# Patient Record
Sex: MALE | Race: ASIAN | NOT HISPANIC OR LATINO | URBAN - METROPOLITAN AREA
[De-identification: names, ages, dates, MRNs, and addresses within clinical notes are randomized per-mention and may not be internally consistent; named-entity substitution may affect disease eponyms.]

---

## 2021-01-01 ENCOUNTER — INPATIENT (INPATIENT)
Facility: HOSPITAL | Age: 61
LOS: 27 days | DRG: 870 | End: 2021-04-05
Attending: INTERNAL MEDICINE | Admitting: STUDENT IN AN ORGANIZED HEALTH CARE EDUCATION/TRAINING PROGRAM
Payer: COMMERCIAL

## 2021-01-01 VITALS
RESPIRATION RATE: 40 BRPM | SYSTOLIC BLOOD PRESSURE: 133 MMHG | OXYGEN SATURATION: 78 % | DIASTOLIC BLOOD PRESSURE: 82 MMHG | HEART RATE: 126 BPM | HEIGHT: 68 IN | TEMPERATURE: 98 F | WEIGHT: 160.06 LBS

## 2021-01-01 VITALS — OXYGEN SATURATION: 85 % | HEART RATE: 82 BPM | RESPIRATION RATE: 38 BRPM

## 2021-01-01 DIAGNOSIS — U07.1 COVID-19: ICD-10-CM

## 2021-01-01 DIAGNOSIS — E11.9 TYPE 2 DIABETES MELLITUS WITHOUT COMPLICATIONS: ICD-10-CM

## 2021-01-01 DIAGNOSIS — I26.99 OTHER PULMONARY EMBOLISM WITHOUT ACUTE COR PULMONALE: ICD-10-CM

## 2021-01-01 DIAGNOSIS — R73.9 HYPERGLYCEMIA, UNSPECIFIED: ICD-10-CM

## 2021-01-01 DIAGNOSIS — R78.81 BACTEREMIA: ICD-10-CM

## 2021-01-01 DIAGNOSIS — E87.2 ACIDOSIS: ICD-10-CM

## 2021-01-01 DIAGNOSIS — I26.93 SINGLE SUBSEGMENTAL PULMONARY EMBOLISM WITHOUT ACUTE COR PULMONALE: ICD-10-CM

## 2021-01-01 DIAGNOSIS — Z29.9 ENCOUNTER FOR PROPHYLACTIC MEASURES, UNSPECIFIED: ICD-10-CM

## 2021-01-01 DIAGNOSIS — R09.02 HYPOXEMIA: ICD-10-CM

## 2021-01-01 DIAGNOSIS — J96.01 ACUTE RESPIRATORY FAILURE WITH HYPOXIA: ICD-10-CM

## 2021-01-01 LAB
-  AMIKACIN: SIGNIFICANT CHANGE UP
-  AMPICILLIN/SULBACTAM: SIGNIFICANT CHANGE UP
-  AMPICILLIN: SIGNIFICANT CHANGE UP
-  AZTREONAM: SIGNIFICANT CHANGE UP
-  CEFAZOLIN: SIGNIFICANT CHANGE UP
-  CEFEPIME: SIGNIFICANT CHANGE UP
-  CEFOXITIN: SIGNIFICANT CHANGE UP
-  CEFTRIAXONE: SIGNIFICANT CHANGE UP
-  CIPROFLOXACIN: SIGNIFICANT CHANGE UP
-  DAPTOMYCIN: SIGNIFICANT CHANGE UP
-  ERTAPENEM: SIGNIFICANT CHANGE UP
-  GENTAMICIN SYNERGY: SIGNIFICANT CHANGE UP
-  GENTAMICIN SYNERGY: SIGNIFICANT CHANGE UP
-  GENTAMICIN: SIGNIFICANT CHANGE UP
-  IMIPENEM: SIGNIFICANT CHANGE UP
-  LEVOFLOXACIN: SIGNIFICANT CHANGE UP
-  LINEZOLID: SIGNIFICANT CHANGE UP
-  MEROPENEM: SIGNIFICANT CHANGE UP
-  PIPERACILLIN/TAZOBACTAM: SIGNIFICANT CHANGE UP
-  STAPHYLOCOCCUS EPIDERMIDIS, METHICILLIN RESISTANT: SIGNIFICANT CHANGE UP
-  TOBRAMYCIN: SIGNIFICANT CHANGE UP
-  TRIMETHOPRIM/SULFAMETHOXAZOLE: SIGNIFICANT CHANGE UP
-  VANCOMYCIN: SIGNIFICANT CHANGE UP
-  VANCOMYCIN: SIGNIFICANT CHANGE UP
A1C WITH ESTIMATED AVERAGE GLUCOSE RESULT: 12.4 % — HIGH (ref 4–5.6)
ALBUMIN SERPL ELPH-MCNC: 1.4 G/DL — LOW (ref 3.3–5)
ALBUMIN SERPL ELPH-MCNC: 1.4 G/DL — LOW (ref 3.3–5)
ALBUMIN SERPL ELPH-MCNC: 1.5 G/DL — LOW (ref 3.3–5)
ALBUMIN SERPL ELPH-MCNC: 1.6 G/DL — LOW (ref 3.3–5)
ALBUMIN SERPL ELPH-MCNC: 1.6 G/DL — LOW (ref 3.3–5)
ALBUMIN SERPL ELPH-MCNC: 1.7 G/DL — LOW (ref 3.3–5)
ALBUMIN SERPL ELPH-MCNC: 1.7 G/DL — LOW (ref 3.3–5)
ALBUMIN SERPL ELPH-MCNC: 1.8 G/DL — LOW (ref 3.3–5)
ALBUMIN SERPL ELPH-MCNC: 1.9 G/DL — LOW (ref 3.3–5)
ALBUMIN SERPL ELPH-MCNC: 1.9 G/DL — LOW (ref 3.3–5)
ALBUMIN SERPL ELPH-MCNC: 2 G/DL — LOW (ref 3.3–5)
ALBUMIN SERPL ELPH-MCNC: 2.1 G/DL — LOW (ref 3.3–5)
ALBUMIN SERPL ELPH-MCNC: 2.2 G/DL — LOW (ref 3.3–5)
ALBUMIN SERPL ELPH-MCNC: 2.3 G/DL — LOW (ref 3.3–5)
ALBUMIN SERPL ELPH-MCNC: 2.3 G/DL — LOW (ref 3.3–5)
ALBUMIN SERPL ELPH-MCNC: 2.4 G/DL — LOW (ref 3.3–5)
ALBUMIN SERPL ELPH-MCNC: 2.5 G/DL — LOW (ref 3.3–5)
ALBUMIN SERPL ELPH-MCNC: 2.6 G/DL — LOW (ref 3.3–5)
ALBUMIN SERPL ELPH-MCNC: 2.7 G/DL — LOW (ref 3.3–5)
ALBUMIN SERPL ELPH-MCNC: 2.8 G/DL — LOW (ref 3.3–5)
ALBUMIN SERPL ELPH-MCNC: 3.1 G/DL — LOW (ref 3.3–5)
ALBUMIN SERPL ELPH-MCNC: 3.1 G/DL — LOW (ref 3.3–5)
ALBUMIN SERPL ELPH-MCNC: 3.3 G/DL — SIGNIFICANT CHANGE UP (ref 3.3–5)
ALBUMIN SERPL ELPH-MCNC: 3.4 G/DL — SIGNIFICANT CHANGE UP (ref 3.3–5)
ALBUMIN SERPL ELPH-MCNC: 3.8 G/DL — SIGNIFICANT CHANGE UP (ref 3.3–5)
ALP SERPL-CCNC: 104 U/L — SIGNIFICANT CHANGE UP (ref 40–120)
ALP SERPL-CCNC: 117 U/L — SIGNIFICANT CHANGE UP (ref 40–120)
ALP SERPL-CCNC: 132 U/L — HIGH (ref 40–120)
ALP SERPL-CCNC: 135 U/L — HIGH (ref 40–120)
ALP SERPL-CCNC: 142 U/L — HIGH (ref 40–120)
ALP SERPL-CCNC: 146 U/L — HIGH (ref 40–120)
ALP SERPL-CCNC: 146 U/L — HIGH (ref 40–120)
ALP SERPL-CCNC: 147 U/L — HIGH (ref 40–120)
ALP SERPL-CCNC: 151 U/L — HIGH (ref 40–120)
ALP SERPL-CCNC: 152 U/L — HIGH (ref 40–120)
ALP SERPL-CCNC: 155 U/L — HIGH (ref 40–120)
ALP SERPL-CCNC: 155 U/L — HIGH (ref 40–120)
ALP SERPL-CCNC: 160 U/L — HIGH (ref 40–120)
ALP SERPL-CCNC: 165 U/L — HIGH (ref 40–120)
ALP SERPL-CCNC: 167 U/L — HIGH (ref 40–120)
ALP SERPL-CCNC: 167 U/L — HIGH (ref 40–120)
ALP SERPL-CCNC: 168 U/L — HIGH (ref 40–120)
ALP SERPL-CCNC: 175 U/L — HIGH (ref 40–120)
ALP SERPL-CCNC: 208 U/L — HIGH (ref 40–120)
ALP SERPL-CCNC: 211 U/L — HIGH (ref 40–120)
ALP SERPL-CCNC: 216 U/L — HIGH (ref 40–120)
ALP SERPL-CCNC: 242 U/L — HIGH (ref 40–120)
ALP SERPL-CCNC: 248 U/L — HIGH (ref 40–120)
ALP SERPL-CCNC: 300 U/L — HIGH (ref 40–120)
ALP SERPL-CCNC: 331 U/L — HIGH (ref 40–120)
ALP SERPL-CCNC: 339 U/L — HIGH (ref 40–120)
ALP SERPL-CCNC: 354 U/L — HIGH (ref 40–120)
ALP SERPL-CCNC: 354 U/L — HIGH (ref 40–120)
ALP SERPL-CCNC: 402 U/L — HIGH (ref 40–120)
ALP SERPL-CCNC: 403 U/L — HIGH (ref 40–120)
ALP SERPL-CCNC: 404 U/L — HIGH (ref 40–120)
ALP SERPL-CCNC: 408 U/L — HIGH (ref 40–120)
ALP SERPL-CCNC: 420 U/L — HIGH (ref 40–120)
ALP SERPL-CCNC: 423 U/L — HIGH (ref 40–120)
ALP SERPL-CCNC: 72 U/L — SIGNIFICANT CHANGE UP (ref 40–120)
ALP SERPL-CCNC: 79 U/L — SIGNIFICANT CHANGE UP (ref 40–120)
ALP SERPL-CCNC: 81 U/L — SIGNIFICANT CHANGE UP (ref 40–120)
ALP SERPL-CCNC: 92 U/L — SIGNIFICANT CHANGE UP (ref 40–120)
ALP SERPL-CCNC: 95 U/L — SIGNIFICANT CHANGE UP (ref 40–120)
ALT FLD-CCNC: 1020 U/L — HIGH (ref 10–45)
ALT FLD-CCNC: 141 U/L — HIGH (ref 10–45)
ALT FLD-CCNC: 23 U/L — SIGNIFICANT CHANGE UP (ref 10–45)
ALT FLD-CCNC: 24 U/L — SIGNIFICANT CHANGE UP (ref 10–45)
ALT FLD-CCNC: 242 U/L — HIGH (ref 10–45)
ALT FLD-CCNC: 26 U/L — SIGNIFICANT CHANGE UP (ref 10–45)
ALT FLD-CCNC: 26 U/L — SIGNIFICANT CHANGE UP (ref 10–45)
ALT FLD-CCNC: 27 U/L — SIGNIFICANT CHANGE UP (ref 10–45)
ALT FLD-CCNC: 28 U/L — SIGNIFICANT CHANGE UP (ref 10–45)
ALT FLD-CCNC: 28 U/L — SIGNIFICANT CHANGE UP (ref 10–45)
ALT FLD-CCNC: 30 U/L — SIGNIFICANT CHANGE UP (ref 10–45)
ALT FLD-CCNC: 31 U/L — SIGNIFICANT CHANGE UP (ref 10–45)
ALT FLD-CCNC: 32 U/L — SIGNIFICANT CHANGE UP (ref 10–45)
ALT FLD-CCNC: 33 U/L — SIGNIFICANT CHANGE UP (ref 10–45)
ALT FLD-CCNC: 34 U/L — SIGNIFICANT CHANGE UP (ref 10–45)
ALT FLD-CCNC: 39 U/L — SIGNIFICANT CHANGE UP (ref 10–45)
ALT FLD-CCNC: 40 U/L — SIGNIFICANT CHANGE UP (ref 10–45)
ALT FLD-CCNC: 43 U/L — SIGNIFICANT CHANGE UP (ref 10–45)
ALT FLD-CCNC: 534 U/L — HIGH (ref 10–45)
ALT FLD-CCNC: 54 U/L — HIGH (ref 10–45)
ALT FLD-CCNC: 56 U/L — HIGH (ref 10–45)
ALT FLD-CCNC: 568 U/L — HIGH (ref 10–45)
ALT FLD-CCNC: 57 U/L — HIGH (ref 10–45)
ALT FLD-CCNC: 60 U/L — HIGH (ref 10–45)
ALT FLD-CCNC: 637 U/L — HIGH (ref 10–45)
ALT FLD-CCNC: 71 U/L — HIGH (ref 10–45)
ALT FLD-CCNC: 779 U/L — HIGH (ref 10–45)
ALT FLD-CCNC: 78 U/L — HIGH (ref 10–45)
ALT FLD-CCNC: 80 U/L — HIGH (ref 10–45)
ALT FLD-CCNC: 82 U/L — HIGH (ref 10–45)
ALT FLD-CCNC: 834 U/L — HIGH (ref 10–45)
ALT FLD-CCNC: 858 U/L — HIGH (ref 10–45)
ALT FLD-CCNC: 86 U/L — HIGH (ref 10–45)
ALT FLD-CCNC: 951 U/L — HIGH (ref 10–45)
ALT FLD-CCNC: 97 U/L — HIGH (ref 10–45)
AMYLASE P1 CFR SERPL: 30 U/L — SIGNIFICANT CHANGE UP (ref 25–125)
ANION GAP SERPL CALC-SCNC: 10 MMOL/L — SIGNIFICANT CHANGE UP (ref 5–17)
ANION GAP SERPL CALC-SCNC: 11 MMOL/L — SIGNIFICANT CHANGE UP (ref 5–17)
ANION GAP SERPL CALC-SCNC: 12 MMOL/L — SIGNIFICANT CHANGE UP (ref 5–17)
ANION GAP SERPL CALC-SCNC: 13 MMOL/L — SIGNIFICANT CHANGE UP (ref 5–17)
ANION GAP SERPL CALC-SCNC: 14 MMOL/L — SIGNIFICANT CHANGE UP (ref 5–17)
ANION GAP SERPL CALC-SCNC: 14 MMOL/L — SIGNIFICANT CHANGE UP (ref 5–17)
ANION GAP SERPL CALC-SCNC: 15 MMOL/L — SIGNIFICANT CHANGE UP (ref 5–17)
ANION GAP SERPL CALC-SCNC: 16 MMOL/L — SIGNIFICANT CHANGE UP (ref 5–17)
ANION GAP SERPL CALC-SCNC: 19 MMOL/L — HIGH (ref 5–17)
ANION GAP SERPL CALC-SCNC: 20 MMOL/L — HIGH (ref 5–17)
ANION GAP SERPL CALC-SCNC: 21 MMOL/L — HIGH (ref 5–17)
ANION GAP SERPL CALC-SCNC: 26 MMOL/L — HIGH (ref 5–17)
ANION GAP SERPL CALC-SCNC: 30 MMOL/L — HIGH (ref 5–17)
ANION GAP SERPL CALC-SCNC: 7 MMOL/L — SIGNIFICANT CHANGE UP (ref 5–17)
ANION GAP SERPL CALC-SCNC: 8 MMOL/L — SIGNIFICANT CHANGE UP (ref 5–17)
ANION GAP SERPL CALC-SCNC: 9 MMOL/L — SIGNIFICANT CHANGE UP (ref 5–17)
ANISOCYTOSIS BLD QL: SLIGHT — SIGNIFICANT CHANGE UP
APPEARANCE UR: ABNORMAL
APPEARANCE UR: CLEAR — SIGNIFICANT CHANGE UP
APPEARANCE UR: CLEAR — SIGNIFICANT CHANGE UP
APTT BLD: 105.9 SEC — HIGH (ref 27.5–35.5)
APTT BLD: 110.4 SEC — HIGH (ref 27.5–35.5)
APTT BLD: 130.3 SEC — CRITICAL HIGH (ref 27.5–35.5)
APTT BLD: 161.1 SEC — CRITICAL HIGH (ref 27.5–35.5)
APTT BLD: 186.5 SEC — CRITICAL HIGH (ref 27.5–35.5)
APTT BLD: 31 SEC — SIGNIFICANT CHANGE UP (ref 27.5–35.5)
APTT BLD: 34.7 SEC — SIGNIFICANT CHANGE UP (ref 27.5–35.5)
APTT BLD: 34.9 SEC — SIGNIFICANT CHANGE UP (ref 27.5–35.5)
APTT BLD: 38.2 SEC — HIGH (ref 27.5–35.5)
APTT BLD: 39.5 SEC — HIGH (ref 27.5–35.5)
APTT BLD: 41.6 SEC — HIGH (ref 27.5–35.5)
APTT BLD: 51.2 SEC — HIGH (ref 27.5–35.5)
APTT BLD: 55 SEC — HIGH (ref 27.5–35.5)
APTT BLD: 60.2 SEC — HIGH (ref 27.5–35.5)
APTT BLD: 62.5 SEC — HIGH (ref 27.5–35.5)
APTT BLD: 65 SEC — HIGH (ref 27.5–35.5)
APTT BLD: 65.3 SEC — HIGH (ref 27.5–35.5)
APTT BLD: 71.8 SEC — HIGH (ref 27.5–35.5)
APTT BLD: 72.1 SEC — HIGH (ref 27.5–35.5)
APTT BLD: 74.3 SEC — HIGH (ref 27.5–35.5)
APTT BLD: 76 SEC — HIGH (ref 27.5–35.5)
APTT BLD: 77.2 SEC — HIGH (ref 27.5–35.5)
APTT BLD: 77.9 SEC — HIGH (ref 27.5–35.5)
APTT BLD: 78.9 SEC — HIGH (ref 27.5–35.5)
APTT BLD: 80.6 SEC — HIGH (ref 27.5–35.5)
APTT BLD: 82.6 SEC — HIGH (ref 27.5–35.5)
APTT BLD: 84.2 SEC — HIGH (ref 27.5–35.5)
APTT BLD: 86.2 SEC — HIGH (ref 27.5–35.5)
APTT BLD: 86.7 SEC — HIGH (ref 27.5–35.5)
APTT BLD: 89.5 SEC — HIGH (ref 27.5–35.5)
APTT BLD: 91.1 SEC — HIGH (ref 27.5–35.5)
APTT BLD: 91.5 SEC — HIGH (ref 27.5–35.5)
APTT BLD: 92.1 SEC — HIGH (ref 27.5–35.5)
APTT BLD: 92.4 SEC — HIGH (ref 27.5–35.5)
APTT BLD: 94.3 SEC — HIGH (ref 27.5–35.5)
AST SERPL-CCNC: 1148 U/L — HIGH (ref 10–40)
AST SERPL-CCNC: 115 U/L — HIGH (ref 10–40)
AST SERPL-CCNC: 1190 U/L — HIGH (ref 10–40)
AST SERPL-CCNC: 1337 U/L — HIGH (ref 10–40)
AST SERPL-CCNC: 153 U/L — HIGH (ref 10–40)
AST SERPL-CCNC: 160 U/L — HIGH (ref 10–40)
AST SERPL-CCNC: 169 U/L — HIGH (ref 10–40)
AST SERPL-CCNC: 181 U/L — HIGH (ref 10–40)
AST SERPL-CCNC: 20 U/L — SIGNIFICANT CHANGE UP (ref 10–40)
AST SERPL-CCNC: 31 U/L — SIGNIFICANT CHANGE UP (ref 10–40)
AST SERPL-CCNC: 31 U/L — SIGNIFICANT CHANGE UP (ref 10–40)
AST SERPL-CCNC: 32 U/L — SIGNIFICANT CHANGE UP (ref 10–40)
AST SERPL-CCNC: 3287 U/L — HIGH (ref 10–40)
AST SERPL-CCNC: 33 U/L — SIGNIFICANT CHANGE UP (ref 10–40)
AST SERPL-CCNC: 35 U/L — SIGNIFICANT CHANGE UP (ref 10–40)
AST SERPL-CCNC: 36 U/L — SIGNIFICANT CHANGE UP (ref 10–40)
AST SERPL-CCNC: 37 U/L — SIGNIFICANT CHANGE UP (ref 10–40)
AST SERPL-CCNC: 38 U/L — SIGNIFICANT CHANGE UP (ref 10–40)
AST SERPL-CCNC: 39 U/L — SIGNIFICANT CHANGE UP (ref 10–40)
AST SERPL-CCNC: 39 U/L — SIGNIFICANT CHANGE UP (ref 10–40)
AST SERPL-CCNC: 41 U/L — HIGH (ref 10–40)
AST SERPL-CCNC: 42 U/L — HIGH (ref 10–40)
AST SERPL-CCNC: 42 U/L — HIGH (ref 10–40)
AST SERPL-CCNC: 43 U/L — HIGH (ref 10–40)
AST SERPL-CCNC: 45 U/L — HIGH (ref 10–40)
AST SERPL-CCNC: 46 U/L — HIGH (ref 10–40)
AST SERPL-CCNC: 542 U/L — HIGH (ref 10–40)
AST SERPL-CCNC: 60 U/L — HIGH (ref 10–40)
AST SERPL-CCNC: 709 U/L — HIGH (ref 10–40)
AST SERPL-CCNC: 73 U/L — HIGH (ref 10–40)
AST SERPL-CCNC: 78 U/L — HIGH (ref 10–40)
AST SERPL-CCNC: 79 U/L — HIGH (ref 10–40)
AST SERPL-CCNC: 83 U/L — HIGH (ref 10–40)
AST SERPL-CCNC: 917 U/L — HIGH (ref 10–40)
BACTERIA # UR AUTO: NEGATIVE — SIGNIFICANT CHANGE UP
BASE EXCESS BLDA CALC-SCNC: -1.3 MMOL/L — SIGNIFICANT CHANGE UP (ref -2–2)
BASE EXCESS BLDA CALC-SCNC: -2.2 MMOL/L — LOW (ref -2–2)
BASE EXCESS BLDA CALC-SCNC: -2.3 MMOL/L — LOW (ref -2–2)
BASE EXCESS BLDA CALC-SCNC: 0.6 MMOL/L — SIGNIFICANT CHANGE UP (ref -2–2)
BASE EXCESS BLDA CALC-SCNC: 2.2 MMOL/L — HIGH (ref -2–2)
BASE EXCESS BLDV CALC-SCNC: -1.5 MMOL/L — SIGNIFICANT CHANGE UP (ref -2–2)
BASE EXCESS BLDV CALC-SCNC: -1.5 MMOL/L — SIGNIFICANT CHANGE UP (ref -2–2)
BASE EXCESS BLDV CALC-SCNC: -21.9 MMOL/L — LOW (ref -2–2)
BASE EXCESS BLDV CALC-SCNC: -4.1 MMOL/L — LOW (ref -2–2)
BASOPHILS # BLD AUTO: 0 K/UL — SIGNIFICANT CHANGE UP (ref 0–0.2)
BASOPHILS # BLD AUTO: 0.01 K/UL — SIGNIFICANT CHANGE UP (ref 0–0.2)
BASOPHILS # BLD AUTO: 0.02 K/UL — SIGNIFICANT CHANGE UP (ref 0–0.2)
BASOPHILS # BLD AUTO: 0.03 K/UL — SIGNIFICANT CHANGE UP (ref 0–0.2)
BASOPHILS # BLD AUTO: 0.04 K/UL — SIGNIFICANT CHANGE UP (ref 0–0.2)
BASOPHILS # BLD AUTO: 0.05 K/UL — SIGNIFICANT CHANGE UP (ref 0–0.2)
BASOPHILS # BLD AUTO: 0.13 K/UL — SIGNIFICANT CHANGE UP (ref 0–0.2)
BASOPHILS NFR BLD AUTO: 0 % — SIGNIFICANT CHANGE UP (ref 0–2)
BASOPHILS NFR BLD AUTO: 0.1 % — SIGNIFICANT CHANGE UP (ref 0–2)
BASOPHILS NFR BLD AUTO: 0.2 % — SIGNIFICANT CHANGE UP (ref 0–2)
BASOPHILS NFR BLD AUTO: 0.3 % — SIGNIFICANT CHANGE UP (ref 0–2)
BASOPHILS NFR BLD AUTO: 0.3 % — SIGNIFICANT CHANGE UP (ref 0–2)
BASOPHILS NFR BLD AUTO: 0.4 % — SIGNIFICANT CHANGE UP (ref 0–2)
BASOPHILS NFR BLD AUTO: 0.4 % — SIGNIFICANT CHANGE UP (ref 0–2)
BASOPHILS NFR BLD AUTO: 0.6 % — SIGNIFICANT CHANGE UP (ref 0–2)
BASOPHILS NFR BLD AUTO: 1.3 % — SIGNIFICANT CHANGE UP (ref 0–2)
BILIRUB DIRECT SERPL-MCNC: 0.2 MG/DL — SIGNIFICANT CHANGE UP (ref 0–0.2)
BILIRUB DIRECT SERPL-MCNC: 0.2 MG/DL — SIGNIFICANT CHANGE UP (ref 0–0.2)
BILIRUB INDIRECT FLD-MCNC: 0.4 MG/DL — SIGNIFICANT CHANGE UP (ref 0.2–1)
BILIRUB INDIRECT FLD-MCNC: 0.6 MG/DL — SIGNIFICANT CHANGE UP (ref 0.2–1)
BILIRUB SERPL-MCNC: 0.3 MG/DL — SIGNIFICANT CHANGE UP (ref 0.2–1.2)
BILIRUB SERPL-MCNC: 0.4 MG/DL — SIGNIFICANT CHANGE UP (ref 0.2–1.2)
BILIRUB SERPL-MCNC: 0.5 MG/DL — SIGNIFICANT CHANGE UP (ref 0.2–1.2)
BILIRUB SERPL-MCNC: 0.6 MG/DL — SIGNIFICANT CHANGE UP (ref 0.2–1.2)
BILIRUB SERPL-MCNC: 0.7 MG/DL — SIGNIFICANT CHANGE UP (ref 0.2–1.2)
BILIRUB SERPL-MCNC: 0.8 MG/DL — SIGNIFICANT CHANGE UP (ref 0.2–1.2)
BILIRUB SERPL-MCNC: 0.8 MG/DL — SIGNIFICANT CHANGE UP (ref 0.2–1.2)
BILIRUB SERPL-MCNC: 0.9 MG/DL — SIGNIFICANT CHANGE UP (ref 0.2–1.2)
BILIRUB SERPL-MCNC: 1 MG/DL — SIGNIFICANT CHANGE UP (ref 0.2–1.2)
BILIRUB SERPL-MCNC: 1.1 MG/DL — SIGNIFICANT CHANGE UP (ref 0.2–1.2)
BILIRUB SERPL-MCNC: 1.2 MG/DL — SIGNIFICANT CHANGE UP (ref 0.2–1.2)
BILIRUB SERPL-MCNC: 1.3 MG/DL — HIGH (ref 0.2–1.2)
BILIRUB SERPL-MCNC: 1.3 MG/DL — HIGH (ref 0.2–1.2)
BILIRUB UR-MCNC: NEGATIVE — SIGNIFICANT CHANGE UP
BLD GP AB SCN SERPL QL: NEGATIVE — SIGNIFICANT CHANGE UP
BUN SERPL-MCNC: 10 MG/DL — SIGNIFICANT CHANGE UP (ref 7–23)
BUN SERPL-MCNC: 10 MG/DL — SIGNIFICANT CHANGE UP (ref 7–23)
BUN SERPL-MCNC: 12 MG/DL — SIGNIFICANT CHANGE UP (ref 7–23)
BUN SERPL-MCNC: 13 MG/DL — SIGNIFICANT CHANGE UP (ref 7–23)
BUN SERPL-MCNC: 14 MG/DL — SIGNIFICANT CHANGE UP (ref 7–23)
BUN SERPL-MCNC: 14 MG/DL — SIGNIFICANT CHANGE UP (ref 7–23)
BUN SERPL-MCNC: 17 MG/DL — SIGNIFICANT CHANGE UP (ref 7–23)
BUN SERPL-MCNC: 18 MG/DL — SIGNIFICANT CHANGE UP (ref 7–23)
BUN SERPL-MCNC: 18 MG/DL — SIGNIFICANT CHANGE UP (ref 7–23)
BUN SERPL-MCNC: 19 MG/DL — SIGNIFICANT CHANGE UP (ref 7–23)
BUN SERPL-MCNC: 19 MG/DL — SIGNIFICANT CHANGE UP (ref 7–23)
BUN SERPL-MCNC: 20 MG/DL — SIGNIFICANT CHANGE UP (ref 7–23)
BUN SERPL-MCNC: 21 MG/DL — SIGNIFICANT CHANGE UP (ref 7–23)
BUN SERPL-MCNC: 21 MG/DL — SIGNIFICANT CHANGE UP (ref 7–23)
BUN SERPL-MCNC: 22 MG/DL — SIGNIFICANT CHANGE UP (ref 7–23)
BUN SERPL-MCNC: 24 MG/DL — HIGH (ref 7–23)
BUN SERPL-MCNC: 25 MG/DL — HIGH (ref 7–23)
BUN SERPL-MCNC: 25 MG/DL — HIGH (ref 7–23)
BUN SERPL-MCNC: 26 MG/DL — HIGH (ref 7–23)
BUN SERPL-MCNC: 26 MG/DL — HIGH (ref 7–23)
BUN SERPL-MCNC: 27 MG/DL — HIGH (ref 7–23)
BUN SERPL-MCNC: 28 MG/DL — HIGH (ref 7–23)
BUN SERPL-MCNC: 28 MG/DL — HIGH (ref 7–23)
BUN SERPL-MCNC: 30 MG/DL — HIGH (ref 7–23)
BUN SERPL-MCNC: 30 MG/DL — HIGH (ref 7–23)
BUN SERPL-MCNC: 32 MG/DL — HIGH (ref 7–23)
BUN SERPL-MCNC: 34 MG/DL — HIGH (ref 7–23)
BUN SERPL-MCNC: 34 MG/DL — HIGH (ref 7–23)
BUN SERPL-MCNC: 36 MG/DL — HIGH (ref 7–23)
BUN SERPL-MCNC: 6 MG/DL — LOW (ref 7–23)
BUN SERPL-MCNC: 7 MG/DL — SIGNIFICANT CHANGE UP (ref 7–23)
BUN SERPL-MCNC: 8 MG/DL — SIGNIFICANT CHANGE UP (ref 7–23)
BUN SERPL-MCNC: 8 MG/DL — SIGNIFICANT CHANGE UP (ref 7–23)
BUN SERPL-MCNC: 9 MG/DL — SIGNIFICANT CHANGE UP (ref 7–23)
CA-I BLDA-SCNC: 1.12 MMOL/L — SIGNIFICANT CHANGE UP (ref 1.12–1.3)
CA-I SERPL-SCNC: 0.96 MMOL/L — LOW (ref 1.12–1.3)
CA-I SERPL-SCNC: 1.05 MMOL/L — LOW (ref 1.12–1.3)
CA-I SERPL-SCNC: 1.06 MMOL/L — LOW (ref 1.12–1.3)
CA-I SERPL-SCNC: 1.16 MMOL/L — SIGNIFICANT CHANGE UP (ref 1.12–1.3)
CALCIUM SERPL-MCNC: 10.5 MG/DL — SIGNIFICANT CHANGE UP (ref 8.4–10.5)
CALCIUM SERPL-MCNC: 6.9 MG/DL — LOW (ref 8.4–10.5)
CALCIUM SERPL-MCNC: 7 MG/DL — LOW (ref 8.4–10.5)
CALCIUM SERPL-MCNC: 7.1 MG/DL — LOW (ref 8.4–10.5)
CALCIUM SERPL-MCNC: 7.3 MG/DL — LOW (ref 8.4–10.5)
CALCIUM SERPL-MCNC: 7.4 MG/DL — LOW (ref 8.4–10.5)
CALCIUM SERPL-MCNC: 7.4 MG/DL — LOW (ref 8.4–10.5)
CALCIUM SERPL-MCNC: 7.5 MG/DL — LOW (ref 8.4–10.5)
CALCIUM SERPL-MCNC: 7.6 MG/DL — LOW (ref 8.4–10.5)
CALCIUM SERPL-MCNC: 7.6 MG/DL — LOW (ref 8.4–10.5)
CALCIUM SERPL-MCNC: 7.7 MG/DL — LOW (ref 8.4–10.5)
CALCIUM SERPL-MCNC: 7.8 MG/DL — LOW (ref 8.4–10.5)
CALCIUM SERPL-MCNC: 7.9 MG/DL — LOW (ref 8.4–10.5)
CALCIUM SERPL-MCNC: 8 MG/DL — LOW (ref 8.4–10.5)
CALCIUM SERPL-MCNC: 8.1 MG/DL — LOW (ref 8.4–10.5)
CALCIUM SERPL-MCNC: 8.3 MG/DL — LOW (ref 8.4–10.5)
CALCIUM SERPL-MCNC: 8.4 MG/DL — SIGNIFICANT CHANGE UP (ref 8.4–10.5)
CALCIUM SERPL-MCNC: 8.6 MG/DL — SIGNIFICANT CHANGE UP (ref 8.4–10.5)
CALCIUM SERPL-MCNC: 8.7 MG/DL — SIGNIFICANT CHANGE UP (ref 8.4–10.5)
CALCIUM SERPL-MCNC: 8.7 MG/DL — SIGNIFICANT CHANGE UP (ref 8.4–10.5)
CALCIUM SERPL-MCNC: 8.8 MG/DL — SIGNIFICANT CHANGE UP (ref 8.4–10.5)
CALCIUM SERPL-MCNC: 8.9 MG/DL — SIGNIFICANT CHANGE UP (ref 8.4–10.5)
CALCIUM SERPL-MCNC: 9 MG/DL — SIGNIFICANT CHANGE UP (ref 8.4–10.5)
CALCIUM SERPL-MCNC: 9.2 MG/DL — SIGNIFICANT CHANGE UP (ref 8.4–10.5)
CALCIUM SERPL-MCNC: 9.3 MG/DL — SIGNIFICANT CHANGE UP (ref 8.4–10.5)
CALCIUM SERPL-MCNC: 9.3 MG/DL — SIGNIFICANT CHANGE UP (ref 8.4–10.5)
CHLORIDE BLDA-SCNC: 110 MMOL/L — HIGH (ref 96–108)
CHLORIDE BLDV-SCNC: 101 MMOL/L — SIGNIFICANT CHANGE UP (ref 96–108)
CHLORIDE BLDV-SCNC: 115 MMOL/L — HIGH (ref 96–108)
CHLORIDE BLDV-SCNC: 116 MMOL/L — HIGH (ref 96–108)
CHLORIDE BLDV-SCNC: 97 MMOL/L — SIGNIFICANT CHANGE UP (ref 96–108)
CHLORIDE SERPL-SCNC: 100 MMOL/L — SIGNIFICANT CHANGE UP (ref 96–108)
CHLORIDE SERPL-SCNC: 100 MMOL/L — SIGNIFICANT CHANGE UP (ref 96–108)
CHLORIDE SERPL-SCNC: 101 MMOL/L — SIGNIFICANT CHANGE UP (ref 96–108)
CHLORIDE SERPL-SCNC: 103 MMOL/L — SIGNIFICANT CHANGE UP (ref 96–108)
CHLORIDE SERPL-SCNC: 104 MMOL/L — SIGNIFICANT CHANGE UP (ref 96–108)
CHLORIDE SERPL-SCNC: 105 MMOL/L — SIGNIFICANT CHANGE UP (ref 96–108)
CHLORIDE SERPL-SCNC: 106 MMOL/L — SIGNIFICANT CHANGE UP (ref 96–108)
CHLORIDE SERPL-SCNC: 107 MMOL/L — SIGNIFICANT CHANGE UP (ref 96–108)
CHLORIDE SERPL-SCNC: 107 MMOL/L — SIGNIFICANT CHANGE UP (ref 96–108)
CHLORIDE SERPL-SCNC: 108 MMOL/L — SIGNIFICANT CHANGE UP (ref 96–108)
CHLORIDE SERPL-SCNC: 111 MMOL/L — HIGH (ref 96–108)
CHLORIDE SERPL-SCNC: 112 MMOL/L — HIGH (ref 96–108)
CHLORIDE SERPL-SCNC: 113 MMOL/L — HIGH (ref 96–108)
CHLORIDE SERPL-SCNC: 114 MMOL/L — HIGH (ref 96–108)
CHLORIDE SERPL-SCNC: 92 MMOL/L — LOW (ref 96–108)
CHLORIDE SERPL-SCNC: 92 MMOL/L — LOW (ref 96–108)
CHLORIDE SERPL-SCNC: 94 MMOL/L — LOW (ref 96–108)
CHLORIDE SERPL-SCNC: 95 MMOL/L — LOW (ref 96–108)
CHLORIDE SERPL-SCNC: 96 MMOL/L — SIGNIFICANT CHANGE UP (ref 96–108)
CHLORIDE SERPL-SCNC: 97 MMOL/L — SIGNIFICANT CHANGE UP (ref 96–108)
CHLORIDE SERPL-SCNC: 98 MMOL/L — SIGNIFICANT CHANGE UP (ref 96–108)
CHLORIDE SERPL-SCNC: 99 MMOL/L — SIGNIFICANT CHANGE UP (ref 96–108)
CHLORIDE UR-SCNC: <35 MMOL/L — SIGNIFICANT CHANGE UP
CK MB CFR SERPL CALC: 1 NG/ML — SIGNIFICANT CHANGE UP (ref 0–6.7)
CK SERPL-CCNC: 143 U/L — SIGNIFICANT CHANGE UP (ref 30–200)
CK SERPL-CCNC: 30 U/L — SIGNIFICANT CHANGE UP (ref 30–200)
CK SERPL-CCNC: 37 U/L — SIGNIFICANT CHANGE UP (ref 30–200)
CK SERPL-CCNC: 42 U/L — SIGNIFICANT CHANGE UP (ref 30–200)
CK SERPL-CCNC: 68 U/L — SIGNIFICANT CHANGE UP (ref 30–200)
CK SERPL-CCNC: 68 U/L — SIGNIFICANT CHANGE UP (ref 30–200)
CO2 BLDA-SCNC: 20 MMOL/L — LOW (ref 22–30)
CO2 BLDA-SCNC: 20 MMOL/L — LOW (ref 22–30)
CO2 BLDA-SCNC: 24 MMOL/L — SIGNIFICANT CHANGE UP (ref 22–30)
CO2 BLDA-SCNC: 27 MMOL/L — SIGNIFICANT CHANGE UP (ref 22–30)
CO2 BLDA-SCNC: 27 MMOL/L — SIGNIFICANT CHANGE UP (ref 22–30)
CO2 BLDV-SCNC: 13 MMOL/L — LOW (ref 22–30)
CO2 BLDV-SCNC: 23 MMOL/L — SIGNIFICANT CHANGE UP (ref 22–30)
CO2 BLDV-SCNC: 24 MMOL/L — SIGNIFICANT CHANGE UP (ref 22–30)
CO2 BLDV-SCNC: 29 MMOL/L — SIGNIFICANT CHANGE UP (ref 22–30)
CO2 SERPL-SCNC: 13 MMOL/L — LOW (ref 22–31)
CO2 SERPL-SCNC: 18 MMOL/L — LOW (ref 22–31)
CO2 SERPL-SCNC: 19 MMOL/L — LOW (ref 22–31)
CO2 SERPL-SCNC: 19 MMOL/L — LOW (ref 22–31)
CO2 SERPL-SCNC: 20 MMOL/L — LOW (ref 22–31)
CO2 SERPL-SCNC: 21 MMOL/L — LOW (ref 22–31)
CO2 SERPL-SCNC: 22 MMOL/L — SIGNIFICANT CHANGE UP (ref 22–31)
CO2 SERPL-SCNC: 23 MMOL/L — SIGNIFICANT CHANGE UP (ref 22–31)
CO2 SERPL-SCNC: 24 MMOL/L — SIGNIFICANT CHANGE UP (ref 22–31)
CO2 SERPL-SCNC: 25 MMOL/L — SIGNIFICANT CHANGE UP (ref 22–31)
CO2 SERPL-SCNC: 26 MMOL/L — SIGNIFICANT CHANGE UP (ref 22–31)
CO2 SERPL-SCNC: 27 MMOL/L — SIGNIFICANT CHANGE UP (ref 22–31)
CO2 SERPL-SCNC: 27 MMOL/L — SIGNIFICANT CHANGE UP (ref 22–31)
CO2 SERPL-SCNC: 32 MMOL/L — HIGH (ref 22–31)
CO2 SERPL-SCNC: <10 MMOL/L — CRITICAL LOW (ref 22–31)
CO2 SERPL-SCNC: <10 MMOL/L — CRITICAL LOW (ref 22–31)
COLOR SPEC: COLORLESS — SIGNIFICANT CHANGE UP
COLOR SPEC: YELLOW — SIGNIFICANT CHANGE UP
COLOR SPEC: YELLOW — SIGNIFICANT CHANGE UP
CREAT ?TM UR-MCNC: 8 MG/DL — SIGNIFICANT CHANGE UP
CREAT SERPL-MCNC: 0.35 MG/DL — LOW (ref 0.5–1.3)
CREAT SERPL-MCNC: 0.36 MG/DL — LOW (ref 0.5–1.3)
CREAT SERPL-MCNC: 0.39 MG/DL — LOW (ref 0.5–1.3)
CREAT SERPL-MCNC: 0.4 MG/DL — LOW (ref 0.5–1.3)
CREAT SERPL-MCNC: 0.41 MG/DL — LOW (ref 0.5–1.3)
CREAT SERPL-MCNC: 0.43 MG/DL — LOW (ref 0.5–1.3)
CREAT SERPL-MCNC: 0.45 MG/DL — LOW (ref 0.5–1.3)
CREAT SERPL-MCNC: 0.48 MG/DL — LOW (ref 0.5–1.3)
CREAT SERPL-MCNC: 0.49 MG/DL — LOW (ref 0.5–1.3)
CREAT SERPL-MCNC: 0.51 MG/DL — SIGNIFICANT CHANGE UP (ref 0.5–1.3)
CREAT SERPL-MCNC: 0.52 MG/DL — SIGNIFICANT CHANGE UP (ref 0.5–1.3)
CREAT SERPL-MCNC: 0.53 MG/DL — SIGNIFICANT CHANGE UP (ref 0.5–1.3)
CREAT SERPL-MCNC: 0.53 MG/DL — SIGNIFICANT CHANGE UP (ref 0.5–1.3)
CREAT SERPL-MCNC: 0.57 MG/DL — SIGNIFICANT CHANGE UP (ref 0.5–1.3)
CREAT SERPL-MCNC: 0.57 MG/DL — SIGNIFICANT CHANGE UP (ref 0.5–1.3)
CREAT SERPL-MCNC: 0.58 MG/DL — SIGNIFICANT CHANGE UP (ref 0.5–1.3)
CREAT SERPL-MCNC: 0.59 MG/DL — SIGNIFICANT CHANGE UP (ref 0.5–1.3)
CREAT SERPL-MCNC: 0.59 MG/DL — SIGNIFICANT CHANGE UP (ref 0.5–1.3)
CREAT SERPL-MCNC: 0.6 MG/DL — SIGNIFICANT CHANGE UP (ref 0.5–1.3)
CREAT SERPL-MCNC: 0.61 MG/DL — SIGNIFICANT CHANGE UP (ref 0.5–1.3)
CREAT SERPL-MCNC: 0.62 MG/DL — SIGNIFICANT CHANGE UP (ref 0.5–1.3)
CREAT SERPL-MCNC: 0.63 MG/DL — SIGNIFICANT CHANGE UP (ref 0.5–1.3)
CREAT SERPL-MCNC: 0.64 MG/DL — SIGNIFICANT CHANGE UP (ref 0.5–1.3)
CREAT SERPL-MCNC: 0.65 MG/DL — SIGNIFICANT CHANGE UP (ref 0.5–1.3)
CREAT SERPL-MCNC: 0.66 MG/DL — SIGNIFICANT CHANGE UP (ref 0.5–1.3)
CREAT SERPL-MCNC: 0.69 MG/DL — SIGNIFICANT CHANGE UP (ref 0.5–1.3)
CREAT SERPL-MCNC: 0.7 MG/DL — SIGNIFICANT CHANGE UP (ref 0.5–1.3)
CREAT SERPL-MCNC: 0.7 MG/DL — SIGNIFICANT CHANGE UP (ref 0.5–1.3)
CREAT SERPL-MCNC: 0.71 MG/DL — SIGNIFICANT CHANGE UP (ref 0.5–1.3)
CREAT SERPL-MCNC: 0.73 MG/DL — SIGNIFICANT CHANGE UP (ref 0.5–1.3)
CREAT SERPL-MCNC: 0.77 MG/DL — SIGNIFICANT CHANGE UP (ref 0.5–1.3)
CREAT SERPL-MCNC: 0.79 MG/DL — SIGNIFICANT CHANGE UP (ref 0.5–1.3)
CREAT SERPL-MCNC: 0.84 MG/DL — SIGNIFICANT CHANGE UP (ref 0.5–1.3)
CREAT SERPL-MCNC: 1.29 MG/DL — SIGNIFICANT CHANGE UP (ref 0.5–1.3)
CREAT SERPL-MCNC: 1.45 MG/DL — HIGH (ref 0.5–1.3)
CREAT SERPL-MCNC: 1.76 MG/DL — HIGH (ref 0.5–1.3)
CREAT SERPL-MCNC: 1.78 MG/DL — HIGH (ref 0.5–1.3)
CREAT SERPL-MCNC: 1.89 MG/DL — HIGH (ref 0.5–1.3)
CREAT SERPL-MCNC: <0.3 MG/DL — LOW (ref 0.5–1.3)
CRP SERPL-MCNC: 10.6 MG/DL — HIGH (ref 0–0.4)
CRP SERPL-MCNC: 12.05 MG/DL — HIGH (ref 0–0.4)
CRP SERPL-MCNC: 128 MG/L — HIGH (ref 0–4)
CRP SERPL-MCNC: 18.45 MG/DL — HIGH (ref 0–0.4)
CRP SERPL-MCNC: 184 MG/L — HIGH (ref 0–4)
CRP SERPL-MCNC: 19.07 MG/DL — HIGH (ref 0–0.4)
CRP SERPL-MCNC: 20.43 MG/DL — HIGH (ref 0–0.4)
CRP SERPL-MCNC: 23.84 MG/DL — HIGH (ref 0–0.4)
CRP SERPL-MCNC: 25.62 MG/DL — HIGH (ref 0–0.4)
CRP SERPL-MCNC: 6.12 MG/DL — HIGH (ref 0–0.4)
CRP SERPL-MCNC: 6.96 MG/DL — HIGH (ref 0–0.4)
CRP SERPL-MCNC: 9.01 MG/DL — HIGH (ref 0–0.4)
CRP SERPL-MCNC: >330 MG/L — HIGH (ref 0–4)
CULTURE RESULTS: SIGNIFICANT CHANGE UP
D DIMER BLD IA.RAPID-MCNC: 1128 NG/ML DDU — HIGH
D DIMER BLD IA.RAPID-MCNC: 1300 NG/ML DDU — HIGH
D DIMER BLD IA.RAPID-MCNC: 2725 NG/ML DDU — HIGH
D DIMER BLD IA.RAPID-MCNC: 2759 NG/ML DDU — HIGH
D DIMER BLD IA.RAPID-MCNC: 303 NG/ML DDU — HIGH
D DIMER BLD IA.RAPID-MCNC: 3552 NG/ML DDU — HIGH
D DIMER BLD IA.RAPID-MCNC: 621 NG/ML DDU — HIGH
D DIMER BLD IA.RAPID-MCNC: 636 NG/ML DDU — HIGH
D DIMER BLD IA.RAPID-MCNC: 643 NG/ML DDU — HIGH
D DIMER BLD IA.RAPID-MCNC: 695 NG/ML DDU — HIGH
D DIMER BLD IA.RAPID-MCNC: 7399 NG/ML DDU — HIGH
D DIMER BLD IA.RAPID-MCNC: 848 NG/ML DDU — HIGH
D DIMER BLD IA.RAPID-MCNC: 934 NG/ML DDU — HIGH
D DIMER BLD IA.RAPID-MCNC: <150 NG/ML DDU — SIGNIFICANT CHANGE UP
D DIMER BLD IA.RAPID-MCNC: HIGH NG/ML DDU
D DIMER BLD IA.RAPID-MCNC: HIGH NG/ML DDU
DACRYOCYTES BLD QL SMEAR: SLIGHT — SIGNIFICANT CHANGE UP
DIFF PNL FLD: ABNORMAL
DIFF PNL FLD: ABNORMAL
DIFF PNL FLD: NEGATIVE — SIGNIFICANT CHANGE UP
E COLI DNA BLD POS QL NAA+NON-PROBE: SIGNIFICANT CHANGE UP
ELLIPTOCYTES BLD QL SMEAR: SLIGHT — SIGNIFICANT CHANGE UP
ENTEROCOC DNA BLD POS QL NAA+NON-PROBE: SIGNIFICANT CHANGE UP
EOSINOPHIL # BLD AUTO: 0 K/UL — SIGNIFICANT CHANGE UP (ref 0–0.5)
EOSINOPHIL # BLD AUTO: 0.02 K/UL — SIGNIFICANT CHANGE UP (ref 0–0.5)
EOSINOPHIL # BLD AUTO: 0.07 K/UL — SIGNIFICANT CHANGE UP (ref 0–0.5)
EOSINOPHIL # BLD AUTO: 0.1 K/UL — SIGNIFICANT CHANGE UP (ref 0–0.5)
EOSINOPHIL # BLD AUTO: 0.11 K/UL — SIGNIFICANT CHANGE UP (ref 0–0.5)
EOSINOPHIL # BLD AUTO: 0.17 K/UL — SIGNIFICANT CHANGE UP (ref 0–0.5)
EOSINOPHIL # BLD AUTO: 0.19 K/UL — SIGNIFICANT CHANGE UP (ref 0–0.5)
EOSINOPHIL # BLD AUTO: 0.21 K/UL — SIGNIFICANT CHANGE UP (ref 0–0.5)
EOSINOPHIL # BLD AUTO: 0.21 K/UL — SIGNIFICANT CHANGE UP (ref 0–0.5)
EOSINOPHIL # BLD AUTO: 0.23 K/UL — SIGNIFICANT CHANGE UP (ref 0–0.5)
EOSINOPHIL # BLD AUTO: 0.29 K/UL — SIGNIFICANT CHANGE UP (ref 0–0.5)
EOSINOPHIL # BLD AUTO: 0.32 K/UL — SIGNIFICANT CHANGE UP (ref 0–0.5)
EOSINOPHIL # BLD AUTO: 0.33 K/UL — SIGNIFICANT CHANGE UP (ref 0–0.5)
EOSINOPHIL # BLD AUTO: 0.42 K/UL — SIGNIFICANT CHANGE UP (ref 0–0.5)
EOSINOPHIL # BLD AUTO: 0.43 K/UL — SIGNIFICANT CHANGE UP (ref 0–0.5)
EOSINOPHIL # BLD AUTO: 0.46 K/UL — SIGNIFICANT CHANGE UP (ref 0–0.5)
EOSINOPHIL # BLD AUTO: 0.46 K/UL — SIGNIFICANT CHANGE UP (ref 0–0.5)
EOSINOPHIL # BLD AUTO: 0.47 K/UL — SIGNIFICANT CHANGE UP (ref 0–0.5)
EOSINOPHIL NFR BLD AUTO: 0 % — SIGNIFICANT CHANGE UP (ref 0–6)
EOSINOPHIL NFR BLD AUTO: 0.1 % — SIGNIFICANT CHANGE UP (ref 0–6)
EOSINOPHIL NFR BLD AUTO: 0.1 % — SIGNIFICANT CHANGE UP (ref 0–6)
EOSINOPHIL NFR BLD AUTO: 0.2 % — SIGNIFICANT CHANGE UP (ref 0–6)
EOSINOPHIL NFR BLD AUTO: 0.6 % — SIGNIFICANT CHANGE UP (ref 0–6)
EOSINOPHIL NFR BLD AUTO: 0.7 % — SIGNIFICANT CHANGE UP (ref 0–6)
EOSINOPHIL NFR BLD AUTO: 1 % — SIGNIFICANT CHANGE UP (ref 0–6)
EOSINOPHIL NFR BLD AUTO: 1.4 % — SIGNIFICANT CHANGE UP (ref 0–6)
EOSINOPHIL NFR BLD AUTO: 1.4 % — SIGNIFICANT CHANGE UP (ref 0–6)
EOSINOPHIL NFR BLD AUTO: 1.5 % — SIGNIFICANT CHANGE UP (ref 0–6)
EOSINOPHIL NFR BLD AUTO: 1.8 % — SIGNIFICANT CHANGE UP (ref 0–6)
EOSINOPHIL NFR BLD AUTO: 2 % — SIGNIFICANT CHANGE UP (ref 0–6)
EOSINOPHIL NFR BLD AUTO: 2.1 % — SIGNIFICANT CHANGE UP (ref 0–6)
EOSINOPHIL NFR BLD AUTO: 2.2 % — SIGNIFICANT CHANGE UP (ref 0–6)
EOSINOPHIL NFR BLD AUTO: 2.5 % — SIGNIFICANT CHANGE UP (ref 0–6)
EOSINOPHIL NFR BLD AUTO: 3.4 % — SIGNIFICANT CHANGE UP (ref 0–6)
EOSINOPHIL NFR BLD AUTO: 3.8 % — SIGNIFICANT CHANGE UP (ref 0–6)
EOSINOPHIL NFR BLD AUTO: 4.2 % — SIGNIFICANT CHANGE UP (ref 0–6)
EOSINOPHIL NFR BLD AUTO: 6.2 % — HIGH (ref 0–6)
EOSINOPHIL NFR BLD AUTO: 6.9 % — HIGH (ref 0–6)
EPI CELLS # UR: 0 /HPF — SIGNIFICANT CHANGE UP
EPI CELLS # UR: 1 /HPF — SIGNIFICANT CHANGE UP
EPI CELLS # UR: 2 /HPF — SIGNIFICANT CHANGE UP
ESTIMATED AVERAGE GLUCOSE: 309 MG/DL — HIGH (ref 68–114)
FERRITIN SERPL-MCNC: 1039 NG/ML — HIGH (ref 30–400)
FERRITIN SERPL-MCNC: 1122 NG/ML — HIGH (ref 30–400)
FERRITIN SERPL-MCNC: 1144 NG/ML — HIGH (ref 30–400)
FERRITIN SERPL-MCNC: 1160 NG/ML — HIGH (ref 30–400)
FERRITIN SERPL-MCNC: 3091 NG/ML — HIGH (ref 30–400)
FERRITIN SERPL-MCNC: 3119 NG/ML — HIGH (ref 30–400)
FERRITIN SERPL-MCNC: 593 NG/ML — HIGH (ref 30–400)
FERRITIN SERPL-MCNC: 689 NG/ML — HIGH (ref 30–400)
FERRITIN SERPL-MCNC: 694 NG/ML — HIGH (ref 30–400)
FERRITIN SERPL-MCNC: 740 NG/ML — HIGH (ref 30–400)
FERRITIN SERPL-MCNC: 791 NG/ML — HIGH (ref 30–400)
FERRITIN SERPL-MCNC: 987 NG/ML — HIGH (ref 30–400)
GAS PNL BLDA: SIGNIFICANT CHANGE UP
GAS PNL BLDV: 128 MMOL/L — LOW (ref 135–145)
GAS PNL BLDV: 130 MMOL/L — LOW (ref 135–145)
GAS PNL BLDV: 145 MMOL/L — SIGNIFICANT CHANGE UP (ref 135–145)
GAS PNL BLDV: 146 MMOL/L — HIGH (ref 135–145)
GAS PNL BLDV: SIGNIFICANT CHANGE UP
GIANT PLATELETS BLD QL SMEAR: PRESENT — SIGNIFICANT CHANGE UP
GLUCOSE BLDA-MCNC: 109 MG/DL — HIGH (ref 70–99)
GLUCOSE BLDC GLUCOMTR-MCNC: 100 MG/DL — HIGH (ref 70–99)
GLUCOSE BLDC GLUCOMTR-MCNC: 108 MG/DL — HIGH (ref 70–99)
GLUCOSE BLDC GLUCOMTR-MCNC: 110 MG/DL — HIGH (ref 70–99)
GLUCOSE BLDC GLUCOMTR-MCNC: 111 MG/DL — HIGH (ref 70–99)
GLUCOSE BLDC GLUCOMTR-MCNC: 111 MG/DL — HIGH (ref 70–99)
GLUCOSE BLDC GLUCOMTR-MCNC: 120 MG/DL — HIGH (ref 70–99)
GLUCOSE BLDC GLUCOMTR-MCNC: 120 MG/DL — HIGH (ref 70–99)
GLUCOSE BLDC GLUCOMTR-MCNC: 121 MG/DL — HIGH (ref 70–99)
GLUCOSE BLDC GLUCOMTR-MCNC: 123 MG/DL — HIGH (ref 70–99)
GLUCOSE BLDC GLUCOMTR-MCNC: 127 MG/DL — HIGH (ref 70–99)
GLUCOSE BLDC GLUCOMTR-MCNC: 129 MG/DL — HIGH (ref 70–99)
GLUCOSE BLDC GLUCOMTR-MCNC: 129 MG/DL — HIGH (ref 70–99)
GLUCOSE BLDC GLUCOMTR-MCNC: 131 MG/DL — HIGH (ref 70–99)
GLUCOSE BLDC GLUCOMTR-MCNC: 132 MG/DL — HIGH (ref 70–99)
GLUCOSE BLDC GLUCOMTR-MCNC: 135 MG/DL — HIGH (ref 70–99)
GLUCOSE BLDC GLUCOMTR-MCNC: 136 MG/DL — HIGH (ref 70–99)
GLUCOSE BLDC GLUCOMTR-MCNC: 138 MG/DL — HIGH (ref 70–99)
GLUCOSE BLDC GLUCOMTR-MCNC: 138 MG/DL — HIGH (ref 70–99)
GLUCOSE BLDC GLUCOMTR-MCNC: 141 MG/DL — HIGH (ref 70–99)
GLUCOSE BLDC GLUCOMTR-MCNC: 149 MG/DL — HIGH (ref 70–99)
GLUCOSE BLDC GLUCOMTR-MCNC: 152 MG/DL — HIGH (ref 70–99)
GLUCOSE BLDC GLUCOMTR-MCNC: 155 MG/DL — HIGH (ref 70–99)
GLUCOSE BLDC GLUCOMTR-MCNC: 159 MG/DL — HIGH (ref 70–99)
GLUCOSE BLDC GLUCOMTR-MCNC: 161 MG/DL — HIGH (ref 70–99)
GLUCOSE BLDC GLUCOMTR-MCNC: 173 MG/DL — HIGH (ref 70–99)
GLUCOSE BLDC GLUCOMTR-MCNC: 174 MG/DL — HIGH (ref 70–99)
GLUCOSE BLDC GLUCOMTR-MCNC: 177 MG/DL — HIGH (ref 70–99)
GLUCOSE BLDC GLUCOMTR-MCNC: 178 MG/DL — HIGH (ref 70–99)
GLUCOSE BLDC GLUCOMTR-MCNC: 180 MG/DL — HIGH (ref 70–99)
GLUCOSE BLDC GLUCOMTR-MCNC: 182 MG/DL — HIGH (ref 70–99)
GLUCOSE BLDC GLUCOMTR-MCNC: 186 MG/DL — HIGH (ref 70–99)
GLUCOSE BLDC GLUCOMTR-MCNC: 189 MG/DL — HIGH (ref 70–99)
GLUCOSE BLDC GLUCOMTR-MCNC: 189 MG/DL — HIGH (ref 70–99)
GLUCOSE BLDC GLUCOMTR-MCNC: 190 MG/DL — HIGH (ref 70–99)
GLUCOSE BLDC GLUCOMTR-MCNC: 192 MG/DL — HIGH (ref 70–99)
GLUCOSE BLDC GLUCOMTR-MCNC: 194 MG/DL — HIGH (ref 70–99)
GLUCOSE BLDC GLUCOMTR-MCNC: 195 MG/DL — HIGH (ref 70–99)
GLUCOSE BLDC GLUCOMTR-MCNC: 196 MG/DL — HIGH (ref 70–99)
GLUCOSE BLDC GLUCOMTR-MCNC: 199 MG/DL — HIGH (ref 70–99)
GLUCOSE BLDC GLUCOMTR-MCNC: 201 MG/DL — HIGH (ref 70–99)
GLUCOSE BLDC GLUCOMTR-MCNC: 205 MG/DL — HIGH (ref 70–99)
GLUCOSE BLDC GLUCOMTR-MCNC: 207 MG/DL — HIGH (ref 70–99)
GLUCOSE BLDC GLUCOMTR-MCNC: 211 MG/DL — HIGH (ref 70–99)
GLUCOSE BLDC GLUCOMTR-MCNC: 214 MG/DL — HIGH (ref 70–99)
GLUCOSE BLDC GLUCOMTR-MCNC: 220 MG/DL — HIGH (ref 70–99)
GLUCOSE BLDC GLUCOMTR-MCNC: 220 MG/DL — HIGH (ref 70–99)
GLUCOSE BLDC GLUCOMTR-MCNC: 224 MG/DL — HIGH (ref 70–99)
GLUCOSE BLDC GLUCOMTR-MCNC: 231 MG/DL — HIGH (ref 70–99)
GLUCOSE BLDC GLUCOMTR-MCNC: 236 MG/DL — HIGH (ref 70–99)
GLUCOSE BLDC GLUCOMTR-MCNC: 238 MG/DL — HIGH (ref 70–99)
GLUCOSE BLDC GLUCOMTR-MCNC: 239 MG/DL — HIGH (ref 70–99)
GLUCOSE BLDC GLUCOMTR-MCNC: 246 MG/DL — HIGH (ref 70–99)
GLUCOSE BLDC GLUCOMTR-MCNC: 253 MG/DL — HIGH (ref 70–99)
GLUCOSE BLDC GLUCOMTR-MCNC: 259 MG/DL — HIGH (ref 70–99)
GLUCOSE BLDC GLUCOMTR-MCNC: 262 MG/DL — HIGH (ref 70–99)
GLUCOSE BLDC GLUCOMTR-MCNC: 276 MG/DL — HIGH (ref 70–99)
GLUCOSE BLDC GLUCOMTR-MCNC: 279 MG/DL — HIGH (ref 70–99)
GLUCOSE BLDC GLUCOMTR-MCNC: 280 MG/DL — HIGH (ref 70–99)
GLUCOSE BLDC GLUCOMTR-MCNC: 293 MG/DL — HIGH (ref 70–99)
GLUCOSE BLDC GLUCOMTR-MCNC: 302 MG/DL — HIGH (ref 70–99)
GLUCOSE BLDC GLUCOMTR-MCNC: 322 MG/DL — HIGH (ref 70–99)
GLUCOSE BLDC GLUCOMTR-MCNC: 393 MG/DL — HIGH (ref 70–99)
GLUCOSE BLDC GLUCOMTR-MCNC: 65 MG/DL — LOW (ref 70–99)
GLUCOSE BLDC GLUCOMTR-MCNC: 73 MG/DL — SIGNIFICANT CHANGE UP (ref 70–99)
GLUCOSE BLDC GLUCOMTR-MCNC: 75 MG/DL — SIGNIFICANT CHANGE UP (ref 70–99)
GLUCOSE BLDC GLUCOMTR-MCNC: 77 MG/DL — SIGNIFICANT CHANGE UP (ref 70–99)
GLUCOSE BLDC GLUCOMTR-MCNC: 80 MG/DL — SIGNIFICANT CHANGE UP (ref 70–99)
GLUCOSE BLDC GLUCOMTR-MCNC: 85 MG/DL — SIGNIFICANT CHANGE UP (ref 70–99)
GLUCOSE BLDC GLUCOMTR-MCNC: 86 MG/DL — SIGNIFICANT CHANGE UP (ref 70–99)
GLUCOSE BLDC GLUCOMTR-MCNC: 92 MG/DL — SIGNIFICANT CHANGE UP (ref 70–99)
GLUCOSE BLDC GLUCOMTR-MCNC: 93 MG/DL — SIGNIFICANT CHANGE UP (ref 70–99)
GLUCOSE BLDC GLUCOMTR-MCNC: 94 MG/DL — SIGNIFICANT CHANGE UP (ref 70–99)
GLUCOSE BLDC GLUCOMTR-MCNC: 99 MG/DL — SIGNIFICANT CHANGE UP (ref 70–99)
GLUCOSE BLDV-MCNC: 182 MG/DL — HIGH (ref 70–99)
GLUCOSE BLDV-MCNC: 231 MG/DL — HIGH (ref 70–99)
GLUCOSE BLDV-MCNC: 277 MG/DL — HIGH (ref 70–99)
GLUCOSE BLDV-MCNC: 282 MG/DL — HIGH (ref 70–99)
GLUCOSE SERPL-MCNC: 107 MG/DL — HIGH (ref 70–99)
GLUCOSE SERPL-MCNC: 114 MG/DL — HIGH (ref 70–99)
GLUCOSE SERPL-MCNC: 117 MG/DL — HIGH (ref 70–99)
GLUCOSE SERPL-MCNC: 119 MG/DL — HIGH (ref 70–99)
GLUCOSE SERPL-MCNC: 124 MG/DL — HIGH (ref 70–99)
GLUCOSE SERPL-MCNC: 124 MG/DL — HIGH (ref 70–99)
GLUCOSE SERPL-MCNC: 139 MG/DL — HIGH (ref 70–99)
GLUCOSE SERPL-MCNC: 140 MG/DL — HIGH (ref 70–99)
GLUCOSE SERPL-MCNC: 148 MG/DL — HIGH (ref 70–99)
GLUCOSE SERPL-MCNC: 154 MG/DL — HIGH (ref 70–99)
GLUCOSE SERPL-MCNC: 158 MG/DL — HIGH (ref 70–99)
GLUCOSE SERPL-MCNC: 158 MG/DL — HIGH (ref 70–99)
GLUCOSE SERPL-MCNC: 161 MG/DL — HIGH (ref 70–99)
GLUCOSE SERPL-MCNC: 163 MG/DL — HIGH (ref 70–99)
GLUCOSE SERPL-MCNC: 167 MG/DL — HIGH (ref 70–99)
GLUCOSE SERPL-MCNC: 174 MG/DL — HIGH (ref 70–99)
GLUCOSE SERPL-MCNC: 181 MG/DL — HIGH (ref 70–99)
GLUCOSE SERPL-MCNC: 187 MG/DL — HIGH (ref 70–99)
GLUCOSE SERPL-MCNC: 198 MG/DL — HIGH (ref 70–99)
GLUCOSE SERPL-MCNC: 204 MG/DL — HIGH (ref 70–99)
GLUCOSE SERPL-MCNC: 211 MG/DL — HIGH (ref 70–99)
GLUCOSE SERPL-MCNC: 216 MG/DL — HIGH (ref 70–99)
GLUCOSE SERPL-MCNC: 217 MG/DL — HIGH (ref 70–99)
GLUCOSE SERPL-MCNC: 219 MG/DL — HIGH (ref 70–99)
GLUCOSE SERPL-MCNC: 226 MG/DL — HIGH (ref 70–99)
GLUCOSE SERPL-MCNC: 235 MG/DL — HIGH (ref 70–99)
GLUCOSE SERPL-MCNC: 242 MG/DL — HIGH (ref 70–99)
GLUCOSE SERPL-MCNC: 244 MG/DL — HIGH (ref 70–99)
GLUCOSE SERPL-MCNC: 246 MG/DL — HIGH (ref 70–99)
GLUCOSE SERPL-MCNC: 255 MG/DL — HIGH (ref 70–99)
GLUCOSE SERPL-MCNC: 257 MG/DL — HIGH (ref 70–99)
GLUCOSE SERPL-MCNC: 278 MG/DL — HIGH (ref 70–99)
GLUCOSE SERPL-MCNC: 278 MG/DL — HIGH (ref 70–99)
GLUCOSE SERPL-MCNC: 300 MG/DL — HIGH (ref 70–99)
GLUCOSE SERPL-MCNC: 301 MG/DL — HIGH (ref 70–99)
GLUCOSE SERPL-MCNC: 303 MG/DL — HIGH (ref 70–99)
GLUCOSE SERPL-MCNC: 310 MG/DL — HIGH (ref 70–99)
GLUCOSE SERPL-MCNC: 331 MG/DL — HIGH (ref 70–99)
GLUCOSE SERPL-MCNC: 337 MG/DL — HIGH (ref 70–99)
GLUCOSE SERPL-MCNC: 342 MG/DL — HIGH (ref 70–99)
GLUCOSE SERPL-MCNC: 407 MG/DL — HIGH (ref 70–99)
GLUCOSE SERPL-MCNC: 84 MG/DL — SIGNIFICANT CHANGE UP (ref 70–99)
GLUCOSE SERPL-MCNC: 98 MG/DL — SIGNIFICANT CHANGE UP (ref 70–99)
GLUCOSE UR QL: ABNORMAL
GLUCOSE UR QL: ABNORMAL
GLUCOSE UR QL: NEGATIVE — SIGNIFICANT CHANGE UP
GRAM STN FLD: SIGNIFICANT CHANGE UP
HAV IGM SER-ACNC: SIGNIFICANT CHANGE UP
HBV CORE IGM SER-ACNC: SIGNIFICANT CHANGE UP
HBV SURFACE AG SER-ACNC: SIGNIFICANT CHANGE UP
HCO3 BLDA-SCNC: 19 MMOL/L — LOW (ref 21–29)
HCO3 BLDA-SCNC: 20 MMOL/L — LOW (ref 21–29)
HCO3 BLDA-SCNC: 23 MMOL/L — SIGNIFICANT CHANGE UP (ref 21–29)
HCO3 BLDA-SCNC: 26 MMOL/L — SIGNIFICANT CHANGE UP (ref 21–29)
HCO3 BLDA-SCNC: 26 MMOL/L — SIGNIFICANT CHANGE UP (ref 21–29)
HCO3 BLDV-SCNC: 11 MMOL/L — LOW (ref 21–29)
HCO3 BLDV-SCNC: 22 MMOL/L — SIGNIFICANT CHANGE UP (ref 21–29)
HCO3 BLDV-SCNC: 22 MMOL/L — SIGNIFICANT CHANGE UP (ref 21–29)
HCO3 BLDV-SCNC: 26 MMOL/L — SIGNIFICANT CHANGE UP (ref 21–29)
HCT VFR BLD CALC: 23.8 % — LOW (ref 39–50)
HCT VFR BLD CALC: 24.7 % — LOW (ref 39–50)
HCT VFR BLD CALC: 25.7 % — LOW (ref 39–50)
HCT VFR BLD CALC: 26.1 % — LOW (ref 39–50)
HCT VFR BLD CALC: 26.6 % — LOW (ref 39–50)
HCT VFR BLD CALC: 26.6 % — LOW (ref 39–50)
HCT VFR BLD CALC: 26.7 % — LOW (ref 39–50)
HCT VFR BLD CALC: 26.9 % — LOW (ref 39–50)
HCT VFR BLD CALC: 27.2 % — LOW (ref 39–50)
HCT VFR BLD CALC: 27.3 % — LOW (ref 39–50)
HCT VFR BLD CALC: 27.5 % — LOW (ref 39–50)
HCT VFR BLD CALC: 27.8 % — LOW (ref 39–50)
HCT VFR BLD CALC: 28 % — LOW (ref 39–50)
HCT VFR BLD CALC: 28 % — LOW (ref 39–50)
HCT VFR BLD CALC: 28.1 % — LOW (ref 39–50)
HCT VFR BLD CALC: 28.6 % — LOW (ref 39–50)
HCT VFR BLD CALC: 28.8 % — LOW (ref 39–50)
HCT VFR BLD CALC: 29.1 % — LOW (ref 39–50)
HCT VFR BLD CALC: 29.3 % — LOW (ref 39–50)
HCT VFR BLD CALC: 29.3 % — LOW (ref 39–50)
HCT VFR BLD CALC: 29.4 % — LOW (ref 39–50)
HCT VFR BLD CALC: 29.5 % — LOW (ref 39–50)
HCT VFR BLD CALC: 29.7 % — LOW (ref 39–50)
HCT VFR BLD CALC: 29.9 % — LOW (ref 39–50)
HCT VFR BLD CALC: 29.9 % — LOW (ref 39–50)
HCT VFR BLD CALC: 30 % — LOW (ref 39–50)
HCT VFR BLD CALC: 30.3 % — LOW (ref 39–50)
HCT VFR BLD CALC: 30.6 % — LOW (ref 39–50)
HCT VFR BLD CALC: 31.1 % — LOW (ref 39–50)
HCT VFR BLD CALC: 31.7 % — LOW (ref 39–50)
HCT VFR BLD CALC: 32.5 % — LOW (ref 39–50)
HCT VFR BLD CALC: 33.5 % — LOW (ref 39–50)
HCT VFR BLD CALC: 33.6 % — LOW (ref 39–50)
HCT VFR BLD CALC: 33.8 % — LOW (ref 39–50)
HCT VFR BLD CALC: 34.4 % — LOW (ref 39–50)
HCT VFR BLD CALC: 39.1 % — SIGNIFICANT CHANGE UP (ref 39–50)
HCT VFR BLD CALC: 39.2 % — SIGNIFICANT CHANGE UP (ref 39–50)
HCT VFR BLD CALC: 39.4 % — SIGNIFICANT CHANGE UP (ref 39–50)
HCT VFR BLD CALC: 39.8 % — SIGNIFICANT CHANGE UP (ref 39–50)
HCT VFR BLD CALC: 39.9 % — SIGNIFICANT CHANGE UP (ref 39–50)
HCT VFR BLD CALC: 40 % — SIGNIFICANT CHANGE UP (ref 39–50)
HCT VFR BLD CALC: 40.6 % — SIGNIFICANT CHANGE UP (ref 39–50)
HCT VFR BLD CALC: 40.7 % — SIGNIFICANT CHANGE UP (ref 39–50)
HCT VFR BLD CALC: 41.1 % — SIGNIFICANT CHANGE UP (ref 39–50)
HCT VFR BLD CALC: 43.3 % — SIGNIFICANT CHANGE UP (ref 39–50)
HCT VFR BLDA CALC: 24 % — LOW (ref 39–50)
HCT VFR BLDA CALC: 26 % — LOW (ref 39–50)
HCT VFR BLDA CALC: 40 % — SIGNIFICANT CHANGE UP (ref 39–50)
HCT VFR BLDA CALC: 44 % — SIGNIFICANT CHANGE UP (ref 39–50)
HCV AB S/CO SERPL IA: 0.09 S/CO — SIGNIFICANT CHANGE UP (ref 0–0.99)
HCV AB S/CO SERPL IA: 0.12 S/CO — SIGNIFICANT CHANGE UP (ref 0–0.99)
HCV AB SERPL-IMP: SIGNIFICANT CHANGE UP
HCV AB SERPL-IMP: SIGNIFICANT CHANGE UP
HEPARINASE TEG R TIME: >17 MIN (ref 4.3–8.3)
HGB BLD CALC-MCNC: 13.1 G/DL — SIGNIFICANT CHANGE UP (ref 13–17)
HGB BLD CALC-MCNC: 14.4 G/DL — SIGNIFICANT CHANGE UP (ref 13–17)
HGB BLD CALC-MCNC: 7.8 G/DL — LOW (ref 13–17)
HGB BLD CALC-MCNC: 8.2 G/DL — LOW (ref 13–17)
HGB BLD-MCNC: 10 G/DL — LOW (ref 13–17)
HGB BLD-MCNC: 10.3 G/DL — LOW (ref 13–17)
HGB BLD-MCNC: 10.4 G/DL — LOW (ref 13–17)
HGB BLD-MCNC: 10.7 G/DL — LOW (ref 13–17)
HGB BLD-MCNC: 10.7 G/DL — LOW (ref 13–17)
HGB BLD-MCNC: 12.4 G/DL — LOW (ref 13–17)
HGB BLD-MCNC: 12.4 G/DL — LOW (ref 13–17)
HGB BLD-MCNC: 12.5 G/DL — LOW (ref 13–17)
HGB BLD-MCNC: 12.6 G/DL — LOW (ref 13–17)
HGB BLD-MCNC: 12.7 G/DL — LOW (ref 13–17)
HGB BLD-MCNC: 12.7 G/DL — LOW (ref 13–17)
HGB BLD-MCNC: 12.9 G/DL — LOW (ref 13–17)
HGB BLD-MCNC: 13 G/DL — SIGNIFICANT CHANGE UP (ref 13–17)
HGB BLD-MCNC: 13 G/DL — SIGNIFICANT CHANGE UP (ref 13–17)
HGB BLD-MCNC: 13.6 G/DL — SIGNIFICANT CHANGE UP (ref 13–17)
HGB BLD-MCNC: 6.9 G/DL — CRITICAL LOW (ref 13–17)
HGB BLD-MCNC: 7 G/DL — CRITICAL LOW (ref 13–17)
HGB BLD-MCNC: 7.4 G/DL — LOW (ref 13–17)
HGB BLD-MCNC: 7.7 G/DL — LOW (ref 13–17)
HGB BLD-MCNC: 7.7 G/DL — LOW (ref 13–17)
HGB BLD-MCNC: 7.9 G/DL — LOW (ref 13–17)
HGB BLD-MCNC: 8 G/DL — LOW (ref 13–17)
HGB BLD-MCNC: 8.2 G/DL — LOW (ref 13–17)
HGB BLD-MCNC: 8.4 G/DL — LOW (ref 13–17)
HGB BLD-MCNC: 8.5 G/DL — LOW (ref 13–17)
HGB BLD-MCNC: 8.6 G/DL — LOW (ref 13–17)
HGB BLD-MCNC: 8.9 G/DL — LOW (ref 13–17)
HGB BLD-MCNC: 8.9 G/DL — LOW (ref 13–17)
HGB BLD-MCNC: 9 G/DL — LOW (ref 13–17)
HGB BLD-MCNC: 9.1 G/DL — LOW (ref 13–17)
HGB BLD-MCNC: 9.2 G/DL — LOW (ref 13–17)
HGB BLD-MCNC: 9.2 G/DL — LOW (ref 13–17)
HGB BLD-MCNC: 9.4 G/DL — LOW (ref 13–17)
HGB BLD-MCNC: 9.5 G/DL — LOW (ref 13–17)
HOROWITZ INDEX BLDA+IHG-RTO: 100 — SIGNIFICANT CHANGE UP
HOROWITZ INDEX BLDA+IHG-RTO: 50 — SIGNIFICANT CHANGE UP
HOROWITZ INDEX BLDA+IHG-RTO: 60 — SIGNIFICANT CHANGE UP
HOROWITZ INDEX BLDA+IHG-RTO: 80 — SIGNIFICANT CHANGE UP
HOROWITZ INDEX BLDA+IHG-RTO: 90 — SIGNIFICANT CHANGE UP
HOROWITZ INDEX BLDV+IHG-RTO: 50 — SIGNIFICANT CHANGE UP
HYALINE CASTS # UR AUTO: 0 /LPF — SIGNIFICANT CHANGE UP (ref 0–2)
HYALINE CASTS # UR AUTO: 2 /LPF — SIGNIFICANT CHANGE UP (ref 0–2)
HYALINE CASTS # UR AUTO: 2 /LPF — SIGNIFICANT CHANGE UP (ref 0–2)
IMM GRANULOCYTES NFR BLD AUTO: 0.4 % — SIGNIFICANT CHANGE UP (ref 0–1.5)
IMM GRANULOCYTES NFR BLD AUTO: 0.4 % — SIGNIFICANT CHANGE UP (ref 0–1.5)
IMM GRANULOCYTES NFR BLD AUTO: 0.5 % — SIGNIFICANT CHANGE UP (ref 0–1.5)
IMM GRANULOCYTES NFR BLD AUTO: 0.8 % — SIGNIFICANT CHANGE UP (ref 0–1.5)
IMM GRANULOCYTES NFR BLD AUTO: 0.9 % — SIGNIFICANT CHANGE UP (ref 0–1.5)
IMM GRANULOCYTES NFR BLD AUTO: 1 % — SIGNIFICANT CHANGE UP (ref 0–1.5)
IMM GRANULOCYTES NFR BLD AUTO: 1.1 % — SIGNIFICANT CHANGE UP (ref 0–1.5)
IMM GRANULOCYTES NFR BLD AUTO: 1.1 % — SIGNIFICANT CHANGE UP (ref 0–1.5)
IMM GRANULOCYTES NFR BLD AUTO: 1.2 % — SIGNIFICANT CHANGE UP (ref 0–1.5)
IMM GRANULOCYTES NFR BLD AUTO: 1.3 % — SIGNIFICANT CHANGE UP (ref 0–1.5)
IMM GRANULOCYTES NFR BLD AUTO: 1.5 % — SIGNIFICANT CHANGE UP (ref 0–1.5)
IMM GRANULOCYTES NFR BLD AUTO: 1.8 % — HIGH (ref 0–1.5)
IMM GRANULOCYTES NFR BLD AUTO: 2.1 % — HIGH (ref 0–1.5)
IMM GRANULOCYTES NFR BLD AUTO: 2.1 % — HIGH (ref 0–1.5)
IMM GRANULOCYTES NFR BLD AUTO: 2.6 % — HIGH (ref 0–1.5)
IMM GRANULOCYTES NFR BLD AUTO: 2.7 % — HIGH (ref 0–1.5)
IMM GRANULOCYTES NFR BLD AUTO: 3.1 % — HIGH (ref 0–1.5)
INR BLD: 1.28 RATIO — HIGH (ref 0.88–1.16)
INR BLD: 1.37 RATIO — HIGH (ref 0.88–1.16)
INR BLD: 1.42 RATIO — HIGH (ref 0.88–1.16)
INR BLD: 1.43 RATIO — HIGH (ref 0.88–1.16)
INR BLD: 1.44 RATIO — HIGH (ref 0.88–1.16)
INR BLD: 1.45 RATIO — HIGH (ref 0.88–1.16)
INR BLD: 1.45 RATIO — HIGH (ref 0.88–1.16)
INR BLD: 1.48 RATIO — HIGH (ref 0.88–1.16)
INR BLD: 1.48 RATIO — HIGH (ref 0.88–1.16)
INR BLD: 1.5 RATIO — HIGH (ref 0.88–1.16)
INR BLD: 1.59 RATIO — HIGH (ref 0.88–1.16)
INR BLD: 1.64 RATIO — HIGH (ref 0.88–1.16)
INR BLD: 1.88 RATIO — HIGH (ref 0.88–1.16)
INR BLD: 1.89 RATIO — HIGH (ref 0.88–1.16)
INR BLD: 2.25 RATIO — HIGH (ref 0.88–1.16)
INR BLD: 3.21 RATIO — HIGH (ref 0.88–1.16)
IRON SATN MFR SERPL: 19 UG/DL — LOW (ref 45–165)
IRON SATN MFR SERPL: 19 UG/DL — LOW (ref 45–165)
IRON SATN MFR SERPL: 7 % — LOW (ref 16–55)
IRON SATN MFR SERPL: 9 % — LOW (ref 16–55)
KETONES UR-MCNC: ABNORMAL
KETONES UR-MCNC: NEGATIVE — SIGNIFICANT CHANGE UP
KETONES UR-MCNC: SIGNIFICANT CHANGE UP
LACTATE BLDA-MCNC: 1.2 MMOL/L — SIGNIFICANT CHANGE UP (ref 0.7–2)
LACTATE BLDV-MCNC: 19 MMOL/L — CRITICAL HIGH (ref 0.7–2)
LACTATE BLDV-MCNC: 2.1 MMOL/L — HIGH (ref 0.7–2)
LACTATE BLDV-MCNC: 2.1 MMOL/L — HIGH (ref 0.7–2)
LACTATE BLDV-MCNC: 4.2 MMOL/L — CRITICAL HIGH (ref 0.7–2)
LACTATE SERPL-SCNC: 2.8 MMOL/L — HIGH (ref 0.7–2)
LDH SERPL L TO P-CCNC: 289 U/L — HIGH (ref 50–242)
LDH SERPL L TO P-CCNC: 333 U/L — HIGH (ref 50–242)
LDH SERPL L TO P-CCNC: 459 U/L — HIGH (ref 50–242)
LDH SERPL L TO P-CCNC: 508 U/L — HIGH (ref 50–242)
LDH SERPL L TO P-CCNC: 821 U/L — HIGH (ref 50–242)
LEGIONELLA AG UR QL: NEGATIVE — SIGNIFICANT CHANGE UP
LEUKOCYTE ESTERASE UR-ACNC: ABNORMAL
LEUKOCYTE ESTERASE UR-ACNC: NEGATIVE — SIGNIFICANT CHANGE UP
LEUKOCYTE ESTERASE UR-ACNC: NEGATIVE — SIGNIFICANT CHANGE UP
LG PLATELETS BLD QL AUTO: SLIGHT — SIGNIFICANT CHANGE UP
LIDOCAIN IGE QN: 12 U/L — SIGNIFICANT CHANGE UP (ref 7–60)
LYMPHOCYTES # BLD AUTO: 0.66 K/UL — LOW (ref 1–3.3)
LYMPHOCYTES # BLD AUTO: 0.74 K/UL — LOW (ref 1–3.3)
LYMPHOCYTES # BLD AUTO: 0.79 K/UL — LOW (ref 1–3.3)
LYMPHOCYTES # BLD AUTO: 0.85 K/UL — LOW (ref 1–3.3)
LYMPHOCYTES # BLD AUTO: 0.87 K/UL — LOW (ref 1–3.3)
LYMPHOCYTES # BLD AUTO: 0.88 K/UL — LOW (ref 1–3.3)
LYMPHOCYTES # BLD AUTO: 0.89 K/UL — LOW (ref 1–3.3)
LYMPHOCYTES # BLD AUTO: 0.9 K/UL — LOW (ref 1–3.3)
LYMPHOCYTES # BLD AUTO: 0.91 K/UL — LOW (ref 1–3.3)
LYMPHOCYTES # BLD AUTO: 0.92 K/UL — LOW (ref 1–3.3)
LYMPHOCYTES # BLD AUTO: 0.93 K/UL — LOW (ref 1–3.3)
LYMPHOCYTES # BLD AUTO: 0.98 K/UL — LOW (ref 1–3.3)
LYMPHOCYTES # BLD AUTO: 1.01 K/UL — SIGNIFICANT CHANGE UP (ref 1–3.3)
LYMPHOCYTES # BLD AUTO: 1.01 K/UL — SIGNIFICANT CHANGE UP (ref 1–3.3)
LYMPHOCYTES # BLD AUTO: 1.03 K/UL — SIGNIFICANT CHANGE UP (ref 1–3.3)
LYMPHOCYTES # BLD AUTO: 1.03 K/UL — SIGNIFICANT CHANGE UP (ref 1–3.3)
LYMPHOCYTES # BLD AUTO: 1.1 K/UL — SIGNIFICANT CHANGE UP (ref 1–3.3)
LYMPHOCYTES # BLD AUTO: 1.19 K/UL — SIGNIFICANT CHANGE UP (ref 1–3.3)
LYMPHOCYTES # BLD AUTO: 1.19 K/UL — SIGNIFICANT CHANGE UP (ref 1–3.3)
LYMPHOCYTES # BLD AUTO: 1.26 K/UL — SIGNIFICANT CHANGE UP (ref 1–3.3)
LYMPHOCYTES # BLD AUTO: 1.38 K/UL — SIGNIFICANT CHANGE UP (ref 1–3.3)
LYMPHOCYTES # BLD AUTO: 1.44 K/UL — SIGNIFICANT CHANGE UP (ref 1–3.3)
LYMPHOCYTES # BLD AUTO: 1.45 K/UL — SIGNIFICANT CHANGE UP (ref 1–3.3)
LYMPHOCYTES # BLD AUTO: 10.1 % — LOW (ref 13–44)
LYMPHOCYTES # BLD AUTO: 10.5 % — LOW (ref 13–44)
LYMPHOCYTES # BLD AUTO: 11.5 % — LOW (ref 13–44)
LYMPHOCYTES # BLD AUTO: 12.3 % — LOW (ref 13–44)
LYMPHOCYTES # BLD AUTO: 13 % — SIGNIFICANT CHANGE UP (ref 13–44)
LYMPHOCYTES # BLD AUTO: 14.1 % — SIGNIFICANT CHANGE UP (ref 13–44)
LYMPHOCYTES # BLD AUTO: 17.4 % — SIGNIFICANT CHANGE UP (ref 13–44)
LYMPHOCYTES # BLD AUTO: 17.4 % — SIGNIFICANT CHANGE UP (ref 13–44)
LYMPHOCYTES # BLD AUTO: 4.6 % — LOW (ref 13–44)
LYMPHOCYTES # BLD AUTO: 5.1 % — LOW (ref 13–44)
LYMPHOCYTES # BLD AUTO: 5.1 % — LOW (ref 13–44)
LYMPHOCYTES # BLD AUTO: 5.8 % — LOW (ref 13–44)
LYMPHOCYTES # BLD AUTO: 6.5 % — LOW (ref 13–44)
LYMPHOCYTES # BLD AUTO: 6.7 % — LOW (ref 13–44)
LYMPHOCYTES # BLD AUTO: 6.8 % — LOW (ref 13–44)
LYMPHOCYTES # BLD AUTO: 6.8 % — LOW (ref 13–44)
LYMPHOCYTES # BLD AUTO: 7.6 % — LOW (ref 13–44)
LYMPHOCYTES # BLD AUTO: 7.7 % — LOW (ref 13–44)
LYMPHOCYTES # BLD AUTO: 7.7 % — LOW (ref 13–44)
LYMPHOCYTES # BLD AUTO: 7.9 % — LOW (ref 13–44)
LYMPHOCYTES # BLD AUTO: 9.7 % — LOW (ref 13–44)
MAGNESIUM SERPL-MCNC: 1.8 MG/DL — SIGNIFICANT CHANGE UP (ref 1.6–2.6)
MAGNESIUM SERPL-MCNC: 1.8 MG/DL — SIGNIFICANT CHANGE UP (ref 1.6–2.6)
MAGNESIUM SERPL-MCNC: 1.9 MG/DL — SIGNIFICANT CHANGE UP (ref 1.6–2.6)
MAGNESIUM SERPL-MCNC: 2 MG/DL — SIGNIFICANT CHANGE UP (ref 1.6–2.6)
MAGNESIUM SERPL-MCNC: 2.2 MG/DL — SIGNIFICANT CHANGE UP (ref 1.6–2.6)
MAGNESIUM SERPL-MCNC: 2.5 MG/DL — SIGNIFICANT CHANGE UP (ref 1.6–2.6)
MAGNESIUM SERPL-MCNC: 2.6 MG/DL — SIGNIFICANT CHANGE UP (ref 1.6–2.6)
MAGNESIUM SERPL-MCNC: 2.7 MG/DL — HIGH (ref 1.6–2.6)
MANUAL SMEAR VERIFICATION: SIGNIFICANT CHANGE UP
MCHC RBC-ENTMCNC: 20.2 PG — LOW (ref 27–34)
MCHC RBC-ENTMCNC: 20.2 PG — LOW (ref 27–34)
MCHC RBC-ENTMCNC: 20.3 PG — LOW (ref 27–34)
MCHC RBC-ENTMCNC: 20.4 PG — LOW (ref 27–34)
MCHC RBC-ENTMCNC: 20.5 PG — LOW (ref 27–34)
MCHC RBC-ENTMCNC: 20.6 PG — LOW (ref 27–34)
MCHC RBC-ENTMCNC: 20.7 PG — LOW (ref 27–34)
MCHC RBC-ENTMCNC: 20.8 PG — LOW (ref 27–34)
MCHC RBC-ENTMCNC: 20.9 PG — LOW (ref 27–34)
MCHC RBC-ENTMCNC: 21.6 PG — LOW (ref 27–34)
MCHC RBC-ENTMCNC: 21.7 PG — LOW (ref 27–34)
MCHC RBC-ENTMCNC: 21.8 PG — LOW (ref 27–34)
MCHC RBC-ENTMCNC: 21.9 PG — LOW (ref 27–34)
MCHC RBC-ENTMCNC: 22.1 PG — LOW (ref 27–34)
MCHC RBC-ENTMCNC: 22.8 PG — LOW (ref 27–34)
MCHC RBC-ENTMCNC: 22.9 PG — LOW (ref 27–34)
MCHC RBC-ENTMCNC: 22.9 PG — LOW (ref 27–34)
MCHC RBC-ENTMCNC: 23.1 PG — LOW (ref 27–34)
MCHC RBC-ENTMCNC: 26.6 GM/DL — LOW (ref 32–36)
MCHC RBC-ENTMCNC: 27.1 GM/DL — LOW (ref 32–36)
MCHC RBC-ENTMCNC: 27.4 GM/DL — LOW (ref 32–36)
MCHC RBC-ENTMCNC: 27.9 GM/DL — LOW (ref 32–36)
MCHC RBC-ENTMCNC: 28.3 GM/DL — LOW (ref 32–36)
MCHC RBC-ENTMCNC: 28.5 GM/DL — LOW (ref 32–36)
MCHC RBC-ENTMCNC: 28.6 GM/DL — LOW (ref 32–36)
MCHC RBC-ENTMCNC: 28.6 GM/DL — LOW (ref 32–36)
MCHC RBC-ENTMCNC: 29.4 GM/DL — LOW (ref 32–36)
MCHC RBC-ENTMCNC: 29.4 GM/DL — LOW (ref 32–36)
MCHC RBC-ENTMCNC: 29.6 GM/DL — LOW (ref 32–36)
MCHC RBC-ENTMCNC: 29.8 GM/DL — LOW (ref 32–36)
MCHC RBC-ENTMCNC: 29.9 GM/DL — LOW (ref 32–36)
MCHC RBC-ENTMCNC: 30 GM/DL — LOW (ref 32–36)
MCHC RBC-ENTMCNC: 30.1 GM/DL — LOW (ref 32–36)
MCHC RBC-ENTMCNC: 30.4 GM/DL — LOW (ref 32–36)
MCHC RBC-ENTMCNC: 30.5 GM/DL — LOW (ref 32–36)
MCHC RBC-ENTMCNC: 30.6 GM/DL — LOW (ref 32–36)
MCHC RBC-ENTMCNC: 30.7 GM/DL — LOW (ref 32–36)
MCHC RBC-ENTMCNC: 30.7 GM/DL — LOW (ref 32–36)
MCHC RBC-ENTMCNC: 30.8 GM/DL — LOW (ref 32–36)
MCHC RBC-ENTMCNC: 30.8 GM/DL — LOW (ref 32–36)
MCHC RBC-ENTMCNC: 31 GM/DL — LOW (ref 32–36)
MCHC RBC-ENTMCNC: 31.1 GM/DL — LOW (ref 32–36)
MCHC RBC-ENTMCNC: 31.4 GM/DL — LOW (ref 32–36)
MCHC RBC-ENTMCNC: 31.6 GM/DL — LOW (ref 32–36)
MCHC RBC-ENTMCNC: 31.6 GM/DL — LOW (ref 32–36)
MCHC RBC-ENTMCNC: 31.7 GM/DL — LOW (ref 32–36)
MCHC RBC-ENTMCNC: 31.8 GM/DL — LOW (ref 32–36)
MCHC RBC-ENTMCNC: 32 GM/DL — SIGNIFICANT CHANGE UP (ref 32–36)
MCV RBC AUTO: 64.5 FL — LOW (ref 80–100)
MCV RBC AUTO: 64.6 FL — LOW (ref 80–100)
MCV RBC AUTO: 64.7 FL — LOW (ref 80–100)
MCV RBC AUTO: 64.8 FL — LOW (ref 80–100)
MCV RBC AUTO: 64.8 FL — LOW (ref 80–100)
MCV RBC AUTO: 64.9 FL — LOW (ref 80–100)
MCV RBC AUTO: 65 FL — LOW (ref 80–100)
MCV RBC AUTO: 65 FL — LOW (ref 80–100)
MCV RBC AUTO: 65.1 FL — LOW (ref 80–100)
MCV RBC AUTO: 65.3 FL — LOW (ref 80–100)
MCV RBC AUTO: 65.3 FL — LOW (ref 80–100)
MCV RBC AUTO: 65.6 FL — LOW (ref 80–100)
MCV RBC AUTO: 66.3 FL — LOW (ref 80–100)
MCV RBC AUTO: 66.6 FL — LOW (ref 80–100)
MCV RBC AUTO: 66.7 FL — LOW (ref 80–100)
MCV RBC AUTO: 66.9 FL — LOW (ref 80–100)
MCV RBC AUTO: 67 FL — LOW (ref 80–100)
MCV RBC AUTO: 67.1 FL — LOW (ref 80–100)
MCV RBC AUTO: 67.2 FL — LOW (ref 80–100)
MCV RBC AUTO: 67.2 FL — LOW (ref 80–100)
MCV RBC AUTO: 67.3 FL — LOW (ref 80–100)
MCV RBC AUTO: 67.5 FL — LOW (ref 80–100)
MCV RBC AUTO: 67.6 FL — LOW (ref 80–100)
MCV RBC AUTO: 67.8 FL — LOW (ref 80–100)
MCV RBC AUTO: 68.2 FL — LOW (ref 80–100)
MCV RBC AUTO: 68.3 FL — LOW (ref 80–100)
MCV RBC AUTO: 68.6 FL — LOW (ref 80–100)
MCV RBC AUTO: 68.7 FL — LOW (ref 80–100)
MCV RBC AUTO: 68.7 FL — LOW (ref 80–100)
MCV RBC AUTO: 69.2 FL — LOW (ref 80–100)
MCV RBC AUTO: 69.5 FL — LOW (ref 80–100)
MCV RBC AUTO: 70.3 FL — LOW (ref 80–100)
MCV RBC AUTO: 70.8 FL — LOW (ref 80–100)
MCV RBC AUTO: 72 FL — LOW (ref 80–100)
MCV RBC AUTO: 72.6 FL — LOW (ref 80–100)
MCV RBC AUTO: 72.8 FL — LOW (ref 80–100)
MCV RBC AUTO: 73.5 FL — LOW (ref 80–100)
MCV RBC AUTO: 76 FL — LOW (ref 80–100)
MCV RBC AUTO: 76.7 FL — LOW (ref 80–100)
MCV RBC AUTO: 77.2 FL — LOW (ref 80–100)
MCV RBC AUTO: 80 FL — SIGNIFICANT CHANGE UP (ref 80–100)
MCV RBC AUTO: 80.6 FL — SIGNIFICANT CHANGE UP (ref 80–100)
MCV RBC AUTO: 80.7 FL — SIGNIFICANT CHANGE UP (ref 80–100)
MCV RBC AUTO: 84.5 FL — SIGNIFICANT CHANGE UP (ref 80–100)
MCV RBC AUTO: 86.9 FL — SIGNIFICANT CHANGE UP (ref 80–100)
METHOD TYPE: SIGNIFICANT CHANGE UP
MICROCYTES BLD QL: SLIGHT — SIGNIFICANT CHANGE UP
MONOCYTES # BLD AUTO: 0.25 K/UL — SIGNIFICANT CHANGE UP (ref 0–0.9)
MONOCYTES # BLD AUTO: 0.25 K/UL — SIGNIFICANT CHANGE UP (ref 0–0.9)
MONOCYTES # BLD AUTO: 0.26 K/UL — SIGNIFICANT CHANGE UP (ref 0–0.9)
MONOCYTES # BLD AUTO: 0.28 K/UL — SIGNIFICANT CHANGE UP (ref 0–0.9)
MONOCYTES # BLD AUTO: 0.32 K/UL — SIGNIFICANT CHANGE UP (ref 0–0.9)
MONOCYTES # BLD AUTO: 0.34 K/UL — SIGNIFICANT CHANGE UP (ref 0–0.9)
MONOCYTES # BLD AUTO: 0.36 K/UL — SIGNIFICANT CHANGE UP (ref 0–0.9)
MONOCYTES # BLD AUTO: 0.37 K/UL — SIGNIFICANT CHANGE UP (ref 0–0.9)
MONOCYTES # BLD AUTO: 0.38 K/UL — SIGNIFICANT CHANGE UP (ref 0–0.9)
MONOCYTES # BLD AUTO: 0.39 K/UL — SIGNIFICANT CHANGE UP (ref 0–0.9)
MONOCYTES # BLD AUTO: 0.4 K/UL — SIGNIFICANT CHANGE UP (ref 0–0.9)
MONOCYTES # BLD AUTO: 0.43 K/UL — SIGNIFICANT CHANGE UP (ref 0–0.9)
MONOCYTES # BLD AUTO: 0.44 K/UL — SIGNIFICANT CHANGE UP (ref 0–0.9)
MONOCYTES # BLD AUTO: 0.45 K/UL — SIGNIFICANT CHANGE UP (ref 0–0.9)
MONOCYTES # BLD AUTO: 0.48 K/UL — SIGNIFICANT CHANGE UP (ref 0–0.9)
MONOCYTES # BLD AUTO: 0.5 K/UL — SIGNIFICANT CHANGE UP (ref 0–0.9)
MONOCYTES # BLD AUTO: 0.5 K/UL — SIGNIFICANT CHANGE UP (ref 0–0.9)
MONOCYTES # BLD AUTO: 0.52 K/UL — SIGNIFICANT CHANGE UP (ref 0–0.9)
MONOCYTES # BLD AUTO: 0.53 K/UL — SIGNIFICANT CHANGE UP (ref 0–0.9)
MONOCYTES # BLD AUTO: 0.88 K/UL — SIGNIFICANT CHANGE UP (ref 0–0.9)
MONOCYTES # BLD AUTO: 0.92 K/UL — HIGH (ref 0–0.9)
MONOCYTES NFR BLD AUTO: 1.7 % — LOW (ref 2–14)
MONOCYTES NFR BLD AUTO: 1.7 % — LOW (ref 2–14)
MONOCYTES NFR BLD AUTO: 1.9 % — LOW (ref 2–14)
MONOCYTES NFR BLD AUTO: 11 % — SIGNIFICANT CHANGE UP (ref 2–14)
MONOCYTES NFR BLD AUTO: 2.3 % — SIGNIFICANT CHANGE UP (ref 2–14)
MONOCYTES NFR BLD AUTO: 2.5 % — SIGNIFICANT CHANGE UP (ref 2–14)
MONOCYTES NFR BLD AUTO: 2.6 % — SIGNIFICANT CHANGE UP (ref 2–14)
MONOCYTES NFR BLD AUTO: 2.7 % — SIGNIFICANT CHANGE UP (ref 2–14)
MONOCYTES NFR BLD AUTO: 2.7 % — SIGNIFICANT CHANGE UP (ref 2–14)
MONOCYTES NFR BLD AUTO: 2.8 % — SIGNIFICANT CHANGE UP (ref 2–14)
MONOCYTES NFR BLD AUTO: 3 % — SIGNIFICANT CHANGE UP (ref 2–14)
MONOCYTES NFR BLD AUTO: 3.2 % — SIGNIFICANT CHANGE UP (ref 2–14)
MONOCYTES NFR BLD AUTO: 3.5 % — SIGNIFICANT CHANGE UP (ref 2–14)
MONOCYTES NFR BLD AUTO: 3.7 % — SIGNIFICANT CHANGE UP (ref 2–14)
MONOCYTES NFR BLD AUTO: 3.8 % — SIGNIFICANT CHANGE UP (ref 2–14)
MONOCYTES NFR BLD AUTO: 4.8 % — SIGNIFICANT CHANGE UP (ref 2–14)
MONOCYTES NFR BLD AUTO: 5.1 % — SIGNIFICANT CHANGE UP (ref 2–14)
MONOCYTES NFR BLD AUTO: 6.6 % — SIGNIFICANT CHANGE UP (ref 2–14)
MONOCYTES NFR BLD AUTO: 7 % — SIGNIFICANT CHANGE UP (ref 2–14)
MONOCYTES NFR BLD AUTO: 7.8 % — SIGNIFICANT CHANGE UP (ref 2–14)
MONOCYTES NFR BLD AUTO: 9 % — SIGNIFICANT CHANGE UP (ref 2–14)
MRSA PCR RESULT.: SIGNIFICANT CHANGE UP
NEUTROPHILS # BLD AUTO: 11.31 K/UL — HIGH (ref 1.8–7.4)
NEUTROPHILS # BLD AUTO: 11.57 K/UL — HIGH (ref 1.8–7.4)
NEUTROPHILS # BLD AUTO: 12.03 K/UL — HIGH (ref 1.8–7.4)
NEUTROPHILS # BLD AUTO: 12.06 K/UL — HIGH (ref 1.8–7.4)
NEUTROPHILS # BLD AUTO: 12.57 K/UL — HIGH (ref 1.8–7.4)
NEUTROPHILS # BLD AUTO: 13.12 K/UL — HIGH (ref 1.8–7.4)
NEUTROPHILS # BLD AUTO: 13.22 K/UL — HIGH (ref 1.8–7.4)
NEUTROPHILS # BLD AUTO: 13.23 K/UL — HIGH (ref 1.8–7.4)
NEUTROPHILS # BLD AUTO: 13.37 K/UL — HIGH (ref 1.8–7.4)
NEUTROPHILS # BLD AUTO: 14.13 K/UL — HIGH (ref 1.8–7.4)
NEUTROPHILS # BLD AUTO: 15.02 K/UL — HIGH (ref 1.8–7.4)
NEUTROPHILS # BLD AUTO: 3.41 K/UL — SIGNIFICANT CHANGE UP (ref 1.8–7.4)
NEUTROPHILS # BLD AUTO: 4.6 K/UL — SIGNIFICANT CHANGE UP (ref 1.8–7.4)
NEUTROPHILS # BLD AUTO: 4.85 K/UL — SIGNIFICANT CHANGE UP (ref 1.8–7.4)
NEUTROPHILS # BLD AUTO: 5.81 K/UL — SIGNIFICANT CHANGE UP (ref 1.8–7.4)
NEUTROPHILS # BLD AUTO: 6.24 K/UL — SIGNIFICANT CHANGE UP (ref 1.8–7.4)
NEUTROPHILS # BLD AUTO: 7.41 K/UL — HIGH (ref 1.8–7.4)
NEUTROPHILS # BLD AUTO: 7.96 K/UL — HIGH (ref 1.8–7.4)
NEUTROPHILS # BLD AUTO: 9.45 K/UL — HIGH (ref 1.8–7.4)
NEUTROPHILS # BLD AUTO: 9.6 K/UL — HIGH (ref 1.8–7.4)
NEUTROPHILS # BLD AUTO: 9.64 K/UL — HIGH (ref 1.8–7.4)
NEUTROPHILS # BLD AUTO: 9.72 K/UL — HIGH (ref 1.8–7.4)
NEUTROPHILS # BLD AUTO: 9.88 K/UL — HIGH (ref 1.8–7.4)
NEUTROPHILS NFR BLD AUTO: 67.5 % — SIGNIFICANT CHANGE UP (ref 43–77)
NEUTROPHILS NFR BLD AUTO: 70.8 % — SIGNIFICANT CHANGE UP (ref 43–77)
NEUTROPHILS NFR BLD AUTO: 71.4 % — SIGNIFICANT CHANGE UP (ref 43–77)
NEUTROPHILS NFR BLD AUTO: 72.3 % — SIGNIFICANT CHANGE UP (ref 43–77)
NEUTROPHILS NFR BLD AUTO: 72.7 % — SIGNIFICANT CHANGE UP (ref 43–77)
NEUTROPHILS NFR BLD AUTO: 75.2 % — SIGNIFICANT CHANGE UP (ref 43–77)
NEUTROPHILS NFR BLD AUTO: 78.2 % — HIGH (ref 43–77)
NEUTROPHILS NFR BLD AUTO: 78.7 % — HIGH (ref 43–77)
NEUTROPHILS NFR BLD AUTO: 82.4 % — HIGH (ref 43–77)
NEUTROPHILS NFR BLD AUTO: 83.9 % — HIGH (ref 43–77)
NEUTROPHILS NFR BLD AUTO: 84.3 % — HIGH (ref 43–77)
NEUTROPHILS NFR BLD AUTO: 84.9 % — HIGH (ref 43–77)
NEUTROPHILS NFR BLD AUTO: 85.9 % — HIGH (ref 43–77)
NEUTROPHILS NFR BLD AUTO: 86.3 % — HIGH (ref 43–77)
NEUTROPHILS NFR BLD AUTO: 87.8 % — HIGH (ref 43–77)
NEUTROPHILS NFR BLD AUTO: 88 % — HIGH (ref 43–77)
NEUTROPHILS NFR BLD AUTO: 88 % — HIGH (ref 43–77)
NEUTROPHILS NFR BLD AUTO: 88.6 % — HIGH (ref 43–77)
NEUTROPHILS NFR BLD AUTO: 88.7 % — HIGH (ref 43–77)
NEUTROPHILS NFR BLD AUTO: 89.7 % — HIGH (ref 43–77)
NEUTROPHILS NFR BLD AUTO: 90.4 % — HIGH (ref 43–77)
NEUTROPHILS NFR BLD AUTO: 91 % — HIGH (ref 43–77)
NEUTROPHILS NFR BLD AUTO: 91.3 % — HIGH (ref 43–77)
NITRITE UR-MCNC: NEGATIVE — SIGNIFICANT CHANGE UP
NRBC # BLD: 0 /100 WBCS — SIGNIFICANT CHANGE UP (ref 0–0)
NRBC # BLD: 17 /100 WBCS — HIGH (ref 0–0)
NRBC # BLD: 2 /100 WBCS — HIGH (ref 0–0)
NRBC # BLD: 3 /100 WBCS — HIGH (ref 0–0)
NRBC # BLD: 32 /100 WBCS — HIGH (ref 0–0)
NRBC # BLD: 4 /100 WBCS — HIGH (ref 0–0)
NRBC # BLD: 5 /100 WBCS — HIGH (ref 0–0)
NRBC # BLD: 5 /100 WBCS — HIGH (ref 0–0)
NRBC # BLD: 6 /100 WBCS — HIGH (ref 0–0)
NRBC # BLD: 7 /100 WBCS — HIGH (ref 0–0)
NRBC # BLD: 7 /100 WBCS — HIGH (ref 0–0)
NRBC # BLD: 8 /100 WBCS — HIGH (ref 0–0)
NT-PROBNP SERPL-SCNC: 118 PG/ML — SIGNIFICANT CHANGE UP (ref 0–300)
NT-PROBNP SERPL-SCNC: 79 PG/ML — SIGNIFICANT CHANGE UP (ref 0–300)
ORGANISM # SPEC MICROSCOPIC CNT: SIGNIFICANT CHANGE UP
OSMOLALITY SERPL: 297 MOSMOL/KG — SIGNIFICANT CHANGE UP (ref 280–301)
OTHER CELLS CSF MANUAL: 7 ML/DL — LOW (ref 18–22)
OTHER CELLS CSF MANUAL: 8 ML/DL — LOW (ref 18–22)
PCO2 BLDA: 24 MMHG — LOW (ref 32–46)
PCO2 BLDA: 26 MMHG — LOW (ref 32–46)
PCO2 BLDA: 40 MMHG — SIGNIFICANT CHANGE UP (ref 32–46)
PCO2 BLDA: 42 MMHG — SIGNIFICANT CHANGE UP (ref 32–46)
PCO2 BLDA: 44 MMHG — SIGNIFICANT CHANGE UP (ref 32–46)
PCO2 BLDV: 36 MMHG — SIGNIFICANT CHANGE UP (ref 35–50)
PCO2 BLDV: 38 MMHG — SIGNIFICANT CHANGE UP (ref 35–50)
PCO2 BLDV: 66 MMHG — HIGH (ref 42–55)
PCO2 BLDV: 87 MMHG — HIGH (ref 42–55)
PH BLDA: 7.35 — SIGNIFICANT CHANGE UP (ref 7.35–7.45)
PH BLDA: 7.38 — SIGNIFICANT CHANGE UP (ref 7.35–7.45)
PH BLDA: 7.43 — SIGNIFICANT CHANGE UP (ref 7.35–7.45)
PH BLDA: 7.49 — HIGH (ref 7.35–7.45)
PH BLDA: 7.52 — HIGH (ref 7.35–7.45)
PH BLDV: 6.84 — CRITICAL LOW (ref 7.35–7.45)
PH BLDV: 7.1 — CRITICAL LOW (ref 7.35–7.45)
PH BLDV: 7.39 — SIGNIFICANT CHANGE UP (ref 7.35–7.45)
PH BLDV: 7.41 — SIGNIFICANT CHANGE UP (ref 7.35–7.45)
PH UR: 6 — SIGNIFICANT CHANGE UP (ref 5–8)
PH UR: 6 — SIGNIFICANT CHANGE UP (ref 5–8)
PH UR: 6.5 — SIGNIFICANT CHANGE UP (ref 5–8)
PHOSPHATE SERPL-MCNC: 2.1 MG/DL — LOW (ref 2.5–4.5)
PHOSPHATE SERPL-MCNC: 2.2 MG/DL — LOW (ref 2.5–4.5)
PHOSPHATE SERPL-MCNC: 2.4 MG/DL — LOW (ref 2.5–4.5)
PHOSPHATE SERPL-MCNC: 2.6 MG/DL — SIGNIFICANT CHANGE UP (ref 2.5–4.5)
PHOSPHATE SERPL-MCNC: 2.7 MG/DL — SIGNIFICANT CHANGE UP (ref 2.5–4.5)
PHOSPHATE SERPL-MCNC: 2.7 MG/DL — SIGNIFICANT CHANGE UP (ref 2.5–4.5)
PHOSPHATE SERPL-MCNC: 2.9 MG/DL — SIGNIFICANT CHANGE UP (ref 2.5–4.5)
PHOSPHATE SERPL-MCNC: 3 MG/DL — SIGNIFICANT CHANGE UP (ref 2.5–4.5)
PHOSPHATE SERPL-MCNC: 3.1 MG/DL — SIGNIFICANT CHANGE UP (ref 2.5–4.5)
PHOSPHATE SERPL-MCNC: 3.3 MG/DL — SIGNIFICANT CHANGE UP (ref 2.5–4.5)
PHOSPHATE SERPL-MCNC: 3.3 MG/DL — SIGNIFICANT CHANGE UP (ref 2.5–4.5)
PHOSPHATE SERPL-MCNC: 3.4 MG/DL — SIGNIFICANT CHANGE UP (ref 2.5–4.5)
PHOSPHATE SERPL-MCNC: 3.4 MG/DL — SIGNIFICANT CHANGE UP (ref 2.5–4.5)
PHOSPHATE SERPL-MCNC: 3.5 MG/DL — SIGNIFICANT CHANGE UP (ref 2.5–4.5)
PHOSPHATE SERPL-MCNC: 3.9 MG/DL — SIGNIFICANT CHANGE UP (ref 2.5–4.5)
PHOSPHATE SERPL-MCNC: 4 MG/DL — SIGNIFICANT CHANGE UP (ref 2.5–4.5)
PHOSPHATE SERPL-MCNC: 4.4 MG/DL — SIGNIFICANT CHANGE UP (ref 2.5–4.5)
PHOSPHATE SERPL-MCNC: 4.6 MG/DL — HIGH (ref 2.5–4.5)
PHOSPHATE SERPL-MCNC: 5.4 MG/DL — HIGH (ref 2.5–4.5)
PHOSPHATE SERPL-MCNC: 6.4 MG/DL — HIGH (ref 2.5–4.5)
PLAT MORPH BLD: ABNORMAL
PLATELET # BLD AUTO: 159 K/UL — SIGNIFICANT CHANGE UP (ref 150–400)
PLATELET # BLD AUTO: 174 K/UL — SIGNIFICANT CHANGE UP (ref 150–400)
PLATELET # BLD AUTO: 175 K/UL — SIGNIFICANT CHANGE UP (ref 150–400)
PLATELET # BLD AUTO: 185 K/UL — SIGNIFICANT CHANGE UP (ref 150–400)
PLATELET # BLD AUTO: 185 K/UL — SIGNIFICANT CHANGE UP (ref 150–400)
PLATELET # BLD AUTO: 186 K/UL — SIGNIFICANT CHANGE UP (ref 150–400)
PLATELET # BLD AUTO: 186 K/UL — SIGNIFICANT CHANGE UP (ref 150–400)
PLATELET # BLD AUTO: 188 K/UL — SIGNIFICANT CHANGE UP (ref 150–400)
PLATELET # BLD AUTO: 197 K/UL — SIGNIFICANT CHANGE UP (ref 150–400)
PLATELET # BLD AUTO: 199 K/UL — SIGNIFICANT CHANGE UP (ref 150–400)
PLATELET # BLD AUTO: 204 K/UL — SIGNIFICANT CHANGE UP (ref 150–400)
PLATELET # BLD AUTO: 204 K/UL — SIGNIFICANT CHANGE UP (ref 150–400)
PLATELET # BLD AUTO: 210 K/UL — SIGNIFICANT CHANGE UP (ref 150–400)
PLATELET # BLD AUTO: 217 K/UL — SIGNIFICANT CHANGE UP (ref 150–400)
PLATELET # BLD AUTO: 232 K/UL — SIGNIFICANT CHANGE UP (ref 150–400)
PLATELET # BLD AUTO: 248 K/UL — SIGNIFICANT CHANGE UP (ref 150–400)
PLATELET # BLD AUTO: 252 K/UL — SIGNIFICANT CHANGE UP (ref 150–400)
PLATELET # BLD AUTO: 293 K/UL — SIGNIFICANT CHANGE UP (ref 150–400)
PLATELET # BLD AUTO: 319 K/UL — SIGNIFICANT CHANGE UP (ref 150–400)
PLATELET # BLD AUTO: 336 K/UL — SIGNIFICANT CHANGE UP (ref 150–400)
PLATELET # BLD AUTO: 337 K/UL — SIGNIFICANT CHANGE UP (ref 150–400)
PLATELET # BLD AUTO: 343 K/UL — SIGNIFICANT CHANGE UP (ref 150–400)
PLATELET # BLD AUTO: 345 K/UL — SIGNIFICANT CHANGE UP (ref 150–400)
PLATELET # BLD AUTO: 347 K/UL — SIGNIFICANT CHANGE UP (ref 150–400)
PLATELET # BLD AUTO: 347 K/UL — SIGNIFICANT CHANGE UP (ref 150–400)
PLATELET # BLD AUTO: 350 K/UL — SIGNIFICANT CHANGE UP (ref 150–400)
PLATELET # BLD AUTO: 351 K/UL — SIGNIFICANT CHANGE UP (ref 150–400)
PLATELET # BLD AUTO: 356 K/UL — SIGNIFICANT CHANGE UP (ref 150–400)
PLATELET # BLD AUTO: 366 K/UL — SIGNIFICANT CHANGE UP (ref 150–400)
PLATELET # BLD AUTO: 374 K/UL — SIGNIFICANT CHANGE UP (ref 150–400)
PLATELET # BLD AUTO: 382 K/UL — SIGNIFICANT CHANGE UP (ref 150–400)
PLATELET # BLD AUTO: 383 K/UL — SIGNIFICANT CHANGE UP (ref 150–400)
PLATELET # BLD AUTO: 386 K/UL — SIGNIFICANT CHANGE UP (ref 150–400)
PLATELET # BLD AUTO: 398 K/UL — SIGNIFICANT CHANGE UP (ref 150–400)
PLATELET # BLD AUTO: 407 K/UL — HIGH (ref 150–400)
PLATELET # BLD AUTO: 425 K/UL — HIGH (ref 150–400)
PLATELET # BLD AUTO: 429 K/UL — HIGH (ref 150–400)
PLATELET # BLD AUTO: 441 K/UL — HIGH (ref 150–400)
PLATELET # BLD AUTO: 445 K/UL — HIGH (ref 150–400)
PLATELET # BLD AUTO: 451 K/UL — HIGH (ref 150–400)
PLATELET # BLD AUTO: 451 K/UL — HIGH (ref 150–400)
PLATELET # BLD AUTO: 454 K/UL — HIGH (ref 150–400)
PLATELET # BLD AUTO: 461 K/UL — HIGH (ref 150–400)
PLATELET # BLD AUTO: 467 K/UL — HIGH (ref 150–400)
PLATELET # BLD AUTO: 496 K/UL — HIGH (ref 150–400)
PLATELET # BLD AUTO: 515 K/UL — HIGH (ref 150–400)
PLATELET # BLD AUTO: 532 K/UL — HIGH (ref 150–400)
PO2 BLDA: 133 MMHG — HIGH (ref 74–108)
PO2 BLDA: 58 MMHG — LOW (ref 74–108)
PO2 BLDA: 66 MMHG — LOW (ref 74–108)
PO2 BLDA: 78 MMHG — SIGNIFICANT CHANGE UP (ref 74–108)
PO2 BLDA: 94 MMHG — SIGNIFICANT CHANGE UP (ref 74–108)
PO2 BLDV: 31 MMHG — SIGNIFICANT CHANGE UP (ref 25–45)
PO2 BLDV: 44 MMHG — SIGNIFICANT CHANGE UP (ref 25–45)
PO2 BLDV: 50 MMHG — HIGH (ref 25–45)
PO2 BLDV: <20 MMHG — LOW (ref 25–45)
POIKILOCYTOSIS BLD QL AUTO: SLIGHT — SIGNIFICANT CHANGE UP
POTASSIUM BLDA-SCNC: 3.8 MMOL/L — SIGNIFICANT CHANGE UP (ref 3.5–5.3)
POTASSIUM BLDV-SCNC: 4.1 MMOL/L — SIGNIFICANT CHANGE UP (ref 3.5–5.3)
POTASSIUM BLDV-SCNC: 4.2 MMOL/L — SIGNIFICANT CHANGE UP (ref 3.5–5.3)
POTASSIUM BLDV-SCNC: 5.3 MMOL/L — SIGNIFICANT CHANGE UP (ref 3.5–5.3)
POTASSIUM BLDV-SCNC: 5.7 MMOL/L — HIGH (ref 3.5–5.3)
POTASSIUM SERPL-MCNC: 3.6 MMOL/L — SIGNIFICANT CHANGE UP (ref 3.5–5.3)
POTASSIUM SERPL-MCNC: 3.6 MMOL/L — SIGNIFICANT CHANGE UP (ref 3.5–5.3)
POTASSIUM SERPL-MCNC: 3.7 MMOL/L — SIGNIFICANT CHANGE UP (ref 3.5–5.3)
POTASSIUM SERPL-MCNC: 3.8 MMOL/L — SIGNIFICANT CHANGE UP (ref 3.5–5.3)
POTASSIUM SERPL-MCNC: 3.9 MMOL/L — SIGNIFICANT CHANGE UP (ref 3.5–5.3)
POTASSIUM SERPL-MCNC: 3.9 MMOL/L — SIGNIFICANT CHANGE UP (ref 3.5–5.3)
POTASSIUM SERPL-MCNC: 4 MMOL/L — SIGNIFICANT CHANGE UP (ref 3.5–5.3)
POTASSIUM SERPL-MCNC: 4.1 MMOL/L — SIGNIFICANT CHANGE UP (ref 3.5–5.3)
POTASSIUM SERPL-MCNC: 4.2 MMOL/L — SIGNIFICANT CHANGE UP (ref 3.5–5.3)
POTASSIUM SERPL-MCNC: 4.2 MMOL/L — SIGNIFICANT CHANGE UP (ref 3.5–5.3)
POTASSIUM SERPL-MCNC: 4.3 MMOL/L — SIGNIFICANT CHANGE UP (ref 3.5–5.3)
POTASSIUM SERPL-MCNC: 4.4 MMOL/L — SIGNIFICANT CHANGE UP (ref 3.5–5.3)
POTASSIUM SERPL-MCNC: 4.5 MMOL/L — SIGNIFICANT CHANGE UP (ref 3.5–5.3)
POTASSIUM SERPL-MCNC: 4.6 MMOL/L — SIGNIFICANT CHANGE UP (ref 3.5–5.3)
POTASSIUM SERPL-MCNC: 4.7 MMOL/L — SIGNIFICANT CHANGE UP (ref 3.5–5.3)
POTASSIUM SERPL-MCNC: 4.7 MMOL/L — SIGNIFICANT CHANGE UP (ref 3.5–5.3)
POTASSIUM SERPL-MCNC: 4.8 MMOL/L — SIGNIFICANT CHANGE UP (ref 3.5–5.3)
POTASSIUM SERPL-MCNC: 4.8 MMOL/L — SIGNIFICANT CHANGE UP (ref 3.5–5.3)
POTASSIUM SERPL-MCNC: 4.9 MMOL/L — SIGNIFICANT CHANGE UP (ref 3.5–5.3)
POTASSIUM SERPL-MCNC: 4.9 MMOL/L — SIGNIFICANT CHANGE UP (ref 3.5–5.3)
POTASSIUM SERPL-MCNC: 5 MMOL/L — SIGNIFICANT CHANGE UP (ref 3.5–5.3)
POTASSIUM SERPL-MCNC: 5 MMOL/L — SIGNIFICANT CHANGE UP (ref 3.5–5.3)
POTASSIUM SERPL-MCNC: 5.1 MMOL/L — SIGNIFICANT CHANGE UP (ref 3.5–5.3)
POTASSIUM SERPL-MCNC: 5.2 MMOL/L — SIGNIFICANT CHANGE UP (ref 3.5–5.3)
POTASSIUM SERPL-MCNC: 5.4 MMOL/L — HIGH (ref 3.5–5.3)
POTASSIUM SERPL-MCNC: 5.5 MMOL/L — HIGH (ref 3.5–5.3)
POTASSIUM SERPL-MCNC: 5.5 MMOL/L — HIGH (ref 3.5–5.3)
POTASSIUM SERPL-MCNC: 5.6 MMOL/L — HIGH (ref 3.5–5.3)
POTASSIUM SERPL-MCNC: 5.6 MMOL/L — HIGH (ref 3.5–5.3)
POTASSIUM SERPL-MCNC: 5.7 MMOL/L — HIGH (ref 3.5–5.3)
POTASSIUM SERPL-MCNC: 6 MMOL/L — HIGH (ref 3.5–5.3)
POTASSIUM SERPL-SCNC: 3.6 MMOL/L — SIGNIFICANT CHANGE UP (ref 3.5–5.3)
POTASSIUM SERPL-SCNC: 3.6 MMOL/L — SIGNIFICANT CHANGE UP (ref 3.5–5.3)
POTASSIUM SERPL-SCNC: 3.7 MMOL/L — SIGNIFICANT CHANGE UP (ref 3.5–5.3)
POTASSIUM SERPL-SCNC: 3.8 MMOL/L — SIGNIFICANT CHANGE UP (ref 3.5–5.3)
POTASSIUM SERPL-SCNC: 3.9 MMOL/L — SIGNIFICANT CHANGE UP (ref 3.5–5.3)
POTASSIUM SERPL-SCNC: 3.9 MMOL/L — SIGNIFICANT CHANGE UP (ref 3.5–5.3)
POTASSIUM SERPL-SCNC: 4 MMOL/L — SIGNIFICANT CHANGE UP (ref 3.5–5.3)
POTASSIUM SERPL-SCNC: 4.1 MMOL/L — SIGNIFICANT CHANGE UP (ref 3.5–5.3)
POTASSIUM SERPL-SCNC: 4.2 MMOL/L — SIGNIFICANT CHANGE UP (ref 3.5–5.3)
POTASSIUM SERPL-SCNC: 4.2 MMOL/L — SIGNIFICANT CHANGE UP (ref 3.5–5.3)
POTASSIUM SERPL-SCNC: 4.3 MMOL/L — SIGNIFICANT CHANGE UP (ref 3.5–5.3)
POTASSIUM SERPL-SCNC: 4.4 MMOL/L — SIGNIFICANT CHANGE UP (ref 3.5–5.3)
POTASSIUM SERPL-SCNC: 4.5 MMOL/L — SIGNIFICANT CHANGE UP (ref 3.5–5.3)
POTASSIUM SERPL-SCNC: 4.6 MMOL/L — SIGNIFICANT CHANGE UP (ref 3.5–5.3)
POTASSIUM SERPL-SCNC: 4.7 MMOL/L — SIGNIFICANT CHANGE UP (ref 3.5–5.3)
POTASSIUM SERPL-SCNC: 4.7 MMOL/L — SIGNIFICANT CHANGE UP (ref 3.5–5.3)
POTASSIUM SERPL-SCNC: 4.8 MMOL/L — SIGNIFICANT CHANGE UP (ref 3.5–5.3)
POTASSIUM SERPL-SCNC: 4.8 MMOL/L — SIGNIFICANT CHANGE UP (ref 3.5–5.3)
POTASSIUM SERPL-SCNC: 4.9 MMOL/L — SIGNIFICANT CHANGE UP (ref 3.5–5.3)
POTASSIUM SERPL-SCNC: 4.9 MMOL/L — SIGNIFICANT CHANGE UP (ref 3.5–5.3)
POTASSIUM SERPL-SCNC: 5 MMOL/L — SIGNIFICANT CHANGE UP (ref 3.5–5.3)
POTASSIUM SERPL-SCNC: 5 MMOL/L — SIGNIFICANT CHANGE UP (ref 3.5–5.3)
POTASSIUM SERPL-SCNC: 5.1 MMOL/L — SIGNIFICANT CHANGE UP (ref 3.5–5.3)
POTASSIUM SERPL-SCNC: 5.2 MMOL/L — SIGNIFICANT CHANGE UP (ref 3.5–5.3)
POTASSIUM SERPL-SCNC: 5.4 MMOL/L — HIGH (ref 3.5–5.3)
POTASSIUM SERPL-SCNC: 5.5 MMOL/L — HIGH (ref 3.5–5.3)
POTASSIUM SERPL-SCNC: 5.5 MMOL/L — HIGH (ref 3.5–5.3)
POTASSIUM SERPL-SCNC: 5.6 MMOL/L — HIGH (ref 3.5–5.3)
POTASSIUM SERPL-SCNC: 5.6 MMOL/L — HIGH (ref 3.5–5.3)
POTASSIUM SERPL-SCNC: 5.7 MMOL/L — HIGH (ref 3.5–5.3)
POTASSIUM SERPL-SCNC: 6 MMOL/L — HIGH (ref 3.5–5.3)
PROCALCITONIN SERPL-MCNC: 0.21 NG/ML — HIGH (ref 0.02–0.1)
PROCALCITONIN SERPL-MCNC: 0.25 NG/ML — HIGH (ref 0.02–0.1)
PROCALCITONIN SERPL-MCNC: 0.3 NG/ML — HIGH (ref 0.02–0.1)
PROCALCITONIN SERPL-MCNC: 0.34 NG/ML — HIGH (ref 0.02–0.1)
PROCALCITONIN SERPL-MCNC: 0.41 NG/ML — HIGH (ref 0.02–0.1)
PROCALCITONIN SERPL-MCNC: 0.42 NG/ML — HIGH (ref 0.02–0.1)
PROCALCITONIN SERPL-MCNC: 0.45 NG/ML — HIGH (ref 0.02–0.1)
PROCALCITONIN SERPL-MCNC: 0.53 NG/ML — HIGH (ref 0.02–0.1)
PROCALCITONIN SERPL-MCNC: 1.19 NG/ML — HIGH (ref 0.02–0.1)
PROCALCITONIN SERPL-MCNC: 5.57 NG/ML — HIGH (ref 0.02–0.1)
PROT SERPL-MCNC: 5.2 G/DL — LOW (ref 6–8.3)
PROT SERPL-MCNC: 5.3 G/DL — LOW (ref 6–8.3)
PROT SERPL-MCNC: 5.4 G/DL — LOW (ref 6–8.3)
PROT SERPL-MCNC: 5.5 G/DL — LOW (ref 6–8.3)
PROT SERPL-MCNC: 5.5 G/DL — LOW (ref 6–8.3)
PROT SERPL-MCNC: 5.6 G/DL — LOW (ref 6–8.3)
PROT SERPL-MCNC: 5.7 G/DL — LOW (ref 6–8.3)
PROT SERPL-MCNC: 5.8 G/DL — LOW (ref 6–8.3)
PROT SERPL-MCNC: 5.8 G/DL — LOW (ref 6–8.3)
PROT SERPL-MCNC: 6 G/DL — SIGNIFICANT CHANGE UP (ref 6–8.3)
PROT SERPL-MCNC: 6.1 G/DL — SIGNIFICANT CHANGE UP (ref 6–8.3)
PROT SERPL-MCNC: 6.3 G/DL — SIGNIFICANT CHANGE UP (ref 6–8.3)
PROT SERPL-MCNC: 6.4 G/DL — SIGNIFICANT CHANGE UP (ref 6–8.3)
PROT SERPL-MCNC: 6.5 G/DL — SIGNIFICANT CHANGE UP (ref 6–8.3)
PROT SERPL-MCNC: 6.6 G/DL — SIGNIFICANT CHANGE UP (ref 6–8.3)
PROT SERPL-MCNC: 6.7 G/DL — SIGNIFICANT CHANGE UP (ref 6–8.3)
PROT SERPL-MCNC: 6.7 G/DL — SIGNIFICANT CHANGE UP (ref 6–8.3)
PROT SERPL-MCNC: 6.8 G/DL — SIGNIFICANT CHANGE UP (ref 6–8.3)
PROT SERPL-MCNC: 6.9 G/DL — SIGNIFICANT CHANGE UP (ref 6–8.3)
PROT SERPL-MCNC: 7.4 G/DL — SIGNIFICANT CHANGE UP (ref 6–8.3)
PROT SERPL-MCNC: 8.1 G/DL — SIGNIFICANT CHANGE UP (ref 6–8.3)
PROT UR-MCNC: 100 — SIGNIFICANT CHANGE UP
PROT UR-MCNC: ABNORMAL
PROT UR-MCNC: ABNORMAL
PROTHROM AB SERPL-ACNC: 15.2 SEC — HIGH (ref 10.6–13.6)
PROTHROM AB SERPL-ACNC: 16.2 SEC — HIGH (ref 10.6–13.6)
PROTHROM AB SERPL-ACNC: 16.8 SEC — HIGH (ref 10.6–13.6)
PROTHROM AB SERPL-ACNC: 16.9 SEC — HIGH (ref 10.6–13.6)
PROTHROM AB SERPL-ACNC: 17 SEC — HIGH (ref 10.6–13.6)
PROTHROM AB SERPL-ACNC: 17.1 SEC — HIGH (ref 10.6–13.6)
PROTHROM AB SERPL-ACNC: 17.1 SEC — HIGH (ref 10.6–13.6)
PROTHROM AB SERPL-ACNC: 17.4 SEC — HIGH (ref 10.6–13.6)
PROTHROM AB SERPL-ACNC: 17.4 SEC — HIGH (ref 10.6–13.6)
PROTHROM AB SERPL-ACNC: 17.7 SEC — HIGH (ref 10.6–13.6)
PROTHROM AB SERPL-ACNC: 18.7 SEC — HIGH (ref 10.6–13.6)
PROTHROM AB SERPL-ACNC: 19.2 SEC — HIGH (ref 10.6–13.6)
PROTHROM AB SERPL-ACNC: 21.9 SEC — HIGH (ref 10.6–13.6)
PROTHROM AB SERPL-ACNC: 22 SEC — HIGH (ref 10.6–13.6)
PROTHROM AB SERPL-ACNC: 26 SEC — HIGH (ref 10.6–13.6)
PROTHROM AB SERPL-ACNC: 36.4 SEC — HIGH (ref 10.6–13.6)
RAPIDTEG MAXIMUM AMPLITUDE: >75 MM (ref 52–70)
RBC # BLD: 3.2 M/UL — LOW (ref 4.2–5.8)
RBC # BLD: 3.2 M/UL — LOW (ref 4.2–5.8)
RBC # BLD: 3.36 M/UL — LOW (ref 4.2–5.8)
RBC # BLD: 3.48 M/UL — LOW (ref 4.2–5.8)
RBC # BLD: 3.5 M/UL — LOW (ref 4.2–5.8)
RBC # BLD: 3.65 M/UL — LOW (ref 4.2–5.8)
RBC # BLD: 3.7 M/UL — LOW (ref 4.2–5.8)
RBC # BLD: 3.72 M/UL — LOW (ref 4.2–5.8)
RBC # BLD: 3.75 M/UL — LOW (ref 4.2–5.8)
RBC # BLD: 3.77 M/UL — LOW (ref 4.2–5.8)
RBC # BLD: 3.78 M/UL — LOW (ref 4.2–5.8)
RBC # BLD: 3.8 M/UL — LOW (ref 4.2–5.8)
RBC # BLD: 3.86 M/UL — LOW (ref 4.2–5.8)
RBC # BLD: 3.87 M/UL — LOW (ref 4.2–5.8)
RBC # BLD: 3.88 M/UL — LOW (ref 4.2–5.8)
RBC # BLD: 3.88 M/UL — LOW (ref 4.2–5.8)
RBC # BLD: 3.89 M/UL — LOW (ref 4.2–5.8)
RBC # BLD: 3.93 M/UL — LOW (ref 4.2–5.8)
RBC # BLD: 3.94 M/UL — LOW (ref 4.2–5.8)
RBC # BLD: 3.94 M/UL — LOW (ref 4.2–5.8)
RBC # BLD: 3.96 M/UL — LOW (ref 4.2–5.8)
RBC # BLD: 4.19 M/UL — LOW (ref 4.2–5.8)
RBC # BLD: 4.25 M/UL — SIGNIFICANT CHANGE UP (ref 4.2–5.8)
RBC # BLD: 4.29 M/UL — SIGNIFICANT CHANGE UP (ref 4.2–5.8)
RBC # BLD: 4.34 M/UL — SIGNIFICANT CHANGE UP (ref 4.2–5.8)
RBC # BLD: 4.4 M/UL — SIGNIFICANT CHANGE UP (ref 4.2–5.8)
RBC # BLD: 4.41 M/UL — SIGNIFICANT CHANGE UP (ref 4.2–5.8)
RBC # BLD: 4.42 M/UL — SIGNIFICANT CHANGE UP (ref 4.2–5.8)
RBC # BLD: 4.44 M/UL — SIGNIFICANT CHANGE UP (ref 4.2–5.8)
RBC # BLD: 4.45 M/UL — SIGNIFICANT CHANGE UP (ref 4.2–5.8)
RBC # BLD: 4.57 M/UL — SIGNIFICANT CHANGE UP (ref 4.2–5.8)
RBC # BLD: 4.65 M/UL — SIGNIFICANT CHANGE UP (ref 4.2–5.8)
RBC # BLD: 4.86 M/UL — SIGNIFICANT CHANGE UP (ref 4.2–5.8)
RBC # BLD: 5 M/UL — SIGNIFICANT CHANGE UP (ref 4.2–5.8)
RBC # BLD: 5.04 M/UL — SIGNIFICANT CHANGE UP (ref 4.2–5.8)
RBC # BLD: 5.18 M/UL — SIGNIFICANT CHANGE UP (ref 4.2–5.8)
RBC # BLD: 5.27 M/UL — SIGNIFICANT CHANGE UP (ref 4.2–5.8)
RBC # BLD: 5.96 M/UL — HIGH (ref 4.2–5.8)
RBC # BLD: 6 M/UL — HIGH (ref 4.2–5.8)
RBC # BLD: 6.06 M/UL — HIGH (ref 4.2–5.8)
RBC # BLD: 6.16 M/UL — HIGH (ref 4.2–5.8)
RBC # BLD: 6.16 M/UL — HIGH (ref 4.2–5.8)
RBC # BLD: 6.2 M/UL — HIGH (ref 4.2–5.8)
RBC # BLD: 6.25 M/UL — HIGH (ref 4.2–5.8)
RBC # BLD: 6.28 M/UL — HIGH (ref 4.2–5.8)
RBC # BLD: 6.35 M/UL — HIGH (ref 4.2–5.8)
RBC # BLD: 6.65 M/UL — HIGH (ref 4.2–5.8)
RBC # FLD: 14.4 % — SIGNIFICANT CHANGE UP (ref 10.3–14.5)
RBC # FLD: 14.5 % — SIGNIFICANT CHANGE UP (ref 10.3–14.5)
RBC # FLD: 14.5 % — SIGNIFICANT CHANGE UP (ref 10.3–14.5)
RBC # FLD: 14.6 % — HIGH (ref 10.3–14.5)
RBC # FLD: 14.7 % — HIGH (ref 10.3–14.5)
RBC # FLD: 14.7 % — HIGH (ref 10.3–14.5)
RBC # FLD: 14.8 % — HIGH (ref 10.3–14.5)
RBC # FLD: 14.8 % — HIGH (ref 10.3–14.5)
RBC # FLD: 15.1 % — HIGH (ref 10.3–14.5)
RBC # FLD: 15.2 % — HIGH (ref 10.3–14.5)
RBC # FLD: 15.3 % — HIGH (ref 10.3–14.5)
RBC # FLD: 15.4 % — HIGH (ref 10.3–14.5)
RBC # FLD: 15.4 % — HIGH (ref 10.3–14.5)
RBC # FLD: 15.5 % — HIGH (ref 10.3–14.5)
RBC # FLD: 15.6 % — HIGH (ref 10.3–14.5)
RBC # FLD: 15.6 % — HIGH (ref 10.3–14.5)
RBC # FLD: 15.7 % — HIGH (ref 10.3–14.5)
RBC # FLD: 15.7 % — HIGH (ref 10.3–14.5)
RBC # FLD: 15.8 % — HIGH (ref 10.3–14.5)
RBC # FLD: 15.8 % — HIGH (ref 10.3–14.5)
RBC # FLD: 15.9 % — HIGH (ref 10.3–14.5)
RBC # FLD: 16.1 % — HIGH (ref 10.3–14.5)
RBC # FLD: 16.2 % — HIGH (ref 10.3–14.5)
RBC # FLD: 16.8 % — HIGH (ref 10.3–14.5)
RBC # FLD: 17.6 % — HIGH (ref 10.3–14.5)
RBC # FLD: 18.1 % — HIGH (ref 10.3–14.5)
RBC # FLD: 18.5 % — HIGH (ref 10.3–14.5)
RBC # FLD: 20.2 % — HIGH (ref 10.3–14.5)
RBC # FLD: 22.4 % — HIGH (ref 10.3–14.5)
RBC # FLD: 22.5 % — HIGH (ref 10.3–14.5)
RBC # FLD: 22.8 % — HIGH (ref 10.3–14.5)
RBC # FLD: 23.4 % — HIGH (ref 10.3–14.5)
RBC # FLD: 23.6 % — HIGH (ref 10.3–14.5)
RBC # FLD: 23.9 % — HIGH (ref 10.3–14.5)
RBC # FLD: 24 % — HIGH (ref 10.3–14.5)
RBC # FLD: 24.2 % — HIGH (ref 10.3–14.5)
RBC # FLD: 24.3 % — HIGH (ref 10.3–14.5)
RBC # FLD: 24.7 % — HIGH (ref 10.3–14.5)
RBC # FLD: 25.1 % — HIGH (ref 10.3–14.5)
RBC # FLD: 25.3 % — HIGH (ref 10.3–14.5)
RBC BLD AUTO: ABNORMAL
RBC CASTS # UR COMP ASSIST: 144 /HPF — HIGH (ref 0–4)
RBC CASTS # UR COMP ASSIST: 20 /HPF — HIGH (ref 0–4)
RBC CASTS # UR COMP ASSIST: 7 /HPF — HIGH (ref 0–4)
RH IG SCN BLD-IMP: POSITIVE — SIGNIFICANT CHANGE UP
S AUREUS DNA NOSE QL NAA+PROBE: SIGNIFICANT CHANGE UP
SAO2 % BLDA: 86 % — LOW (ref 92–96)
SAO2 % BLDA: 94 % — SIGNIFICANT CHANGE UP (ref 92–96)
SAO2 % BLDA: 95 % — SIGNIFICANT CHANGE UP (ref 92–96)
SAO2 % BLDA: 96 % — SIGNIFICANT CHANGE UP (ref 92–96)
SAO2 % BLDA: 99 % — HIGH (ref 92–96)
SAO2 % BLDV: 21 % — LOW (ref 67–88)
SAO2 % BLDV: 46 % — LOW (ref 67–88)
SAO2 % BLDV: 63 % — LOW (ref 67–88)
SAO2 % BLDV: 69 % — SIGNIFICANT CHANGE UP (ref 67–88)
SARS-COV-2 IGG SERPL QL IA: POSITIVE
SARS-COV-2 IGM SERPL IA-ACNC: 1.61 INDEX — HIGH
SARS-COV-2 RNA SPEC QL NAA+PROBE: DETECTED
SODIUM BLDA-SCNC: 137 MMOL/L — SIGNIFICANT CHANGE UP (ref 135–145)
SODIUM SERPL-SCNC: 125 MMOL/L — LOW (ref 135–145)
SODIUM SERPL-SCNC: 127 MMOL/L — LOW (ref 135–145)
SODIUM SERPL-SCNC: 129 MMOL/L — LOW (ref 135–145)
SODIUM SERPL-SCNC: 130 MMOL/L — LOW (ref 135–145)
SODIUM SERPL-SCNC: 131 MMOL/L — LOW (ref 135–145)
SODIUM SERPL-SCNC: 132 MMOL/L — LOW (ref 135–145)
SODIUM SERPL-SCNC: 133 MMOL/L — LOW (ref 135–145)
SODIUM SERPL-SCNC: 133 MMOL/L — LOW (ref 135–145)
SODIUM SERPL-SCNC: 134 MMOL/L — LOW (ref 135–145)
SODIUM SERPL-SCNC: 134 MMOL/L — LOW (ref 135–145)
SODIUM SERPL-SCNC: 135 MMOL/L — SIGNIFICANT CHANGE UP (ref 135–145)
SODIUM SERPL-SCNC: 136 MMOL/L — SIGNIFICANT CHANGE UP (ref 135–145)
SODIUM SERPL-SCNC: 137 MMOL/L — SIGNIFICANT CHANGE UP (ref 135–145)
SODIUM SERPL-SCNC: 138 MMOL/L — SIGNIFICANT CHANGE UP (ref 135–145)
SODIUM SERPL-SCNC: 138 MMOL/L — SIGNIFICANT CHANGE UP (ref 135–145)
SODIUM SERPL-SCNC: 140 MMOL/L — SIGNIFICANT CHANGE UP (ref 135–145)
SODIUM SERPL-SCNC: 141 MMOL/L — SIGNIFICANT CHANGE UP (ref 135–145)
SODIUM SERPL-SCNC: 142 MMOL/L — SIGNIFICANT CHANGE UP (ref 135–145)
SODIUM SERPL-SCNC: 142 MMOL/L — SIGNIFICANT CHANGE UP (ref 135–145)
SODIUM SERPL-SCNC: 143 MMOL/L — SIGNIFICANT CHANGE UP (ref 135–145)
SODIUM SERPL-SCNC: 144 MMOL/L — SIGNIFICANT CHANGE UP (ref 135–145)
SODIUM SERPL-SCNC: 145 MMOL/L — SIGNIFICANT CHANGE UP (ref 135–145)
SODIUM SERPL-SCNC: 145 MMOL/L — SIGNIFICANT CHANGE UP (ref 135–145)
SODIUM SERPL-SCNC: 146 MMOL/L — HIGH (ref 135–145)
SODIUM SERPL-SCNC: 147 MMOL/L — HIGH (ref 135–145)
SODIUM UR-SCNC: 6 MMOL/L — SIGNIFICANT CHANGE UP
SP GR SPEC: 1.01 — SIGNIFICANT CHANGE UP (ref 1.01–1.02)
SP GR SPEC: 1.03 — HIGH (ref 1.01–1.02)
SP GR SPEC: 1.04 — HIGH (ref 1.01–1.02)
SPECIMEN SOURCE: SIGNIFICANT CHANGE UP
T3FREE SERPL-MCNC: 1.49 PG/ML — LOW (ref 1.8–4.6)
T4 FREE SERPL-MCNC: 1.2 NG/DL — SIGNIFICANT CHANGE UP (ref 0.9–1.8)
TEG FUNCTIONAL FIBRINOGEN: >52 MM (ref 15–32)
TEG MAXIMUM AMPLITUDE: 70.5 MM (ref 52–69)
TEG REACTION TIME: >17 MIN (ref 4.6–9.1)
TIBC SERPL-MCNC: 207 UG/DL — LOW (ref 220–430)
TIBC SERPL-MCNC: 256 UG/DL — SIGNIFICANT CHANGE UP (ref 220–430)
TRIGL SERPL-MCNC: 226 MG/DL — HIGH
TRIGL SERPL-MCNC: 261 MG/DL — HIGH
TRIGL SERPL-MCNC: 388 MG/DL — HIGH
TROPONIN T, HIGH SENSITIVITY RESULT: <6 NG/L — SIGNIFICANT CHANGE UP (ref 0–51)
TROPONIN T, HIGH SENSITIVITY RESULT: <6 NG/L — SIGNIFICANT CHANGE UP (ref 0–51)
TSH SERPL-MCNC: 1.01 UIU/ML — SIGNIFICANT CHANGE UP (ref 0.27–4.2)
UIBC SERPL-MCNC: 188 UG/DL — SIGNIFICANT CHANGE UP (ref 110–370)
UIBC SERPL-MCNC: 237 UG/DL — SIGNIFICANT CHANGE UP (ref 110–370)
UROBILINOGEN FLD QL: ABNORMAL
UROBILINOGEN FLD QL: ABNORMAL
UROBILINOGEN FLD QL: NEGATIVE — SIGNIFICANT CHANGE UP
WBC # BLD: 10.14 K/UL — SIGNIFICANT CHANGE UP (ref 3.8–10.5)
WBC # BLD: 10.18 K/UL — SIGNIFICANT CHANGE UP (ref 3.8–10.5)
WBC # BLD: 10.25 K/UL — SIGNIFICANT CHANGE UP (ref 3.8–10.5)
WBC # BLD: 11.31 K/UL — HIGH (ref 3.8–10.5)
WBC # BLD: 11.31 K/UL — HIGH (ref 3.8–10.5)
WBC # BLD: 11.48 K/UL — HIGH (ref 3.8–10.5)
WBC # BLD: 11.5 K/UL — HIGH (ref 3.8–10.5)
WBC # BLD: 11.61 K/UL — HIGH (ref 3.8–10.5)
WBC # BLD: 11.67 K/UL — HIGH (ref 3.8–10.5)
WBC # BLD: 11.86 K/UL — HIGH (ref 3.8–10.5)
WBC # BLD: 12.01 K/UL — HIGH (ref 3.8–10.5)
WBC # BLD: 12.02 K/UL — HIGH (ref 3.8–10.5)
WBC # BLD: 12.35 K/UL — HIGH (ref 3.8–10.5)
WBC # BLD: 12.53 K/UL — HIGH (ref 3.8–10.5)
WBC # BLD: 12.54 K/UL — HIGH (ref 3.8–10.5)
WBC # BLD: 12.85 K/UL — HIGH (ref 3.8–10.5)
WBC # BLD: 12.93 K/UL — HIGH (ref 3.8–10.5)
WBC # BLD: 13.14 K/UL — HIGH (ref 3.8–10.5)
WBC # BLD: 13.35 K/UL — HIGH (ref 3.8–10.5)
WBC # BLD: 13.43 K/UL — HIGH (ref 3.8–10.5)
WBC # BLD: 13.57 K/UL — HIGH (ref 3.8–10.5)
WBC # BLD: 13.62 K/UL — HIGH (ref 3.8–10.5)
WBC # BLD: 13.7 K/UL — HIGH (ref 3.8–10.5)
WBC # BLD: 14.07 K/UL — HIGH (ref 3.8–10.5)
WBC # BLD: 14.21 K/UL — HIGH (ref 3.8–10.5)
WBC # BLD: 14.31 K/UL — HIGH (ref 3.8–10.5)
WBC # BLD: 14.47 K/UL — HIGH (ref 3.8–10.5)
WBC # BLD: 14.54 K/UL — HIGH (ref 3.8–10.5)
WBC # BLD: 14.57 K/UL — HIGH (ref 3.8–10.5)
WBC # BLD: 14.75 K/UL — HIGH (ref 3.8–10.5)
WBC # BLD: 14.93 K/UL — HIGH (ref 3.8–10.5)
WBC # BLD: 14.94 K/UL — HIGH (ref 3.8–10.5)
WBC # BLD: 15.39 K/UL — HIGH (ref 3.8–10.5)
WBC # BLD: 16.16 K/UL — HIGH (ref 3.8–10.5)
WBC # BLD: 16.36 K/UL — HIGH (ref 3.8–10.5)
WBC # BLD: 16.63 K/UL — HIGH (ref 3.8–10.5)
WBC # BLD: 4.54 K/UL — SIGNIFICANT CHANGE UP (ref 3.8–10.5)
WBC # BLD: 6.79 K/UL — SIGNIFICANT CHANGE UP (ref 3.8–10.5)
WBC # BLD: 6.82 K/UL — SIGNIFICANT CHANGE UP (ref 3.8–10.5)
WBC # BLD: 6.92 K/UL — SIGNIFICANT CHANGE UP (ref 3.8–10.5)
WBC # BLD: 7.36 K/UL — SIGNIFICANT CHANGE UP (ref 3.8–10.5)
WBC # BLD: 7.57 K/UL — SIGNIFICANT CHANGE UP (ref 3.8–10.5)
WBC # BLD: 8 K/UL — SIGNIFICANT CHANGE UP (ref 3.8–10.5)
WBC # BLD: 8.03 K/UL — SIGNIFICANT CHANGE UP (ref 3.8–10.5)
WBC # BLD: 8.22 K/UL — SIGNIFICANT CHANGE UP (ref 3.8–10.5)
WBC # BLD: 9.11 K/UL — SIGNIFICANT CHANGE UP (ref 3.8–10.5)
WBC # BLD: 9.84 K/UL — SIGNIFICANT CHANGE UP (ref 3.8–10.5)
WBC # FLD AUTO: 10.14 K/UL — SIGNIFICANT CHANGE UP (ref 3.8–10.5)
WBC # FLD AUTO: 10.18 K/UL — SIGNIFICANT CHANGE UP (ref 3.8–10.5)
WBC # FLD AUTO: 10.25 K/UL — SIGNIFICANT CHANGE UP (ref 3.8–10.5)
WBC # FLD AUTO: 11.31 K/UL — HIGH (ref 3.8–10.5)
WBC # FLD AUTO: 11.31 K/UL — HIGH (ref 3.8–10.5)
WBC # FLD AUTO: 11.48 K/UL — HIGH (ref 3.8–10.5)
WBC # FLD AUTO: 11.5 K/UL — HIGH (ref 3.8–10.5)
WBC # FLD AUTO: 11.61 K/UL — HIGH (ref 3.8–10.5)
WBC # FLD AUTO: 11.67 K/UL — HIGH (ref 3.8–10.5)
WBC # FLD AUTO: 11.86 K/UL — HIGH (ref 3.8–10.5)
WBC # FLD AUTO: 12.01 K/UL — HIGH (ref 3.8–10.5)
WBC # FLD AUTO: 12.02 K/UL — HIGH (ref 3.8–10.5)
WBC # FLD AUTO: 12.35 K/UL — HIGH (ref 3.8–10.5)
WBC # FLD AUTO: 12.53 K/UL — HIGH (ref 3.8–10.5)
WBC # FLD AUTO: 12.54 K/UL — HIGH (ref 3.8–10.5)
WBC # FLD AUTO: 12.85 K/UL — HIGH (ref 3.8–10.5)
WBC # FLD AUTO: 12.93 K/UL — HIGH (ref 3.8–10.5)
WBC # FLD AUTO: 13.14 K/UL — HIGH (ref 3.8–10.5)
WBC # FLD AUTO: 13.35 K/UL — HIGH (ref 3.8–10.5)
WBC # FLD AUTO: 13.43 K/UL — HIGH (ref 3.8–10.5)
WBC # FLD AUTO: 13.57 K/UL — HIGH (ref 3.8–10.5)
WBC # FLD AUTO: 13.62 K/UL — HIGH (ref 3.8–10.5)
WBC # FLD AUTO: 13.7 K/UL — HIGH (ref 3.8–10.5)
WBC # FLD AUTO: 14.07 K/UL — HIGH (ref 3.8–10.5)
WBC # FLD AUTO: 14.21 K/UL — HIGH (ref 3.8–10.5)
WBC # FLD AUTO: 14.31 K/UL — HIGH (ref 3.8–10.5)
WBC # FLD AUTO: 14.47 K/UL — HIGH (ref 3.8–10.5)
WBC # FLD AUTO: 14.54 K/UL — HIGH (ref 3.8–10.5)
WBC # FLD AUTO: 14.57 K/UL — HIGH (ref 3.8–10.5)
WBC # FLD AUTO: 14.75 K/UL — HIGH (ref 3.8–10.5)
WBC # FLD AUTO: 14.93 K/UL — HIGH (ref 3.8–10.5)
WBC # FLD AUTO: 14.94 K/UL — HIGH (ref 3.8–10.5)
WBC # FLD AUTO: 15.39 K/UL — HIGH (ref 3.8–10.5)
WBC # FLD AUTO: 16.16 K/UL — HIGH (ref 3.8–10.5)
WBC # FLD AUTO: 16.36 K/UL — HIGH (ref 3.8–10.5)
WBC # FLD AUTO: 16.63 K/UL — HIGH (ref 3.8–10.5)
WBC # FLD AUTO: 4.54 K/UL — SIGNIFICANT CHANGE UP (ref 3.8–10.5)
WBC # FLD AUTO: 6.79 K/UL — SIGNIFICANT CHANGE UP (ref 3.8–10.5)
WBC # FLD AUTO: 6.82 K/UL — SIGNIFICANT CHANGE UP (ref 3.8–10.5)
WBC # FLD AUTO: 6.92 K/UL — SIGNIFICANT CHANGE UP (ref 3.8–10.5)
WBC # FLD AUTO: 7.36 K/UL — SIGNIFICANT CHANGE UP (ref 3.8–10.5)
WBC # FLD AUTO: 7.57 K/UL — SIGNIFICANT CHANGE UP (ref 3.8–10.5)
WBC # FLD AUTO: 8 K/UL — SIGNIFICANT CHANGE UP (ref 3.8–10.5)
WBC # FLD AUTO: 8.03 K/UL — SIGNIFICANT CHANGE UP (ref 3.8–10.5)
WBC # FLD AUTO: 8.22 K/UL — SIGNIFICANT CHANGE UP (ref 3.8–10.5)
WBC # FLD AUTO: 9.11 K/UL — SIGNIFICANT CHANGE UP (ref 3.8–10.5)
WBC # FLD AUTO: 9.84 K/UL — SIGNIFICANT CHANGE UP (ref 3.8–10.5)
WBC UR QL: 18 /HPF — HIGH (ref 0–5)
WBC UR QL: 2 /HPF — SIGNIFICANT CHANGE UP (ref 0–5)
WBC UR QL: 3 /HPF — SIGNIFICANT CHANGE UP (ref 0–5)

## 2021-01-01 PROCEDURE — 99232 SBSQ HOSP IP/OBS MODERATE 35: CPT

## 2021-01-01 PROCEDURE — 76705 ECHO EXAM OF ABDOMEN: CPT

## 2021-01-01 PROCEDURE — 99291 CRITICAL CARE FIRST HOUR: CPT

## 2021-01-01 PROCEDURE — 74018 RADEX ABDOMEN 1 VIEW: CPT | Mod: 26

## 2021-01-01 PROCEDURE — 82550 ASSAY OF CK (CPK): CPT

## 2021-01-01 PROCEDURE — 84100 ASSAY OF PHOSPHORUS: CPT

## 2021-01-01 PROCEDURE — 85610 PROTHROMBIN TIME: CPT

## 2021-01-01 PROCEDURE — 71250 CT THORAX DX C-: CPT | Mod: 26

## 2021-01-01 PROCEDURE — 93308 TTE F-UP OR LMTD: CPT | Mod: 26,GC

## 2021-01-01 PROCEDURE — 99223 1ST HOSP IP/OBS HIGH 75: CPT | Mod: GC,25

## 2021-01-01 PROCEDURE — 76604 US EXAM CHEST: CPT | Mod: 26,GC

## 2021-01-01 PROCEDURE — 85730 THROMBOPLASTIN TIME PARTIAL: CPT

## 2021-01-01 PROCEDURE — 71045 X-RAY EXAM CHEST 1 VIEW: CPT | Mod: 26

## 2021-01-01 PROCEDURE — 99291 CRITICAL CARE FIRST HOUR: CPT | Mod: 25

## 2021-01-01 PROCEDURE — 99233 SBSQ HOSP IP/OBS HIGH 50: CPT

## 2021-01-01 PROCEDURE — 99223 1ST HOSP IP/OBS HIGH 75: CPT

## 2021-01-01 PROCEDURE — 86803 HEPATITIS C AB TEST: CPT

## 2021-01-01 PROCEDURE — 84481 FREE ASSAY (FT-3): CPT

## 2021-01-01 PROCEDURE — 82962 GLUCOSE BLOOD TEST: CPT

## 2021-01-01 PROCEDURE — 86923 COMPATIBILITY TEST ELECTRIC: CPT

## 2021-01-01 PROCEDURE — U0005: CPT

## 2021-01-01 PROCEDURE — 94003 VENT MGMT INPAT SUBQ DAY: CPT

## 2021-01-01 PROCEDURE — 85027 COMPLETE CBC AUTOMATED: CPT

## 2021-01-01 PROCEDURE — 71250 CT THORAX DX C-: CPT

## 2021-01-01 PROCEDURE — 82553 CREATINE MB FRACTION: CPT

## 2021-01-01 PROCEDURE — 93010 ELECTROCARDIOGRAM REPORT: CPT

## 2021-01-01 PROCEDURE — 71045 X-RAY EXAM CHEST 1 VIEW: CPT

## 2021-01-01 PROCEDURE — 36800 INSERTION OF CANNULA: CPT | Mod: GC,59

## 2021-01-01 PROCEDURE — 85014 HEMATOCRIT: CPT

## 2021-01-01 PROCEDURE — P9016: CPT

## 2021-01-01 PROCEDURE — 84145 PROCALCITONIN (PCT): CPT

## 2021-01-01 PROCEDURE — 82435 ASSAY OF BLOOD CHLORIDE: CPT

## 2021-01-01 PROCEDURE — 93005 ELECTROCARDIOGRAM TRACING: CPT

## 2021-01-01 PROCEDURE — 74177 CT ABD & PELVIS W/CONTRAST: CPT | Mod: 26

## 2021-01-01 PROCEDURE — 82803 BLOOD GASES ANY COMBINATION: CPT

## 2021-01-01 PROCEDURE — 84439 ASSAY OF FREE THYROXINE: CPT

## 2021-01-01 PROCEDURE — 94640 AIRWAY INHALATION TREATMENT: CPT

## 2021-01-01 PROCEDURE — 36556 INSERT NON-TUNNEL CV CATH: CPT | Mod: GC,59

## 2021-01-01 PROCEDURE — C8929: CPT

## 2021-01-01 PROCEDURE — 82436 ASSAY OF URINE CHLORIDE: CPT

## 2021-01-01 PROCEDURE — 83036 HEMOGLOBIN GLYCOSYLATED A1C: CPT

## 2021-01-01 PROCEDURE — 84300 ASSAY OF URINE SODIUM: CPT

## 2021-01-01 PROCEDURE — 96361 HYDRATE IV INFUSION ADD-ON: CPT

## 2021-01-01 PROCEDURE — 76705 ECHO EXAM OF ABDOMEN: CPT | Mod: 26,RT

## 2021-01-01 PROCEDURE — 82565 ASSAY OF CREATININE: CPT

## 2021-01-01 PROCEDURE — 82728 ASSAY OF FERRITIN: CPT

## 2021-01-01 PROCEDURE — 93306 TTE W/DOPPLER COMPLETE: CPT | Mod: 26

## 2021-01-01 PROCEDURE — 87449 NOS EACH ORGANISM AG IA: CPT

## 2021-01-01 PROCEDURE — 94660 CPAP INITIATION&MGMT: CPT

## 2021-01-01 PROCEDURE — 83880 ASSAY OF NATRIURETIC PEPTIDE: CPT

## 2021-01-01 PROCEDURE — 80048 BASIC METABOLIC PNL TOTAL CA: CPT

## 2021-01-01 PROCEDURE — 71275 CT ANGIOGRAPHY CHEST: CPT | Mod: 26

## 2021-01-01 PROCEDURE — 74176 CT ABD & PELVIS W/O CONTRAST: CPT

## 2021-01-01 PROCEDURE — 86900 BLOOD TYPING SEROLOGIC ABO: CPT

## 2021-01-01 PROCEDURE — 83605 ASSAY OF LACTIC ACID: CPT

## 2021-01-01 PROCEDURE — 99292 CRITICAL CARE ADDL 30 MIN: CPT

## 2021-01-01 PROCEDURE — 83930 ASSAY OF BLOOD OSMOLALITY: CPT

## 2021-01-01 PROCEDURE — 81001 URINALYSIS AUTO W/SCOPE: CPT

## 2021-01-01 PROCEDURE — 85018 HEMOGLOBIN: CPT

## 2021-01-01 PROCEDURE — 82150 ASSAY OF AMYLASE: CPT

## 2021-01-01 PROCEDURE — 83615 LACTATE (LD) (LDH) ENZYME: CPT

## 2021-01-01 PROCEDURE — 71045 X-RAY EXAM CHEST 1 VIEW: CPT | Mod: 26,76

## 2021-01-01 PROCEDURE — 86769 SARS-COV-2 COVID-19 ANTIBODY: CPT

## 2021-01-01 PROCEDURE — 87077 CULTURE AEROBIC IDENTIFY: CPT

## 2021-01-01 PROCEDURE — 87150 DNA/RNA AMPLIFIED PROBE: CPT

## 2021-01-01 PROCEDURE — 36430 TRANSFUSION BLD/BLD COMPNT: CPT

## 2021-01-01 PROCEDURE — 84443 ASSAY THYROID STIM HORMONE: CPT

## 2021-01-01 PROCEDURE — 93970 EXTREMITY STUDY: CPT | Mod: 26

## 2021-01-01 PROCEDURE — 82570 ASSAY OF URINE CREATININE: CPT

## 2021-01-01 PROCEDURE — 87640 STAPH A DNA AMP PROBE: CPT

## 2021-01-01 PROCEDURE — 74018 RADEX ABDOMEN 1 VIEW: CPT

## 2021-01-01 PROCEDURE — 99223 1ST HOSP IP/OBS HIGH 75: CPT | Mod: GC

## 2021-01-01 PROCEDURE — 94002 VENT MGMT INPAT INIT DAY: CPT

## 2021-01-01 PROCEDURE — 93970 EXTREMITY STUDY: CPT

## 2021-01-01 PROCEDURE — 86140 C-REACTIVE PROTEIN: CPT

## 2021-01-01 PROCEDURE — 82330 ASSAY OF CALCIUM: CPT

## 2021-01-01 PROCEDURE — 83690 ASSAY OF LIPASE: CPT

## 2021-01-01 PROCEDURE — U0003: CPT

## 2021-01-01 PROCEDURE — 96374 THER/PROPH/DIAG INJ IV PUSH: CPT

## 2021-01-01 PROCEDURE — 99285 EMERGENCY DEPT VISIT HI MDM: CPT

## 2021-01-01 PROCEDURE — 85379 FIBRIN DEGRADATION QUANT: CPT

## 2021-01-01 PROCEDURE — 86901 BLOOD TYPING SEROLOGIC RH(D): CPT

## 2021-01-01 PROCEDURE — 93975 VASCULAR STUDY: CPT | Mod: 26

## 2021-01-01 PROCEDURE — 97161 PT EVAL LOW COMPLEX 20 MIN: CPT

## 2021-01-01 PROCEDURE — 80076 HEPATIC FUNCTION PANEL: CPT

## 2021-01-01 PROCEDURE — 87641 MR-STAPH DNA AMP PROBE: CPT

## 2021-01-01 PROCEDURE — 87070 CULTURE OTHR SPECIMN AEROBIC: CPT

## 2021-01-01 PROCEDURE — 84132 ASSAY OF SERUM POTASSIUM: CPT

## 2021-01-01 PROCEDURE — 85025 COMPLETE CBC W/AUTO DIFF WBC: CPT

## 2021-01-01 PROCEDURE — 99254 IP/OBS CNSLTJ NEW/EST MOD 60: CPT | Mod: GC

## 2021-01-01 PROCEDURE — 85396 CLOTTING ASSAY WHOLE BLOOD: CPT

## 2021-01-01 PROCEDURE — 83550 IRON BINDING TEST: CPT

## 2021-01-01 PROCEDURE — 36569 INSJ PICC 5 YR+ W/O IMAGING: CPT

## 2021-01-01 PROCEDURE — 86850 RBC ANTIBODY SCREEN: CPT

## 2021-01-01 PROCEDURE — 87040 BLOOD CULTURE FOR BACTERIA: CPT

## 2021-01-01 PROCEDURE — 90945 DIALYSIS ONE EVALUATION: CPT

## 2021-01-01 PROCEDURE — 74177 CT ABD & PELVIS W/CONTRAST: CPT

## 2021-01-01 PROCEDURE — 80053 COMPREHEN METABOLIC PANEL: CPT

## 2021-01-01 PROCEDURE — 93975 VASCULAR STUDY: CPT

## 2021-01-01 PROCEDURE — 84478 ASSAY OF TRIGLYCERIDES: CPT

## 2021-01-01 PROCEDURE — 36620 INSERTION CATHETER ARTERY: CPT | Mod: GC,59

## 2021-01-01 PROCEDURE — 84484 ASSAY OF TROPONIN QUANT: CPT

## 2021-01-01 PROCEDURE — 83735 ASSAY OF MAGNESIUM: CPT

## 2021-01-01 PROCEDURE — 71275 CT ANGIOGRAPHY CHEST: CPT

## 2021-01-01 PROCEDURE — 84295 ASSAY OF SERUM SODIUM: CPT

## 2021-01-01 PROCEDURE — 83540 ASSAY OF IRON: CPT

## 2021-01-01 PROCEDURE — 74176 CT ABD & PELVIS W/O CONTRAST: CPT | Mod: 26

## 2021-01-01 PROCEDURE — 36556 INSERT NON-TUNNEL CV CATH: CPT

## 2021-01-01 PROCEDURE — 87186 SC STD MICRODIL/AGAR DIL: CPT

## 2021-01-01 PROCEDURE — 82947 ASSAY GLUCOSE BLOOD QUANT: CPT

## 2021-01-01 PROCEDURE — 36600 WITHDRAWAL OF ARTERIAL BLOOD: CPT

## 2021-01-01 PROCEDURE — 76775 US EXAM ABDO BACK WALL LIM: CPT | Mod: 26,GC

## 2021-01-01 PROCEDURE — 80074 ACUTE HEPATITIS PANEL: CPT

## 2021-01-01 RX ORDER — CALCIUM GLUCONATE 100 MG/ML
2 VIAL (ML) INTRAVENOUS ONCE
Refills: 0 | Status: COMPLETED | OUTPATIENT
Start: 2021-01-01 | End: 2021-01-01

## 2021-01-01 RX ORDER — DEXTROSE 50 % IN WATER 50 %
25 SYRINGE (ML) INTRAVENOUS ONCE
Refills: 0 | Status: COMPLETED | OUTPATIENT
Start: 2021-01-01 | End: 2021-01-01

## 2021-01-01 RX ORDER — DAPTOMYCIN 500 MG/10ML
536 INJECTION, POWDER, LYOPHILIZED, FOR SOLUTION INTRAVENOUS ONCE
Refills: 0 | Status: DISCONTINUED | OUTPATIENT
Start: 2021-01-01 | End: 2021-01-01

## 2021-01-01 RX ORDER — LACTULOSE 10 G/15ML
15 SOLUTION ORAL EVERY 12 HOURS
Refills: 0 | Status: DISCONTINUED | OUTPATIENT
Start: 2021-01-01 | End: 2021-01-01

## 2021-01-01 RX ORDER — CEFAZOLIN SODIUM 1 G
2000 VIAL (EA) INJECTION EVERY 8 HOURS
Refills: 0 | Status: DISCONTINUED | OUTPATIENT
Start: 2021-01-01 | End: 2021-01-01

## 2021-01-01 RX ORDER — INSULIN LISPRO 100/ML
4 VIAL (ML) SUBCUTANEOUS
Refills: 0 | Status: DISCONTINUED | OUTPATIENT
Start: 2021-01-01 | End: 2021-01-01

## 2021-01-01 RX ORDER — SODIUM CHLORIDE 9 MG/ML
1000 INJECTION, SOLUTION INTRAVENOUS
Refills: 0 | Status: DISCONTINUED | OUTPATIENT
Start: 2021-01-01 | End: 2021-01-01

## 2021-01-01 RX ORDER — FUROSEMIDE 40 MG
20 TABLET ORAL ONCE
Refills: 0 | Status: COMPLETED | OUTPATIENT
Start: 2021-01-01 | End: 2021-01-01

## 2021-01-01 RX ORDER — BUDESONIDE AND FORMOTEROL FUMARATE DIHYDRATE 160; 4.5 UG/1; UG/1
2 AEROSOL RESPIRATORY (INHALATION)
Refills: 0 | Status: DISCONTINUED | OUTPATIENT
Start: 2021-01-01 | End: 2021-01-01

## 2021-01-01 RX ORDER — HUMAN INSULIN 100 [IU]/ML
6 INJECTION, SUSPENSION SUBCUTANEOUS EVERY 6 HOURS
Refills: 0 | Status: DISCONTINUED | OUTPATIENT
Start: 2021-01-01 | End: 2021-01-01

## 2021-01-01 RX ORDER — HEPARIN SODIUM 5000 [USP'U]/ML
5500 INJECTION INTRAVENOUS; SUBCUTANEOUS EVERY 6 HOURS
Refills: 0 | Status: DISCONTINUED | OUTPATIENT
Start: 2021-01-01 | End: 2021-01-01

## 2021-01-01 RX ORDER — DAPTOMYCIN 500 MG/10ML
INJECTION, POWDER, LYOPHILIZED, FOR SOLUTION INTRAVENOUS
Refills: 0 | Status: DISCONTINUED | OUTPATIENT
Start: 2021-01-01 | End: 2021-01-01

## 2021-01-01 RX ORDER — CISATRACURIUM BESYLATE 2 MG/ML
10 INJECTION INTRAVENOUS ONCE
Refills: 0 | Status: COMPLETED | OUTPATIENT
Start: 2021-01-01 | End: 2021-01-01

## 2021-01-01 RX ORDER — MIDAZOLAM HYDROCHLORIDE 1 MG/ML
2 INJECTION, SOLUTION INTRAMUSCULAR; INTRAVENOUS ONCE
Refills: 0 | Status: DISCONTINUED | OUTPATIENT
Start: 2021-01-01 | End: 2021-01-01

## 2021-01-01 RX ORDER — LACTULOSE 10 G/15ML
20 SOLUTION ORAL EVERY 6 HOURS
Refills: 0 | Status: DISCONTINUED | OUTPATIENT
Start: 2021-01-01 | End: 2021-01-01

## 2021-01-01 RX ORDER — KETAMINE HYDROCHLORIDE 100 MG/ML
0.25 INJECTION INTRAMUSCULAR; INTRAVENOUS
Qty: 1000 | Refills: 0 | Status: DISCONTINUED | OUTPATIENT
Start: 2021-01-01 | End: 2021-01-01

## 2021-01-01 RX ORDER — MULTIVIT-MIN/FERROUS GLUCONATE 9 MG/15 ML
15 LIQUID (ML) ORAL DAILY
Refills: 0 | Status: DISCONTINUED | OUTPATIENT
Start: 2021-01-01 | End: 2021-01-01

## 2021-01-01 RX ORDER — POTASSIUM CHLORIDE 20 MEQ
20 PACKET (EA) ORAL ONCE
Refills: 0 | Status: COMPLETED | OUTPATIENT
Start: 2021-01-01 | End: 2021-01-01

## 2021-01-01 RX ORDER — LINEZOLID 600 MG/300ML
600 INJECTION, SOLUTION INTRAVENOUS EVERY 12 HOURS
Refills: 0 | Status: DISCONTINUED | OUTPATIENT
Start: 2021-01-01 | End: 2021-01-01

## 2021-01-01 RX ORDER — DEXTROSE 50 % IN WATER 50 %
12.5 SYRINGE (ML) INTRAVENOUS ONCE
Refills: 0 | Status: DISCONTINUED | OUTPATIENT
Start: 2021-01-01 | End: 2021-01-01

## 2021-01-01 RX ORDER — INSULIN GLARGINE 100 [IU]/ML
30 INJECTION, SOLUTION SUBCUTANEOUS AT BEDTIME
Refills: 0 | Status: DISCONTINUED | OUTPATIENT
Start: 2021-01-01 | End: 2021-01-01

## 2021-01-01 RX ORDER — NOREPINEPHRINE BITARTRATE/D5W 8 MG/250ML
0.01 PLASTIC BAG, INJECTION (ML) INTRAVENOUS
Qty: 8 | Refills: 0 | Status: DISCONTINUED | OUTPATIENT
Start: 2021-01-01 | End: 2021-01-01

## 2021-01-01 RX ORDER — FUROSEMIDE 40 MG
40 TABLET ORAL ONCE
Refills: 0 | Status: COMPLETED | OUTPATIENT
Start: 2021-01-01 | End: 2021-01-01

## 2021-01-01 RX ORDER — DEXTROSE 50 % IN WATER 50 %
50 SYRINGE (ML) INTRAVENOUS ONCE
Refills: 0 | Status: COMPLETED | OUTPATIENT
Start: 2021-01-01 | End: 2021-01-01

## 2021-01-01 RX ORDER — LACTULOSE 10 G/15ML
20 SOLUTION ORAL ONCE
Refills: 0 | Status: COMPLETED | OUTPATIENT
Start: 2021-01-01 | End: 2021-01-01

## 2021-01-01 RX ORDER — HUMAN INSULIN 100 [IU]/ML
15 INJECTION, SUSPENSION SUBCUTANEOUS EVERY 6 HOURS
Refills: 0 | Status: DISCONTINUED | OUTPATIENT
Start: 2021-01-01 | End: 2021-01-01

## 2021-01-01 RX ORDER — KETAMINE HYDROCHLORIDE 100 MG/ML
3 INJECTION INTRAMUSCULAR; INTRAVENOUS
Qty: 1000 | Refills: 0 | Status: DISCONTINUED | OUTPATIENT
Start: 2021-01-01 | End: 2021-01-01

## 2021-01-01 RX ORDER — PROPOFOL 10 MG/ML
15 INJECTION, EMULSION INTRAVENOUS
Qty: 1000 | Refills: 0 | Status: DISCONTINUED | OUTPATIENT
Start: 2021-01-01 | End: 2021-01-01

## 2021-01-01 RX ORDER — CISATRACURIUM BESYLATE 2 MG/ML
3 INJECTION INTRAVENOUS
Qty: 200 | Refills: 0 | Status: DISCONTINUED | OUTPATIENT
Start: 2021-01-01 | End: 2021-01-01

## 2021-01-01 RX ORDER — MEROPENEM 1 G/30ML
INJECTION INTRAVENOUS
Refills: 0 | Status: DISCONTINUED | OUTPATIENT
Start: 2021-01-01 | End: 2021-01-01

## 2021-01-01 RX ORDER — CHLORHEXIDINE GLUCONATE 213 G/1000ML
15 SOLUTION TOPICAL EVERY 12 HOURS
Refills: 0 | Status: DISCONTINUED | OUTPATIENT
Start: 2021-01-01 | End: 2021-01-01

## 2021-01-01 RX ORDER — POLYETHYLENE GLYCOL 3350 17 G/17G
17 POWDER, FOR SOLUTION ORAL EVERY 12 HOURS
Refills: 0 | Status: DISCONTINUED | OUTPATIENT
Start: 2021-01-01 | End: 2021-01-01

## 2021-01-01 RX ORDER — MAGNESIUM SULFATE 500 MG/ML
1 VIAL (ML) INJECTION ONCE
Refills: 0 | Status: COMPLETED | OUTPATIENT
Start: 2021-01-01 | End: 2021-01-01

## 2021-01-01 RX ORDER — CISATRACURIUM BESYLATE 2 MG/ML
15 INJECTION INTRAVENOUS ONCE
Refills: 0 | Status: COMPLETED | OUTPATIENT
Start: 2021-01-01 | End: 2021-01-01

## 2021-01-01 RX ORDER — PANTOPRAZOLE SODIUM 20 MG/1
40 TABLET, DELAYED RELEASE ORAL
Refills: 0 | Status: DISCONTINUED | OUTPATIENT
Start: 2021-01-01 | End: 2021-01-01

## 2021-01-01 RX ORDER — DEXTROSE 50 % IN WATER 50 %
15 SYRINGE (ML) INTRAVENOUS ONCE
Refills: 0 | Status: DISCONTINUED | OUTPATIENT
Start: 2021-01-01 | End: 2021-01-01

## 2021-01-01 RX ORDER — DEXAMETHASONE 0.5 MG/5ML
6 ELIXIR ORAL ONCE
Refills: 0 | Status: COMPLETED | OUTPATIENT
Start: 2021-01-01 | End: 2021-01-01

## 2021-01-01 RX ORDER — INSULIN LISPRO 100/ML
8 VIAL (ML) SUBCUTANEOUS
Refills: 0 | Status: DISCONTINUED | OUTPATIENT
Start: 2021-01-01 | End: 2021-01-01

## 2021-01-01 RX ORDER — VASOPRESSIN 20 [USP'U]/ML
0.04 INJECTION INTRAVENOUS
Qty: 50 | Refills: 0 | Status: DISCONTINUED | OUTPATIENT
Start: 2021-01-01 | End: 2021-01-01

## 2021-01-01 RX ORDER — PHENOBARBITAL 60 MG
260 TABLET ORAL ONCE
Refills: 0 | Status: DISCONTINUED | OUTPATIENT
Start: 2021-01-01 | End: 2021-01-01

## 2021-01-01 RX ORDER — AMPICILLIN SODIUM AND SULBACTAM SODIUM 250; 125 MG/ML; MG/ML
INJECTION, POWDER, FOR SUSPENSION INTRAMUSCULAR; INTRAVENOUS
Refills: 0 | Status: DISCONTINUED | OUTPATIENT
Start: 2021-01-01 | End: 2021-01-01

## 2021-01-01 RX ORDER — INSULIN LISPRO 100/ML
9 VIAL (ML) SUBCUTANEOUS
Refills: 0 | Status: DISCONTINUED | OUTPATIENT
Start: 2021-01-01 | End: 2021-01-01

## 2021-01-01 RX ORDER — HEPARIN SODIUM 5000 [USP'U]/ML
800 INJECTION INTRAVENOUS; SUBCUTANEOUS
Qty: 25000 | Refills: 0 | Status: DISCONTINUED | OUTPATIENT
Start: 2021-01-01 | End: 2021-01-01

## 2021-01-01 RX ORDER — HUMAN INSULIN 100 [IU]/ML
25 INJECTION, SUSPENSION SUBCUTANEOUS EVERY 6 HOURS
Refills: 0 | Status: DISCONTINUED | OUTPATIENT
Start: 2021-01-01 | End: 2021-01-01

## 2021-01-01 RX ORDER — DEXTROSE 50 % IN WATER 50 %
12.5 SYRINGE (ML) INTRAVENOUS ONCE
Refills: 0 | Status: COMPLETED | OUTPATIENT
Start: 2021-01-01 | End: 2021-01-01

## 2021-01-01 RX ORDER — INSULIN HUMAN 100 [IU]/ML
10 INJECTION, SOLUTION SUBCUTANEOUS ONCE
Refills: 0 | Status: COMPLETED | OUTPATIENT
Start: 2021-01-01 | End: 2021-01-01

## 2021-01-01 RX ORDER — SODIUM CHLORIDE 9 MG/ML
500 INJECTION INTRAMUSCULAR; INTRAVENOUS; SUBCUTANEOUS ONCE
Refills: 0 | Status: COMPLETED | OUTPATIENT
Start: 2021-01-01 | End: 2021-01-01

## 2021-01-01 RX ORDER — PHENYLEPHRINE HYDROCHLORIDE 10 MG/ML
0.1 INJECTION INTRAVENOUS
Qty: 40 | Refills: 0 | Status: DISCONTINUED | OUTPATIENT
Start: 2021-01-01 | End: 2021-01-01

## 2021-01-01 RX ORDER — DAPTOMYCIN 500 MG/10ML
550 INJECTION, POWDER, LYOPHILIZED, FOR SOLUTION INTRAVENOUS EVERY 24 HOURS
Refills: 0 | Status: DISCONTINUED | OUTPATIENT
Start: 2021-04-06 | End: 2021-01-01

## 2021-01-01 RX ORDER — DEXTROSE 50 % IN WATER 50 %
25 SYRINGE (ML) INTRAVENOUS ONCE
Refills: 0 | Status: DISCONTINUED | OUTPATIENT
Start: 2021-01-01 | End: 2021-01-01

## 2021-01-01 RX ORDER — CHOLECALCIFEROL (VITAMIN D3) 125 MCG
2000 CAPSULE ORAL DAILY
Refills: 0 | Status: DISCONTINUED | OUTPATIENT
Start: 2021-01-01 | End: 2021-01-01

## 2021-01-01 RX ORDER — AMPICILLIN SODIUM AND SULBACTAM SODIUM 250; 125 MG/ML; MG/ML
3 INJECTION, POWDER, FOR SUSPENSION INTRAMUSCULAR; INTRAVENOUS EVERY 6 HOURS
Refills: 0 | Status: DISCONTINUED | OUTPATIENT
Start: 2021-01-01 | End: 2021-01-01

## 2021-01-01 RX ORDER — POLYETHYLENE GLYCOL 3350 17 G/17G
17 POWDER, FOR SOLUTION ORAL DAILY
Refills: 0 | Status: DISCONTINUED | OUTPATIENT
Start: 2021-01-01 | End: 2021-01-01

## 2021-01-01 RX ORDER — SODIUM ZIRCONIUM CYCLOSILICATE 10 G/10G
10 POWDER, FOR SUSPENSION ORAL ONCE
Refills: 0 | Status: COMPLETED | OUTPATIENT
Start: 2021-01-01 | End: 2021-01-01

## 2021-01-01 RX ORDER — INSULIN LISPRO 100/ML
VIAL (ML) SUBCUTANEOUS
Refills: 0 | Status: DISCONTINUED | OUTPATIENT
Start: 2021-01-01 | End: 2021-01-01

## 2021-01-01 RX ORDER — SODIUM CHLORIDE 9 MG/ML
1000 INJECTION INTRAMUSCULAR; INTRAVENOUS; SUBCUTANEOUS ONCE
Refills: 0 | Status: COMPLETED | OUTPATIENT
Start: 2021-01-01 | End: 2021-01-01

## 2021-01-01 RX ORDER — CISATRACURIUM BESYLATE 2 MG/ML
10 INJECTION INTRAVENOUS ONCE
Refills: 0 | Status: DISCONTINUED | OUTPATIENT
Start: 2021-01-01 | End: 2021-01-01

## 2021-01-01 RX ORDER — SODIUM CHLORIDE 9 MG/ML
1000 INJECTION, SOLUTION INTRAVENOUS ONCE
Refills: 0 | Status: COMPLETED | OUTPATIENT
Start: 2021-01-01 | End: 2021-01-01

## 2021-01-01 RX ORDER — CEFEPIME 1 G/1
2000 INJECTION, POWDER, FOR SOLUTION INTRAMUSCULAR; INTRAVENOUS EVERY 8 HOURS
Refills: 0 | Status: DISCONTINUED | OUTPATIENT
Start: 2021-01-01 | End: 2021-01-01

## 2021-01-01 RX ORDER — FENTANYL CITRATE 50 UG/ML
50 INJECTION INTRAVENOUS ONCE
Refills: 0 | Status: DISCONTINUED | OUTPATIENT
Start: 2021-01-01 | End: 2021-01-01

## 2021-01-01 RX ORDER — PANTOPRAZOLE SODIUM 20 MG/1
40 TABLET, DELAYED RELEASE ORAL DAILY
Refills: 0 | Status: DISCONTINUED | OUTPATIENT
Start: 2021-01-01 | End: 2021-01-01

## 2021-01-01 RX ORDER — HEPARIN SODIUM 5000 [USP'U]/ML
INJECTION INTRAVENOUS; SUBCUTANEOUS
Qty: 25000 | Refills: 0 | Status: DISCONTINUED | OUTPATIENT
Start: 2021-01-01 | End: 2021-01-01

## 2021-01-01 RX ORDER — METOCLOPRAMIDE HCL 10 MG
10 TABLET ORAL EVERY 8 HOURS
Refills: 0 | Status: DISCONTINUED | OUTPATIENT
Start: 2021-01-01 | End: 2021-01-01

## 2021-01-01 RX ORDER — REMDESIVIR 5 MG/ML
INJECTION INTRAVENOUS
Refills: 0 | Status: COMPLETED | OUTPATIENT
Start: 2021-01-01 | End: 2021-01-01

## 2021-01-01 RX ORDER — INSULIN LISPRO 100/ML
15 VIAL (ML) SUBCUTANEOUS
Refills: 0 | Status: DISCONTINUED | OUTPATIENT
Start: 2021-01-01 | End: 2021-01-01

## 2021-01-01 RX ORDER — MEROPENEM 1 G/30ML
1000 INJECTION INTRAVENOUS EVERY 8 HOURS
Refills: 0 | Status: DISCONTINUED | OUTPATIENT
Start: 2021-01-01 | End: 2021-01-01

## 2021-01-01 RX ORDER — HEPARIN SODIUM 5000 [USP'U]/ML
2500 INJECTION INTRAVENOUS; SUBCUTANEOUS EVERY 6 HOURS
Refills: 0 | Status: DISCONTINUED | OUTPATIENT
Start: 2021-01-01 | End: 2021-01-01

## 2021-01-01 RX ORDER — POTASSIUM CHLORIDE 20 MEQ
10 PACKET (EA) ORAL ONCE
Refills: 0 | Status: COMPLETED | OUTPATIENT
Start: 2021-01-01 | End: 2021-01-01

## 2021-01-01 RX ORDER — CEFAZOLIN SODIUM 1 G
2000 VIAL (EA) INJECTION ONCE
Refills: 0 | Status: COMPLETED | OUTPATIENT
Start: 2021-01-01 | End: 2021-01-01

## 2021-01-01 RX ORDER — ACETAMINOPHEN 500 MG
650 TABLET ORAL ONCE
Refills: 0 | Status: COMPLETED | OUTPATIENT
Start: 2021-01-01 | End: 2021-01-01

## 2021-01-01 RX ORDER — SODIUM BICARBONATE 1 MEQ/ML
50 SYRINGE (ML) INTRAVENOUS ONCE
Refills: 0 | Status: COMPLETED | OUTPATIENT
Start: 2021-01-01 | End: 2021-01-01

## 2021-01-01 RX ORDER — PANTOPRAZOLE SODIUM 20 MG/1
40 TABLET, DELAYED RELEASE ORAL EVERY 12 HOURS
Refills: 0 | Status: DISCONTINUED | OUTPATIENT
Start: 2021-01-01 | End: 2021-01-01

## 2021-01-01 RX ORDER — INSULIN HUMAN 100 [IU]/ML
4 INJECTION, SOLUTION SUBCUTANEOUS ONCE
Refills: 0 | Status: DISCONTINUED | OUTPATIENT
Start: 2021-01-01 | End: 2021-01-01

## 2021-01-01 RX ORDER — ACETAMINOPHEN 500 MG
650 TABLET ORAL EVERY 4 HOURS
Refills: 0 | Status: DISCONTINUED | OUTPATIENT
Start: 2021-01-01 | End: 2021-01-01

## 2021-01-01 RX ORDER — HEPARIN SODIUM 5000 [USP'U]/ML
1100 INJECTION INTRAVENOUS; SUBCUTANEOUS
Qty: 25000 | Refills: 0 | Status: DISCONTINUED | OUTPATIENT
Start: 2021-01-01 | End: 2021-01-01

## 2021-01-01 RX ORDER — CEFAZOLIN SODIUM 1 G
VIAL (EA) INJECTION
Refills: 0 | Status: DISCONTINUED | OUTPATIENT
Start: 2021-01-01 | End: 2021-01-01

## 2021-01-01 RX ORDER — INSULIN GLARGINE 100 [IU]/ML
25 INJECTION, SOLUTION SUBCUTANEOUS AT BEDTIME
Refills: 0 | Status: DISCONTINUED | OUTPATIENT
Start: 2021-01-01 | End: 2021-01-01

## 2021-01-01 RX ORDER — ACETAMINOPHEN 500 MG
650 TABLET ORAL EVERY 6 HOURS
Refills: 0 | Status: DISCONTINUED | OUTPATIENT
Start: 2021-01-01 | End: 2021-01-01

## 2021-01-01 RX ORDER — METHADONE HYDROCHLORIDE 40 MG/1
40 TABLET ORAL EVERY 8 HOURS
Refills: 0 | Status: DISCONTINUED | OUTPATIENT
Start: 2021-01-01 | End: 2021-01-01

## 2021-01-01 RX ORDER — FENTANYL CITRATE 50 UG/ML
100 INJECTION INTRAVENOUS ONCE
Refills: 0 | Status: DISCONTINUED | OUTPATIENT
Start: 2021-01-01 | End: 2021-01-01

## 2021-01-01 RX ORDER — DEXAMETHASONE 0.5 MG/5ML
6 ELIXIR ORAL DAILY
Refills: 0 | Status: COMPLETED | OUTPATIENT
Start: 2021-01-01 | End: 2021-01-01

## 2021-01-01 RX ORDER — PROPOFOL 10 MG/ML
50 INJECTION, EMULSION INTRAVENOUS
Qty: 1000 | Refills: 0 | Status: DISCONTINUED | OUTPATIENT
Start: 2021-01-01 | End: 2021-01-01

## 2021-01-01 RX ORDER — DEXMEDETOMIDINE HYDROCHLORIDE IN 0.9% SODIUM CHLORIDE 4 UG/ML
0.01 INJECTION INTRAVENOUS
Qty: 200 | Refills: 0 | Status: DISCONTINUED | OUTPATIENT
Start: 2021-01-01 | End: 2021-01-01

## 2021-01-01 RX ORDER — MIDAZOLAM HYDROCHLORIDE 1 MG/ML
0.02 INJECTION, SOLUTION INTRAMUSCULAR; INTRAVENOUS
Qty: 100 | Refills: 0 | Status: DISCONTINUED | OUTPATIENT
Start: 2021-01-01 | End: 2021-01-01

## 2021-01-01 RX ORDER — SIMETHICONE 80 MG/1
160 TABLET, CHEWABLE ORAL ONCE
Refills: 0 | Status: COMPLETED | OUTPATIENT
Start: 2021-01-01 | End: 2021-01-01

## 2021-01-01 RX ORDER — INSULIN LISPRO 100/ML
12 VIAL (ML) SUBCUTANEOUS
Refills: 0 | Status: DISCONTINUED | OUTPATIENT
Start: 2021-01-01 | End: 2021-01-01

## 2021-01-01 RX ORDER — INSULIN HUMAN 100 [IU]/ML
3 INJECTION, SOLUTION SUBCUTANEOUS
Qty: 100 | Refills: 0 | Status: DISCONTINUED | OUTPATIENT
Start: 2021-01-01 | End: 2021-01-01

## 2021-01-01 RX ORDER — FENTANYL CITRATE 50 UG/ML
0.5 INJECTION INTRAVENOUS
Qty: 2500 | Refills: 0 | Status: DISCONTINUED | OUTPATIENT
Start: 2021-01-01 | End: 2021-01-01

## 2021-01-01 RX ORDER — BUMETANIDE 0.25 MG/ML
2 INJECTION INTRAMUSCULAR; INTRAVENOUS
Qty: 20 | Refills: 0 | Status: DISCONTINUED | OUTPATIENT
Start: 2021-01-01 | End: 2021-01-01

## 2021-01-01 RX ORDER — HUMAN INSULIN 100 [IU]/ML
16 INJECTION, SUSPENSION SUBCUTANEOUS EVERY 6 HOURS
Refills: 0 | Status: DISCONTINUED | OUTPATIENT
Start: 2021-01-01 | End: 2021-01-01

## 2021-01-01 RX ORDER — CHLORHEXIDINE GLUCONATE 213 G/1000ML
1 SOLUTION TOPICAL
Refills: 0 | Status: DISCONTINUED | OUTPATIENT
Start: 2021-01-01 | End: 2021-01-01

## 2021-01-01 RX ORDER — INSULIN GLARGINE 100 [IU]/ML
15 INJECTION, SOLUTION SUBCUTANEOUS AT BEDTIME
Refills: 0 | Status: DISCONTINUED | OUTPATIENT
Start: 2021-01-01 | End: 2021-01-01

## 2021-01-01 RX ORDER — CALCIUM CHLORIDE
1000 POWDER (GRAM) MISCELLANEOUS ONCE
Refills: 0 | Status: COMPLETED | OUTPATIENT
Start: 2021-01-01 | End: 2021-01-01

## 2021-01-01 RX ORDER — HEPARIN SODIUM 5000 [USP'U]/ML
1000 INJECTION INTRAVENOUS; SUBCUTANEOUS
Qty: 25000 | Refills: 0 | Status: DISCONTINUED | OUTPATIENT
Start: 2021-01-01 | End: 2021-01-01

## 2021-01-01 RX ORDER — GLUCAGON INJECTION, SOLUTION 0.5 MG/.1ML
1 INJECTION, SOLUTION SUBCUTANEOUS ONCE
Refills: 0 | Status: DISCONTINUED | OUTPATIENT
Start: 2021-01-01 | End: 2021-01-01

## 2021-01-01 RX ORDER — BUMETANIDE 0.25 MG/ML
2 INJECTION INTRAMUSCULAR; INTRAVENOUS ONCE
Refills: 0 | Status: COMPLETED | OUTPATIENT
Start: 2021-01-01 | End: 2021-01-01

## 2021-01-01 RX ORDER — INSULIN LISPRO 100/ML
VIAL (ML) SUBCUTANEOUS AT BEDTIME
Refills: 0 | Status: DISCONTINUED | OUTPATIENT
Start: 2021-01-01 | End: 2021-01-01

## 2021-01-01 RX ORDER — ARGATROBAN 50 MG/50ML
1.3 INJECTION, SOLUTION INTRAVENOUS
Qty: 250 | Refills: 0 | Status: DISCONTINUED | OUTPATIENT
Start: 2021-01-01 | End: 2021-01-01

## 2021-01-01 RX ORDER — HEPARIN SODIUM 5000 [USP'U]/ML
5500 INJECTION INTRAVENOUS; SUBCUTANEOUS ONCE
Refills: 0 | Status: DISCONTINUED | OUTPATIENT
Start: 2021-01-01 | End: 2021-01-01

## 2021-01-01 RX ORDER — ACETAMINOPHEN 500 MG
1000 TABLET ORAL ONCE
Refills: 0 | Status: COMPLETED | OUTPATIENT
Start: 2021-01-01 | End: 2021-01-01

## 2021-01-01 RX ORDER — ENOXAPARIN SODIUM 100 MG/ML
40 INJECTION SUBCUTANEOUS DAILY
Refills: 0 | Status: DISCONTINUED | OUTPATIENT
Start: 2021-01-01 | End: 2021-01-01

## 2021-01-01 RX ORDER — DEXTROSE 10 % IN WATER 10 %
1000 INTRAVENOUS SOLUTION INTRAVENOUS
Refills: 0 | Status: DISCONTINUED | OUTPATIENT
Start: 2021-01-01 | End: 2021-01-01

## 2021-01-01 RX ORDER — MIDAZOLAM HYDROCHLORIDE 1 MG/ML
4 INJECTION, SOLUTION INTRAMUSCULAR; INTRAVENOUS ONCE
Refills: 0 | Status: DISCONTINUED | OUTPATIENT
Start: 2021-01-01 | End: 2021-01-01

## 2021-01-01 RX ORDER — SODIUM BICARBONATE 1 MEQ/ML
100 SYRINGE (ML) INTRAVENOUS ONCE
Refills: 0 | Status: COMPLETED | OUTPATIENT
Start: 2021-01-01 | End: 2021-01-01

## 2021-01-01 RX ORDER — INSULIN GLARGINE 100 [IU]/ML
20 INJECTION, SOLUTION SUBCUTANEOUS AT BEDTIME
Refills: 0 | Status: COMPLETED | OUTPATIENT
Start: 2021-01-01 | End: 2021-01-01

## 2021-01-01 RX ORDER — CISATRACURIUM BESYLATE 2 MG/ML
20 INJECTION INTRAVENOUS ONCE
Refills: 0 | Status: COMPLETED | OUTPATIENT
Start: 2021-01-01 | End: 2021-01-01

## 2021-01-01 RX ORDER — SENNA PLUS 8.6 MG/1
10 TABLET ORAL EVERY 12 HOURS
Refills: 0 | Status: DISCONTINUED | OUTPATIENT
Start: 2021-01-01 | End: 2021-01-01

## 2021-01-01 RX ORDER — FENTANYL CITRATE 50 UG/ML
100 INJECTION INTRAVENOUS
Refills: 0 | Status: DISCONTINUED | OUTPATIENT
Start: 2021-01-01 | End: 2021-01-01

## 2021-01-01 RX ORDER — ALBUTEROL 90 UG/1
4 AEROSOL, METERED ORAL ONCE
Refills: 0 | Status: COMPLETED | OUTPATIENT
Start: 2021-01-01 | End: 2021-01-01

## 2021-01-01 RX ORDER — REMDESIVIR 5 MG/ML
100 INJECTION INTRAVENOUS EVERY 24 HOURS
Refills: 0 | Status: COMPLETED | OUTPATIENT
Start: 2021-01-01 | End: 2021-01-01

## 2021-01-01 RX ORDER — CEFEPIME 1 G/1
INJECTION, POWDER, FOR SOLUTION INTRAMUSCULAR; INTRAVENOUS
Refills: 0 | Status: DISCONTINUED | OUTPATIENT
Start: 2021-01-01 | End: 2021-01-01

## 2021-01-01 RX ORDER — HUMAN INSULIN 100 [IU]/ML
11 INJECTION, SUSPENSION SUBCUTANEOUS EVERY 6 HOURS
Refills: 0 | Status: DISCONTINUED | OUTPATIENT
Start: 2021-01-01 | End: 2021-01-01

## 2021-01-01 RX ORDER — ENOXAPARIN SODIUM 100 MG/ML
70 INJECTION SUBCUTANEOUS EVERY 12 HOURS
Refills: 0 | Status: DISCONTINUED | OUTPATIENT
Start: 2021-01-01 | End: 2021-01-01

## 2021-01-01 RX ORDER — ASPIRIN/CALCIUM CARB/MAGNESIUM 324 MG
162 TABLET ORAL DAILY
Refills: 0 | Status: DISCONTINUED | OUTPATIENT
Start: 2021-01-01 | End: 2021-01-01

## 2021-01-01 RX ORDER — MEROPENEM 1 G/30ML
1000 INJECTION INTRAVENOUS EVERY 12 HOURS
Refills: 0 | Status: DISCONTINUED | OUTPATIENT
Start: 2021-01-01 | End: 2021-01-01

## 2021-01-01 RX ORDER — METHYLNALTREXONE BROMIDE 12 MG/.6ML
6 INJECTION, SOLUTION SUBCUTANEOUS ONCE
Refills: 0 | Status: COMPLETED | OUTPATIENT
Start: 2021-01-01 | End: 2021-01-01

## 2021-01-01 RX ORDER — NALOXEGOL OXALATE 12.5 MG/1
12.5 TABLET, FILM COATED ORAL DAILY
Refills: 0 | Status: DISCONTINUED | OUTPATIENT
Start: 2021-01-01 | End: 2021-01-01

## 2021-01-01 RX ORDER — MAGNESIUM SULFATE 500 MG/ML
2 VIAL (ML) INJECTION ONCE
Refills: 0 | Status: COMPLETED | OUTPATIENT
Start: 2021-01-01 | End: 2021-01-01

## 2021-01-01 RX ORDER — INSULIN LISPRO 100/ML
11 VIAL (ML) SUBCUTANEOUS
Refills: 0 | Status: DISCONTINUED | OUTPATIENT
Start: 2021-01-01 | End: 2021-01-01

## 2021-01-01 RX ORDER — POTASSIUM PHOSPHATE, MONOBASIC POTASSIUM PHOSPHATE, DIBASIC 236; 224 MG/ML; MG/ML
15 INJECTION, SOLUTION INTRAVENOUS ONCE
Refills: 0 | Status: COMPLETED | OUTPATIENT
Start: 2021-01-01 | End: 2021-01-01

## 2021-01-01 RX ORDER — SENNA PLUS 8.6 MG/1
10 TABLET ORAL AT BEDTIME
Refills: 0 | Status: DISCONTINUED | OUTPATIENT
Start: 2021-01-01 | End: 2021-01-01

## 2021-01-01 RX ORDER — MEROPENEM 1 G/30ML
1000 INJECTION INTRAVENOUS ONCE
Refills: 0 | Status: COMPLETED | OUTPATIENT
Start: 2021-01-01 | End: 2021-01-01

## 2021-01-01 RX ORDER — PHENOBARBITAL 60 MG
220 TABLET ORAL ONCE
Refills: 0 | Status: DISCONTINUED | OUTPATIENT
Start: 2021-01-01 | End: 2021-01-01

## 2021-01-01 RX ORDER — REMDESIVIR 5 MG/ML
200 INJECTION INTRAVENOUS EVERY 24 HOURS
Refills: 0 | Status: COMPLETED | OUTPATIENT
Start: 2021-01-01 | End: 2021-01-01

## 2021-01-01 RX ORDER — PHENOBARBITAL 60 MG
220 TABLET ORAL EVERY 8 HOURS
Refills: 0 | Status: DISCONTINUED | OUTPATIENT
Start: 2021-01-01 | End: 2021-01-01

## 2021-01-01 RX ORDER — POTASSIUM CHLORIDE 20 MEQ
40 PACKET (EA) ORAL ONCE
Refills: 0 | Status: COMPLETED | OUTPATIENT
Start: 2021-01-01 | End: 2021-01-01

## 2021-01-01 RX ORDER — ACETAMINOPHEN 500 MG
2 TABLET ORAL
Qty: 0 | Refills: 0 | DISCHARGE

## 2021-01-01 RX ORDER — NOREPINEPHRINE BITARTRATE/D5W 8 MG/250ML
0.05 PLASTIC BAG, INJECTION (ML) INTRAVENOUS
Qty: 8 | Refills: 0 | Status: DISCONTINUED | OUTPATIENT
Start: 2021-01-01 | End: 2021-01-01

## 2021-01-01 RX ORDER — INSULIN GLARGINE 100 [IU]/ML
6 INJECTION, SOLUTION SUBCUTANEOUS AT BEDTIME
Refills: 0 | Status: DISCONTINUED | OUTPATIENT
Start: 2021-01-01 | End: 2021-01-01

## 2021-01-01 RX ORDER — ENOXAPARIN SODIUM 100 MG/ML
60 INJECTION SUBCUTANEOUS EVERY 12 HOURS
Refills: 0 | Status: DISCONTINUED | OUTPATIENT
Start: 2021-01-01 | End: 2021-01-01

## 2021-01-01 RX ORDER — PIPERACILLIN AND TAZOBACTAM 4; .5 G/20ML; G/20ML
3.38 INJECTION, POWDER, LYOPHILIZED, FOR SOLUTION INTRAVENOUS EVERY 8 HOURS
Refills: 0 | Status: COMPLETED | OUTPATIENT
Start: 2021-01-01 | End: 2021-01-01

## 2021-01-01 RX ORDER — HUMAN INSULIN 100 [IU]/ML
8 INJECTION, SUSPENSION SUBCUTANEOUS EVERY 6 HOURS
Refills: 0 | Status: DISCONTINUED | OUTPATIENT
Start: 2021-01-01 | End: 2021-01-01

## 2021-01-01 RX ORDER — HEPARIN SODIUM 5000 [USP'U]/ML
600 INJECTION INTRAVENOUS; SUBCUTANEOUS
Qty: 25000 | Refills: 0 | Status: DISCONTINUED | OUTPATIENT
Start: 2021-01-01 | End: 2021-01-01

## 2021-01-01 RX ORDER — METOCLOPRAMIDE HCL 10 MG
5 TABLET ORAL EVERY 6 HOURS
Refills: 0 | Status: COMPLETED | OUTPATIENT
Start: 2021-01-01 | End: 2021-01-01

## 2021-01-01 RX ORDER — INSULIN LISPRO 100/ML
VIAL (ML) SUBCUTANEOUS EVERY 6 HOURS
Refills: 0 | Status: DISCONTINUED | OUTPATIENT
Start: 2021-01-01 | End: 2021-01-01

## 2021-01-01 RX ORDER — VANCOMYCIN HCL 1 G
1000 VIAL (EA) INTRAVENOUS ONCE
Refills: 0 | Status: COMPLETED | OUTPATIENT
Start: 2021-01-01 | End: 2021-01-01

## 2021-01-01 RX ORDER — METHADONE HYDROCHLORIDE 40 MG/1
20 TABLET ORAL EVERY 8 HOURS
Refills: 0 | Status: DISCONTINUED | OUTPATIENT
Start: 2021-01-01 | End: 2021-01-01

## 2021-01-01 RX ORDER — POTASSIUM PHOSPHATE, MONOBASIC POTASSIUM PHOSPHATE, DIBASIC 236; 224 MG/ML; MG/ML
30 INJECTION, SOLUTION INTRAVENOUS ONCE
Refills: 0 | Status: COMPLETED | OUTPATIENT
Start: 2021-01-01 | End: 2021-01-01

## 2021-01-01 RX ORDER — ERYTHROMYCIN ETHYLSUCCINATE 400 MG
250 TABLET ORAL EVERY 8 HOURS
Refills: 0 | Status: DISCONTINUED | OUTPATIENT
Start: 2021-01-01 | End: 2021-01-01

## 2021-01-01 RX ORDER — HUMAN INSULIN 100 [IU]/ML
20 INJECTION, SUSPENSION SUBCUTANEOUS EVERY 6 HOURS
Refills: 0 | Status: DISCONTINUED | OUTPATIENT
Start: 2021-01-01 | End: 2021-01-01

## 2021-01-01 RX ORDER — CEFEPIME 1 G/1
2000 INJECTION, POWDER, FOR SOLUTION INTRAMUSCULAR; INTRAVENOUS ONCE
Refills: 0 | Status: COMPLETED | OUTPATIENT
Start: 2021-01-01 | End: 2021-01-01

## 2021-01-01 RX ORDER — PIPERACILLIN AND TAZOBACTAM 4; .5 G/20ML; G/20ML
3.38 INJECTION, POWDER, LYOPHILIZED, FOR SOLUTION INTRAVENOUS ONCE
Refills: 0 | Status: COMPLETED | OUTPATIENT
Start: 2021-01-01 | End: 2021-01-01

## 2021-01-01 RX ORDER — SODIUM BICARBONATE 1 MEQ/ML
0.34 SYRINGE (ML) INTRAVENOUS
Qty: 150 | Refills: 0 | Status: DISCONTINUED | OUTPATIENT
Start: 2021-01-01 | End: 2021-01-01

## 2021-01-01 RX ORDER — INFLUENZA VIRUS VACCINE 15; 15; 15; 15 UG/.5ML; UG/.5ML; UG/.5ML; UG/.5ML
0.5 SUSPENSION INTRAMUSCULAR ONCE
Refills: 0 | Status: DISCONTINUED | OUTPATIENT
Start: 2021-01-01 | End: 2021-01-01

## 2021-01-01 RX ORDER — LACTULOSE 10 G/15ML
30 SOLUTION ORAL EVERY 6 HOURS
Refills: 0 | Status: DISCONTINUED | OUTPATIENT
Start: 2021-01-01 | End: 2021-01-01

## 2021-01-01 RX ORDER — ALBUTEROL 90 UG/1
2 AEROSOL, METERED ORAL EVERY 6 HOURS
Refills: 0 | Status: DISCONTINUED | OUTPATIENT
Start: 2021-01-01 | End: 2021-01-01

## 2021-01-01 RX ORDER — DAPTOMYCIN 500 MG/10ML
550 INJECTION, POWDER, LYOPHILIZED, FOR SOLUTION INTRAVENOUS ONCE
Refills: 0 | Status: DISCONTINUED | OUTPATIENT
Start: 2021-01-01 | End: 2021-01-01

## 2021-01-01 RX ORDER — FENTANYL CITRATE 50 UG/ML
2.5 INJECTION INTRAVENOUS
Qty: 5000 | Refills: 0 | Status: DISCONTINUED | OUTPATIENT
Start: 2021-01-01 | End: 2021-01-01

## 2021-01-01 RX ORDER — DEXMEDETOMIDINE HYDROCHLORIDE IN 0.9% SODIUM CHLORIDE 4 UG/ML
0.5 INJECTION INTRAVENOUS
Qty: 200 | Refills: 0 | Status: DISCONTINUED | OUTPATIENT
Start: 2021-01-01 | End: 2021-01-01

## 2021-01-01 RX ORDER — HUMAN INSULIN 100 [IU]/ML
4 INJECTION, SUSPENSION SUBCUTANEOUS EVERY 6 HOURS
Refills: 0 | Status: DISCONTINUED | OUTPATIENT
Start: 2021-01-01 | End: 2021-01-01

## 2021-01-01 RX ORDER — INSULIN GLARGINE 100 [IU]/ML
20 INJECTION, SOLUTION SUBCUTANEOUS AT BEDTIME
Refills: 0 | Status: DISCONTINUED | OUTPATIENT
Start: 2021-01-01 | End: 2021-01-01

## 2021-01-01 RX ORDER — VANCOMYCIN HCL 1 G
1000 VIAL (EA) INTRAVENOUS EVERY 12 HOURS
Refills: 0 | Status: DISCONTINUED | OUTPATIENT
Start: 2021-01-01 | End: 2021-01-01

## 2021-01-01 RX ORDER — BUDESONIDE, MICRONIZED 100 %
0.5 POWDER (GRAM) MISCELLANEOUS EVERY 12 HOURS
Refills: 0 | Status: DISCONTINUED | OUTPATIENT
Start: 2021-01-01 | End: 2021-01-01

## 2021-01-01 RX ORDER — AMPICILLIN SODIUM AND SULBACTAM SODIUM 250; 125 MG/ML; MG/ML
3 INJECTION, POWDER, FOR SUSPENSION INTRAMUSCULAR; INTRAVENOUS ONCE
Refills: 0 | Status: COMPLETED | OUTPATIENT
Start: 2021-01-01 | End: 2021-01-01

## 2021-01-01 RX ORDER — TOCILIZUMAB 20 MG/ML
400 INJECTION, SOLUTION, CONCENTRATE INTRAVENOUS ONCE
Refills: 0 | Status: DISCONTINUED | OUTPATIENT
Start: 2021-01-01 | End: 2021-01-01

## 2021-01-01 RX ORDER — NOREPINEPHRINE BITARTRATE/D5W 8 MG/250ML
0.05 PLASTIC BAG, INJECTION (ML) INTRAVENOUS
Qty: 16 | Refills: 0 | Status: DISCONTINUED | OUTPATIENT
Start: 2021-01-01 | End: 2021-01-01

## 2021-01-01 RX ORDER — FENTANYL CITRATE 50 UG/ML
0.5 INJECTION INTRAVENOUS
Qty: 5000 | Refills: 0 | Status: DISCONTINUED | OUTPATIENT
Start: 2021-01-01 | End: 2021-01-01

## 2021-01-01 RX ORDER — SENNA PLUS 8.6 MG/1
2 TABLET ORAL AT BEDTIME
Refills: 0 | Status: DISCONTINUED | OUTPATIENT
Start: 2021-01-01 | End: 2021-01-01

## 2021-01-01 RX ADMIN — DEXMEDETOMIDINE HYDROCHLORIDE IN 0.9% SODIUM CHLORIDE 0.2 MICROGRAM(S)/KG/HR: 4 INJECTION INTRAVENOUS at 21:28

## 2021-01-01 RX ADMIN — BUDESONIDE AND FORMOTEROL FUMARATE DIHYDRATE 2 PUFF(S): 160; 4.5 AEROSOL RESPIRATORY (INHALATION) at 17:52

## 2021-01-01 RX ADMIN — DEXMEDETOMIDINE HYDROCHLORIDE IN 0.9% SODIUM CHLORIDE 8.34 MICROGRAM(S)/KG/HR: 4 INJECTION INTRAVENOUS at 17:15

## 2021-01-01 RX ADMIN — Medication 650 MILLIGRAM(S): at 13:37

## 2021-01-01 RX ADMIN — Medication 1 APPLICATION(S): at 21:26

## 2021-01-01 RX ADMIN — LACTULOSE 20 GRAM(S): 10 SOLUTION ORAL at 11:21

## 2021-01-01 RX ADMIN — Medication 1 APPLICATION(S): at 04:39

## 2021-01-01 RX ADMIN — LACTULOSE 20 GRAM(S): 10 SOLUTION ORAL at 11:11

## 2021-01-01 RX ADMIN — MIDAZOLAM HYDROCHLORIDE 2 MILLIGRAM(S): 1 INJECTION, SOLUTION INTRAMUSCULAR; INTRAVENOUS at 05:37

## 2021-01-01 RX ADMIN — Medication 4: at 12:20

## 2021-01-01 RX ADMIN — LACTULOSE 20 GRAM(S): 10 SOLUTION ORAL at 19:50

## 2021-01-01 RX ADMIN — Medication 3.13 MICROGRAM(S)/KG/MIN: at 21:35

## 2021-01-01 RX ADMIN — AMPICILLIN SODIUM AND SULBACTAM SODIUM 200 GRAM(S): 250; 125 INJECTION, POWDER, FOR SUSPENSION INTRAMUSCULAR; INTRAVENOUS at 05:45

## 2021-01-01 RX ADMIN — Medication 50 GRAM(S): at 05:22

## 2021-01-01 RX ADMIN — LACTULOSE 20 GRAM(S): 10 SOLUTION ORAL at 00:26

## 2021-01-01 RX ADMIN — KETAMINE HYDROCHLORIDE 20 MG/KG/HR: 100 INJECTION INTRAMUSCULAR; INTRAVENOUS at 17:03

## 2021-01-01 RX ADMIN — LINEZOLID 300 MILLIGRAM(S): 600 INJECTION, SOLUTION INTRAVENOUS at 00:16

## 2021-01-01 RX ADMIN — FENTANYL CITRATE 8.34 MICROGRAM(S)/KG/HR: 50 INJECTION INTRAVENOUS at 19:47

## 2021-01-01 RX ADMIN — FENTANYL CITRATE 100 MICROGRAM(S): 50 INJECTION INTRAVENOUS at 20:22

## 2021-01-01 RX ADMIN — Medication 20 MILLIGRAM(S): at 07:40

## 2021-01-01 RX ADMIN — Medication 75 MEQ/KG/HR: at 09:07

## 2021-01-01 RX ADMIN — Medication 2: at 12:32

## 2021-01-01 RX ADMIN — FENTANYL CITRATE 1.67 MICROGRAM(S)/KG/HR: 50 INJECTION INTRAVENOUS at 23:28

## 2021-01-01 RX ADMIN — Medication 11 UNIT(S): at 08:12

## 2021-01-01 RX ADMIN — ENOXAPARIN SODIUM 70 MILLIGRAM(S): 100 INJECTION SUBCUTANEOUS at 18:22

## 2021-01-01 RX ADMIN — CHLORHEXIDINE GLUCONATE 15 MILLILITER(S): 213 SOLUTION TOPICAL at 04:37

## 2021-01-01 RX ADMIN — Medication 1 APPLICATION(S): at 05:01

## 2021-01-01 RX ADMIN — HUMAN INSULIN 6 UNIT(S): 100 INJECTION, SUSPENSION SUBCUTANEOUS at 12:11

## 2021-01-01 RX ADMIN — PANTOPRAZOLE SODIUM 40 MILLIGRAM(S): 20 TABLET, DELAYED RELEASE ORAL at 11:20

## 2021-01-01 RX ADMIN — Medication 4 UNIT(S): at 16:50

## 2021-01-01 RX ADMIN — INSULIN HUMAN 10 UNIT(S): 100 INJECTION, SOLUTION SUBCUTANEOUS at 11:35

## 2021-01-01 RX ADMIN — Medication 2 MILLIGRAM(S): at 05:44

## 2021-01-01 RX ADMIN — Medication 1.25 MICROGRAM(S)/KG/MIN: at 17:58

## 2021-01-01 RX ADMIN — Medication 11 UNIT(S): at 16:43

## 2021-01-01 RX ADMIN — Medication 10 MILLIGRAM(S): at 21:09

## 2021-01-01 RX ADMIN — Medication 100 MILLIEQUIVALENT(S): at 03:54

## 2021-01-01 RX ADMIN — CHLORHEXIDINE GLUCONATE 15 MILLILITER(S): 213 SOLUTION TOPICAL at 05:33

## 2021-01-01 RX ADMIN — Medication 250 MILLIGRAM(S): at 19:50

## 2021-01-01 RX ADMIN — MEROPENEM 100 MILLIGRAM(S): 1 INJECTION INTRAVENOUS at 20:33

## 2021-01-01 RX ADMIN — Medication 1 APPLICATION(S): at 17:35

## 2021-01-01 RX ADMIN — PROPOFOL 6 MICROGRAM(S)/KG/MIN: 10 INJECTION, EMULSION INTRAVENOUS at 23:28

## 2021-01-01 RX ADMIN — SENNA PLUS 10 MILLILITER(S): 8.6 TABLET ORAL at 21:15

## 2021-01-01 RX ADMIN — Medication 1.25 MICROGRAM(S)/KG/MIN: at 14:43

## 2021-01-01 RX ADMIN — ALBUTEROL 4 PUFF(S): 90 AEROSOL, METERED ORAL at 11:23

## 2021-01-01 RX ADMIN — HEPARIN SODIUM 10 UNIT(S)/HR: 5000 INJECTION INTRAVENOUS; SUBCUTANEOUS at 05:57

## 2021-01-01 RX ADMIN — Medication 50 MILLILITER(S): at 18:45

## 2021-01-01 RX ADMIN — POTASSIUM PHOSPHATE, MONOBASIC POTASSIUM PHOSPHATE, DIBASIC 83.33 MILLIMOLE(S): 236; 224 INJECTION, SOLUTION INTRAVENOUS at 02:40

## 2021-01-01 RX ADMIN — SENNA PLUS 10 MILLILITER(S): 8.6 TABLET ORAL at 17:18

## 2021-01-01 RX ADMIN — Medication 650 MILLIGRAM(S): at 22:27

## 2021-01-01 RX ADMIN — CHLORHEXIDINE GLUCONATE 15 MILLILITER(S): 213 SOLUTION TOPICAL at 17:25

## 2021-01-01 RX ADMIN — Medication 162 MILLIGRAM(S): at 12:05

## 2021-01-01 RX ADMIN — FENTANYL CITRATE 50 MICROGRAM(S): 50 INJECTION INTRAVENOUS at 23:28

## 2021-01-01 RX ADMIN — METHADONE HYDROCHLORIDE 20 MILLIGRAM(S): 40 TABLET ORAL at 21:28

## 2021-01-01 RX ADMIN — KETAMINE HYDROCHLORIDE 20 MG/KG/HR: 100 INJECTION INTRAMUSCULAR; INTRAVENOUS at 08:43

## 2021-01-01 RX ADMIN — Medication 4: at 12:18

## 2021-01-01 RX ADMIN — Medication 1 APPLICATION(S): at 05:28

## 2021-01-01 RX ADMIN — FENTANYL CITRATE 1.67 MICROGRAM(S)/KG/HR: 50 INJECTION INTRAVENOUS at 05:36

## 2021-01-01 RX ADMIN — PANTOPRAZOLE SODIUM 40 MILLIGRAM(S): 20 TABLET, DELAYED RELEASE ORAL at 12:10

## 2021-01-01 RX ADMIN — SODIUM CHLORIDE 1000 MILLILITER(S): 9 INJECTION INTRAMUSCULAR; INTRAVENOUS; SUBCUTANEOUS at 12:50

## 2021-01-01 RX ADMIN — Medication 1.25 MICROGRAM(S)/KG/MIN: at 04:08

## 2021-01-01 RX ADMIN — LACTULOSE 15 GRAM(S): 10 SOLUTION ORAL at 17:05

## 2021-01-01 RX ADMIN — Medication 12 UNIT(S): at 12:18

## 2021-01-01 RX ADMIN — CHLORHEXIDINE GLUCONATE 15 MILLILITER(S): 213 SOLUTION TOPICAL at 17:12

## 2021-01-01 RX ADMIN — CHLORHEXIDINE GLUCONATE 1 APPLICATION(S): 213 SOLUTION TOPICAL at 05:22

## 2021-01-01 RX ADMIN — METHADONE HYDROCHLORIDE 20 MILLIGRAM(S): 40 TABLET ORAL at 14:00

## 2021-01-01 RX ADMIN — DEXMEDETOMIDINE HYDROCHLORIDE IN 0.9% SODIUM CHLORIDE 0.2 MICROGRAM(S)/KG/HR: 4 INJECTION INTRAVENOUS at 11:31

## 2021-01-01 RX ADMIN — PANTOPRAZOLE SODIUM 40 MILLIGRAM(S): 20 TABLET, DELAYED RELEASE ORAL at 17:15

## 2021-01-01 RX ADMIN — ENOXAPARIN SODIUM 70 MILLIGRAM(S): 100 INJECTION SUBCUTANEOUS at 05:03

## 2021-01-01 RX ADMIN — AMPICILLIN SODIUM AND SULBACTAM SODIUM 200 GRAM(S): 250; 125 INJECTION, POWDER, FOR SUSPENSION INTRAMUSCULAR; INTRAVENOUS at 17:43

## 2021-01-01 RX ADMIN — Medication 15 MILLILITER(S): at 12:10

## 2021-01-01 RX ADMIN — Medication 6 MILLIGRAM(S): at 05:38

## 2021-01-01 RX ADMIN — Medication 250 MILLIGRAM(S): at 21:03

## 2021-01-01 RX ADMIN — Medication 10 MILLIGRAM(S): at 19:49

## 2021-01-01 RX ADMIN — Medication 6: at 05:05

## 2021-01-01 RX ADMIN — ENOXAPARIN SODIUM 70 MILLIGRAM(S): 100 INJECTION SUBCUTANEOUS at 17:15

## 2021-01-01 RX ADMIN — SODIUM CHLORIDE 3000 MILLILITER(S): 9 INJECTION INTRAMUSCULAR; INTRAVENOUS; SUBCUTANEOUS at 11:08

## 2021-01-01 RX ADMIN — Medication 2000 UNIT(S): at 17:14

## 2021-01-01 RX ADMIN — Medication 100 MILLIGRAM(S): at 13:00

## 2021-01-01 RX ADMIN — PROPOFOL 20 MICROGRAM(S)/KG/MIN: 10 INJECTION, EMULSION INTRAVENOUS at 02:24

## 2021-01-01 RX ADMIN — BUDESONIDE AND FORMOTEROL FUMARATE DIHYDRATE 2 PUFF(S): 160; 4.5 AEROSOL RESPIRATORY (INHALATION) at 05:17

## 2021-01-01 RX ADMIN — FENTANYL CITRATE 50 MICROGRAM(S): 50 INJECTION INTRAVENOUS at 05:10

## 2021-01-01 RX ADMIN — LACTULOSE 20 GRAM(S): 10 SOLUTION ORAL at 12:50

## 2021-01-01 RX ADMIN — Medication 2: at 17:57

## 2021-01-01 RX ADMIN — Medication 100 GRAM(S): at 11:17

## 2021-01-01 RX ADMIN — Medication 4: at 12:35

## 2021-01-01 RX ADMIN — PANTOPRAZOLE SODIUM 40 MILLIGRAM(S): 20 TABLET, DELAYED RELEASE ORAL at 17:12

## 2021-01-01 RX ADMIN — HUMAN INSULIN 20 UNIT(S): 100 INJECTION, SUSPENSION SUBCUTANEOUS at 05:21

## 2021-01-01 RX ADMIN — CHLORHEXIDINE GLUCONATE 15 MILLILITER(S): 213 SOLUTION TOPICAL at 05:04

## 2021-01-01 RX ADMIN — PROPOFOL 6 MICROGRAM(S)/KG/MIN: 10 INJECTION, EMULSION INTRAVENOUS at 06:08

## 2021-01-01 RX ADMIN — Medication 5 MILLIGRAM(S): at 00:08

## 2021-01-01 RX ADMIN — Medication 12 UNIT(S): at 08:26

## 2021-01-01 RX ADMIN — ARGATROBAN 7.2 MICROGRAM(S)/KG/MIN: 50 INJECTION, SOLUTION INTRAVENOUS at 06:21

## 2021-01-01 RX ADMIN — PIPERACILLIN AND TAZOBACTAM 200 GRAM(S): 4; .5 INJECTION, POWDER, LYOPHILIZED, FOR SOLUTION INTRAVENOUS at 18:22

## 2021-01-01 RX ADMIN — Medication 50 MILLILITER(S): at 11:16

## 2021-01-01 RX ADMIN — PIPERACILLIN AND TAZOBACTAM 25 GRAM(S): 4; .5 INJECTION, POWDER, LYOPHILIZED, FOR SOLUTION INTRAVENOUS at 05:03

## 2021-01-01 RX ADMIN — PHENYLEPHRINE HYDROCHLORIDE 2.5 MICROGRAM(S)/KG/MIN: 10 INJECTION INTRAVENOUS at 11:32

## 2021-01-01 RX ADMIN — MIDAZOLAM HYDROCHLORIDE 4 MILLIGRAM(S): 1 INJECTION, SOLUTION INTRAMUSCULAR; INTRAVENOUS at 17:24

## 2021-01-01 RX ADMIN — CHLORHEXIDINE GLUCONATE 1 APPLICATION(S): 213 SOLUTION TOPICAL at 05:34

## 2021-01-01 RX ADMIN — Medication 15 MILLILITER(S): at 11:10

## 2021-01-01 RX ADMIN — FENTANYL CITRATE 8.34 MICROGRAM(S)/KG/HR: 50 INJECTION INTRAVENOUS at 11:32

## 2021-01-01 RX ADMIN — Medication 15 UNIT(S): at 17:10

## 2021-01-01 RX ADMIN — Medication 1.25 MICROGRAM(S)/KG/MIN: at 08:43

## 2021-01-01 RX ADMIN — Medication 3: at 08:09

## 2021-01-01 RX ADMIN — AMPICILLIN SODIUM AND SULBACTAM SODIUM 200 GRAM(S): 250; 125 INJECTION, POWDER, FOR SUSPENSION INTRAMUSCULAR; INTRAVENOUS at 11:58

## 2021-01-01 RX ADMIN — PANTOPRAZOLE SODIUM 40 MILLIGRAM(S): 20 TABLET, DELAYED RELEASE ORAL at 05:59

## 2021-01-01 RX ADMIN — CHLORHEXIDINE GLUCONATE 1 APPLICATION(S): 213 SOLUTION TOPICAL at 05:33

## 2021-01-01 RX ADMIN — CEFEPIME 100 MILLIGRAM(S): 1 INJECTION, POWDER, FOR SOLUTION INTRAMUSCULAR; INTRAVENOUS at 05:17

## 2021-01-01 RX ADMIN — CHLORHEXIDINE GLUCONATE 15 MILLILITER(S): 213 SOLUTION TOPICAL at 05:13

## 2021-01-01 RX ADMIN — DEXMEDETOMIDINE HYDROCHLORIDE IN 0.9% SODIUM CHLORIDE 8.34 MICROGRAM(S)/KG/HR: 4 INJECTION INTRAVENOUS at 17:19

## 2021-01-01 RX ADMIN — HUMAN INSULIN 15 UNIT(S): 100 INJECTION, SUSPENSION SUBCUTANEOUS at 12:10

## 2021-01-01 RX ADMIN — Medication 15 UNIT(S): at 17:23

## 2021-01-01 RX ADMIN — CHLORHEXIDINE GLUCONATE 15 MILLILITER(S): 213 SOLUTION TOPICAL at 17:57

## 2021-01-01 RX ADMIN — METHYLNALTREXONE BROMIDE 6 MILLIGRAM(S): 12 INJECTION, SOLUTION SUBCUTANEOUS at 01:44

## 2021-01-01 RX ADMIN — Medication 40 MILLIEQUIVALENT(S): at 15:57

## 2021-01-01 RX ADMIN — CISATRACURIUM BESYLATE 12 MICROGRAM(S)/KG/MIN: 2 INJECTION INTRAVENOUS at 17:19

## 2021-01-01 RX ADMIN — CISATRACURIUM BESYLATE 10 MILLIGRAM(S): 2 INJECTION INTRAVENOUS at 13:00

## 2021-01-01 RX ADMIN — NALOXEGOL OXALATE 12.5 MILLIGRAM(S): 12.5 TABLET, FILM COATED ORAL at 12:37

## 2021-01-01 RX ADMIN — ENOXAPARIN SODIUM 70 MILLIGRAM(S): 100 INJECTION SUBCUTANEOUS at 05:21

## 2021-01-01 RX ADMIN — CISATRACURIUM BESYLATE 12 MICROGRAM(S)/KG/MIN: 2 INJECTION INTRAVENOUS at 03:10

## 2021-01-01 RX ADMIN — AMPICILLIN SODIUM AND SULBACTAM SODIUM 200 GRAM(S): 250; 125 INJECTION, POWDER, FOR SUSPENSION INTRAMUSCULAR; INTRAVENOUS at 23:29

## 2021-01-01 RX ADMIN — HEPARIN SODIUM 10 UNIT(S)/HR: 5000 INJECTION INTRAVENOUS; SUBCUTANEOUS at 17:11

## 2021-01-01 RX ADMIN — Medication 20 MILLIGRAM(S): at 20:58

## 2021-01-01 RX ADMIN — CHLORHEXIDINE GLUCONATE 1 APPLICATION(S): 213 SOLUTION TOPICAL at 05:11

## 2021-01-01 RX ADMIN — METHADONE HYDROCHLORIDE 20 MILLIGRAM(S): 40 TABLET ORAL at 05:21

## 2021-01-01 RX ADMIN — Medication 4: at 17:48

## 2021-01-01 RX ADMIN — POLYETHYLENE GLYCOL 3350 17 GRAM(S): 17 POWDER, FOR SOLUTION ORAL at 19:51

## 2021-01-01 RX ADMIN — Medication 250 MILLIGRAM(S): at 06:16

## 2021-01-01 RX ADMIN — PHENYLEPHRINE HYDROCHLORIDE 2.5 MICROGRAM(S)/KG/MIN: 10 INJECTION INTRAVENOUS at 17:31

## 2021-01-01 RX ADMIN — Medication 12 UNIT(S): at 17:02

## 2021-01-01 RX ADMIN — LACTULOSE 30 GRAM(S): 10 SOLUTION ORAL at 14:24

## 2021-01-01 RX ADMIN — PIPERACILLIN AND TAZOBACTAM 25 GRAM(S): 4; .5 INJECTION, POWDER, LYOPHILIZED, FOR SOLUTION INTRAVENOUS at 13:30

## 2021-01-01 RX ADMIN — Medication 0: at 21:31

## 2021-01-01 RX ADMIN — INSULIN GLARGINE 25 UNIT(S): 100 INJECTION, SOLUTION SUBCUTANEOUS at 21:43

## 2021-01-01 RX ADMIN — Medication 50 MILLIEQUIVALENT(S): at 11:37

## 2021-01-01 RX ADMIN — Medication 200 GRAM(S): at 19:46

## 2021-01-01 RX ADMIN — MIDAZOLAM HYDROCHLORIDE 1.33 MG/KG/HR: 1 INJECTION, SOLUTION INTRAMUSCULAR; INTRAVENOUS at 17:23

## 2021-01-01 RX ADMIN — CISATRACURIUM BESYLATE 12 MICROGRAM(S)/KG/MIN: 2 INJECTION INTRAVENOUS at 17:58

## 2021-01-01 RX ADMIN — AMPICILLIN SODIUM AND SULBACTAM SODIUM 200 GRAM(S): 250; 125 INJECTION, POWDER, FOR SUSPENSION INTRAMUSCULAR; INTRAVENOUS at 17:05

## 2021-01-01 RX ADMIN — HEPARIN SODIUM 0 UNIT(S)/HR: 5000 INJECTION INTRAVENOUS; SUBCUTANEOUS at 07:40

## 2021-01-01 RX ADMIN — HEPARIN SODIUM 10 UNIT(S)/HR: 5000 INJECTION INTRAVENOUS; SUBCUTANEOUS at 21:43

## 2021-01-01 RX ADMIN — VASOPRESSIN 2.4 UNIT(S)/MIN: 20 INJECTION INTRAVENOUS at 19:47

## 2021-01-01 RX ADMIN — FENTANYL CITRATE 8.34 MICROGRAM(S)/KG/HR: 50 INJECTION INTRAVENOUS at 17:19

## 2021-01-01 RX ADMIN — Medication 250 MILLIGRAM(S): at 13:31

## 2021-01-01 RX ADMIN — MIDAZOLAM HYDROCHLORIDE 1.33 MG/KG/HR: 1 INJECTION, SOLUTION INTRAMUSCULAR; INTRAVENOUS at 08:16

## 2021-01-01 RX ADMIN — REMDESIVIR 500 MILLIGRAM(S): 5 INJECTION INTRAVENOUS at 17:00

## 2021-01-01 RX ADMIN — PANTOPRAZOLE SODIUM 40 MILLIGRAM(S): 20 TABLET, DELAYED RELEASE ORAL at 05:07

## 2021-01-01 RX ADMIN — INSULIN GLARGINE 30 UNIT(S): 100 INJECTION, SOLUTION SUBCUTANEOUS at 22:37

## 2021-01-01 RX ADMIN — Medication 20 MILLIGRAM(S): at 10:25

## 2021-01-01 RX ADMIN — CEFEPIME 100 MILLIGRAM(S): 1 INJECTION, POWDER, FOR SOLUTION INTRAMUSCULAR; INTRAVENOUS at 05:32

## 2021-01-01 RX ADMIN — PANTOPRAZOLE SODIUM 40 MILLIGRAM(S): 20 TABLET, DELAYED RELEASE ORAL at 06:16

## 2021-01-01 RX ADMIN — SODIUM CHLORIDE 1000 MILLILITER(S): 9 INJECTION INTRAMUSCULAR; INTRAVENOUS; SUBCUTANEOUS at 21:25

## 2021-01-01 RX ADMIN — CHLORHEXIDINE GLUCONATE 15 MILLILITER(S): 213 SOLUTION TOPICAL at 05:00

## 2021-01-01 RX ADMIN — Medication 650 MILLIGRAM(S): at 04:38

## 2021-01-01 RX ADMIN — HUMAN INSULIN 20 UNIT(S): 100 INJECTION, SUSPENSION SUBCUTANEOUS at 00:39

## 2021-01-01 RX ADMIN — Medication 10 MILLIGRAM(S): at 22:18

## 2021-01-01 RX ADMIN — CHLORHEXIDINE GLUCONATE 15 MILLILITER(S): 213 SOLUTION TOPICAL at 17:15

## 2021-01-01 RX ADMIN — CHLORHEXIDINE GLUCONATE 15 MILLILITER(S): 213 SOLUTION TOPICAL at 05:27

## 2021-01-01 RX ADMIN — MEROPENEM 100 MILLIGRAM(S): 1 INJECTION INTRAVENOUS at 12:16

## 2021-01-01 RX ADMIN — Medication 6: at 05:07

## 2021-01-01 RX ADMIN — INSULIN GLARGINE 20 UNIT(S): 100 INJECTION, SOLUTION SUBCUTANEOUS at 22:49

## 2021-01-01 RX ADMIN — PANTOPRAZOLE SODIUM 40 MILLIGRAM(S): 20 TABLET, DELAYED RELEASE ORAL at 17:05

## 2021-01-01 RX ADMIN — HEPARIN SODIUM 800 UNIT(S)/HR: 5000 INJECTION INTRAVENOUS; SUBCUTANEOUS at 02:49

## 2021-01-01 RX ADMIN — Medication 40 MILLIGRAM(S): at 12:08

## 2021-01-01 RX ADMIN — MIDAZOLAM HYDROCHLORIDE 2 MILLIGRAM(S): 1 INJECTION, SOLUTION INTRAMUSCULAR; INTRAVENOUS at 07:29

## 2021-01-01 RX ADMIN — CHLORHEXIDINE GLUCONATE 1 APPLICATION(S): 213 SOLUTION TOPICAL at 05:28

## 2021-01-01 RX ADMIN — PIPERACILLIN AND TAZOBACTAM 25 GRAM(S): 4; .5 INJECTION, POWDER, LYOPHILIZED, FOR SOLUTION INTRAVENOUS at 05:43

## 2021-01-01 RX ADMIN — Medication 40 MILLIGRAM(S): at 15:35

## 2021-01-01 RX ADMIN — Medication 1: at 08:08

## 2021-01-01 RX ADMIN — MIDAZOLAM HYDROCHLORIDE 1.33 MG/KG/HR: 1 INJECTION, SOLUTION INTRAMUSCULAR; INTRAVENOUS at 21:36

## 2021-01-01 RX ADMIN — Medication 1 APPLICATION(S): at 18:54

## 2021-01-01 RX ADMIN — MIDAZOLAM HYDROCHLORIDE 1.33 MG/KG/HR: 1 INJECTION, SOLUTION INTRAMUSCULAR; INTRAVENOUS at 03:38

## 2021-01-01 RX ADMIN — HEPARIN SODIUM 800 UNIT(S)/HR: 5000 INJECTION INTRAVENOUS; SUBCUTANEOUS at 19:29

## 2021-01-01 RX ADMIN — Medication 6 MILLIGRAM(S): at 05:01

## 2021-01-01 RX ADMIN — Medication 0: at 21:22

## 2021-01-01 RX ADMIN — Medication 5 MILLIGRAM(S): at 17:13

## 2021-01-01 RX ADMIN — KETAMINE HYDROCHLORIDE 20 MG/KG/HR: 100 INJECTION INTRAMUSCULAR; INTRAVENOUS at 05:46

## 2021-01-01 RX ADMIN — CHLORHEXIDINE GLUCONATE 15 MILLILITER(S): 213 SOLUTION TOPICAL at 06:37

## 2021-01-01 RX ADMIN — ENOXAPARIN SODIUM 70 MILLIGRAM(S): 100 INJECTION SUBCUTANEOUS at 18:38

## 2021-01-01 RX ADMIN — MEROPENEM 100 MILLIGRAM(S): 1 INJECTION INTRAVENOUS at 05:05

## 2021-01-01 RX ADMIN — CHLORHEXIDINE GLUCONATE 15 MILLILITER(S): 213 SOLUTION TOPICAL at 05:28

## 2021-01-01 RX ADMIN — CHLORHEXIDINE GLUCONATE 1 APPLICATION(S): 213 SOLUTION TOPICAL at 05:23

## 2021-01-01 RX ADMIN — CHLORHEXIDINE GLUCONATE 15 MILLILITER(S): 213 SOLUTION TOPICAL at 17:18

## 2021-01-01 RX ADMIN — Medication 2: at 17:02

## 2021-01-01 RX ADMIN — PIPERACILLIN AND TAZOBACTAM 25 GRAM(S): 4; .5 INJECTION, POWDER, LYOPHILIZED, FOR SOLUTION INTRAVENOUS at 01:57

## 2021-01-01 RX ADMIN — Medication 10 MILLIGRAM(S): at 05:10

## 2021-01-01 RX ADMIN — Medication 2: at 05:43

## 2021-01-01 RX ADMIN — NALOXEGOL OXALATE 12.5 MILLIGRAM(S): 12.5 TABLET, FILM COATED ORAL at 11:10

## 2021-01-01 RX ADMIN — DEXMEDETOMIDINE HYDROCHLORIDE IN 0.9% SODIUM CHLORIDE 0.2 MICROGRAM(S)/KG/HR: 4 INJECTION INTRAVENOUS at 11:36

## 2021-01-01 RX ADMIN — Medication 1 APPLICATION(S): at 17:05

## 2021-01-01 RX ADMIN — HUMAN INSULIN 8 UNIT(S): 100 INJECTION, SUSPENSION SUBCUTANEOUS at 05:02

## 2021-01-01 RX ADMIN — Medication 2000 UNIT(S): at 12:02

## 2021-01-01 RX ADMIN — Medication 4: at 05:34

## 2021-01-01 RX ADMIN — Medication 650 MILLIGRAM(S): at 18:22

## 2021-01-01 RX ADMIN — MEROPENEM 100 MILLIGRAM(S): 1 INJECTION INTRAVENOUS at 20:10

## 2021-01-01 RX ADMIN — LACTULOSE 20 GRAM(S): 10 SOLUTION ORAL at 23:44

## 2021-01-01 RX ADMIN — FENTANYL CITRATE 8.34 MICROGRAM(S)/KG/HR: 50 INJECTION INTRAVENOUS at 02:25

## 2021-01-01 RX ADMIN — PIPERACILLIN AND TAZOBACTAM 25 GRAM(S): 4; .5 INJECTION, POWDER, LYOPHILIZED, FOR SOLUTION INTRAVENOUS at 15:05

## 2021-01-01 RX ADMIN — Medication 15 MILLILITER(S): at 13:18

## 2021-01-01 RX ADMIN — Medication 15 MILLILITER(S): at 12:04

## 2021-01-01 RX ADMIN — Medication 4: at 12:06

## 2021-01-01 RX ADMIN — MIDAZOLAM HYDROCHLORIDE 1.33 MG/KG/HR: 1 INJECTION, SOLUTION INTRAMUSCULAR; INTRAVENOUS at 13:09

## 2021-01-01 RX ADMIN — SENNA PLUS 2 TABLET(S): 8.6 TABLET ORAL at 05:45

## 2021-01-01 RX ADMIN — Medication 10 MILLIGRAM(S): at 22:56

## 2021-01-01 RX ADMIN — FENTANYL CITRATE 100 MICROGRAM(S): 50 INJECTION INTRAVENOUS at 06:00

## 2021-01-01 RX ADMIN — VASOPRESSIN 2.4 UNIT(S)/MIN: 20 INJECTION INTRAVENOUS at 21:35

## 2021-01-01 RX ADMIN — AMPICILLIN SODIUM AND SULBACTAM SODIUM 200 GRAM(S): 250; 125 INJECTION, POWDER, FOR SUSPENSION INTRAMUSCULAR; INTRAVENOUS at 11:13

## 2021-01-01 RX ADMIN — Medication 10: at 17:38

## 2021-01-01 RX ADMIN — Medication 6 MILLIGRAM(S): at 11:22

## 2021-01-01 RX ADMIN — Medication 2000 UNIT(S): at 11:47

## 2021-01-01 RX ADMIN — PANTOPRAZOLE SODIUM 40 MILLIGRAM(S): 20 TABLET, DELAYED RELEASE ORAL at 17:43

## 2021-01-01 RX ADMIN — Medication 400 MILLIGRAM(S): at 17:47

## 2021-01-01 RX ADMIN — SODIUM CHLORIDE 100 MILLILITER(S): 9 INJECTION, SOLUTION INTRAVENOUS at 00:35

## 2021-01-01 RX ADMIN — ENOXAPARIN SODIUM 40 MILLIGRAM(S): 100 INJECTION SUBCUTANEOUS at 22:49

## 2021-01-01 RX ADMIN — KETAMINE HYDROCHLORIDE 1.67 MG/KG/HR: 100 INJECTION INTRAMUSCULAR; INTRAVENOUS at 23:27

## 2021-01-01 RX ADMIN — PANTOPRAZOLE SODIUM 40 MILLIGRAM(S): 20 TABLET, DELAYED RELEASE ORAL at 18:54

## 2021-01-01 RX ADMIN — MIDAZOLAM HYDROCHLORIDE 2 MILLIGRAM(S): 1 INJECTION, SOLUTION INTRAMUSCULAR; INTRAVENOUS at 07:47

## 2021-01-01 RX ADMIN — Medication 2: at 00:22

## 2021-01-01 RX ADMIN — REMDESIVIR 500 MILLIGRAM(S): 5 INJECTION INTRAVENOUS at 18:05

## 2021-01-01 RX ADMIN — METHADONE HYDROCHLORIDE 20 MILLIGRAM(S): 40 TABLET ORAL at 15:50

## 2021-01-01 RX ADMIN — Medication 11 UNIT(S): at 12:35

## 2021-01-01 RX ADMIN — PROPOFOL 6 MICROGRAM(S)/KG/MIN: 10 INJECTION, EMULSION INTRAVENOUS at 14:44

## 2021-01-01 RX ADMIN — HEPARIN SODIUM 0 UNIT(S)/HR: 5000 INJECTION INTRAVENOUS; SUBCUTANEOUS at 17:07

## 2021-01-01 RX ADMIN — Medication 2: at 06:10

## 2021-01-01 RX ADMIN — PIPERACILLIN AND TAZOBACTAM 25 GRAM(S): 4; .5 INJECTION, POWDER, LYOPHILIZED, FOR SOLUTION INTRAVENOUS at 14:28

## 2021-01-01 RX ADMIN — Medication 10 MILLIGRAM(S): at 05:05

## 2021-01-01 RX ADMIN — KETAMINE HYDROCHLORIDE 20 MG/KG/HR: 100 INJECTION INTRAMUSCULAR; INTRAVENOUS at 17:31

## 2021-01-01 RX ADMIN — HUMAN INSULIN 8 UNIT(S): 100 INJECTION, SUSPENSION SUBCUTANEOUS at 12:05

## 2021-01-01 RX ADMIN — FENTANYL CITRATE 100 MICROGRAM(S): 50 INJECTION INTRAVENOUS at 06:47

## 2021-01-01 RX ADMIN — CHLORHEXIDINE GLUCONATE 1 APPLICATION(S): 213 SOLUTION TOPICAL at 05:01

## 2021-01-01 RX ADMIN — KETAMINE HYDROCHLORIDE 1.67 MG/KG/HR: 100 INJECTION INTRAMUSCULAR; INTRAVENOUS at 07:40

## 2021-01-01 RX ADMIN — DEXMEDETOMIDINE HYDROCHLORIDE IN 0.9% SODIUM CHLORIDE 0.2 MICROGRAM(S)/KG/HR: 4 INJECTION INTRAVENOUS at 17:31

## 2021-01-01 RX ADMIN — Medication 1000 MILLIGRAM(S): at 11:37

## 2021-01-01 RX ADMIN — PANTOPRAZOLE SODIUM 40 MILLIGRAM(S): 20 TABLET, DELAYED RELEASE ORAL at 05:00

## 2021-01-01 RX ADMIN — HUMAN INSULIN 6 UNIT(S): 100 INJECTION, SUSPENSION SUBCUTANEOUS at 23:15

## 2021-01-01 RX ADMIN — Medication 10 MILLIGRAM(S): at 14:49

## 2021-01-01 RX ADMIN — Medication 9 UNIT(S): at 12:37

## 2021-01-01 RX ADMIN — Medication 1.25 MICROGRAM(S)/KG/MIN: at 06:08

## 2021-01-01 RX ADMIN — PIPERACILLIN AND TAZOBACTAM 25 GRAM(S): 4; .5 INJECTION, POWDER, LYOPHILIZED, FOR SOLUTION INTRAVENOUS at 22:46

## 2021-01-01 RX ADMIN — AMPICILLIN SODIUM AND SULBACTAM SODIUM 200 GRAM(S): 250; 125 INJECTION, POWDER, FOR SUSPENSION INTRAMUSCULAR; INTRAVENOUS at 12:34

## 2021-01-01 RX ADMIN — CHLORHEXIDINE GLUCONATE 1 APPLICATION(S): 213 SOLUTION TOPICAL at 02:15

## 2021-01-01 RX ADMIN — Medication 100 MILLIEQUIVALENT(S): at 13:57

## 2021-01-01 RX ADMIN — ENOXAPARIN SODIUM 70 MILLIGRAM(S): 100 INJECTION SUBCUTANEOUS at 17:25

## 2021-01-01 RX ADMIN — Medication 2000 UNIT(S): at 12:34

## 2021-01-01 RX ADMIN — Medication 25 MILLILITER(S): at 00:26

## 2021-01-01 RX ADMIN — BUDESONIDE AND FORMOTEROL FUMARATE DIHYDRATE 2 PUFF(S): 160; 4.5 AEROSOL RESPIRATORY (INHALATION) at 18:03

## 2021-01-01 RX ADMIN — AMPICILLIN SODIUM AND SULBACTAM SODIUM 200 GRAM(S): 250; 125 INJECTION, POWDER, FOR SUSPENSION INTRAMUSCULAR; INTRAVENOUS at 17:26

## 2021-01-01 RX ADMIN — CHLORHEXIDINE GLUCONATE 15 MILLILITER(S): 213 SOLUTION TOPICAL at 05:22

## 2021-01-01 RX ADMIN — SODIUM CHLORIDE 1000 MILLILITER(S): 9 INJECTION, SOLUTION INTRAVENOUS at 12:20

## 2021-01-01 RX ADMIN — LACTULOSE 20 GRAM(S): 10 SOLUTION ORAL at 06:16

## 2021-01-01 RX ADMIN — METHADONE HYDROCHLORIDE 20 MILLIGRAM(S): 40 TABLET ORAL at 15:28

## 2021-01-01 RX ADMIN — Medication 8: at 12:10

## 2021-01-01 RX ADMIN — PIPERACILLIN AND TAZOBACTAM 25 GRAM(S): 4; .5 INJECTION, POWDER, LYOPHILIZED, FOR SOLUTION INTRAVENOUS at 17:01

## 2021-01-01 RX ADMIN — LACTULOSE 20 GRAM(S): 10 SOLUTION ORAL at 17:35

## 2021-01-01 RX ADMIN — CHLORHEXIDINE GLUCONATE 1 APPLICATION(S): 213 SOLUTION TOPICAL at 05:04

## 2021-01-01 RX ADMIN — FENTANYL CITRATE 50 MICROGRAM(S): 50 INJECTION INTRAVENOUS at 21:29

## 2021-01-01 RX ADMIN — HEPARIN SODIUM 10 UNIT(S)/HR: 5000 INJECTION INTRAVENOUS; SUBCUTANEOUS at 17:57

## 2021-01-01 RX ADMIN — MIDAZOLAM HYDROCHLORIDE 1.33 MG/KG/HR: 1 INJECTION, SOLUTION INTRAMUSCULAR; INTRAVENOUS at 17:19

## 2021-01-01 RX ADMIN — PHENYLEPHRINE HYDROCHLORIDE 2.5 MICROGRAM(S)/KG/MIN: 10 INJECTION INTRAVENOUS at 17:24

## 2021-01-01 RX ADMIN — PROPOFOL 6 MICROGRAM(S)/KG/MIN: 10 INJECTION, EMULSION INTRAVENOUS at 09:11

## 2021-01-01 RX ADMIN — SODIUM ZIRCONIUM CYCLOSILICATE 10 GRAM(S): 10 POWDER, FOR SUSPENSION ORAL at 10:12

## 2021-01-01 RX ADMIN — Medication 5 MILLIGRAM(S): at 11:22

## 2021-01-01 RX ADMIN — Medication 50 MILLIEQUIVALENT(S): at 21:25

## 2021-01-01 RX ADMIN — PANTOPRAZOLE SODIUM 40 MILLIGRAM(S): 20 TABLET, DELAYED RELEASE ORAL at 11:21

## 2021-01-01 RX ADMIN — FENTANYL CITRATE 8.34 MICROGRAM(S)/KG/HR: 50 INJECTION INTRAVENOUS at 08:43

## 2021-01-01 RX ADMIN — Medication 2: at 05:36

## 2021-01-01 RX ADMIN — HUMAN INSULIN 25 UNIT(S): 100 INJECTION, SUSPENSION SUBCUTANEOUS at 00:39

## 2021-01-01 RX ADMIN — AMPICILLIN SODIUM AND SULBACTAM SODIUM 200 GRAM(S): 250; 125 INJECTION, POWDER, FOR SUSPENSION INTRAMUSCULAR; INTRAVENOUS at 05:10

## 2021-01-01 RX ADMIN — FENTANYL CITRATE 100 MICROGRAM(S): 50 INJECTION INTRAVENOUS at 20:07

## 2021-01-01 RX ADMIN — Medication 100 MILLIGRAM(S): at 05:22

## 2021-01-01 RX ADMIN — POLYETHYLENE GLYCOL 3350 17 GRAM(S): 17 POWDER, FOR SOLUTION ORAL at 05:43

## 2021-01-01 RX ADMIN — KETAMINE HYDROCHLORIDE 1.67 MG/KG/HR: 100 INJECTION INTRAMUSCULAR; INTRAVENOUS at 06:07

## 2021-01-01 RX ADMIN — PIPERACILLIN AND TAZOBACTAM 25 GRAM(S): 4; .5 INJECTION, POWDER, LYOPHILIZED, FOR SOLUTION INTRAVENOUS at 05:11

## 2021-01-01 RX ADMIN — Medication 100 MILLIGRAM(S): at 19:49

## 2021-01-01 RX ADMIN — Medication 1: at 08:12

## 2021-01-01 RX ADMIN — ENOXAPARIN SODIUM 70 MILLIGRAM(S): 100 INJECTION SUBCUTANEOUS at 05:40

## 2021-01-01 RX ADMIN — METHADONE HYDROCHLORIDE 20 MILLIGRAM(S): 40 TABLET ORAL at 22:56

## 2021-01-01 RX ADMIN — Medication 2000 UNIT(S): at 12:36

## 2021-01-01 RX ADMIN — FENTANYL CITRATE 8.34 MICROGRAM(S)/KG/HR: 50 INJECTION INTRAVENOUS at 03:10

## 2021-01-01 RX ADMIN — SODIUM CHLORIDE 1000 MILLILITER(S): 9 INJECTION INTRAMUSCULAR; INTRAVENOUS; SUBCUTANEOUS at 11:26

## 2021-01-01 RX ADMIN — LINEZOLID 300 MILLIGRAM(S): 600 INJECTION, SOLUTION INTRAVENOUS at 13:23

## 2021-01-01 RX ADMIN — ARGATROBAN 8 MICROGRAM(S)/KG/MIN: 50 INJECTION, SOLUTION INTRAVENOUS at 03:09

## 2021-01-01 RX ADMIN — CHLORHEXIDINE GLUCONATE 15 MILLILITER(S): 213 SOLUTION TOPICAL at 05:23

## 2021-01-01 RX ADMIN — HEPARIN SODIUM 1100 UNIT(S)/HR: 5000 INJECTION INTRAVENOUS; SUBCUTANEOUS at 10:10

## 2021-01-01 RX ADMIN — HEPARIN SODIUM 10 UNIT(S)/HR: 5000 INJECTION INTRAVENOUS; SUBCUTANEOUS at 13:54

## 2021-01-01 RX ADMIN — Medication 20 MILLIGRAM(S): at 11:31

## 2021-01-01 RX ADMIN — Medication 2000 UNIT(S): at 13:22

## 2021-01-01 RX ADMIN — PANTOPRAZOLE SODIUM 40 MILLIGRAM(S): 20 TABLET, DELAYED RELEASE ORAL at 05:44

## 2021-01-01 RX ADMIN — POLYETHYLENE GLYCOL 3350 17 GRAM(S): 17 POWDER, FOR SOLUTION ORAL at 01:02

## 2021-01-01 RX ADMIN — Medication 1 APPLICATION(S): at 17:17

## 2021-01-01 RX ADMIN — Medication 6 MILLIGRAM(S): at 05:14

## 2021-01-01 RX ADMIN — HEPARIN SODIUM 800 UNIT(S)/HR: 5000 INJECTION INTRAVENOUS; SUBCUTANEOUS at 01:42

## 2021-01-01 RX ADMIN — SENNA PLUS 10 MILLILITER(S): 8.6 TABLET ORAL at 21:26

## 2021-01-01 RX ADMIN — CEFEPIME 100 MILLIGRAM(S): 1 INJECTION, POWDER, FOR SOLUTION INTRAMUSCULAR; INTRAVENOUS at 13:17

## 2021-01-01 RX ADMIN — Medication 2000 UNIT(S): at 12:05

## 2021-01-01 RX ADMIN — Medication 50 MILLILITER(S): at 05:32

## 2021-01-01 RX ADMIN — KETAMINE HYDROCHLORIDE 1.67 MG/KG/HR: 100 INJECTION INTRAMUSCULAR; INTRAVENOUS at 00:55

## 2021-01-01 RX ADMIN — HUMAN INSULIN 8 UNIT(S): 100 INJECTION, SUSPENSION SUBCUTANEOUS at 23:08

## 2021-01-01 RX ADMIN — HUMAN INSULIN 4 UNIT(S): 100 INJECTION, SUSPENSION SUBCUTANEOUS at 11:10

## 2021-01-01 RX ADMIN — KETAMINE HYDROCHLORIDE 20 MG/KG/HR: 100 INJECTION INTRAMUSCULAR; INTRAVENOUS at 17:23

## 2021-01-01 RX ADMIN — PANTOPRAZOLE SODIUM 40 MILLIGRAM(S): 20 TABLET, DELAYED RELEASE ORAL at 05:35

## 2021-01-01 RX ADMIN — SENNA PLUS 2 TABLET(S): 8.6 TABLET ORAL at 22:56

## 2021-01-01 RX ADMIN — Medication 1 APPLICATION(S): at 17:56

## 2021-01-01 RX ADMIN — Medication 6 MILLIGRAM(S): at 06:10

## 2021-01-01 RX ADMIN — CISATRACURIUM BESYLATE 20 MILLIGRAM(S): 2 INJECTION INTRAVENOUS at 03:04

## 2021-01-01 RX ADMIN — ARGATROBAN 7.2 MICROGRAM(S)/KG/MIN: 50 INJECTION, SOLUTION INTRAVENOUS at 17:03

## 2021-01-01 RX ADMIN — Medication 1: at 21:44

## 2021-01-01 RX ADMIN — CHLORHEXIDINE GLUCONATE 15 MILLILITER(S): 213 SOLUTION TOPICAL at 17:19

## 2021-01-01 RX ADMIN — CHLORHEXIDINE GLUCONATE 15 MILLILITER(S): 213 SOLUTION TOPICAL at 17:06

## 2021-01-01 RX ADMIN — Medication 3: at 16:44

## 2021-01-01 RX ADMIN — INSULIN GLARGINE 25 UNIT(S): 100 INJECTION, SOLUTION SUBCUTANEOUS at 22:00

## 2021-01-01 RX ADMIN — PANTOPRAZOLE SODIUM 40 MILLIGRAM(S): 20 TABLET, DELAYED RELEASE ORAL at 05:17

## 2021-01-01 RX ADMIN — Medication 650 MILLIGRAM(S): at 04:07

## 2021-01-01 RX ADMIN — AMPICILLIN SODIUM AND SULBACTAM SODIUM 200 GRAM(S): 250; 125 INJECTION, POWDER, FOR SUSPENSION INTRAMUSCULAR; INTRAVENOUS at 01:09

## 2021-01-01 RX ADMIN — HUMAN INSULIN 16 UNIT(S): 100 INJECTION, SUSPENSION SUBCUTANEOUS at 01:54

## 2021-01-01 RX ADMIN — Medication 10 MILLIGRAM(S): at 13:07

## 2021-01-01 RX ADMIN — Medication 100 GRAM(S): at 20:30

## 2021-01-01 RX ADMIN — KETAMINE HYDROCHLORIDE 20 MG/KG/HR: 100 INJECTION INTRAMUSCULAR; INTRAVENOUS at 02:24

## 2021-01-01 RX ADMIN — Medication 2: at 12:37

## 2021-01-01 RX ADMIN — LACTULOSE 20 GRAM(S): 10 SOLUTION ORAL at 05:18

## 2021-01-01 RX ADMIN — CHLORHEXIDINE GLUCONATE 15 MILLILITER(S): 213 SOLUTION TOPICAL at 17:16

## 2021-01-01 RX ADMIN — HEPARIN SODIUM 800 UNIT(S)/HR: 5000 INJECTION INTRAVENOUS; SUBCUTANEOUS at 00:56

## 2021-01-01 RX ADMIN — KETAMINE HYDROCHLORIDE 1.67 MG/KG/HR: 100 INJECTION INTRAMUSCULAR; INTRAVENOUS at 13:00

## 2021-01-01 RX ADMIN — HEPARIN SODIUM 1200 UNIT(S)/HR: 5000 INJECTION INTRAVENOUS; SUBCUTANEOUS at 10:52

## 2021-01-01 RX ADMIN — CISATRACURIUM BESYLATE 12 MICROGRAM(S)/KG/MIN: 2 INJECTION INTRAVENOUS at 05:34

## 2021-01-01 RX ADMIN — KETAMINE HYDROCHLORIDE 20 MG/KG/HR: 100 INJECTION INTRAMUSCULAR; INTRAVENOUS at 08:15

## 2021-01-01 RX ADMIN — CISATRACURIUM BESYLATE 12 MICROGRAM(S)/KG/MIN: 2 INJECTION INTRAVENOUS at 21:35

## 2021-01-01 RX ADMIN — Medication 2000 UNIT(S): at 11:09

## 2021-01-01 RX ADMIN — Medication 1.25 MICROGRAM(S)/KG/MIN: at 23:28

## 2021-01-01 RX ADMIN — POLYETHYLENE GLYCOL 3350 17 GRAM(S): 17 POWDER, FOR SOLUTION ORAL at 17:38

## 2021-01-01 RX ADMIN — ENOXAPARIN SODIUM 40 MILLIGRAM(S): 100 INJECTION SUBCUTANEOUS at 12:24

## 2021-01-01 RX ADMIN — PIPERACILLIN AND TAZOBACTAM 25 GRAM(S): 4; .5 INJECTION, POWDER, LYOPHILIZED, FOR SOLUTION INTRAVENOUS at 13:14

## 2021-01-01 RX ADMIN — FENTANYL CITRATE 50 MICROGRAM(S): 50 INJECTION INTRAVENOUS at 16:16

## 2021-01-01 RX ADMIN — CISATRACURIUM BESYLATE 12 MICROGRAM(S)/KG/MIN: 2 INJECTION INTRAVENOUS at 14:44

## 2021-01-01 RX ADMIN — METHADONE HYDROCHLORIDE 40 MILLIGRAM(S): 40 TABLET ORAL at 17:18

## 2021-01-01 RX ADMIN — METHADONE HYDROCHLORIDE 20 MILLIGRAM(S): 40 TABLET ORAL at 21:33

## 2021-01-01 RX ADMIN — PANTOPRAZOLE SODIUM 40 MILLIGRAM(S): 20 TABLET, DELAYED RELEASE ORAL at 17:17

## 2021-01-01 RX ADMIN — CHLORHEXIDINE GLUCONATE 1 APPLICATION(S): 213 SOLUTION TOPICAL at 05:06

## 2021-01-01 RX ADMIN — Medication 1: at 21:41

## 2021-01-01 RX ADMIN — PANTOPRAZOLE SODIUM 40 MILLIGRAM(S): 20 TABLET, DELAYED RELEASE ORAL at 17:02

## 2021-01-01 RX ADMIN — PANTOPRAZOLE SODIUM 40 MILLIGRAM(S): 20 TABLET, DELAYED RELEASE ORAL at 17:35

## 2021-01-01 RX ADMIN — POLYETHYLENE GLYCOL 3350 17 GRAM(S): 17 POWDER, FOR SOLUTION ORAL at 05:04

## 2021-01-01 RX ADMIN — HUMAN INSULIN 16 UNIT(S): 100 INJECTION, SUSPENSION SUBCUTANEOUS at 17:16

## 2021-01-01 RX ADMIN — Medication 50 MILLIEQUIVALENT(S): at 08:13

## 2021-01-01 RX ADMIN — Medication 5 MILLIGRAM(S): at 05:10

## 2021-01-01 RX ADMIN — KETAMINE HYDROCHLORIDE 20 MG/KG/HR: 100 INJECTION INTRAMUSCULAR; INTRAVENOUS at 21:35

## 2021-01-01 RX ADMIN — MEROPENEM 100 MILLIGRAM(S): 1 INJECTION INTRAVENOUS at 12:02

## 2021-01-01 RX ADMIN — INSULIN HUMAN 3 UNIT(S)/HR: 100 INJECTION, SOLUTION SUBCUTANEOUS at 17:20

## 2021-01-01 RX ADMIN — CHLORHEXIDINE GLUCONATE 15 MILLILITER(S): 213 SOLUTION TOPICAL at 05:35

## 2021-01-01 RX ADMIN — Medication 4: at 00:10

## 2021-01-01 RX ADMIN — CISATRACURIUM BESYLATE 12 MICROGRAM(S)/KG/MIN: 2 INJECTION INTRAVENOUS at 23:27

## 2021-01-01 RX ADMIN — SODIUM CHLORIDE 500 MILLILITER(S): 9 INJECTION INTRAMUSCULAR; INTRAVENOUS; SUBCUTANEOUS at 14:21

## 2021-01-01 RX ADMIN — Medication 10 MILLIGRAM(S): at 13:59

## 2021-01-01 RX ADMIN — ENOXAPARIN SODIUM 70 MILLIGRAM(S): 100 INJECTION SUBCUTANEOUS at 17:01

## 2021-01-01 RX ADMIN — CISATRACURIUM BESYLATE 12 MICROGRAM(S)/KG/MIN: 2 INJECTION INTRAVENOUS at 19:47

## 2021-01-01 RX ADMIN — PROPOFOL 6 MICROGRAM(S)/KG/MIN: 10 INJECTION, EMULSION INTRAVENOUS at 00:34

## 2021-01-01 RX ADMIN — Medication 6: at 12:11

## 2021-01-01 RX ADMIN — KETAMINE HYDROCHLORIDE 20 MG/KG/HR: 100 INJECTION INTRAMUSCULAR; INTRAVENOUS at 13:07

## 2021-01-01 RX ADMIN — FENTANYL CITRATE 1.67 MICROGRAM(S)/KG/HR: 50 INJECTION INTRAVENOUS at 17:58

## 2021-01-01 RX ADMIN — Medication 10 MILLIGRAM(S): at 06:17

## 2021-01-01 RX ADMIN — Medication 1.25 MICROGRAM(S)/KG/MIN: at 17:03

## 2021-01-01 RX ADMIN — Medication 1.25 MICROGRAM(S)/KG/MIN: at 07:41

## 2021-01-01 RX ADMIN — Medication 650 MILLIGRAM(S): at 13:59

## 2021-01-01 RX ADMIN — PROPOFOL 20 MICROGRAM(S)/KG/MIN: 10 INJECTION, EMULSION INTRAVENOUS at 03:10

## 2021-01-01 RX ADMIN — METHADONE HYDROCHLORIDE 40 MILLIGRAM(S): 40 TABLET ORAL at 05:33

## 2021-01-01 RX ADMIN — DEXMEDETOMIDINE HYDROCHLORIDE IN 0.9% SODIUM CHLORIDE 8.34 MICROGRAM(S)/KG/HR: 4 INJECTION INTRAVENOUS at 19:47

## 2021-01-01 RX ADMIN — Medication 6 MILLIGRAM(S): at 05:40

## 2021-01-01 RX ADMIN — BUMETANIDE 10 MG/HR: 0.25 INJECTION INTRAMUSCULAR; INTRAVENOUS at 05:31

## 2021-01-01 RX ADMIN — HUMAN INSULIN 11 UNIT(S): 100 INJECTION, SUSPENSION SUBCUTANEOUS at 23:03

## 2021-01-01 RX ADMIN — CHLORHEXIDINE GLUCONATE 15 MILLILITER(S): 213 SOLUTION TOPICAL at 17:02

## 2021-01-01 RX ADMIN — CHLORHEXIDINE GLUCONATE 15 MILLILITER(S): 213 SOLUTION TOPICAL at 05:03

## 2021-01-01 RX ADMIN — Medication 1 APPLICATION(S): at 17:42

## 2021-01-01 RX ADMIN — DEXMEDETOMIDINE HYDROCHLORIDE IN 0.9% SODIUM CHLORIDE 0.2 MICROGRAM(S)/KG/HR: 4 INJECTION INTRAVENOUS at 13:07

## 2021-01-01 RX ADMIN — PANTOPRAZOLE SODIUM 40 MILLIGRAM(S): 20 TABLET, DELAYED RELEASE ORAL at 05:32

## 2021-01-01 RX ADMIN — CEFEPIME 100 MILLIGRAM(S): 1 INJECTION, POWDER, FOR SOLUTION INTRAMUSCULAR; INTRAVENOUS at 22:21

## 2021-01-01 RX ADMIN — ENOXAPARIN SODIUM 60 MILLIGRAM(S): 100 INJECTION SUBCUTANEOUS at 05:01

## 2021-01-01 RX ADMIN — FENTANYL CITRATE 8.34 MICROGRAM(S)/KG/HR: 50 INJECTION INTRAVENOUS at 17:23

## 2021-01-01 RX ADMIN — PANTOPRAZOLE SODIUM 40 MILLIGRAM(S): 20 TABLET, DELAYED RELEASE ORAL at 05:34

## 2021-01-01 RX ADMIN — ENOXAPARIN SODIUM 70 MILLIGRAM(S): 100 INJECTION SUBCUTANEOUS at 06:03

## 2021-01-01 RX ADMIN — CHLORHEXIDINE GLUCONATE 15 MILLILITER(S): 213 SOLUTION TOPICAL at 17:26

## 2021-01-01 RX ADMIN — Medication 6: at 17:01

## 2021-01-01 RX ADMIN — PANTOPRAZOLE SODIUM 40 MILLIGRAM(S): 20 TABLET, DELAYED RELEASE ORAL at 17:56

## 2021-01-01 RX ADMIN — Medication 15 UNIT(S): at 08:07

## 2021-01-01 RX ADMIN — Medication 650 MILLIGRAM(S): at 13:40

## 2021-01-01 RX ADMIN — PIPERACILLIN AND TAZOBACTAM 25 GRAM(S): 4; .5 INJECTION, POWDER, LYOPHILIZED, FOR SOLUTION INTRAVENOUS at 13:02

## 2021-01-01 RX ADMIN — HEPARIN SODIUM 1000 UNIT(S)/HR: 5000 INJECTION INTRAVENOUS; SUBCUTANEOUS at 00:15

## 2021-01-01 RX ADMIN — AMPICILLIN SODIUM AND SULBACTAM SODIUM 200 GRAM(S): 250; 125 INJECTION, POWDER, FOR SUSPENSION INTRAMUSCULAR; INTRAVENOUS at 23:24

## 2021-01-01 RX ADMIN — Medication 2: at 00:59

## 2021-01-01 RX ADMIN — AMPICILLIN SODIUM AND SULBACTAM SODIUM 200 GRAM(S): 250; 125 INJECTION, POWDER, FOR SUSPENSION INTRAMUSCULAR; INTRAVENOUS at 17:19

## 2021-01-01 RX ADMIN — Medication 1 APPLICATION(S): at 05:11

## 2021-01-01 RX ADMIN — CHLORHEXIDINE GLUCONATE 15 MILLILITER(S): 213 SOLUTION TOPICAL at 17:30

## 2021-01-01 RX ADMIN — Medication 0.5 MILLIGRAM(S): at 05:44

## 2021-01-01 RX ADMIN — Medication 1: at 12:24

## 2021-01-01 RX ADMIN — Medication 2000 UNIT(S): at 12:10

## 2021-01-01 RX ADMIN — Medication 10 MILLIGRAM(S): at 05:44

## 2021-01-01 RX ADMIN — KETAMINE HYDROCHLORIDE 20 MG/KG/HR: 100 INJECTION INTRAMUSCULAR; INTRAVENOUS at 17:18

## 2021-01-01 RX ADMIN — Medication 10 MILLIGRAM(S): at 19:51

## 2021-01-01 RX ADMIN — DEXMEDETOMIDINE HYDROCHLORIDE IN 0.9% SODIUM CHLORIDE 0.2 MICROGRAM(S)/KG/HR: 4 INJECTION INTRAVENOUS at 05:45

## 2021-01-01 RX ADMIN — PANTOPRAZOLE SODIUM 40 MILLIGRAM(S): 20 TABLET, DELAYED RELEASE ORAL at 05:22

## 2021-01-01 RX ADMIN — HUMAN INSULIN 6 UNIT(S): 100 INJECTION, SUSPENSION SUBCUTANEOUS at 05:08

## 2021-01-01 RX ADMIN — CHLORHEXIDINE GLUCONATE 15 MILLILITER(S): 213 SOLUTION TOPICAL at 17:05

## 2021-01-01 RX ADMIN — MIDAZOLAM HYDROCHLORIDE 1.33 MG/KG/HR: 1 INJECTION, SOLUTION INTRAMUSCULAR; INTRAVENOUS at 20:27

## 2021-01-01 RX ADMIN — FENTANYL CITRATE 1.67 MICROGRAM(S)/KG/HR: 50 INJECTION INTRAVENOUS at 08:00

## 2021-01-01 RX ADMIN — METHADONE HYDROCHLORIDE 40 MILLIGRAM(S): 40 TABLET ORAL at 21:28

## 2021-01-01 RX ADMIN — Medication 162 MILLIGRAM(S): at 12:09

## 2021-01-01 RX ADMIN — CEFEPIME 100 MILLIGRAM(S): 1 INJECTION, POWDER, FOR SOLUTION INTRAMUSCULAR; INTRAVENOUS at 21:48

## 2021-01-01 RX ADMIN — PROPOFOL 6 MICROGRAM(S)/KG/MIN: 10 INJECTION, EMULSION INTRAVENOUS at 07:42

## 2021-01-01 RX ADMIN — Medication 1 APPLICATION(S): at 05:59

## 2021-01-01 RX ADMIN — METHADONE HYDROCHLORIDE 20 MILLIGRAM(S): 40 TABLET ORAL at 05:23

## 2021-01-01 RX ADMIN — CISATRACURIUM BESYLATE 12 MICROGRAM(S)/KG/MIN: 2 INJECTION INTRAVENOUS at 02:24

## 2021-01-01 RX ADMIN — Medication 15 MILLILITER(S): at 12:50

## 2021-01-01 RX ADMIN — HUMAN INSULIN 15 UNIT(S): 100 INJECTION, SUSPENSION SUBCUTANEOUS at 12:11

## 2021-01-01 RX ADMIN — Medication 1.25 MICROGRAM(S)/KG/MIN: at 02:24

## 2021-01-01 RX ADMIN — Medication 1 APPLICATION(S): at 06:14

## 2021-01-01 RX ADMIN — Medication 1000 MILLIGRAM(S): at 05:00

## 2021-01-01 RX ADMIN — KETAMINE HYDROCHLORIDE 1.67 MG/KG/HR: 100 INJECTION INTRAMUSCULAR; INTRAVENOUS at 14:43

## 2021-01-01 RX ADMIN — Medication 2: at 17:20

## 2021-01-01 RX ADMIN — REMDESIVIR 500 MILLIGRAM(S): 5 INJECTION INTRAVENOUS at 18:03

## 2021-01-01 RX ADMIN — Medication 6.25 MICROGRAM(S)/KG/MIN: at 06:10

## 2021-01-01 RX ADMIN — SENNA PLUS 10 MILLILITER(S): 8.6 TABLET ORAL at 17:35

## 2021-01-01 RX ADMIN — Medication 1000 MILLIGRAM(S): at 18:40

## 2021-01-01 RX ADMIN — SENNA PLUS 10 MILLILITER(S): 8.6 TABLET ORAL at 19:51

## 2021-01-01 RX ADMIN — BUMETANIDE 10 MG/HR: 0.25 INJECTION INTRAMUSCULAR; INTRAVENOUS at 21:35

## 2021-01-01 RX ADMIN — Medication 1 APPLICATION(S): at 05:22

## 2021-01-01 RX ADMIN — Medication 100 GRAM(S): at 02:13

## 2021-01-01 RX ADMIN — KETAMINE HYDROCHLORIDE 20 MG/KG/HR: 100 INJECTION INTRAMUSCULAR; INTRAVENOUS at 03:10

## 2021-01-01 RX ADMIN — Medication 4: at 17:06

## 2021-01-01 RX ADMIN — AMPICILLIN SODIUM AND SULBACTAM SODIUM 200 GRAM(S): 250; 125 INJECTION, POWDER, FOR SUSPENSION INTRAMUSCULAR; INTRAVENOUS at 05:32

## 2021-01-01 RX ADMIN — Medication 10 MILLIGRAM(S): at 13:00

## 2021-01-01 RX ADMIN — DEXMEDETOMIDINE HYDROCHLORIDE IN 0.9% SODIUM CHLORIDE 0.2 MICROGRAM(S)/KG/HR: 4 INJECTION INTRAVENOUS at 21:36

## 2021-01-01 RX ADMIN — Medication 1 APPLICATION(S): at 05:06

## 2021-01-01 RX ADMIN — Medication 10 MILLIGRAM(S): at 21:33

## 2021-01-01 RX ADMIN — CHLORHEXIDINE GLUCONATE 1 APPLICATION(S): 213 SOLUTION TOPICAL at 04:38

## 2021-01-01 RX ADMIN — PANTOPRAZOLE SODIUM 40 MILLIGRAM(S): 20 TABLET, DELAYED RELEASE ORAL at 17:20

## 2021-01-01 RX ADMIN — NALOXEGOL OXALATE 12.5 MILLIGRAM(S): 12.5 TABLET, FILM COATED ORAL at 13:23

## 2021-01-01 RX ADMIN — Medication 3: at 08:26

## 2021-01-01 RX ADMIN — VASOPRESSIN 2.4 UNIT(S)/MIN: 20 INJECTION INTRAVENOUS at 06:11

## 2021-01-01 RX ADMIN — HUMAN INSULIN 25 UNIT(S): 100 INJECTION, SUSPENSION SUBCUTANEOUS at 12:20

## 2021-01-01 RX ADMIN — CHLORHEXIDINE GLUCONATE 15 MILLILITER(S): 213 SOLUTION TOPICAL at 02:15

## 2021-01-01 RX ADMIN — AMPICILLIN SODIUM AND SULBACTAM SODIUM 200 GRAM(S): 250; 125 INJECTION, POWDER, FOR SUSPENSION INTRAMUSCULAR; INTRAVENOUS at 23:01

## 2021-01-01 RX ADMIN — ENOXAPARIN SODIUM 70 MILLIGRAM(S): 100 INJECTION SUBCUTANEOUS at 05:43

## 2021-01-01 RX ADMIN — PIPERACILLIN AND TAZOBACTAM 25 GRAM(S): 4; .5 INJECTION, POWDER, LYOPHILIZED, FOR SOLUTION INTRAVENOUS at 13:58

## 2021-01-01 RX ADMIN — Medication 1000 MILLIGRAM(S): at 19:00

## 2021-01-01 RX ADMIN — METHADONE HYDROCHLORIDE 20 MILLIGRAM(S): 40 TABLET ORAL at 05:28

## 2021-01-01 RX ADMIN — MIDAZOLAM HYDROCHLORIDE 1.33 MG/KG/HR: 1 INJECTION, SOLUTION INTRAMUSCULAR; INTRAVENOUS at 05:46

## 2021-01-01 RX ADMIN — CISATRACURIUM BESYLATE 12 MICROGRAM(S)/KG/MIN: 2 INJECTION INTRAVENOUS at 07:41

## 2021-01-01 RX ADMIN — Medication 250 MILLIGRAM(S): at 11:38

## 2021-01-01 RX ADMIN — Medication 3.13 MICROGRAM(S)/KG/MIN: at 11:35

## 2021-01-01 RX ADMIN — ENOXAPARIN SODIUM 70 MILLIGRAM(S): 100 INJECTION SUBCUTANEOUS at 05:10

## 2021-01-01 RX ADMIN — Medication 1: at 17:10

## 2021-01-01 RX ADMIN — PIPERACILLIN AND TAZOBACTAM 25 GRAM(S): 4; .5 INJECTION, POWDER, LYOPHILIZED, FOR SOLUTION INTRAVENOUS at 05:41

## 2021-01-01 RX ADMIN — Medication 6 MILLIGRAM(S): at 06:03

## 2021-01-01 RX ADMIN — LINEZOLID 300 MILLIGRAM(S): 600 INJECTION, SOLUTION INTRAVENOUS at 12:15

## 2021-01-01 RX ADMIN — Medication 2: at 23:02

## 2021-01-01 RX ADMIN — CHLORHEXIDINE GLUCONATE 15 MILLILITER(S): 213 SOLUTION TOPICAL at 17:36

## 2021-01-01 RX ADMIN — VASOPRESSIN 2.4 UNIT(S)/MIN: 20 INJECTION INTRAVENOUS at 05:46

## 2021-01-01 RX ADMIN — Medication 260 MILLIGRAM(S): at 10:52

## 2021-01-01 RX ADMIN — Medication 162 MILLIGRAM(S): at 12:36

## 2021-01-01 RX ADMIN — LINEZOLID 300 MILLIGRAM(S): 600 INJECTION, SOLUTION INTRAVENOUS at 00:10

## 2021-01-01 RX ADMIN — PIPERACILLIN AND TAZOBACTAM 25 GRAM(S): 4; .5 INJECTION, POWDER, LYOPHILIZED, FOR SOLUTION INTRAVENOUS at 22:15

## 2021-01-01 RX ADMIN — Medication 650 MILLIGRAM(S): at 11:23

## 2021-01-01 RX ADMIN — CHLORHEXIDINE GLUCONATE 1 APPLICATION(S): 213 SOLUTION TOPICAL at 06:13

## 2021-01-01 RX ADMIN — PROPOFOL 6 MICROGRAM(S)/KG/MIN: 10 INJECTION, EMULSION INTRAVENOUS at 17:58

## 2021-01-01 RX ADMIN — HUMAN INSULIN 11 UNIT(S): 100 INJECTION, SUSPENSION SUBCUTANEOUS at 17:01

## 2021-01-01 RX ADMIN — Medication 162 MILLIGRAM(S): at 11:39

## 2021-01-01 RX ADMIN — HUMAN INSULIN 11 UNIT(S): 100 INJECTION, SUSPENSION SUBCUTANEOUS at 05:04

## 2021-01-01 RX ADMIN — CHLORHEXIDINE GLUCONATE 15 MILLILITER(S): 213 SOLUTION TOPICAL at 05:44

## 2021-01-01 RX ADMIN — MIDAZOLAM HYDROCHLORIDE 4 MILLIGRAM(S): 1 INJECTION, SOLUTION INTRAMUSCULAR; INTRAVENOUS at 06:00

## 2021-01-01 RX ADMIN — KETAMINE HYDROCHLORIDE 1.67 MG/KG/HR: 100 INJECTION INTRAMUSCULAR; INTRAVENOUS at 21:59

## 2021-01-01 RX ADMIN — Medication 11 UNIT(S): at 08:36

## 2021-01-01 RX ADMIN — Medication 10 MILLIGRAM(S): at 05:59

## 2021-01-01 RX ADMIN — Medication 1.25 MICROGRAM(S)/KG/MIN: at 19:46

## 2021-01-01 RX ADMIN — HUMAN INSULIN 15 UNIT(S): 100 INJECTION, SUSPENSION SUBCUTANEOUS at 05:36

## 2021-01-01 RX ADMIN — PANTOPRAZOLE SODIUM 40 MILLIGRAM(S): 20 TABLET, DELAYED RELEASE ORAL at 17:13

## 2021-01-01 RX ADMIN — FENTANYL CITRATE 1.67 MICROGRAM(S)/KG/HR: 50 INJECTION INTRAVENOUS at 07:41

## 2021-01-01 RX ADMIN — HUMAN INSULIN 15 UNIT(S): 100 INJECTION, SUSPENSION SUBCUTANEOUS at 17:11

## 2021-01-01 RX ADMIN — Medication 100 MILLIEQUIVALENT(S): at 05:22

## 2021-01-01 RX ADMIN — Medication 650 MILLIGRAM(S): at 12:39

## 2021-01-01 RX ADMIN — Medication 9 UNIT(S): at 12:24

## 2021-01-01 RX ADMIN — PHENYLEPHRINE HYDROCHLORIDE 2.5 MICROGRAM(S)/KG/MIN: 10 INJECTION INTRAVENOUS at 17:06

## 2021-01-01 RX ADMIN — INSULIN GLARGINE 20 UNIT(S): 100 INJECTION, SOLUTION SUBCUTANEOUS at 21:44

## 2021-01-01 RX ADMIN — Medication 10 MILLIGRAM(S): at 15:58

## 2021-01-01 RX ADMIN — LACTULOSE 20 GRAM(S): 10 SOLUTION ORAL at 12:09

## 2021-01-01 RX ADMIN — PANTOPRAZOLE SODIUM 40 MILLIGRAM(S): 20 TABLET, DELAYED RELEASE ORAL at 05:04

## 2021-01-01 RX ADMIN — REMDESIVIR 500 MILLIGRAM(S): 5 INJECTION INTRAVENOUS at 17:57

## 2021-01-01 RX ADMIN — Medication 2: at 01:00

## 2021-01-01 RX ADMIN — Medication 4: at 22:49

## 2021-01-01 RX ADMIN — Medication 5 MILLIGRAM(S): at 12:03

## 2021-01-01 RX ADMIN — LACTULOSE 15 GRAM(S): 10 SOLUTION ORAL at 05:27

## 2021-01-01 RX ADMIN — FENTANYL CITRATE 100 MICROGRAM(S): 50 INJECTION INTRAVENOUS at 09:55

## 2021-01-01 RX ADMIN — LACTULOSE 20 GRAM(S): 10 SOLUTION ORAL at 17:16

## 2021-01-01 RX ADMIN — Medication 1 APPLICATION(S): at 05:04

## 2021-01-01 RX ADMIN — ENOXAPARIN SODIUM 70 MILLIGRAM(S): 100 INJECTION SUBCUTANEOUS at 17:26

## 2021-01-01 RX ADMIN — Medication 15 MILLILITER(S): at 12:34

## 2021-01-01 RX ADMIN — SODIUM CHLORIDE 100 MILLILITER(S): 9 INJECTION, SOLUTION INTRAVENOUS at 00:36

## 2021-01-01 RX ADMIN — AMPICILLIN SODIUM AND SULBACTAM SODIUM 200 GRAM(S): 250; 125 INJECTION, POWDER, FOR SUSPENSION INTRAMUSCULAR; INTRAVENOUS at 14:39

## 2021-01-01 RX ADMIN — Medication 50 MILLIEQUIVALENT(S): at 12:39

## 2021-01-01 RX ADMIN — KETAMINE HYDROCHLORIDE 20 MG/KG/HR: 100 INJECTION INTRAMUSCULAR; INTRAVENOUS at 11:43

## 2021-01-01 RX ADMIN — Medication 4: at 17:23

## 2021-01-01 RX ADMIN — BUDESONIDE AND FORMOTEROL FUMARATE DIHYDRATE 2 PUFF(S): 160; 4.5 AEROSOL RESPIRATORY (INHALATION) at 06:20

## 2021-01-01 RX ADMIN — FENTANYL CITRATE 8.34 MICROGRAM(S)/KG/HR: 50 INJECTION INTRAVENOUS at 17:04

## 2021-01-01 RX ADMIN — Medication 15 MILLILITER(S): at 12:15

## 2021-01-01 RX ADMIN — CHLORHEXIDINE GLUCONATE 15 MILLILITER(S): 213 SOLUTION TOPICAL at 17:56

## 2021-01-01 RX ADMIN — KETAMINE HYDROCHLORIDE 20 MG/KG/HR: 100 INJECTION INTRAMUSCULAR; INTRAVENOUS at 20:27

## 2021-01-01 RX ADMIN — Medication 2: at 06:22

## 2021-01-01 RX ADMIN — MIDAZOLAM HYDROCHLORIDE 1.33 MG/KG/HR: 1 INJECTION, SOLUTION INTRAMUSCULAR; INTRAVENOUS at 17:05

## 2021-01-01 RX ADMIN — FENTANYL CITRATE 1.67 MICROGRAM(S)/KG/HR: 50 INJECTION INTRAVENOUS at 14:44

## 2021-01-01 RX ADMIN — Medication 10 MILLIGRAM(S): at 13:03

## 2021-01-01 RX ADMIN — Medication 6 MILLIGRAM(S): at 05:22

## 2021-01-01 RX ADMIN — AMPICILLIN SODIUM AND SULBACTAM SODIUM 200 GRAM(S): 250; 125 INJECTION, POWDER, FOR SUSPENSION INTRAMUSCULAR; INTRAVENOUS at 11:46

## 2021-01-01 RX ADMIN — SODIUM CHLORIDE 1000 MILLILITER(S): 9 INJECTION, SOLUTION INTRAVENOUS at 10:47

## 2021-01-01 RX ADMIN — Medication 12.5 GRAM(S): at 19:45

## 2021-01-01 RX ADMIN — SENNA PLUS 2 TABLET(S): 8.6 TABLET ORAL at 21:33

## 2021-01-01 RX ADMIN — Medication 1 APPLICATION(S): at 17:20

## 2021-01-01 RX ADMIN — CEFEPIME 100 MILLIGRAM(S): 1 INJECTION, POWDER, FOR SOLUTION INTRAMUSCULAR; INTRAVENOUS at 12:50

## 2021-01-01 RX ADMIN — Medication 6: at 17:36

## 2021-01-01 RX ADMIN — POLYETHYLENE GLYCOL 3350 17 GRAM(S): 17 POWDER, FOR SOLUTION ORAL at 17:17

## 2021-01-01 RX ADMIN — PIPERACILLIN AND TAZOBACTAM 25 GRAM(S): 4; .5 INJECTION, POWDER, LYOPHILIZED, FOR SOLUTION INTRAVENOUS at 09:45

## 2021-01-01 RX ADMIN — Medication 1 APPLICATION(S): at 19:07

## 2021-01-01 RX ADMIN — Medication 5 MILLIGRAM(S): at 17:25

## 2021-01-01 RX ADMIN — Medication 11 UNIT(S): at 12:09

## 2021-01-01 RX ADMIN — PIPERACILLIN AND TAZOBACTAM 25 GRAM(S): 4; .5 INJECTION, POWDER, LYOPHILIZED, FOR SOLUTION INTRAVENOUS at 05:22

## 2021-01-01 RX ADMIN — Medication 2: at 17:15

## 2021-01-01 RX ADMIN — PANTOPRAZOLE SODIUM 40 MILLIGRAM(S): 20 TABLET, DELAYED RELEASE ORAL at 05:23

## 2021-01-01 RX ADMIN — ENOXAPARIN SODIUM 70 MILLIGRAM(S): 100 INJECTION SUBCUTANEOUS at 06:10

## 2021-01-01 RX ADMIN — Medication 400 MILLIGRAM(S): at 06:06

## 2021-01-01 RX ADMIN — PROPOFOL 20 MICROGRAM(S)/KG/MIN: 10 INJECTION, EMULSION INTRAVENOUS at 17:04

## 2021-01-01 RX ADMIN — Medication 25 MILLILITER(S): at 13:30

## 2021-01-01 RX ADMIN — PIPERACILLIN AND TAZOBACTAM 25 GRAM(S): 4; .5 INJECTION, POWDER, LYOPHILIZED, FOR SOLUTION INTRAVENOUS at 21:10

## 2021-01-01 RX ADMIN — Medication 5 MILLIGRAM(S): at 13:59

## 2021-01-01 RX ADMIN — Medication 10 MILLIGRAM(S): at 13:31

## 2021-01-01 RX ADMIN — PANTOPRAZOLE SODIUM 40 MILLIGRAM(S): 20 TABLET, DELAYED RELEASE ORAL at 17:25

## 2021-01-01 RX ADMIN — Medication 4: at 23:07

## 2021-01-01 RX ADMIN — Medication 5: at 16:50

## 2021-01-01 RX ADMIN — MIDAZOLAM HYDROCHLORIDE 1.33 MG/KG/HR: 1 INJECTION, SOLUTION INTRAMUSCULAR; INTRAVENOUS at 11:31

## 2021-01-01 RX ADMIN — Medication 6: at 05:34

## 2021-01-01 RX ADMIN — Medication 10 MILLIGRAM(S): at 05:22

## 2021-01-01 RX ADMIN — REMDESIVIR 500 MILLIGRAM(S): 5 INJECTION INTRAVENOUS at 17:10

## 2021-01-01 RX ADMIN — AMPICILLIN SODIUM AND SULBACTAM SODIUM 200 GRAM(S): 250; 125 INJECTION, POWDER, FOR SUSPENSION INTRAMUSCULAR; INTRAVENOUS at 05:23

## 2021-01-01 RX ADMIN — HEPARIN SODIUM 800 UNIT(S)/HR: 5000 INJECTION INTRAVENOUS; SUBCUTANEOUS at 09:29

## 2021-01-01 RX ADMIN — BUMETANIDE 2 MILLIGRAM(S): 0.25 INJECTION INTRAMUSCULAR; INTRAVENOUS at 02:06

## 2021-01-01 RX ADMIN — PANTOPRAZOLE SODIUM 40 MILLIGRAM(S): 20 TABLET, DELAYED RELEASE ORAL at 17:48

## 2021-01-01 RX ADMIN — Medication 100 MILLIEQUIVALENT(S): at 11:16

## 2021-01-01 RX ADMIN — INSULIN HUMAN 10 UNIT(S): 100 INJECTION, SOLUTION SUBCUTANEOUS at 18:45

## 2021-01-01 RX ADMIN — FENTANYL CITRATE 100 MICROGRAM(S): 50 INJECTION INTRAVENOUS at 09:41

## 2021-01-01 RX ADMIN — KETAMINE HYDROCHLORIDE 20 MG/KG/HR: 100 INJECTION INTRAMUSCULAR; INTRAVENOUS at 06:59

## 2021-01-01 RX ADMIN — Medication 50 MILLILITER(S): at 11:45

## 2021-01-01 RX ADMIN — SODIUM CHLORIDE 1000 MILLILITER(S): 9 INJECTION INTRAMUSCULAR; INTRAVENOUS; SUBCUTANEOUS at 03:42

## 2021-01-01 RX ADMIN — Medication 100 MILLIGRAM(S): at 13:31

## 2021-01-01 RX ADMIN — HEPARIN SODIUM 800 UNIT(S)/HR: 5000 INJECTION INTRAVENOUS; SUBCUTANEOUS at 04:44

## 2021-01-01 RX ADMIN — Medication 6 MILLIGRAM(S): at 06:32

## 2021-01-01 RX ADMIN — AMPICILLIN SODIUM AND SULBACTAM SODIUM 200 GRAM(S): 250; 125 INJECTION, POWDER, FOR SUSPENSION INTRAMUSCULAR; INTRAVENOUS at 06:40

## 2021-01-01 RX ADMIN — INSULIN GLARGINE 25 UNIT(S): 100 INJECTION, SOLUTION SUBCUTANEOUS at 21:23

## 2021-01-01 RX ADMIN — AMPICILLIN SODIUM AND SULBACTAM SODIUM 200 GRAM(S): 250; 125 INJECTION, POWDER, FOR SUSPENSION INTRAMUSCULAR; INTRAVENOUS at 17:30

## 2021-01-01 RX ADMIN — Medication 10 MILLIGRAM(S): at 05:27

## 2021-01-01 RX ADMIN — Medication 10 MILLIGRAM(S): at 05:34

## 2021-01-01 RX ADMIN — METHADONE HYDROCHLORIDE 20 MILLIGRAM(S): 40 TABLET ORAL at 21:00

## 2021-01-01 RX ADMIN — Medication 250 MILLIGRAM(S): at 19:45

## 2021-01-01 RX ADMIN — Medication 1 APPLICATION(S): at 17:25

## 2021-01-01 RX ADMIN — Medication 4: at 23:15

## 2021-01-01 RX ADMIN — CHLORHEXIDINE GLUCONATE 15 MILLILITER(S): 213 SOLUTION TOPICAL at 05:06

## 2021-01-01 RX ADMIN — Medication 1 APPLICATION(S): at 17:14

## 2021-01-01 RX ADMIN — HUMAN INSULIN 4 UNIT(S): 100 INJECTION, SUSPENSION SUBCUTANEOUS at 17:36

## 2021-01-01 RX ADMIN — ALBUTEROL 2 PUFF(S): 90 AEROSOL, METERED ORAL at 00:04

## 2021-01-01 RX ADMIN — ARGATROBAN 6 MICROGRAM(S)/KG/MIN: 50 INJECTION, SOLUTION INTRAVENOUS at 02:24

## 2021-01-01 RX ADMIN — Medication 11 UNIT(S): at 12:25

## 2021-01-01 RX ADMIN — Medication 10 MILLIGRAM(S): at 21:28

## 2021-01-01 RX ADMIN — DEXMEDETOMIDINE HYDROCHLORIDE IN 0.9% SODIUM CHLORIDE 0.2 MICROGRAM(S)/KG/HR: 4 INJECTION INTRAVENOUS at 03:39

## 2021-01-01 RX ADMIN — Medication 8: at 17:10

## 2021-01-01 RX ADMIN — Medication 2000 UNIT(S): at 12:03

## 2021-01-01 RX ADMIN — Medication 400 MILLIGRAM(S): at 04:40

## 2021-01-01 RX ADMIN — Medication 2: at 00:48

## 2021-01-01 RX ADMIN — Medication 1.25 MICROGRAM(S)/KG/MIN: at 17:23

## 2021-01-01 RX ADMIN — KETAMINE HYDROCHLORIDE 20 MG/KG/HR: 100 INJECTION INTRAMUSCULAR; INTRAVENOUS at 03:38

## 2021-01-01 RX ADMIN — Medication 10 MILLIGRAM(S): at 22:30

## 2021-01-01 RX ADMIN — Medication 2000 UNIT(S): at 12:17

## 2021-01-01 RX ADMIN — Medication 162 MILLIGRAM(S): at 11:10

## 2021-01-01 RX ADMIN — HUMAN INSULIN 16 UNIT(S): 100 INJECTION, SUSPENSION SUBCUTANEOUS at 11:18

## 2021-01-01 RX ADMIN — SENNA PLUS 10 MILLILITER(S): 8.6 TABLET ORAL at 01:02

## 2021-01-01 RX ADMIN — PANTOPRAZOLE SODIUM 40 MILLIGRAM(S): 20 TABLET, DELAYED RELEASE ORAL at 17:06

## 2021-01-01 RX ADMIN — HUMAN INSULIN 25 UNIT(S): 100 INJECTION, SUSPENSION SUBCUTANEOUS at 17:54

## 2021-01-01 RX ADMIN — MIDAZOLAM HYDROCHLORIDE 1.33 MG/KG/HR: 1 INJECTION, SOLUTION INTRAMUSCULAR; INTRAVENOUS at 16:26

## 2021-01-01 RX ADMIN — HUMAN INSULIN 15 UNIT(S): 100 INJECTION, SUSPENSION SUBCUTANEOUS at 23:39

## 2021-01-01 RX ADMIN — Medication 3: at 16:48

## 2021-01-01 RX ADMIN — Medication 4: at 17:53

## 2021-01-01 RX ADMIN — Medication 2: at 11:11

## 2021-01-01 RX ADMIN — Medication 10 MILLIGRAM(S): at 13:18

## 2021-01-01 RX ADMIN — HEPARIN SODIUM 800 UNIT(S)/HR: 5000 INJECTION INTRAVENOUS; SUBCUTANEOUS at 09:00

## 2021-01-01 RX ADMIN — BUDESONIDE AND FORMOTEROL FUMARATE DIHYDRATE 2 PUFF(S): 160; 4.5 AEROSOL RESPIRATORY (INHALATION) at 18:17

## 2021-01-01 RX ADMIN — ENOXAPARIN SODIUM 60 MILLIGRAM(S): 100 INJECTION SUBCUTANEOUS at 18:05

## 2021-01-01 RX ADMIN — Medication 1 APPLICATION(S): at 05:46

## 2021-01-01 RX ADMIN — Medication 100 MILLIGRAM(S): at 06:16

## 2021-01-01 RX ADMIN — Medication 5 MILLIGRAM(S): at 05:35

## 2021-01-01 RX ADMIN — Medication 1 APPLICATION(S): at 05:17

## 2021-01-01 RX ADMIN — HEPARIN SODIUM 11 UNIT(S)/HR: 5000 INJECTION INTRAVENOUS; SUBCUTANEOUS at 23:29

## 2021-01-01 RX ADMIN — Medication 5 MILLIGRAM(S): at 00:43

## 2021-01-01 RX ADMIN — Medication 2: at 17:07

## 2021-01-01 RX ADMIN — Medication 15 UNIT(S): at 12:27

## 2021-01-01 RX ADMIN — Medication 1: at 12:28

## 2021-01-01 RX ADMIN — Medication 4: at 05:30

## 2021-01-01 RX ADMIN — PANTOPRAZOLE SODIUM 40 MILLIGRAM(S): 20 TABLET, DELAYED RELEASE ORAL at 05:27

## 2021-01-01 RX ADMIN — Medication 1 APPLICATION(S): at 17:02

## 2021-01-01 RX ADMIN — Medication 20 MILLIGRAM(S): at 04:29

## 2021-01-01 RX ADMIN — HUMAN INSULIN 8 UNIT(S): 100 INJECTION, SUSPENSION SUBCUTANEOUS at 17:48

## 2021-01-01 RX ADMIN — LACTULOSE 20 GRAM(S): 10 SOLUTION ORAL at 05:33

## 2021-01-01 RX ADMIN — SODIUM CHLORIDE 500 MILLILITER(S): 9 INJECTION INTRAMUSCULAR; INTRAVENOUS; SUBCUTANEOUS at 09:00

## 2021-01-01 RX ADMIN — METHADONE HYDROCHLORIDE 20 MILLIGRAM(S): 40 TABLET ORAL at 05:44

## 2021-01-01 RX ADMIN — FENTANYL CITRATE 50 MICROGRAM(S): 50 INJECTION INTRAVENOUS at 16:01

## 2021-01-01 RX ADMIN — METHADONE HYDROCHLORIDE 20 MILLIGRAM(S): 40 TABLET ORAL at 13:03

## 2021-01-01 RX ADMIN — Medication 2: at 08:31

## 2021-01-01 RX ADMIN — DEXMEDETOMIDINE HYDROCHLORIDE IN 0.9% SODIUM CHLORIDE 0.2 MICROGRAM(S)/KG/HR: 4 INJECTION INTRAVENOUS at 19:45

## 2021-01-01 RX ADMIN — KETAMINE HYDROCHLORIDE 1.67 MG/KG/HR: 100 INJECTION INTRAMUSCULAR; INTRAVENOUS at 17:58

## 2021-01-01 RX ADMIN — Medication 2000 UNIT(S): at 11:22

## 2021-01-01 RX ADMIN — Medication 11 UNIT(S): at 17:21

## 2021-01-01 RX ADMIN — Medication 2: at 11:19

## 2021-01-01 RX ADMIN — Medication 1.25 MICROGRAM(S)/KG/MIN: at 17:19

## 2021-01-01 RX ADMIN — Medication 3: at 12:08

## 2021-01-01 RX ADMIN — Medication 4: at 05:21

## 2021-01-01 RX ADMIN — Medication 650 MILLIGRAM(S): at 12:37

## 2021-01-01 RX ADMIN — CHLORHEXIDINE GLUCONATE 15 MILLILITER(S): 213 SOLUTION TOPICAL at 17:35

## 2021-01-01 RX ADMIN — Medication 40 MILLIGRAM(S): at 11:01

## 2021-01-01 RX ADMIN — Medication 650 MILLIGRAM(S): at 02:15

## 2021-01-01 RX ADMIN — PIPERACILLIN AND TAZOBACTAM 25 GRAM(S): 4; .5 INJECTION, POWDER, LYOPHILIZED, FOR SOLUTION INTRAVENOUS at 01:53

## 2021-01-01 RX ADMIN — Medication 10 MILLIGRAM(S): at 08:22

## 2021-01-01 RX ADMIN — Medication 6: at 12:09

## 2021-01-01 RX ADMIN — PANTOPRAZOLE SODIUM 40 MILLIGRAM(S): 20 TABLET, DELAYED RELEASE ORAL at 13:30

## 2021-01-01 RX ADMIN — LACTULOSE 20 GRAM(S): 10 SOLUTION ORAL at 12:37

## 2021-01-01 RX ADMIN — SODIUM CHLORIDE 50 MILLILITER(S): 9 INJECTION, SOLUTION INTRAVENOUS at 11:22

## 2021-01-01 RX ADMIN — CEFEPIME 100 MILLIGRAM(S): 1 INJECTION, POWDER, FOR SOLUTION INTRAMUSCULAR; INTRAVENOUS at 13:22

## 2021-01-01 RX ADMIN — Medication 200 GRAM(S): at 01:37

## 2021-01-01 RX ADMIN — PROPOFOL 6 MICROGRAM(S)/KG/MIN: 10 INJECTION, EMULSION INTRAVENOUS at 06:48

## 2021-01-01 RX ADMIN — Medication 250 MILLIGRAM(S): at 08:45

## 2021-01-01 RX ADMIN — Medication 9 UNIT(S): at 08:27

## 2021-01-01 RX ADMIN — Medication 3.13 MICROGRAM(S)/KG/MIN: at 05:46

## 2021-01-01 RX ADMIN — Medication 6: at 23:03

## 2021-01-01 RX ADMIN — POLYETHYLENE GLYCOL 3350 17 GRAM(S): 17 POWDER, FOR SOLUTION ORAL at 17:02

## 2021-01-01 RX ADMIN — Medication 2000 UNIT(S): at 11:10

## 2021-01-01 RX ADMIN — CISATRACURIUM BESYLATE 15 MILLIGRAM(S): 2 INJECTION INTRAVENOUS at 13:41

## 2021-01-01 RX ADMIN — CHLORHEXIDINE GLUCONATE 15 MILLILITER(S): 213 SOLUTION TOPICAL at 17:43

## 2021-01-01 RX ADMIN — ENOXAPARIN SODIUM 40 MILLIGRAM(S): 100 INJECTION SUBCUTANEOUS at 12:19

## 2021-01-01 RX ADMIN — FENTANYL CITRATE 100 MICROGRAM(S): 50 INJECTION INTRAVENOUS at 15:45

## 2021-01-01 RX ADMIN — Medication 1 APPLICATION(S): at 05:32

## 2021-01-01 RX ADMIN — MEROPENEM 100 MILLIGRAM(S): 1 INJECTION INTRAVENOUS at 04:56

## 2021-01-01 RX ADMIN — Medication 15 MILLILITER(S): at 12:36

## 2021-01-01 RX ADMIN — LINEZOLID 300 MILLIGRAM(S): 600 INJECTION, SOLUTION INTRAVENOUS at 11:36

## 2021-01-01 RX ADMIN — Medication 8: at 23:45

## 2021-01-01 RX ADMIN — HUMAN INSULIN 16 UNIT(S): 100 INJECTION, SUSPENSION SUBCUTANEOUS at 20:58

## 2021-01-01 RX ADMIN — KETAMINE HYDROCHLORIDE 1.67 MG/KG/HR: 100 INJECTION INTRAMUSCULAR; INTRAVENOUS at 13:57

## 2021-01-01 RX ADMIN — SENNA PLUS 10 MILLILITER(S): 8.6 TABLET ORAL at 22:21

## 2021-01-01 RX ADMIN — Medication 2: at 17:27

## 2021-01-01 RX ADMIN — Medication 4: at 17:41

## 2021-01-01 RX ADMIN — Medication 6: at 00:38

## 2021-01-01 RX ADMIN — HUMAN INSULIN 4 UNIT(S): 100 INJECTION, SUSPENSION SUBCUTANEOUS at 05:35

## 2021-01-01 RX ADMIN — POTASSIUM PHOSPHATE, MONOBASIC POTASSIUM PHOSPHATE, DIBASIC 62.5 MILLIMOLE(S): 236; 224 INJECTION, SOLUTION INTRAVENOUS at 04:10

## 2021-01-01 RX ADMIN — LACTULOSE 15 GRAM(S): 10 SOLUTION ORAL at 17:15

## 2021-01-01 RX ADMIN — PIPERACILLIN AND TAZOBACTAM 25 GRAM(S): 4; .5 INJECTION, POWDER, LYOPHILIZED, FOR SOLUTION INTRAVENOUS at 06:10

## 2021-01-01 RX ADMIN — SIMETHICONE 160 MILLIGRAM(S): 80 TABLET, CHEWABLE ORAL at 08:22

## 2021-01-01 RX ADMIN — HEPARIN SODIUM 800 UNIT(S)/HR: 5000 INJECTION INTRAVENOUS; SUBCUTANEOUS at 01:43

## 2021-01-01 RX ADMIN — AMPICILLIN SODIUM AND SULBACTAM SODIUM 200 GRAM(S): 250; 125 INJECTION, POWDER, FOR SUSPENSION INTRAMUSCULAR; INTRAVENOUS at 00:41

## 2021-01-01 RX ADMIN — INSULIN GLARGINE 30 UNIT(S): 100 INJECTION, SOLUTION SUBCUTANEOUS at 22:45

## 2021-01-01 RX ADMIN — INSULIN HUMAN 10 UNIT(S): 100 INJECTION, SOLUTION SUBCUTANEOUS at 01:07

## 2021-01-01 RX ADMIN — Medication 3.13 MICROGRAM(S)/KG/MIN: at 21:12

## 2021-01-01 RX ADMIN — FENTANYL CITRATE 100 MICROGRAM(S): 50 INJECTION INTRAVENOUS at 05:45

## 2021-01-01 RX ADMIN — PANTOPRAZOLE SODIUM 40 MILLIGRAM(S): 20 TABLET, DELAYED RELEASE ORAL at 17:34

## 2021-01-01 RX ADMIN — Medication 100 MILLIGRAM(S): at 22:57

## 2021-01-01 RX ADMIN — Medication 2: at 00:09

## 2021-01-01 RX ADMIN — CISATRACURIUM BESYLATE 15 MILLIGRAM(S): 2 INJECTION INTRAVENOUS at 14:43

## 2021-01-01 RX ADMIN — LACTULOSE 15 GRAM(S): 10 SOLUTION ORAL at 05:45

## 2021-01-01 RX ADMIN — INSULIN GLARGINE 25 UNIT(S): 100 INJECTION, SOLUTION SUBCUTANEOUS at 21:39

## 2021-01-01 RX ADMIN — CISATRACURIUM BESYLATE 12 MICROGRAM(S)/KG/MIN: 2 INJECTION INTRAVENOUS at 11:27

## 2021-01-01 RX ADMIN — DEXMEDETOMIDINE HYDROCHLORIDE IN 0.9% SODIUM CHLORIDE 0.2 MICROGRAM(S)/KG/HR: 4 INJECTION INTRAVENOUS at 17:23

## 2021-01-01 RX ADMIN — Medication 4: at 13:08

## 2021-01-01 RX ADMIN — ENOXAPARIN SODIUM 70 MILLIGRAM(S): 100 INJECTION SUBCUTANEOUS at 17:14

## 2021-01-01 RX ADMIN — Medication 50 MILLILITER(S): at 11:37

## 2021-01-01 RX ADMIN — LACTULOSE 15 GRAM(S): 10 SOLUTION ORAL at 05:05

## 2021-01-01 RX ADMIN — Medication 50 GRAM(S): at 05:23

## 2021-01-01 RX ADMIN — KETAMINE HYDROCHLORIDE 20 MG/KG/HR: 100 INJECTION INTRAMUSCULAR; INTRAVENOUS at 11:31

## 2021-01-01 RX ADMIN — KETAMINE HYDROCHLORIDE 20 MG/KG/HR: 100 INJECTION INTRAMUSCULAR; INTRAVENOUS at 17:05

## 2021-01-01 RX ADMIN — Medication 4: at 05:02

## 2021-01-01 RX ADMIN — HUMAN INSULIN 16 UNIT(S): 100 INJECTION, SUSPENSION SUBCUTANEOUS at 05:54

## 2021-01-01 RX ADMIN — HEPARIN SODIUM 800 UNIT(S)/HR: 5000 INJECTION INTRAVENOUS; SUBCUTANEOUS at 12:10

## 2021-01-01 RX ADMIN — PIPERACILLIN AND TAZOBACTAM 25 GRAM(S): 4; .5 INJECTION, POWDER, LYOPHILIZED, FOR SOLUTION INTRAVENOUS at 22:37

## 2021-01-01 RX ADMIN — KETAMINE HYDROCHLORIDE 20 MG/KG/HR: 100 INJECTION INTRAMUSCULAR; INTRAVENOUS at 19:46

## 2021-01-01 RX ADMIN — CISATRACURIUM BESYLATE 12 MICROGRAM(S)/KG/MIN: 2 INJECTION INTRAVENOUS at 05:31

## 2021-01-01 RX ADMIN — Medication 20 MILLIGRAM(S): at 06:12

## 2021-01-01 RX ADMIN — FENTANYL CITRATE 100 MICROGRAM(S): 50 INJECTION INTRAVENOUS at 15:28

## 2021-01-01 RX ADMIN — Medication 100 MILLIEQUIVALENT(S): at 05:23

## 2021-01-01 RX ADMIN — CHLORHEXIDINE GLUCONATE 15 MILLILITER(S): 213 SOLUTION TOPICAL at 05:11

## 2021-01-01 RX ADMIN — HEPARIN SODIUM 1100 UNIT(S)/HR: 5000 INJECTION INTRAVENOUS; SUBCUTANEOUS at 12:34

## 2021-01-01 RX ADMIN — PANTOPRAZOLE SODIUM 40 MILLIGRAM(S): 20 TABLET, DELAYED RELEASE ORAL at 05:10

## 2021-01-01 RX ADMIN — Medication 2: at 12:16

## 2021-01-01 RX ADMIN — PHENYLEPHRINE HYDROCHLORIDE 2.5 MICROGRAM(S)/KG/MIN: 10 INJECTION INTRAVENOUS at 20:27

## 2021-01-01 RX ADMIN — HUMAN INSULIN 15 UNIT(S): 100 INJECTION, SUSPENSION SUBCUTANEOUS at 17:39

## 2021-01-01 RX ADMIN — CISATRACURIUM BESYLATE 12 MICROGRAM(S)/KG/MIN: 2 INJECTION INTRAVENOUS at 17:03

## 2021-01-01 RX ADMIN — Medication 2000 UNIT(S): at 11:53

## 2021-01-01 RX ADMIN — Medication 4: at 11:19

## 2021-01-01 RX ADMIN — POLYETHYLENE GLYCOL 3350 17 GRAM(S): 17 POWDER, FOR SOLUTION ORAL at 10:25

## 2021-01-01 RX ADMIN — HUMAN INSULIN 16 UNIT(S): 100 INJECTION, SUSPENSION SUBCUTANEOUS at 03:51

## 2021-03-08 NOTE — ED ADULT NURSE NOTE - OBJECTIVE STATEMENT
PT is a 60 year old male who presents from home, COVID+ x 10 days.  PT is hypoxic, 85% on room air.  Febrile, 101.5.  Tachypneic.  PT placed on 6 L's NC.  Spo2 92% on NC.  Pt denies any pertinent medical history.  PT on cardiac monitor, tachycardic 125.  Attending at bedside.  Respirations even and labored.  Lungs sound diminished.  Pt denies any other complaints at this time.

## 2021-03-08 NOTE — ED PROVIDER NOTE - NS ED ROS FT
REVIEW OF SYSTEMS:  General: +fever, +chills  HEENT: no headache, no vision changes  Cardiac: no chest pain, no palpitations  Respiratory: no cough, no shortness of breath  Gastrointestinal: no abdominal pain, no nausea, no vomiting, no diarrhea  Genitourinary: no hematuria, no dysuria, no urinary frequency  Extremities: no extremity swelling, no extremity pain  Neuro: no focal weakness, no numbness/tingling of the extremities, no decreased sensation  Heme: no easy bleeding, no easy bruising  Skin: no jaundice,  no rashes, no lesions  All other ROS as documented in HPI  -oCrby Bhatt, PGY-3

## 2021-03-08 NOTE — ED PROVIDER NOTE - CLINICAL SUMMARY MEDICAL DECISION MAKING FREE TEXT BOX
60M presents with COVID+, mild increased WOB. Will check labs, CXR, give decadron for hypoxia, consider HFNC vs bipap in WOB or hypoxia worsens. TBA.

## 2021-03-08 NOTE — H&P ADULT - NSHPPHYSICALEXAM_GEN_ALL_CORE
Vital Signs Last 24 Hrs  T(C): 37.8 (08 Mar 2021 13:15), Max: 38.6 (08 Mar 2021 10:57)  T(F): 100 (08 Mar 2021 13:15), Max: 101.5 (08 Mar 2021 10:57)  HR: 103 (08 Mar 2021 13:59) (103 - 126)  BP: 130/88 (08 Mar 2021 13:59) (115/71 - 149/97)  BP(mean): --  RR: 22 (08 Mar 2021 13:59) (20 - 40)  SpO2: 96% (08 Mar 2021 13:59) (78% - 100%)    PHYSICAL EXAM:  GENERAL: in mild respiratory distress  HEAD:  NCAT  EYES: PERRLA, conjunctiva clear  NECK: Supple, No JVD  CHEST/LUNG: speaks full sentences, tachypneic to 30s, SpO2 95% on 65% FiO2  HEART: Reg rate. tachycardic low 100s  ABDOMEN: SNTND. Bowel sounds present  EXTREMITIES:  2+ Peripheral Pulses, No clubbing, cyanosis, edema.  PSYCH: AAOx3, appropriate affect  NEUROLOGY: grossly non-focal  SKIN: No rashes or lesions

## 2021-03-08 NOTE — ED PROVIDER NOTE - ATTENDING CONTRIBUTION TO CARE
Attending Statement (CRISTAL Simpson MD):    HPI: 59y/o M with no reported medical comorbidities (states hasn't been to doctor in many years), presenting with increasing fatigue, fever, and cough for the past 5 days; covid + at outpatient 3 days ago; today went to urgent as feeling worse (but denies SOB) and found to have hypoxia, so sent to ED for further evaluation.  Nonsmoker. No CP.    Review of Systems:  -General: + fever  -ENT: +congestion, no difficulty swallowing  -Pulmonary: +cough, no shortness of breath  -Cardiac: no chest pain, no palpitations  -Gastrointestinal: no abdominal pain, no nausea, no vomiting, and no diarrhea.  -Genitourinary: no blood or pain with urination  -Musculoskeletal: no back or neck pain  -Skin: no rashes  -Endocrine: No h/o diabetes or thyroid disease  -Neurologic: No focal weakness or numbness    All else negative unless otherwise specified elsewhere in this note.    PSH/PMH as noted above    On Physical Exam:  General: ill appearing, tachypneic but is speaking clearly in full sentences and without difficulty; cooperative with exam  HEENT: PERRL, MMM, oropharynx clear  Neck: no neck tenderness, no nuchal rigidity  Cardiac: normal s1, s2; RRR; no MGR  Lungs: diffuse rales  Abdomen: soft nontender/nondistended  : no bladder tenderness or distension  Skin: intact, no rash  Extremities: no peripheral edema, no gross deformities  Neuro: no gross neurologic deficits    MDM: Attending Statement (CRISTAL Simpson MD):    HPI: 59y/o M with no reported medical comorbidities (states hasn't been to doctor in many years), presenting with increasing fatigue, fever, and cough for the past 5 days; covid + at outpatient 3 days ago; today went to urgent as feeling worse (but denies SOB) and found to have hypoxia, so sent to ED for further evaluation.  Nonsmoker. No CP.    Review of Systems:  -General: + fever  -ENT: +congestion, no difficulty swallowing  -Pulmonary: +cough, no shortness of breath  -Cardiac: no chest pain, no palpitations  -Gastrointestinal: no abdominal pain, no nausea, no vomiting, and no diarrhea.  -Genitourinary: no blood or pain with urination  -Musculoskeletal: no back or neck pain  -Skin: no rashes  -Endocrine: No h/o diabetes or thyroid disease  -Neurologic: No focal weakness or numbness    All else negative unless otherwise specified elsewhere in this note.    PSH/PMH as noted above    On Physical Exam:  General: ill appearing, tachypneic but is speaking clearly in full sentences and without difficulty cooperative with exam  HEENT: PERRL, MMM, oropharynx clear  Neck: no neck tenderness, no nuchal rigidity  Cardiac: s1s2 tachycardic  Lungs: diffuse rales; tachpneic; O2 sat 88-90% on 10L via NC  Abdomen: soft nontender/nondistended  : no bladder tenderness or distension  Skin: intact, no rash  Extremities: no peripheral edema, no gross deformities  Neuro: no gross neurologic deficits    MDM: Pt p/w hypoxia likely due to COVID 19 infection; starting covid labs/work-up, including cbc, cmp, ferritin, ldh, esr, crp, d dimer and vbg, and will obtain CXR, continue on supplemental oxygen as needed, starting decadron, and plan for admission. Titrating supplemental oxygen to high flow nasal cannula.

## 2021-03-08 NOTE — ED ADULT NURSE REASSESSMENT NOTE - NS ED NURSE REASSESS COMMENT FT1
Pharmacy to verify Remdesivir and send meds to [facility] Coverage RN, Katalina, [phone]

## 2021-03-08 NOTE — H&P ADULT - PROBLEM SELECTOR PLAN 1
- presented with sepsis due to covid syndrome  - remdesivir x5 days  - decadron x10 days  - inflammatory markers q48-72h  - f/u blood cultures as part of sepsis work-up

## 2021-03-08 NOTE — H&P ADULT - NSHPREVIEWOFSYSTEMS_GEN_ALL_CORE
REVIEW OF SYSTEMS:    CONSTITUTIONAL: No weakness, weight loss, +fevers, chills  EYES/ENT: No visual changes;  No vertigo or throat pain   NECK: No pain or stiffness  RESPIRATORY: No cough, wheezing, hemoptysis; No shortness of breath  CARDIOVASCULAR: No chest pain or palpitations, +CASTILLO  GASTROINTESTINAL: No abdominal or epigastric pain. No nausea, vomiting, or hematemesis; No diarrhea or constipation. No melena or hematochezia.  GENITOURINARY: No dysuria, frequency or hematuria  NEUROLOGICAL: No numbness or weakness, AAOX3  SKIN: No itching, rashes  MUSCULOSKELETAL: no joint erythema, no joint swelling  PSYCHIATRIC: no depression, no anxiety

## 2021-03-08 NOTE — H&P ADULT - PROBLEM SELECTOR PLAN 3
- likely has underlying diabetes  - hyperglycemia will worsen with decadron  - check hba1c  - start sliding scale insulin, FSH AC+HS

## 2021-03-08 NOTE — ED PROVIDER NOTE - PHYSICAL EXAMINATION
General: Well developed, well nourished  HEENT: Normocephalic and atraumatic, Trachea midline.   Cardiac: Normal S1 and S2 w/ tachycardia. No MRG.  Pulmonary: Bibasilar rales, Mild increased WOB, satting >92% on 6L NC    Abdominal: Soft, NTND  Neurologic: No focal sensory or motor deficits.  Musculoskeletal: No limited ROM.  Vascular: Warm and well perfused  Skin: Color appropriate for race.   Psychiatric: Appropriate mood and affect. No apparent risk to self or others.  Corby Bhatt M.D. PGY-3

## 2021-03-08 NOTE — H&P ADULT - NSHPLABSRESULTS_GEN_ALL_CORE
cxr personally reviewed: b/l opacities    ekg personally reviewed: QTc 438, 120 bpm, sinus tachycardia

## 2021-03-08 NOTE — H&P ADULT - NSHPSOCIALHISTORY_GEN_ALL_CORE
former smoker as a teenager, drinks alcohol socially/occasionally, not daily. No other drug use. Lives with wife. Works as a pipe/.

## 2021-03-08 NOTE — H&P ADULT - HISTORY OF PRESENT ILLNESS
61 yo male w/no known pmh (does not f/u with PCP) presents to [facility] for cc fevers, weakness, fatigue, shortness of breath for 5 days. Tested positive for covid 3 days ago at an urgent care. Today presented again to urgent care and noted to be hypoxic and sent to the ER. Patient denies dysuria, diarrhea, constipation. Currently denies shortness of breath on high flow.     In the ER, patient was febrile to 101.5F, tachycardic to 120s, tachypneic to 40, hypoxic to 88% on 6L. Improved 59 yo male w/no known pmh (does not f/u with PCP) presents to [facility] for cc fevers, weakness, fatigue, shortness of breath for 5 days. Tested positive for covid 3 days ago at an urgent care. Today presented again to urgent care and noted to be hypoxic and sent to the ER. Patient denies dysuria, diarrhea, constipation. Currently denies shortness of breath on high flow.     In the ER, patient was febrile to 101.5F, tachycardic to 120s, tachypneic to 40, hypoxic to 88% on 6L. Improved on high flow nasal cannula to 95%.

## 2021-03-08 NOTE — ED PROVIDER NOTE - OBJECTIVE STATEMENT
60M no known PMH presents with hypoxia. Pt states 5 days of symptoms. States fevers and cough. Had a covid swab 3 days ago which resulted as positive. Today went to PMD and was sent to ER for hypoxia. States no known medical history but does not follow up with a doctor. Denies CP. States does not feel SOB. No n/v/d.

## 2021-03-09 NOTE — PROGRESS NOTE ADULT - SUBJECTIVE AND OBJECTIVE BOX
Patient is a 60y old  Male who presents with a chief complaint of hypoxia 2/2 COVID (08 Mar 2021 14:08)      SUBJECTIVE / OVERNIGHT EVENTS:    61 yo male w/no known pmh (does not f/u with PCP) presents to [facility] for cc fevers, weakness, fatigue, shortness of breath for 5 days. Tested positive for covid 3 days ago at an urgent care. On admission day, pt  presented again to urgent care and noted to be hypoxic and sent to the ER. Patient denies dysuria, diarrhea, constipation. Currently denies shortness of breath on high flow.   In the ER, patient was febrile to 101.5F, tachycardic to 120s, tachypneic to 40, hypoxic to 88% on 6L. Improved on high flow nasal cannula to 95%.   RR: 20 (09 Mar 2021 12:05) (20 - 30)  SpO2: 91% (09 Mar 2021 12:05) (90% - 97%) on HFNC ( 40/100%)      ADDITIONAL REVIEW OF SYSTEMS: Negative except for above    MEDICATIONS  (STANDING):  dexAMETHasone  Injectable 6 milliGRAM(s) IV Push daily  dextrose 40% Gel 15 Gram(s) Oral once  dextrose 5%. 1000 milliLiter(s) (50 mL/Hr) IV Continuous <Continuous>  dextrose 5%. 1000 milliLiter(s) (100 mL/Hr) IV Continuous <Continuous>  dextrose 50% Injectable 25 Gram(s) IV Push once  dextrose 50% Injectable 12.5 Gram(s) IV Push once  dextrose 50% Injectable 25 Gram(s) IV Push once  glucagon  Injectable 1 milliGRAM(s) IntraMuscular once  influenza   Vaccine 0.5 milliLiter(s) IntraMuscular once  insulin lispro (ADMELOG) corrective regimen sliding scale   SubCutaneous three times a day before meals  insulin lispro (ADMELOG) corrective regimen sliding scale   SubCutaneous at bedtime  remdesivir  IVPB 100 milliGRAM(s) IV Intermittent every 24 hours  remdesivir  IVPB   IV Intermittent     MEDICATIONS  (PRN):  acetaminophen   Tablet .. 650 milliGRAM(s) Oral every 4 hours PRN Mild Pain (1 - 3)      CAPILLARY BLOOD GLUCOSE      POCT Blood Glucose.: 325 mg/dL (09 Mar 2021 12:19)  POCT Blood Glucose.: 282 mg/dL (09 Mar 2021 07:59)  POCT Blood Glucose.: 278 mg/dL (08 Mar 2021 21:16)    I&O's Summary    08 Mar 2021 07:01  -  09 Mar 2021 07:00  --------------------------------------------------------  IN: 480 mL / OUT: 900 mL / NET: -420 mL    09 Mar 2021 07:01  -  09 Mar 2021 13:51  --------------------------------------------------------  IN: 240 mL / OUT: 0 mL / NET: 240 mL        PHYSICAL EXAM:  Vital Signs Last 24 Hrs  T(C): 36.7 (09 Mar 2021 12:05), Max: 37.6 (08 Mar 2021 18:47)  T(F): 98.1 (09 Mar 2021 12:05), Max: 99.6 (08 Mar 2021 18:47)  HR: 91 (09 Mar 2021 12:05) (89 - 121)  BP: 135/82 (09 Mar 2021 12:05) (124/82 - 135/82)  BP(mean): --  RR: 20 (09 Mar 2021 12:05) (20 - 30)  SpO2: 91% (09 Mar 2021 12:05) (90% - 97%)    PHYSICAL EXAM:  GENERAL: NAD, well-developed  HEAD:  Atraumatic, Normocephalic  EYES:  conjunctiva and sclera clear  NECK: Supple, No JVD  CHEST/LUNG: Clear to auscultation bilaterally; No wheeze  HEART: Regular rate and rhythm; No murmurs, rubs, or gallops  ABDOMEN: Soft, Nontender, Nondistended; Bowel sounds present  EXTREMITIES:  2+ Peripheral Pulses, No clubbing, cyanosis, or edema  PSYCH: affect is appropriate   NEUROLOGY: non-focal, AAOx3  SKIN: No rashes or lesions      LABS:                        12.7   4.54  )-----------( 248      ( 09 Mar 2021 05:38 )             39.9     03-09    135  |  99  |  21  ----------------------------<  331<H>  5.1   |  21<L>  |  0.77    Ca    9.2      09 Mar 2021 05:38  Phos  3.4     03-09  Mg     2.7     03-09    TPro  7.4  /  Alb  3.4  /  TBili  0.5  /  DBili  x   /  AST  42<H>  /  ALT  33  /  AlkPhos  72  03-09                RADIOLOGY & ADDITIONAL TESTS:    Imaging Personally Reviewed:    Electrocardiogram Personally Reviewed:    COORDINATION OF CARE:  Care Discussed with Consultants/Other Providers [Y/N]:  Prior or Outpatient Records Reviewed [Y/N]:     Patient is a 60y old  Male who presents with a chief complaint of hypoxia 2/2 COVID (08 Mar 2021 14:08)      SUBJECTIVE / OVERNIGHT EVENTS:    61 yo male w/no known pmh (does not f/u with PCP) presents to [facility] for cc fevers, weakness, fatigue, shortness of breath for 5 days. Tested positive for covid 3 days ago at an urgent care. On admission day, pt  presented again to urgent care and noted to be hypoxic and sent to the ER. Patient denies dysuria, diarrhea, constipation. Currently denies shortness of breath on high flow.   In the ER, patient was febrile to 101.5F, tachycardic to 120s, tachypneic to 40, hypoxic to 88% on 6L. Improved on high flow nasal cannula to 95%.   RR: 20 (09 Mar 2021 12:05) (20 - 30)  SpO2: 91% (09 Mar 2021 12:05) (90% - 97%) on HFNC ( 40/100%)/ pt states to feel better and offers no complaints       ADDITIONAL REVIEW OF SYSTEMS: Negative except for above    MEDICATIONS  (STANDING):  dexAMETHasone  Injectable 6 milliGRAM(s) IV Push daily  dextrose 40% Gel 15 Gram(s) Oral once  dextrose 5%. 1000 milliLiter(s) (50 mL/Hr) IV Continuous <Continuous>  dextrose 5%. 1000 milliLiter(s) (100 mL/Hr) IV Continuous <Continuous>  dextrose 50% Injectable 25 Gram(s) IV Push once  dextrose 50% Injectable 12.5 Gram(s) IV Push once  dextrose 50% Injectable 25 Gram(s) IV Push once  glucagon  Injectable 1 milliGRAM(s) IntraMuscular once  influenza   Vaccine 0.5 milliLiter(s) IntraMuscular once  insulin lispro (ADMELOG) corrective regimen sliding scale   SubCutaneous three times a day before meals  insulin lispro (ADMELOG) corrective regimen sliding scale   SubCutaneous at bedtime  remdesivir  IVPB 100 milliGRAM(s) IV Intermittent every 24 hours  remdesivir  IVPB   IV Intermittent     MEDICATIONS  (PRN):  acetaminophen   Tablet .. 650 milliGRAM(s) Oral every 4 hours PRN Mild Pain (1 - 3)      CAPILLARY BLOOD GLUCOSE      POCT Blood Glucose.: 325 mg/dL (09 Mar 2021 12:19)  POCT Blood Glucose.: 282 mg/dL (09 Mar 2021 07:59)  POCT Blood Glucose.: 278 mg/dL (08 Mar 2021 21:16)    I&O's Summary    08 Mar 2021 07:01  -  09 Mar 2021 07:00  --------------------------------------------------------  IN: 480 mL / OUT: 900 mL / NET: -420 mL    09 Mar 2021 07:01  -  09 Mar 2021 13:51  --------------------------------------------------------  IN: 240 mL / OUT: 0 mL / NET: 240 mL        PHYSICAL EXAM:  Vital Signs Last 24 Hrs  T(C): 36.7 (09 Mar 2021 12:05), Max: 37.6 (08 Mar 2021 18:47)  T(F): 98.1 (09 Mar 2021 12:05), Max: 99.6 (08 Mar 2021 18:47)  HR: 91 (09 Mar 2021 12:05) (89 - 121)  BP: 135/82 (09 Mar 2021 12:05) (124/82 - 135/82)  BP(mean): --  RR: 20 (09 Mar 2021 12:05) (20 - 30)  SpO2: 91% (09 Mar 2021 12:05) (90% - 97%)    PHYSICAL EXAM:  GENERAL: NAD, well-developed  HEAD:  Atraumatic, Normocephalic  EYES:  conjunctiva and sclera clear  NECK: Supple, No JVD  CHEST/LUNG: Clear to auscultation bilaterally; No wheeze  HEART: Regular rate and rhythm; No murmurs, rubs, or gallops  ABDOMEN: Soft, Nontender, Nondistended; Bowel sounds present  EXTREMITIES:  2+ Peripheral Pulses, No clubbing, cyanosis, or edema  PSYCH: affect is appropriate   NEUROLOGY: non-focal, AAOx3  SKIN: No rashes or lesions      LABS:                        12.7   4.54  )-----------( 248      ( 09 Mar 2021 05:38 )             39.9     03-09    135  |  99  |  21  ----------------------------<  331<H>  5.1   |  21<L>  |  0.77    Ca    9.2      09 Mar 2021 05:38  Phos  3.4     03-09  Mg     2.7     03-09    TPro  7.4  /  Alb  3.4  /  TBili  0.5  /  DBili  x   /  AST  42<H>  /  ALT  33  /  AlkPhos  72  03-09                RADIOLOGY & ADDITIONAL TESTS:    Imaging Personally Reviewed:    Electrocardiogram Personally Reviewed:    COORDINATION OF CARE:  Care Discussed with Consultants/Other Providers [Y/N]:  Prior or Outpatient Records Reviewed [Y/N]:

## 2021-03-09 NOTE — PROGRESS NOTE ADULT - PROBLEM SELECTOR PLAN 3
- likely has underlying diabetes  - hyperglycemia will worsen with decadron  - check hba1c  - start sliding scale insulin, FSH AC+HS - likely has underlying diabetes  - hyperglycemia will worsen with decadron  - A1C = 12.4   - Lantus and Admelog is started   - monitor blood glucose   - Endo consult with Dr Mcmanus   - cont slidding scale   - Nutrition and DM educator consults

## 2021-03-09 NOTE — CONSULT NOTE ADULT - ASSESSMENT
Assessment  DMT2: 60y Male with newly diagnosed DM T2 with hyperglycemia admitted with COVID19 , A1C is 12%, on high dose steroids now, on basal bolus insulin, blood sugars running high,  no hypoglycemic episode,  eating meals, compliant with low carb diet.  Patient is high risk with high level decision making due to uncontrolled diabetes, has increased risk for complications.  COVID19: On medications, monitored, stable.  Overweight: No strict exercise routines, neither on low calorie diet.                Anderson Mcmanus MD  Cell: [phone]  Office: [phone]

## 2021-03-09 NOTE — CONSULT NOTE ADULT - SUBJECTIVE AND OBJECTIVE BOX
HPI:  59 yo male w/no known pmh (does not f/u with PCP) presents to [facility] for cc fevers, weakness, fatigue, shortness of breath for 5 days. Tested positive for covid 3 days ago at an urgent care. Today presented again to urgent care and noted to be hypoxic and sent to the ER. Patient denies dysuria, diarrhea, constipation. Currently denies shortness of breath on high flow.     In the ER, patient was febrile to 101.5F, tachycardic to 120s, tachypneic to 40, hypoxic to 88% on 6L. Improved on high flow nasal cannula to 95%.  (08 Mar 2021 14:08)  Patient apparently has no history of diabetes, no polyuria polydipsia. Patient follows up with PCP for health management.    PAST MEDICAL & SURGICAL HISTORY:  No pertinent past medical history    No significant past surgical history        FAMILY HISTORY:  No pertinent family history in first degree relatives        Social History:    Outpatient Medications:    MEDICATIONS  (STANDING):  dexAMETHasone  Injectable 6 milliGRAM(s) IV Push daily  dextrose 40% Gel 15 Gram(s) Oral once  dextrose 5%. 1000 milliLiter(s) (50 mL/Hr) IV Continuous <Continuous>  dextrose 5%. 1000 milliLiter(s) (100 mL/Hr) IV Continuous <Continuous>  dextrose 50% Injectable 25 Gram(s) IV Push once  dextrose 50% Injectable 12.5 Gram(s) IV Push once  dextrose 50% Injectable 25 Gram(s) IV Push once  enoxaparin Injectable 40 milliGRAM(s) SubCutaneous daily  glucagon  Injectable 1 milliGRAM(s) IntraMuscular once  influenza   Vaccine 0.5 milliLiter(s) IntraMuscular once  insulin glargine Injectable (LANTUS) 20 Unit(s) SubCutaneous at bedtime  insulin lispro (ADMELOG) corrective regimen sliding scale   SubCutaneous three times a day before meals  insulin lispro (ADMELOG) corrective regimen sliding scale   SubCutaneous at bedtime  insulin lispro Injectable (ADMELOG) 8 Unit(s) SubCutaneous three times a day before meals  remdesivir  IVPB 100 milliGRAM(s) IV Intermittent every 24 hours  remdesivir  IVPB   IV Intermittent     MEDICATIONS  (PRN):  acetaminophen   Tablet .. 650 milliGRAM(s) Oral every 4 hours PRN Mild Pain (1 - 3)      Allergies    No Known Allergies    Intolerances      Review of Systems:  Constitutional: No fever, no chills  Eyes: No blurry vision  Neuro: No tremors  HEENT: No pain, no neck swelling  Cardiovascular: No chest pain, no palpitations  Respiratory: No SOB, no cough  GI: No nausea, vomiting, abdominal pain  : No dysuria  Skin: no rash  MSK: Has leg swelling.  Psych: no depression  Endocrine: no polyuria, polydipsia    UNABLE TO OBTAIN    ALL OTHER SYSTEMS REVIEWED AND NEGATIVE        PHYSICAL EXAM:  VITALS: T(C): 36.7 (03-09-21 @ 12:05)  T(F): 98.1 (03-09-21 @ 12:05), Max: 99.6 (03-08-21 @ 18:47)  HR: 91 (03-09-21 @ 12:05) (89 - 100)  BP: 135/82 (03-09-21 @ 12:05) (124/82 - 135/82)  RR:  (20 - 20)  SpO2:  (90% - 96%)  Wt(kg): --  GENERAL: NAD, well-groomed, well-developed  EYES: No proptosis, no lid lag  HEENT:  Atraumatic, Normocephalic  THYROID: Normal size, no palpable nodules  RESPIRATORY: Clear to auscultation bilaterally; No rales, rhonchi, wheezing  CARDIOVASCULAR: Si S2, No murmurs;  GI: Soft, non distended, normal bowel sounds  SKIN: Dry, intact, No rashes or lesions  MUSCULOSKELETAL: Has BL lower extremity edema.  NEURO:  no tremor, sensation decreased in feet BL,    POCT Blood Glucose.: 395 mg/dL (03-09-21 @ 16:31)  POCT Blood Glucose.: 402 mg/dL (03-09-21 @ 16:26)  POCT Blood Glucose.: 325 mg/dL (03-09-21 @ 12:19)  POCT Blood Glucose.: 282 mg/dL (03-09-21 @ 07:59)  POCT Blood Glucose.: 278 mg/dL (03-08-21 @ 21:16)                            12.7   4.54  )-----------( 248      ( 09 Mar 2021 05:38 )             39.9       03-09    135  |  99  |  21  ----------------------------<  331<H>  5.1   |  21<L>  |  0.77    EGFR if : 114  EGFR if non : 99    Ca    9.2      03-09  Mg     2.7     03-09  Phos  3.4     03-09    TPro  7.4  /  Alb  3.4  /  TBili  0.5  /  DBili  x   /  AST  42<H>  /  ALT  33  /  AlkPhos  72  03-09      Thyroid Function Tests:              Radiology:

## 2021-03-09 NOTE — PROGRESS NOTE ADULT - PROBLEM SELECTOR PLAN 2
- cont HFNC, wean as tolerated  - discussed possibility of intubation, patient is full code - cont HFNC, wean as tolerated  - previous provider discussed possibility of intubation, patient is full code

## 2021-03-10 NOTE — PROGRESS NOTE ADULT - PROBLEM SELECTOR PLAN 1
Will increase Lantus to 25u at bedtime.  Will increase Admelog to 11u before each meal and continue Admelog correction scale coverage. Will continue monitoring FS and FU.  Patient counseled for compliance with consistent low carb diet and exercise as tolerated outpatient.

## 2021-03-10 NOTE — PHYSICAL THERAPY INITIAL EVALUATION ADULT - ADDITIONAL COMMENTS
As per pt, pt lives in a private house w/ spouse and + steps to enter w/ UHR. PTA pt was independent w/ all mobility and ADLs.

## 2021-03-10 NOTE — PHYSICAL THERAPY INITIAL EVALUATION ADULT - CRITERIA FOR SKILLED THERAPEUTIC INTERVENTIONS
Spontaneous, unlabored and symmetrical
impairments found/functional limitations in following categories

## 2021-03-10 NOTE — PROGRESS NOTE ADULT - PROBLEM SELECTOR PLAN 2
- cont HFNC with increasing FIO2 ( now 100%) with SPO2 <90 ---> BIPAP or titrate down as tolerated   - TOciluzimab was ordered and Rx ( Sameet ) called who states that based on the Criteria , patient is out of the window of opportunity for Toci .  Will call Pulm in AM if not improved   - Patient is full code - cont HFNC with increasing FIO2 ( now 100%)  ; IF  SPO2 <90 ---> BIPAP  IF improves  titrate down as tolerated   - TOciluzimab was ordered and Rx ( Sameet ) called who states that based on the Criteria , patient is out of the window of opportunity for Toci and not approved   Will call Pulm in AM if not improved   - Patient is full code

## 2021-03-10 NOTE — PROGRESS NOTE ADULT - PROBLEM SELECTOR PLAN 1
- presented with sepsis due to covid syndrome  - remdesivir x5 days ( start on 3/8)   - decadron x10 days ( start on 3/8)   - inflammatory markers q48-72h  - Neg blood cultures so far

## 2021-03-10 NOTE — PHYSICAL THERAPY INITIAL EVALUATION ADULT - PLANNED THERAPY INTERVENTIONS, PT EVAL
GOAL: Pt will perform 12  stairs with or without U HR as needed within 3-4weeks./balance training/bed mobility training/gait training/transfer training

## 2021-03-10 NOTE — PROGRESS NOTE ADULT - SUBJECTIVE AND OBJECTIVE BOX
Patient is a 60y old  Male who presents with a chief complaint of hypoxia 2/2 COVID (10 Mar 2021 15:36)      SUBJECTIVE / OVERNIGHT EVENTS:    Patient was seen after walking with PT as he states with drop in his SPO2 post ambulation , but offers no complaints.  no fever     RR: 18 (10 Mar 2021 11:29) (17 - 20)  SpO2: 93% (10 Mar 2021 11:29) (90% - 95%)    ADDITIONAL REVIEW OF SYSTEMS: Negative except for above    MEDICATIONS  (STANDING):  dexAMETHasone  Injectable 6 milliGRAM(s) IV Push daily  dextrose 40% Gel 15 Gram(s) Oral once  dextrose 5%. 1000 milliLiter(s) (50 mL/Hr) IV Continuous <Continuous>  dextrose 5%. 1000 milliLiter(s) (100 mL/Hr) IV Continuous <Continuous>  dextrose 50% Injectable 25 Gram(s) IV Push once  dextrose 50% Injectable 12.5 Gram(s) IV Push once  dextrose 50% Injectable 25 Gram(s) IV Push once  enoxaparin Injectable 40 milliGRAM(s) SubCutaneous daily  glucagon  Injectable 1 milliGRAM(s) IntraMuscular once  influenza   Vaccine 0.5 milliLiter(s) IntraMuscular once  insulin glargine Injectable (LANTUS) 25 Unit(s) SubCutaneous at bedtime  insulin lispro (ADMELOG) corrective regimen sliding scale   SubCutaneous three times a day before meals  insulin lispro (ADMELOG) corrective regimen sliding scale   SubCutaneous at bedtime  insulin lispro Injectable (ADMELOG) 11 Unit(s) SubCutaneous three times a day before meals  remdesivir  IVPB 100 milliGRAM(s) IV Intermittent every 24 hours  remdesivir  IVPB   IV Intermittent     MEDICATIONS  (PRN):  acetaminophen   Tablet .. 650 milliGRAM(s) Oral every 4 hours PRN Mild Pain (1 - 3)      CAPILLARY BLOOD GLUCOSE      POCT Blood Glucose.: 313 mg/dL (10 Mar 2021 12:11)  POCT Blood Glucose.: 248 mg/dL (10 Mar 2021 07:49)  POCT Blood Glucose.: 333 mg/dL (10 Mar 2021 00:32)  POCT Blood Glucose.: 422 mg/dL (09 Mar 2021 21:07)  POCT Blood Glucose.: 448 mg/dL (09 Mar 2021 21:05)  POCT Blood Glucose.: 395 mg/dL (09 Mar 2021 16:31)  POCT Blood Glucose.: 402 mg/dL (09 Mar 2021 16:26)    I&O's Summary    09 Mar 2021 07:01  -  10 Mar 2021 07:00  --------------------------------------------------------  IN: 910 mL / OUT: 1550 mL / NET: -640 mL    10 Mar 2021 07:01  -  10 Mar 2021 15:52  --------------------------------------------------------  IN: 920 mL / OUT: 650 mL / NET: 270 mL        PHYSICAL EXAM:  Vital Signs Last 24 Hrs  T(C): 36.6 (10 Mar 2021 11:06), Max: 36.7 (10 Mar 2021 05:41)  T(F): 97.9 (10 Mar 2021 11:06), Max: 98.1 (10 Mar 2021 05:41)  HR: 94 (10 Mar 2021 11:29) (87 - 112)  BP: 136/77 (10 Mar 2021 11:06) (128/79 - 136/77)  BP(mean): --  RR: 18 (10 Mar 2021 11:29) (17 - 20)  SpO2: 93% (10 Mar 2021 11:29) (90% - 95%)    PHYSICAL EXAM:  GENERAL: NAD, well-developed  HEAD:  Atraumatic, Normocephalic  EYES:  conjunctiva and sclera clear  NECK: Supple, No JVD  CHEST/LUNG: Clear to auscultation bilaterally; No wheeze  HEART: Regular rate and rhythm; No murmurs, rubs, or gallops  ABDOMEN: Soft, Nontender, Nondistended; Bowel sounds present  EXTREMITIES:  2+ Peripheral Pulses, No clubbing, cyanosis, or edema  PSYCH: affect is appropriate   NEUROLOGY: non-focal, AAOx3  SKIN: No rashes or lesions      LABS:                        12.5   13.14 )-----------( 383      ( 10 Mar 2021 06:48 )             39.4     03-10    136  |  100  |  28<H>  ----------------------------<  278<H>  4.9   |  21<L>  |  0.73    Ca    9.3      10 Mar 2021 06:48  Phos  3.4     03-09  Mg     2.7     03-09    TPro  6.8  /  Alb  3.3  /  TBili  0.5  /  DBili  x   /  AST  37  /  ALT  39  /  AlkPhos  81  03-10              Culture - Blood (collected 08 Mar 2021 17:52)  Source: .Blood Blood-Peripheral  Preliminary Report (09 Mar 2021 18:02):    No growth to date.    Culture - Blood (collected 08 Mar 2021 17:52)  Source: .Blood Blood-Peripheral  Preliminary Report (09 Mar 2021 18:02):    No growth to date.        RADIOLOGY & ADDITIONAL TESTS:    Imaging Personally Reviewed:    Electrocardiogram Personally Reviewed:    COORDINATION OF CARE:  Care Discussed with Consultants/Other Providers [Y/N]:  Prior or Outpatient Records Reviewed [Y/N]:

## 2021-03-10 NOTE — PROGRESS NOTE ADULT - SUBJECTIVE AND OBJECTIVE BOX
Chief complaint  Patient is a 60y old  Male who presents with a chief complaint of hypoxia 2/2 COVID (09 Mar 2021 17:50)   Review of systems  Patient in bed, looks comfortable, no hypoglycemic episodes.    Labs and Fingersticks  CAPILLARY BLOOD GLUCOSE      POCT Blood Glucose.: 313 mg/dL (10 Mar 2021 12:11)  POCT Blood Glucose.: 248 mg/dL (10 Mar 2021 07:49)  POCT Blood Glucose.: 333 mg/dL (10 Mar 2021 00:32)  POCT Blood Glucose.: 422 mg/dL (09 Mar 2021 21:07)  POCT Blood Glucose.: 448 mg/dL (09 Mar 2021 21:05)  POCT Blood Glucose.: 395 mg/dL (09 Mar 2021 16:31)  POCT Blood Glucose.: 402 mg/dL (09 Mar 2021 16:26)      Anion Gap, Serum: 15 (03-10 @ 06:48)  Anion Gap, Serum: 15 (03-09 @ 05:38)      Calcium, Total Serum: 9.3 (03-10 @ 06:48)  Calcium, Total Serum: 9.2 (03-09 @ 05:38)  Albumin, Serum: 3.3 (03-10 @ 06:48)  Albumin, Serum: 3.4 (03-09 @ 05:38)    Alanine Aminotransferase (ALT/SGPT): 39 (03-10 @ 06:48)  Alanine Aminotransferase (ALT/SGPT): 33 (03-09 @ 05:38)  Alkaline Phosphatase, Serum: 81 (03-10 @ 06:48)  Alkaline Phosphatase, Serum: 72 (03-09 @ 05:38)  Aspartate Aminotransferase (AST/SGOT): 37 (03-10 @ 06:48)  Aspartate Aminotransferase (AST/SGOT): 42 <H> (03-09 @ 05:38)        03-10    136  |  100  |  28<H>  ----------------------------<  278<H>  4.9   |  21<L>  |  0.73    Ca    9.3      10 Mar 2021 06:48  Phos  3.4     03-09  Mg     2.7     03-09    TPro  6.8  /  Alb  3.3  /  TBili  0.5  /  DBili  x   /  AST  37  /  ALT  39  /  AlkPhos  81  03-10                        12.5   13.14 )-----------( 383      ( 10 Mar 2021 06:48 )             39.4     Medications  MEDICATIONS  (STANDING):  dexAMETHasone  Injectable 6 milliGRAM(s) IV Push daily  dextrose 40% Gel 15 Gram(s) Oral once  dextrose 5%. 1000 milliLiter(s) (50 mL/Hr) IV Continuous <Continuous>  dextrose 5%. 1000 milliLiter(s) (100 mL/Hr) IV Continuous <Continuous>  dextrose 50% Injectable 25 Gram(s) IV Push once  dextrose 50% Injectable 12.5 Gram(s) IV Push once  dextrose 50% Injectable 25 Gram(s) IV Push once  enoxaparin Injectable 40 milliGRAM(s) SubCutaneous daily  glucagon  Injectable 1 milliGRAM(s) IntraMuscular once  influenza   Vaccine 0.5 milliLiter(s) IntraMuscular once  insulin glargine Injectable (LANTUS) 25 Unit(s) SubCutaneous at bedtime  insulin lispro (ADMELOG) corrective regimen sliding scale   SubCutaneous three times a day before meals  insulin lispro (ADMELOG) corrective regimen sliding scale   SubCutaneous at bedtime  insulin lispro Injectable (ADMELOG) 11 Unit(s) SubCutaneous three times a day before meals  remdesivir  IVPB 100 milliGRAM(s) IV Intermittent every 24 hours  remdesivir  IVPB   IV Intermittent       Physical Exam  General: Patient comfortable in bed  Vital Signs Last 12 Hrs  T(F): 97.9 (03-10-21 @ 11:06), Max: 98.1 (03-10-21 @ 05:41)  HR: 94 (03-10-21 @ 11:29) (87 - 95)  BP: 136/77 (03-10-21 @ 11:06) (128/79 - 136/77)  BP(mean): --  RR: 18 (03-10-21 @ 11:29) (17 - 18)  SpO2: 93% (03-10-21 @ 11:29) (90% - 93%)  Neck: No palpable thyroid nodules.  CVS: S1S2, No murmurs  Respiratory: No wheezing, no crepitations  GI: Abdomen soft, bowel sounds positive  Musculoskeletal:  edema lower extremities.   Skin: No skin rashes, no ecchymosis    Diagnostics             Chief complaint  Patient is a 60y old  Male who presents with a chief complaint of hypoxia 2/2 COVID (09 Mar 2021 17:50)   Review of systems  Patient in bed, looks comfortable, no hypoglycemic episodes.    Labs and Fingersticks  CAPILLARY BLOOD GLUCOSE    POCT Blood Glucose.: 313 mg/dL (10 Mar 2021 12:11)  POCT Blood Glucose.: 248 mg/dL (10 Mar 2021 07:49)  POCT Blood Glucose.: 333 mg/dL (10 Mar 2021 00:32)  POCT Blood Glucose.: 422 mg/dL (09 Mar 2021 21:07)  POCT Blood Glucose.: 448 mg/dL (09 Mar 2021 21:05)  POCT Blood Glucose.: 395 mg/dL (09 Mar 2021 16:31)  POCT Blood Glucose.: 402 mg/dL (09 Mar 2021 16:26)      Anion Gap, Serum: 15 (03-10 @ 06:48)  Anion Gap, Serum: 15 (03-09 @ 05:38)      Calcium, Total Serum: 9.3 (03-10 @ 06:48)  Calcium, Total Serum: 9.2 (03-09 @ 05:38)  Albumin, Serum: 3.3 (03-10 @ 06:48)  Albumin, Serum: 3.4 (03-09 @ 05:38)    Alanine Aminotransferase (ALT/SGPT): 39 (03-10 @ 06:48)  Alanine Aminotransferase (ALT/SGPT): 33 (03-09 @ 05:38)  Alkaline Phosphatase, Serum: 81 (03-10 @ 06:48)  Alkaline Phosphatase, Serum: 72 (03-09 @ 05:38)  Aspartate Aminotransferase (AST/SGOT): 37 (03-10 @ 06:48)  Aspartate Aminotransferase (AST/SGOT): 42 <H> (03-09 @ 05:38)        03-10    136  |  100  |  28<H>  ----------------------------<  278<H>  4.9   |  21<L>  |  0.73    Ca    9.3      10 Mar 2021 06:48  Phos  3.4     03-09  Mg     2.7     03-09    TPro  6.8  /  Alb  3.3  /  TBili  0.5  /  DBili  x   /  AST  37  /  ALT  39  /  AlkPhos  81  03-10                        12.5   13.14 )-----------( 383      ( 10 Mar 2021 06:48 )             39.4     Medications  MEDICATIONS  (STANDING):  dexAMETHasone  Injectable 6 milliGRAM(s) IV Push daily  dextrose 40% Gel 15 Gram(s) Oral once  dextrose 5%. 1000 milliLiter(s) (50 mL/Hr) IV Continuous <Continuous>  dextrose 5%. 1000 milliLiter(s) (100 mL/Hr) IV Continuous <Continuous>  dextrose 50% Injectable 25 Gram(s) IV Push once  dextrose 50% Injectable 12.5 Gram(s) IV Push once  dextrose 50% Injectable 25 Gram(s) IV Push once  enoxaparin Injectable 40 milliGRAM(s) SubCutaneous daily  glucagon  Injectable 1 milliGRAM(s) IntraMuscular once  influenza   Vaccine 0.5 milliLiter(s) IntraMuscular once  insulin glargine Injectable (LANTUS) 25 Unit(s) SubCutaneous at bedtime  insulin lispro (ADMELOG) corrective regimen sliding scale   SubCutaneous three times a day before meals  insulin lispro (ADMELOG) corrective regimen sliding scale   SubCutaneous at bedtime  insulin lispro Injectable (ADMELOG) 11 Unit(s) SubCutaneous three times a day before meals  remdesivir  IVPB 100 milliGRAM(s) IV Intermittent every 24 hours  remdesivir  IVPB   IV Intermittent       Physical Exam  General: Patient comfortable in bed  Vital Signs Last 12 Hrs  T(F): 97.9 (03-10-21 @ 11:06), Max: 98.1 (03-10-21 @ 05:41)  HR: 94 (03-10-21 @ 11:29) (87 - 95)  BP: 136/77 (03-10-21 @ 11:06) (128/79 - 136/77)  BP(mean): --  RR: 18 (03-10-21 @ 11:29) (17 - 18)  SpO2: 93% (03-10-21 @ 11:29) (90% - 93%)  Neck: No palpable thyroid nodules.  CVS: S1S2, No murmurs  Respiratory: No wheezing, no crepitations  GI: Abdomen soft, bowel sounds positive  Musculoskeletal:  edema lower extremities.   Skin: No skin rashes, no ecchymosis    Diagnostics

## 2021-03-11 NOTE — PROGRESS NOTE ADULT - ASSESSMENT
Assessment  DMT2: 60y Male with newly diagnosed DM T2 with hyperglycemia admitted with COVID19 , A1C is 12.4%, now on basal bolus insulin, increased dose yesterday, blood sugars are improving and trending within acceptable range today, no hypoglycemic episodes. Patient is eating meals, appears alert and comfortable, remains on dexamethasone.  COVID19: On medications, monitored, stable.  Overweight: No strict exercise routines, neither on low calorie diet.      Anderson Mcmanus MD  Cell: [phone]  Office: [phone]       Assessment  DMT2: 60y Male with newly diagnosed DM T2 with hyperglycemia admitted with COVID19 , A1C is 12.4%, now on basal bolus insulin,  increased dose yesterday, blood sugars are improving and trending within acceptable range today, no hypoglycemic episodes. Patient is eating meals, appears alert and comfortable, remains on dexamethasone.  COVID19: On medications, monitored, stable.  Overweight: No strict exercise routines, neither on low calorie diet.      Anderson Mcmanus MD  Cell: [phone]  Office: [phone]

## 2021-03-11 NOTE — PROGRESS NOTE ADULT - SUBJECTIVE AND OBJECTIVE BOX
Chief complaint  Patient is a 60y old  Male who presents with a chief complaint of hypoxia 2/2 COVID (11 Mar 2021 09:18)   Review of systems  Patient in bed, looks comfortable, no hypoglycemic episodes.    Labs and Fingersticks  CAPILLARY BLOOD GLUCOSE      POCT Blood Glucose.: 169 mg/dL (11 Mar 2021 12:07)  POCT Blood Glucose.: 158 mg/dL (11 Mar 2021 07:28)  POCT Blood Glucose.: 234 mg/dL (10 Mar 2021 21:25)  POCT Blood Glucose.: 285 mg/dL (10 Mar 2021 16:34)      Anion Gap, Serum: 16 (03-11 @ 06:40)  Anion Gap, Serum: 15 (03-10 @ 06:48)      Calcium, Total Serum: 9.2 (03-11 @ 06:40)  Calcium, Total Serum: 9.3 (03-10 @ 06:48)  Albumin, Serum: 3.3 (03-11 @ 06:40)  Albumin, Serum: 3.3 (03-10 @ 06:48)    Alanine Aminotransferase (ALT/SGPT): 34 (03-11 @ 06:40)  Alanine Aminotransferase (ALT/SGPT): 39 (03-10 @ 06:48)  Alkaline Phosphatase, Serum: 92 (03-11 @ 06:40)  Alkaline Phosphatase, Serum: 81 (03-10 @ 06:48)  Aspartate Aminotransferase (AST/SGOT): 35 (03-11 @ 06:40)  Aspartate Aminotransferase (AST/SGOT): 37 (03-10 @ 06:48)        03-11    136  |  99  |  21  ----------------------------<  124<H>  4.2   |  21<L>  |  0.66    Ca    9.2      11 Mar 2021 06:40    TPro  6.8  /  Alb  3.3  /  TBili  0.6  /  DBili  x   /  AST  35  /  ALT  34  /  AlkPhos  92  03-11                        12.6   11.31 )-----------( 425      ( 11 Mar 2021 06:42 )             39.8     Medications  MEDICATIONS  (STANDING):  dexAMETHasone  Injectable 6 milliGRAM(s) IV Push daily  dextrose 40% Gel 15 Gram(s) Oral once  dextrose 5%. 1000 milliLiter(s) (50 mL/Hr) IV Continuous <Continuous>  dextrose 5%. 1000 milliLiter(s) (100 mL/Hr) IV Continuous <Continuous>  dextrose 50% Injectable 25 Gram(s) IV Push once  dextrose 50% Injectable 12.5 Gram(s) IV Push once  dextrose 50% Injectable 25 Gram(s) IV Push once  enoxaparin Injectable 40 milliGRAM(s) SubCutaneous daily  glucagon  Injectable 1 milliGRAM(s) IntraMuscular once  influenza   Vaccine 0.5 milliLiter(s) IntraMuscular once  insulin glargine Injectable (LANTUS) 25 Unit(s) SubCutaneous at bedtime  insulin lispro (ADMELOG) corrective regimen sliding scale   SubCutaneous three times a day before meals  insulin lispro (ADMELOG) corrective regimen sliding scale   SubCutaneous at bedtime  insulin lispro Injectable (ADMELOG) 11 Unit(s) SubCutaneous three times a day before meals  remdesivir  IVPB 100 milliGRAM(s) IV Intermittent every 24 hours  remdesivir  IVPB   IV Intermittent       Physical Exam  General: Patient comfortable in bed  Vital Signs Last 12 Hrs  T(F): 98.5 (03-11-21 @ 10:56), Max: 98.5 (03-11-21 @ 10:56)  HR: 95 (03-11-21 @ 10:56) (95 - 111)  BP: 113/76 (03-11-21 @ 10:56) (113/76 - 137/83)  BP(mean): --  RR: 17 (03-11-21 @ 10:56) (17 - 20)  SpO2: 91% (03-11-21 @ 10:56) (91% - 95%)  Neck: No palpable thyroid nodules.  CVS: S1S2, No murmurs  Respiratory: No wheezing, no crepitations  GI: Abdomen soft, bowel sounds positive  Musculoskeletal:  edema lower extremities.   Skin: No skin rashes, no ecchymosis    Diagnostics             Chief complaint  Patient is a 60y old  Male who presents with a chief complaint of hypoxia 2/2 COVID (11 Mar 2021 09:18)   Review of systems  Patient in bed, looks comfortable, no hypoglycemic episodes.    Labs and Fingersticks  CAPILLARY BLOOD GLUCOSE      POCT Blood Glucose.: 169 mg/dL (11 Mar 2021 12:07)  POCT Blood Glucose.: 158 mg/dL (11 Mar 2021 07:28)  POCT Blood Glucose.: 234 mg/dL (10 Mar 2021 21:25)  POCT Blood Glucose.: 285 mg/dL (10 Mar 2021 16:34)      Anion Gap, Serum: 16 (03-11 @ 06:40)  Anion Gap, Serum: 15 (03-10 @ 06:48)      Calcium, Total Serum: 9.2 (03-11 @ 06:40)  Calcium, Total Serum: 9.3 (03-10 @ 06:48)  Albumin, Serum: 3.3 (03-11 @ 06:40)  Albumin, Serum: 3.3 (03-10 @ 06:48)    Alanine Aminotransferase (ALT/SGPT): 34 (03-11 @ 06:40)  Alanine Aminotransferase (ALT/SGPT): 39 (03-10 @ 06:48)  Alkaline Phosphatase, Serum: 92 (03-11 @ 06:40)  Alkaline Phosphatase, Serum: 81 (03-10 @ 06:48)  Aspartate Aminotransferase (AST/SGOT): 35 (03-11 @ 06:40)  Aspartate Aminotransferase (AST/SGOT): 37 (03-10 @ 06:48)        03-11    136  |  99  |  21  ----------------------------<  124<H>  4.2   |  21<L>  |  0.66    Ca    9.2      11 Mar 2021 06:40    TPro  6.8  /  Alb  3.3  /  TBili  0.6  /  DBili  x   /  AST  35  /  ALT  34  /  AlkPhos  92  03-11                        12.6   11.31 )-----------( 425      ( 11 Mar 2021 06:42 )             39.8     Medications  MEDICATIONS  (STANDING):  dexAMETHasone  Injectable 6 milliGRAM(s) IV Push daily  dextrose 40% Gel 15 Gram(s) Oral once  dextrose 5%. 1000 milliLiter(s) (50 mL/Hr) IV Continuous <Continuous>  dextrose 5%. 1000 milliLiter(s) (100 mL/Hr) IV Continuous <Continuous>  dextrose 50% Injectable 25 Gram(s) IV Push once  dextrose 50% Injectable 12.5 Gram(s) IV Push once  dextrose 50% Injectable 25 Gram(s) IV Push once  enoxaparin Injectable 40 milliGRAM(s) SubCutaneous daily  glucagon  Injectable 1 milliGRAM(s) IntraMuscular once  influenza   Vaccine 0.5 milliLiter(s) IntraMuscular once  insulin glargine Injectable (LANTUS) 25 Unit(s) SubCutaneous at bedtime  insulin lispro (ADMELOG) corrective regimen sliding scale   SubCutaneous three times a day before meals  insulin lispro (ADMELOG) corrective regimen sliding scale   SubCutaneous at bedtime  insulin lispro Injectable (ADMELOG) 11 Unit(s) SubCutaneous three times a day before meals  remdesivir  IVPB 100 milliGRAM(s) IV Intermittent every 24 hours  remdesivir  IVPB   IV Intermittent       Physical Exam  General: Patient comfortable in bed  Vital Signs Last 12 Hrs  T(F): 98.5 (03-11-21 @ 10:56), Max: 98.5 (03-11-21 @ 10:56)  HR: 95 (03-11-21 @ 10:56) (95 - 111)  BP: 113/76 (03-11-21 @ 10:56) (113/76 - 137/83)  BP(mean): --  RR: 17 (03-11-21 @ 10:56) (17 - 20)  SpO2: 91% (03-11-21 @ 10:56) (91% - 95%)  Neck: No palpable thyroid nodules.  CVS: S1S2, No murmurs  Respiratory: No wheezing, no crepitations  GI: Abdomen soft, bowel sounds positive  Musculoskeletal:  edema lower extremities.   Skin: No skin rashes, no ecchymosis    Diagnostics

## 2021-03-11 NOTE — PROGRESS NOTE ADULT - PROBLEM SELECTOR PLAN 3
- likely has underlying diabetes  - hyperglycemia will worsen with decadron---> now improving   - A1C = 12.4   - Lantus and Admelog is started   - monitor blood glucose   - Endo consult with Dr Mcmanus is appreciated    - cont slidding scale   - Nutrition and DM educator consults

## 2021-03-11 NOTE — PROGRESS NOTE ADULT - PROBLEM SELECTOR PLAN 1
Will continue current insulin regimen for now. Will continue monitoring FS and FU, will adjust insulin dose as steroids are tapered/dc.  Patient with newly dx DM, A1C 12.4%. His insulin requirement is large while on IV steroids, will continue monitoring and make recommendations for outpatient management prior to DC.  Patient counseled for compliance with consistent low carb diet and exercise as tolerated outpatient.

## 2021-03-11 NOTE — CHART NOTE - NSCHARTNOTEFT_GEN_A_CORE
Nutrition Initial Assessment    Nutrition Consult Received: Yes [ x ]  No [   ]    Reason for Initial Nutrition Assessment: Consult for new T2DM diet education and Pt seen for routine length of stay.     Source of Information: Unable to conduct a face to face interview due to limited contact restrictions related to pt's medical condition and isolation precautions. Information obtained from a phone call with the pt and from the EMR.    Admitting Diagnosis: 59 yo male w/no known pmh (does not f/u with PCP) presents to [facility] for cc fevers, weakness, fatigue, shortness of breath for 5 days. Tested positive for covid 3 days ago at an urgent care. Per Endocrinology consult, newly diagnosed DM with A1C of 12%      PAST MEDICAL & SURGICAL HISTORY:  No pertinent past medical history    No significant past surgical history        Subjective Information: Noted pt on HFNC, slightly difficult to hear pt clearly through the phone. Pt reports fair appetite and PO intake PTA. Pt reports fair appetite and PO intake here, eating a little more than 50% of most meals. Has been drinking 1 sugar free mighty shake per day. Pt does not follow any diet restrictions at home. Reports NKFA. Diet recall obtained. Pt reports taking multivitamin at home.    Pt denies any nausea, vomiting, diarrhea or constipation.  Reports last BM was on 3/11.  Pt denies any difficulty chewing or swallowing.     Pt reports UBW is ~158. Denies any significant wt loss or wt gain.  Pt's dosing wt is 146.7 lbs. Discussed difference in UBW with pt, reported he believes he weighs more than his dosing weight.     Pt declined additional oral nutritional supplement.    Pt deferred verbal nutrition education, amenable to receiving nutrition therapy handouts. Provided patient with Carbohydrate Counting for People with Diabetes and Diabetes Label Reading Tips handouts.        Current Nutrition Order: Diet, Consistent Carbohydrate/No Snacks (03-08-21 @ 14:40)    PO Intake:  Fair (50-75%) [ x  ]       Skin Integrity: Per flow sheets, there is no edema documented at this time.   Per flow sheets, there are no pressure injuries documented at this time.     Labs:   03-11 Na136 mmol/L Glu 124 mg/dL<H> K+ 4.2 mmol/L Cr  0.66 mg/dL BUN 21 mg/dL Phos n/a   Alb 3.3 g/dL PAB n/a     3/9/21: HbA1c of 12%, average  mg/dL    POCT Blood Glucose.: 169 mg/dL (03-11-21 @ 12:07)  POCT Blood Glucose.: 158 mg/dL (03-11-21 @ 07:28)  POCT Blood Glucose.: 234 mg/dL (03-10-21 @ 21:25)  POCT Blood Glucose.: 285 mg/dL (03-10-21 @ 16:34)    Noted A1C of 12%, indicative of poor glycemic control.  Noted elevated POC BG, continue to monitor and correct as needed.  Pt is on steroid treatment, may affect BG levels.     Medications:  MEDICATIONS  (STANDING):  dexAMETHasone  Injectable 6 milliGRAM(s) IV Push daily  glucagon  Injectable 1 milliGRAM(s) IntraMuscular once  insulin glargine Injectable (LANTUS) 25 Unit(s) SubCutaneous at bedtime  insulin lispro (ADMELOG) corrective regimen sliding scale   SubCutaneous three times a day before meals  insulin lispro (ADMELOG) corrective regimen sliding scale   SubCutaneous at bedtime  insulin lispro Injectable (ADMELOG) 11 Unit(s) SubCutaneous three times a day before meals  remdesivir  IVPB 100 milliGRAM(s) IV Intermittent every 24 hours  remdesivir  IVPB   IV Intermittent     Admitted Anthropometrics:    Height (cm): 167.6 (03-08-21 @ 18:47)  Weight (kg): 66.7 (03-08-21 @ 18:47)  BMI (kg/m2): 23.7 (03-08-21 @ 18:47)    Nutrition Focused Physical Exam: Unable to complete due to limited isolation contact precautions at this time.     Estimated Energy Needs (25 kcal/kg- 30 kcal/kg): 1670 kcals - 2000 kcals   Estimated Protein Needs (1 g/kg- 1.2 g/kg): 67 g - 80 g  Based on dosing weight of: 66.6 kg    [ x ] Nutrition Diagnosis: Altered nutrition related labs related to endocrine dysfunction, newly diagnosed Diabetes as evidenced by HbA1c of 12%.    Goal: Pt able to teach back 2 points of nutrition education provided    Nutrition Interventions/Recommendations:  1) Continue with current diet order as tolerated   2) Continue with sugar free mighty shakes   3) Assess patient's understanding of diabetic diet and provided handouts upon follow up   4) Monitor PO intake, diet tolerance, weight trends, labs, GI function, and skin integrity.     RD to follow-up per protocol.  Payal Carlson RD pager #: [phone] Nutrition Initial Assessment    Nutrition Consult Received: Yes [ x ]  No [   ]    Reason for Initial Nutrition Assessment: Consult for new T2DM diet education and Pt seen for routine length of stay.     Source of Information: Unable to conduct a face to face interview due to limited contact restrictions related to pt's medical condition and isolation precautions. Information obtained from a phone call with the pt and from the EMR.    Admitting Diagnosis: 61 yo male w/no known pmh (does not f/u with PCP) presents to [facility] for cc fevers, weakness, fatigue, shortness of breath for 5 days. Tested positive for covid 3 days ago at an urgent care. Per Endocrinology consult, newly diagnosed DM with A1C of 12%      PAST MEDICAL & SURGICAL HISTORY:  No pertinent past medical history    No significant past surgical history        Subjective Information: Noted pt on HFNC, slightly difficult to hear pt clearly through the phone. Pt reports fair appetite and PO intake PTA. Pt reports fair appetite and PO intake here, eating a little more than 50% of most meals. Has been drinking 1 sugar free mighty shake per day. Pt does not follow any diet restrictions at home. Reports NKFA. Diet recall obtained. Pt reports taking multivitamin at home.    Pt denies any nausea, vomiting, diarrhea or constipation.  Reports last BM was on 3/11.  Pt denies any difficulty chewing or swallowing.     Pt reports UBW is ~158. Denies any significant wt loss or wt gain.  Pt's dosing wt is 146.7 lbs. Discussed difference in UBW with pt, reported he believes he weighs more than his dosing weight.     Pt declined additional oral nutritional supplement.    Pt deferred verbal nutrition education, amenable to receiving nutrition therapy handouts. Provided patient with Carbohydrate Counting for People with Diabetes and Diabetes Label Reading Tips handouts.        Current Nutrition Order: Diet, Consistent Carbohydrate/No Snacks (03-08-21 @ 14:40)    PO Intake:  Fair (50-75%) [ x  ]       Skin Integrity: Per flow sheets, there is no edema documented at this time.   Per flow sheets, there are no pressure injuries documented at this time.     Labs:   03-11 Na136 mmol/L Glu 124 mg/dL<H> K+ 4.2 mmol/L Cr  0.66 mg/dL BUN 21 mg/dL Phos n/a   Alb 3.3 g/dL PAB n/a     3/9/21: HbA1c of 12%, average  mg/dL    POCT Blood Glucose.: 169 mg/dL (03-11-21 @ 12:07)  POCT Blood Glucose.: 158 mg/dL (03-11-21 @ 07:28)  POCT Blood Glucose.: 234 mg/dL (03-10-21 @ 21:25)  POCT Blood Glucose.: 285 mg/dL (03-10-21 @ 16:34)    Noted A1C of 12%, indicative of poor glycemic control.  Noted elevated POC BG, continue to monitor and correct as needed.  Pt is on steroid treatment, may affect BG levels.     Medications:  MEDICATIONS  (STANDING):  dexAMETHasone  Injectable 6 milliGRAM(s) IV Push daily  glucagon  Injectable 1 milliGRAM(s) IntraMuscular once  insulin glargine Injectable (LANTUS) 25 Unit(s) SubCutaneous at bedtime  insulin lispro (ADMELOG) corrective regimen sliding scale   SubCutaneous three times a day before meals  insulin lispro (ADMELOG) corrective regimen sliding scale   SubCutaneous at bedtime  insulin lispro Injectable (ADMELOG) 11 Unit(s) SubCutaneous three times a day before meals  remdesivir  IVPB 100 milliGRAM(s) IV Intermittent every 24 hours  remdesivir  IVPB   IV Intermittent     Admitted Anthropometrics:    Height (cm): 167.6 (03-08-21 @ 18:47)  Weight (kg): 66.7 (03-08-21 @ 18:47)  BMI (kg/m2): 23.7 (03-08-21 @ 18:47)  IBW: 142 lbs   % IBW: 103.3%    Nutrition Focused Physical Exam: Unable to complete due to limited isolation contact precautions at this time.     Estimated Energy Needs (25 kcal/kg- 30 kcal/kg): 1670 kcals - 2000 kcals   Estimated Protein Needs (1 g/kg- 1.2 g/kg): 67 g - 80 g  Based on dosing weight of: 66.6 kg    [ x ] Nutrition Diagnosis: Altered nutrition related labs related to endocrine dysfunction, newly diagnosed Diabetes as evidenced by HbA1c of 12%.    Goal: Pt able to teach back 2 points of nutrition education provided    Nutrition Interventions/Recommendations:  1) Continue with current diet order as tolerated   2) Continue with sugar free mighty shakes   3) Assess patient's understanding of diabetic diet and provided handouts upon follow up   4) Monitor PO intake, diet tolerance, weight trends, labs, GI function, and skin integrity.     RD to follow-up per protocol.  Payal Carlson RD pager #: [phone]

## 2021-03-11 NOTE — PROGRESS NOTE ADULT - PROBLEM SELECTOR PLAN 2
- cont HFNC with increasing FIO2 ( now 100%)  ; IF  SPO2 <90 ---> BIPAP  IF improves  titrate down as tolerated   - TOciluzimab was ordered and Rx ( Sameet ) called who states that based on the Criteria , patient is out of the window of opportunity for Toci and not approved   Will call Pulm in AM if not improved   - Patient is full code

## 2021-03-11 NOTE — PROGRESS NOTE ADULT - SUBJECTIVE AND OBJECTIVE BOX
Patient is a 60y old  Male who presents with a chief complaint of hypoxia 2/2 COVID (11 Mar 2021 09:18)      SUBJECTIVE / OVERNIGHT EVENTS:    Patient was seen and offers no complaints.  He is still in need of high O2 supplement  Via HFNC       ADDITIONAL REVIEW OF SYSTEMS: Negative except for above    MEDICATIONS  (STANDING):  dexAMETHasone  Injectable 6 milliGRAM(s) IV Push daily  dextrose 40% Gel 15 Gram(s) Oral once  dextrose 5%. 1000 milliLiter(s) (50 mL/Hr) IV Continuous <Continuous>  dextrose 5%. 1000 milliLiter(s) (100 mL/Hr) IV Continuous <Continuous>  dextrose 50% Injectable 25 Gram(s) IV Push once  dextrose 50% Injectable 12.5 Gram(s) IV Push once  dextrose 50% Injectable 25 Gram(s) IV Push once  enoxaparin Injectable 40 milliGRAM(s) SubCutaneous daily  glucagon  Injectable 1 milliGRAM(s) IntraMuscular once  influenza   Vaccine 0.5 milliLiter(s) IntraMuscular once  insulin glargine Injectable (LANTUS) 25 Unit(s) SubCutaneous at bedtime  insulin lispro (ADMELOG) corrective regimen sliding scale   SubCutaneous three times a day before meals  insulin lispro (ADMELOG) corrective regimen sliding scale   SubCutaneous at bedtime  insulin lispro Injectable (ADMELOG) 11 Unit(s) SubCutaneous three times a day before meals  remdesivir  IVPB 100 milliGRAM(s) IV Intermittent every 24 hours  remdesivir  IVPB   IV Intermittent     MEDICATIONS  (PRN):  acetaminophen   Tablet .. 650 milliGRAM(s) Oral every 4 hours PRN Mild Pain (1 - 3)      CAPILLARY BLOOD GLUCOSE      POCT Blood Glucose.: 169 mg/dL (11 Mar 2021 12:07)  POCT Blood Glucose.: 158 mg/dL (11 Mar 2021 07:28)  POCT Blood Glucose.: 234 mg/dL (10 Mar 2021 21:25)  POCT Blood Glucose.: 285 mg/dL (10 Mar 2021 16:34)    I&O's Summary    10 Mar 2021 07:01  -  11 Mar 2021 07:00  --------------------------------------------------------  IN: 1400 mL / OUT: 1150 mL / NET: 250 mL        PHYSICAL EXAM:  Vital Signs Last 24 Hrs  T(C): 36.9 (11 Mar 2021 10:56), Max: 36.9 (10 Mar 2021 20:46)  T(F): 98.5 (11 Mar 2021 10:56), Max: 98.5 (11 Mar 2021 10:56)  HR: 95 (11 Mar 2021 10:56) (85 - 111)  BP: 113/76 (11 Mar 2021 10:56) (113/76 - 137/83)  BP(mean): --  RR: 17 (11 Mar 2021 10:56) (17 - 20)  SpO2: 91% (11 Mar 2021 10:56) (83% - 96%)    PHYSICAL EXAM:  GENERAL: NAD, well-developed  HEAD:  Atraumatic, Normocephalic  EYES:  conjunctiva and sclera clear  NECK: Supple, No JVD  CHEST/LUNG: Clear to auscultation bilaterally; No wheeze  HEART: Regular rate and rhythm; No murmurs, rubs, or gallops  ABDOMEN: Soft, Nontender, Nondistended; Bowel sounds present  EXTREMITIES:  2+ Peripheral Pulses, No clubbing, cyanosis, or edema  PSYCH: affect is appropriate   NEUROLOGY: non-focal, AAOx3      LABS:                        12.6   11.31 )-----------( 425      ( 11 Mar 2021 06:42 )             39.8     03-11    136  |  99  |  21  ----------------------------<  124<H>  4.2   |  21<L>  |  0.66    Ca    9.2      11 Mar 2021 06:40    TPro  6.8  /  Alb  3.3  /  TBili  0.6  /  DBili  x   /  AST  35  /  ALT  34  /  AlkPhos  92  03-11              Culture - Blood (collected 08 Mar 2021 17:52)  Source: .Blood Blood-Peripheral  Preliminary Report (09 Mar 2021 18:02):    No growth to date.    Culture - Blood (collected 08 Mar 2021 17:52)  Source: .Blood Blood-Peripheral  Preliminary Report (09 Mar 2021 18:02):    No growth to date.        RADIOLOGY & ADDITIONAL TESTS:    Imaging Personally Reviewed:    Electrocardiogram Personally Reviewed:    COORDINATION OF CARE:  Care Discussed with Consultants/Other Providers [Y/N]:  Prior or Outpatient Records Reviewed [Y/N]:     Patient is a 60y old  Male who presents with a chief complaint of hypoxia 2/2 COVID (11 Mar 2021 09:18)      SUBJECTIVE / OVERNIGHT EVENTS:    Patient was seen and offers no complaints.  He is still in need of high O2 supplement  Via HFNC but decreasing to 90% from 100%.        ADDITIONAL REVIEW OF SYSTEMS: Negative except for above    MEDICATIONS  (STANDING):  dexAMETHasone  Injectable 6 milliGRAM(s) IV Push daily  dextrose 40% Gel 15 Gram(s) Oral once  dextrose 5%. 1000 milliLiter(s) (50 mL/Hr) IV Continuous <Continuous>  dextrose 5%. 1000 milliLiter(s) (100 mL/Hr) IV Continuous <Continuous>  dextrose 50% Injectable 25 Gram(s) IV Push once  dextrose 50% Injectable 12.5 Gram(s) IV Push once  dextrose 50% Injectable 25 Gram(s) IV Push once  enoxaparin Injectable 40 milliGRAM(s) SubCutaneous daily  glucagon  Injectable 1 milliGRAM(s) IntraMuscular once  influenza   Vaccine 0.5 milliLiter(s) IntraMuscular once  insulin glargine Injectable (LANTUS) 25 Unit(s) SubCutaneous at bedtime  insulin lispro (ADMELOG) corrective regimen sliding scale   SubCutaneous three times a day before meals  insulin lispro (ADMELOG) corrective regimen sliding scale   SubCutaneous at bedtime  insulin lispro Injectable (ADMELOG) 11 Unit(s) SubCutaneous three times a day before meals  remdesivir  IVPB 100 milliGRAM(s) IV Intermittent every 24 hours  remdesivir  IVPB   IV Intermittent     MEDICATIONS  (PRN):  acetaminophen   Tablet .. 650 milliGRAM(s) Oral every 4 hours PRN Mild Pain (1 - 3)      CAPILLARY BLOOD GLUCOSE      POCT Blood Glucose.: 169 mg/dL (11 Mar 2021 12:07)  POCT Blood Glucose.: 158 mg/dL (11 Mar 2021 07:28)  POCT Blood Glucose.: 234 mg/dL (10 Mar 2021 21:25)  POCT Blood Glucose.: 285 mg/dL (10 Mar 2021 16:34)    I&O's Summary    10 Mar 2021 07:01  -  11 Mar 2021 07:00  --------------------------------------------------------  IN: 1400 mL / OUT: 1150 mL / NET: 250 mL        PHYSICAL EXAM:  Vital Signs Last 24 Hrs  T(C): 36.9 (11 Mar 2021 10:56), Max: 36.9 (10 Mar 2021 20:46)  T(F): 98.5 (11 Mar 2021 10:56), Max: 98.5 (11 Mar 2021 10:56)  HR: 95 (11 Mar 2021 10:56) (85 - 111)  BP: 113/76 (11 Mar 2021 10:56) (113/76 - 137/83)  BP(mean): --  RR: 17 (11 Mar 2021 10:56) (17 - 20)  SpO2: 91% (11 Mar 2021 10:56) (83% - 96%)    PHYSICAL EXAM:  GENERAL: NAD, well-developed  HEAD:  Atraumatic, Normocephalic  EYES:  conjunctiva and sclera clear  NECK: Supple, No JVD  CHEST/LUNG: Clear to auscultation bilaterally; No wheeze  HEART: Regular rate and rhythm; No murmurs, rubs, or gallops  ABDOMEN: Soft, Nontender, Nondistended; Bowel sounds present  EXTREMITIES:  2+ Peripheral Pulses, No clubbing, cyanosis, or edema  PSYCH: affect is appropriate   NEUROLOGY: non-focal, AAOx3      LABS:                        12.6   11.31 )-----------( 425      ( 11 Mar 2021 06:42 )             39.8     03-11    136  |  99  |  21  ----------------------------<  124<H>  4.2   |  21<L>  |  0.66    Ca    9.2      11 Mar 2021 06:40    TPro  6.8  /  Alb  3.3  /  TBili  0.6  /  DBili  x   /  AST  35  /  ALT  34  /  AlkPhos  92  03-11              Culture - Blood (collected 08 Mar 2021 17:52)  Source: .Blood Blood-Peripheral  Preliminary Report (09 Mar 2021 18:02):    No growth to date.    Culture - Blood (collected 08 Mar 2021 17:52)  Source: .Blood Blood-Peripheral  Preliminary Report (09 Mar 2021 18:02):    No growth to date.        RADIOLOGY & ADDITIONAL TESTS:    Imaging Personally Reviewed:    Electrocardiogram Personally Reviewed:    COORDINATION OF CARE:  Care Discussed with Consultants/Other Providers [Y/N]:  Prior or Outpatient Records Reviewed [Y/N]:

## 2021-03-12 NOTE — CONSULT NOTE ADULT - SUBJECTIVE AND OBJECTIVE BOX
PULMONARY CONSULT    HPI: 61 y/o M with no significant PMH. Presents with c/o fevers, weakness, fatigue, SOB for 5 days. Tested positive for COVID 3 days prior to admission at an urgent care. Day of admission presented again to urgent care and noted to be hypoxic and sent to the ER.     PAST MEDICAL & SURGICAL HISTORY:  No pertinent past medical history  No significant past surgical history    Allergies  No Known Allergies    FAMILY HISTORY:  No pertinent family history in first degree relatives      Social history:     Review of Systems:  CONSTITUTIONAL: Per above   EYES: No eye pain, visual disturbances, or discharge  ENMT:  No difficulty hearing, tinnitus, vertigo; No sinus or throat pain  NECK: No pain or stiffness  RESPIRATORY: Per above  CARDIOVASCULAR: No chest pain, palpitations, dizziness, or leg swelling  GASTROINTESTINAL: No abdominal or epigastric pain. No nausea, vomiting, or hematemesis; No diarrhea or constipation. No melena or hematochezia.  GENITOURINARY: No dysuria, frequency, hematuria, or incontinence  NEUROLOGICAL: No headaches, memory loss, loss of strength, numbness, or tremors  SKIN: No itching, burning, rashes, or lesions   MUSCULOSKELETAL: No joint pain or swelling; No muscle, back, or extremity pain  PSYCHIATRIC: No depression, anxiety, mood swings, or difficulty sleeping      Medications:  MEDICATIONS  (STANDING):  dexAMETHasone  Injectable 6 milliGRAM(s) IV Push daily  dextrose 40% Gel 15 Gram(s) Oral once  dextrose 5%. 1000 milliLiter(s) (50 mL/Hr) IV Continuous <Continuous>  dextrose 5%. 1000 milliLiter(s) (100 mL/Hr) IV Continuous <Continuous>  dextrose 50% Injectable 25 Gram(s) IV Push once  dextrose 50% Injectable 12.5 Gram(s) IV Push once  dextrose 50% Injectable 25 Gram(s) IV Push once  enoxaparin Injectable 60 milliGRAM(s) SubCutaneous every 12 hours  glucagon  Injectable 1 milliGRAM(s) IntraMuscular once  influenza   Vaccine 0.5 milliLiter(s) IntraMuscular once  insulin glargine Injectable (LANTUS) 25 Unit(s) SubCutaneous at bedtime  insulin lispro (ADMELOG) corrective regimen sliding scale   SubCutaneous three times a day before meals  insulin lispro (ADMELOG) corrective regimen sliding scale   SubCutaneous at bedtime  insulin lispro Injectable (ADMELOG) 11 Unit(s) SubCutaneous three times a day before meals  remdesivir  IVPB 100 milliGRAM(s) IV Intermittent every 24 hours  remdesivir  IVPB   IV Intermittent     MEDICATIONS  (PRN):  acetaminophen   Tablet .. 650 milliGRAM(s) Oral every 4 hours PRN Mild Pain (1 - 3)  ALBUTerol    90 MICROgram(s) HFA Inhaler 2 Puff(s) Inhalation every 6 hours PRN Shortness of Breath and/or Wheezing            Vital Signs Last 24 Hrs  T(C): 37.1 (12 Mar 2021 04:36), Max: 37.1 (12 Mar 2021 04:36)  T(F): 98.8 (12 Mar 2021 04:36), Max: 98.8 (12 Mar 2021 04:36)  HR: 118 (12 Mar 2021 04:36) (90 - 118)  BP: 110/67 (12 Mar 2021 04:36) (110/67 - 134/84)  BP(mean): --  RR: 21 (12 Mar 2021 10:01) (18 - 21)  SpO2: 97% (12 Mar 2021 10:01) (78% - 97%)            03-11 @ 07:01  -  03-12 @ 07:00  --------------------------------------------------------  IN: 1090 mL / OUT: 1075 mL / NET: 15 mL          LABS:                        12.7   11.31 )-----------( 398      ( 12 Mar 2021 05:22 )             40.0     03-12    136  |  101  |  20  ----------------------------<  119<H>  4.6   |  23  |  0.70    Ca    9.2      12 Mar 2021 05:27    TPro  6.8  /  Alb  3.3  /  TBili  0.7  /  DBili  x   /  AST  35  /  ALT  31  /  AlkPhos  95  03-12          CAPILLARY BLOOD GLUCOSE      POCT Blood Glucose.: 149 mg/dL (12 Mar 2021 07:32)          CULTURES:     (collected 03-08-21 @ 17:52)  Source: .Blood Blood-Peripheral  Preliminary Report (03-09-21 @ 18:02):    No growth to date.     (collected 03-08-21 @ 17:52)  Source: .Blood Blood-Peripheral  Preliminary Report (03-09-21 @ 18:02):    No growth to date.        Physical Examination:    General: No acute distress.      HEENT: Pupils equal, reactive to light.  Symmetric.    PULM:     CVS: S1, S2    ABD: Soft, nondistended, nontender, normoactive bowel sounds, no masses    EXT: No edema, nontender    SKIN: Warm and well perfused, no rashes noted.    NEURO: Alert, oriented, interactive, nonfocal      RADIOLOGY REVIEWED  CXR 3/8: b/l LL opacities  PULMONARY CONSULT    HPI: 59 y/o M with no significant PMH. Presents with c/o fevers, weakness, fatigue, SOB for 5 days. Tested positive for COVID 3 days prior to admission at an urgent care. Day of admission presented again to urgent care and noted to be hypoxic and sent to the ER and placed on HFNC. CXR with b/l LL opacities. Now with significant increase in ddimer. States SOB improving since admission. Denies CP, pleuritic CP.     PAST MEDICAL & SURGICAL HISTORY:  No pertinent past medical history  No significant past surgical history    Allergies  No Known Allergies    FAMILY HISTORY:  No pertinent family history in first degree relatives    Social history: former smoker - quit 30 years ago     Review of Systems:  CONSTITUTIONAL: Per above   EYES: No eye pain, visual disturbances, or discharge  ENMT:  No difficulty hearing, tinnitus, vertigo; No sinus or throat pain  NECK: No pain or stiffness  RESPIRATORY: Per above  CARDIOVASCULAR: No chest pain, palpitations, dizziness, or leg swelling  GASTROINTESTINAL: No abdominal or epigastric pain. No nausea, vomiting, or hematemesis; No diarrhea or constipation. No melena or hematochezia.  GENITOURINARY: No dysuria, frequency, hematuria, or incontinence  NEUROLOGICAL: No headaches, memory loss, loss of strength, numbness, or tremors  SKIN: No itching, burning, rashes, or lesions   MUSCULOSKELETAL: No joint pain or swelling; No muscle, back, or extremity pain  PSYCHIATRIC: No depression, anxiety, mood swings, or difficulty sleeping      Medications:  MEDICATIONS  (STANDING):  dexAMETHasone  Injectable 6 milliGRAM(s) IV Push daily  dextrose 40% Gel 15 Gram(s) Oral once  dextrose 5%. 1000 milliLiter(s) (50 mL/Hr) IV Continuous <Continuous>  dextrose 5%. 1000 milliLiter(s) (100 mL/Hr) IV Continuous <Continuous>  dextrose 50% Injectable 25 Gram(s) IV Push once  dextrose 50% Injectable 12.5 Gram(s) IV Push once  dextrose 50% Injectable 25 Gram(s) IV Push once  enoxaparin Injectable 60 milliGRAM(s) SubCutaneous every 12 hours  glucagon  Injectable 1 milliGRAM(s) IntraMuscular once  influenza   Vaccine 0.5 milliLiter(s) IntraMuscular once  insulin glargine Injectable (LANTUS) 25 Unit(s) SubCutaneous at bedtime  insulin lispro (ADMELOG) corrective regimen sliding scale   SubCutaneous three times a day before meals  insulin lispro (ADMELOG) corrective regimen sliding scale   SubCutaneous at bedtime  insulin lispro Injectable (ADMELOG) 11 Unit(s) SubCutaneous three times a day before meals  remdesivir  IVPB 100 milliGRAM(s) IV Intermittent every 24 hours  remdesivir  IVPB   IV Intermittent     MEDICATIONS  (PRN):  acetaminophen   Tablet .. 650 milliGRAM(s) Oral every 4 hours PRN Mild Pain (1 - 3)  ALBUTerol    90 MICROgram(s) HFA Inhaler 2 Puff(s) Inhalation every 6 hours PRN Shortness of Breath and/or Wheezing            Vital Signs Last 24 Hrs  T(C): 37.1 (12 Mar 2021 04:36), Max: 37.1 (12 Mar 2021 04:36)  T(F): 98.8 (12 Mar 2021 04:36), Max: 98.8 (12 Mar 2021 04:36)  HR: 118 (12 Mar 2021 04:36) (90 - 118)  BP: 110/67 (12 Mar 2021 04:36) (110/67 - 134/84)  BP(mean): --  RR: 21 (12 Mar 2021 10:01) (18 - 21)  SpO2: 97% (12 Mar 2021 10:01) (78% - 97%)            03-11 @ 07:01  -  03-12 @ 07:00  --------------------------------------------------------  IN: 1090 mL / OUT: 1075 mL / NET: 15 mL          LABS:                        12.7   11.31 )-----------( 398      ( 12 Mar 2021 05:22 )             40.0     03-12    136  |  101  |  20  ----------------------------<  119<H>  4.6   |  23  |  0.70    Ca    9.2      12 Mar 2021 05:27    TPro  6.8  /  Alb  3.3  /  TBili  0.7  /  DBili  x   /  AST  35  /  ALT  31  /  AlkPhos  95  03-12          CAPILLARY BLOOD GLUCOSE      POCT Blood Glucose.: 149 mg/dL (12 Mar 2021 07:32)          CULTURES:     (collected 03-08-21 @ 17:52)  Source: .Blood Blood-Peripheral  Preliminary Report (03-09-21 @ 18:02):    No growth to date.     (collected 03-08-21 @ 17:52)  Source: .Blood Blood-Peripheral  Preliminary Report (03-09-21 @ 18:02):    No growth to date.        Physical Examination:    General: No acute distress.      HEENT: Pupils equal, reactive to light.  Symmetric.    PULM: trace bibasilar crackles     CVS: RRR    ABD: Soft, nondistended, nontender, normoactive bowel sounds, no masses    EXT: No edema, nontender    SKIN: Warm and well perfused, no rashes noted.    NEURO: Alert, oriented, interactive, nonfocal      RADIOLOGY REVIEWED  CXR 3/8: b/l LL opacities

## 2021-03-12 NOTE — CHART NOTE - NSCHARTNOTEFT_GEN_A_CORE
Called by radiologist reported CTA of chest results    < from: CT Angio Chest w/ IV Cont (03.12.21 @ 15:19) >    1. Right lower lobe medial segmental pulmonary embolism.    2. Diffuse bilateral groundglass opacities, septal thickening, bronchiectasis, representative of lung injury.      Dr. Frank made aware. pt is already on Lovenox 60 mg BID therapeutic dose. Continue Lovenox therapeutic dose.     Nikia Musa NP-C  #[id number]

## 2021-03-12 NOTE — PROGRESS NOTE ADULT - PROBLEM SELECTOR PLAN 3
- likely has underlying diabetes  - hyperglycemia will worsen with decadron---> now improving   - A1C = 12.4   - Lantus and Admelog is started   - monitor blood glucose   - Endo consult with Dr Mcmanus is appreciated    - cont slidding scale   - Nutrition and DM educator consults - NEW DM   - hyperglycemia will worsen with decadron---> now improving   - A1C = 12.4   - Cw Lantus and Admelog/ enDO IS F/UP .  MIght need to adjust post prandial if continued to be high   - monitor blood glucose   - Endo consult with Dr Mcmanus is appreciated    - cont slidding scale   - Nutrition and DM educator consults

## 2021-03-12 NOTE — PROGRESS NOTE ADULT - ASSESSMENT
59 yo male w/no known pmh presents with hypoxic respiratory failure due to covid pneumonia.  59 yo male w/no known pmh presents with hypoxic respiratory failure due to covid pneumonia now c/b Rt lung segmental PE.

## 2021-03-12 NOTE — CONSULT NOTE ADULT - ASSESSMENT
59 y/o M with no significant PMH. Presents with c/o fevers, weakness, fatigue, SOB for 5 days. Tested positive for COVID 3 days prior to admission at an urgent care. Day of admission presented again to urgent care and noted to be hypoxic and sent to the ER and placed on HFNC. CXR with b/l LL opacities. Now with significant increase in ddimer. Remains on HFNC.

## 2021-03-12 NOTE — PROGRESS NOTE ADULT - PROBLEM SELECTOR PLAN 2
- cont HFNC with increasing FIO2 ( now 100%)  ; IF  SPO2 <90 ---> BIPAP  IF improves  titrate down as tolerated   - TOciluzimab was ordered and Rx ( Sameet ) called who states that based on the Criteria , patient is out of the window of opportunity for Toci and not approved   Will call Pulm in AM if not improved   - Patient is full code - cont HFNC with increasing FIO2---> NRB   - TOciluzimab was ordered and Rx ( Sameet ) called who states that based on the Criteria , patient is out of the window of opportunity for Toci and not approved   - Pulm consult is called   - Patient is full code  - NEW RT PE  Chest CT with Bronchiectasis / Fibrosis / Pos H/O Cigarette smoking for many years and stopped about 10 years ago

## 2021-03-12 NOTE — PROGRESS NOTE ADULT - PROBLEM SELECTOR PLAN 1
- presented with sepsis due to covid syndrome  - remdesivir x5 days ( start on 3/8)   - decadron x10 days ( start on 3/8)   - inflammatory markers q48-72h  - Neg blood cultures so far - presented with sepsis due to severe covid infection  - remdesivir x5 days ( start on 3/8)   - decadron x10 days ( start on 3/8)   - inflammatory markers q48-72h  - Neg blood cultures so far

## 2021-03-12 NOTE — PROGRESS NOTE ADULT - ASSESSMENT
Assessment  DMT2: 60y Male with newly diagnosed DM T2 with hyperglycemia admitted with COVID19 , A1C is 12.4%, now on basal bolus insulin, bolus dose held this morning explaining postprandial hyperglycemia, blood sugars are otherwise improving, no hypoglycemic episodes, eating meals, remains on dexamethasone.  COVID19: On medications, monitored, stable.  Overweight: No strict exercise routines, neither on low calorie diet.      Anderson Mcmanus MD  Cell: [phone]  Office: [phone]       Assessment  DMT2: 60y Male with newly diagnosed DM T2 with hyperglycemia admitted with COVID19 , A1C is 12.4%, now on basal bolus insulin, bolus dose held this morning  explaining postprandial hyperglycemia, blood sugars are otherwise improving, no hypoglycemic episodes, eating meals, remains on dexamethasone.  COVID19: On medications, monitored, stable.  Overweight: No strict exercise routines, neither on low calorie diet.      Anderson Mcmanus MD  Cell: [phone]  Office: [phone]

## 2021-03-12 NOTE — PROGRESS NOTE ADULT - SUBJECTIVE AND OBJECTIVE BOX
Patient is a 60y old  Male who presents with a chief complaint of hypoxia 2/2 COVID (11 Mar 2021 16:03)      SUBJECTIVE / OVERNIGHT EVENTS:    Tele reviewed:       ADDITIONAL REVIEW OF SYSTEMS: Negative except for above    MEDICATIONS  (STANDING):  dexAMETHasone  Injectable 6 milliGRAM(s) IV Push daily  dextrose 40% Gel 15 Gram(s) Oral once  dextrose 5%. 1000 milliLiter(s) (50 mL/Hr) IV Continuous <Continuous>  dextrose 5%. 1000 milliLiter(s) (100 mL/Hr) IV Continuous <Continuous>  dextrose 50% Injectable 25 Gram(s) IV Push once  dextrose 50% Injectable 12.5 Gram(s) IV Push once  dextrose 50% Injectable 25 Gram(s) IV Push once  enoxaparin Injectable 60 milliGRAM(s) SubCutaneous every 12 hours  glucagon  Injectable 1 milliGRAM(s) IntraMuscular once  influenza   Vaccine 0.5 milliLiter(s) IntraMuscular once  insulin glargine Injectable (LANTUS) 25 Unit(s) SubCutaneous at bedtime  insulin lispro (ADMELOG) corrective regimen sliding scale   SubCutaneous three times a day before meals  insulin lispro (ADMELOG) corrective regimen sliding scale   SubCutaneous at bedtime  insulin lispro Injectable (ADMELOG) 11 Unit(s) SubCutaneous three times a day before meals  remdesivir  IVPB   IV Intermittent   remdesivir  IVPB 100 milliGRAM(s) IV Intermittent every 24 hours    MEDICATIONS  (PRN):  acetaminophen   Tablet .. 650 milliGRAM(s) Oral every 4 hours PRN Mild Pain (1 - 3)  ALBUTerol    90 MICROgram(s) HFA Inhaler 2 Puff(s) Inhalation every 6 hours PRN Shortness of Breath and/or Wheezing      CAPILLARY BLOOD GLUCOSE      POCT Blood Glucose.: 149 mg/dL (12 Mar 2021 07:32)  POCT Blood Glucose.: 219 mg/dL (11 Mar 2021 21:20)  POCT Blood Glucose.: 213 mg/dL (11 Mar 2021 16:33)  POCT Blood Glucose.: 169 mg/dL (11 Mar 2021 12:07)    I&O's Summary    11 Mar 2021 07:01  -  12 Mar 2021 07:00  --------------------------------------------------------  IN: 1090 mL / OUT: 1075 mL / NET: 15 mL        PHYSICAL EXAM:  Vital Signs Last 24 Hrs  T(C): 37.1 (12 Mar 2021 04:36), Max: 37.1 (12 Mar 2021 04:36)  T(F): 98.8 (12 Mar 2021 04:36), Max: 98.8 (12 Mar 2021 04:36)  HR: 118 (12 Mar 2021 04:36) (90 - 118)  BP: 110/67 (12 Mar 2021 04:36) (110/67 - 134/84)  BP(mean): --  RR: 21 (12 Mar 2021 10:01) (17 - 21)  SpO2: 97% (12 Mar 2021 10:01) (78% - 97%)    PHYSICAL EXAM:        LABS:                        12.7   11.31 )-----------( 398      ( 12 Mar 2021 05:22 )             40.0     03-12    136  |  101  |  20  ----------------------------<  119<H>  4.6   |  23  |  0.70    Ca    9.2      12 Mar 2021 05:27    TPro  6.8  /  Alb  3.3  /  TBili  0.7  /  DBili  x   /  AST  35  /  ALT  31  /  AlkPhos  95  03-12                RADIOLOGY & ADDITIONAL TESTS:    Imaging Personally Reviewed:    Electrocardiogram Personally Reviewed:    COORDINATION OF CARE:  Care Discussed with Consultants/Other Providers [Y/N]:  Prior or Outpatient Records Reviewed [Y/N]:     Patient is a 60y old  Male who presents with a chief complaint of hypoxia 2/2 COVID (11 Mar 2021 16:03)      SUBJECTIVE / OVERNIGHT EVENTS:   Patient is seen in AM , he looks comfortable but still need O2 VIa NRB with saturation now >90   SpO2: 97% (12 Mar 2021 10:01) (78% - 97%) ( SPO2 dropped to 78% on HFNC ( 40/90% ) on 3/11      ADDITIONAL REVIEW OF SYSTEMS: Negative except for above    MEDICATIONS  (STANDING):  dexAMETHasone  Injectable 6 milliGRAM(s) IV Push daily  dextrose 40% Gel 15 Gram(s) Oral once  dextrose 5%. 1000 milliLiter(s) (50 mL/Hr) IV Continuous <Continuous>  dextrose 5%. 1000 milliLiter(s) (100 mL/Hr) IV Continuous <Continuous>  dextrose 50% Injectable 25 Gram(s) IV Push once  dextrose 50% Injectable 12.5 Gram(s) IV Push once  dextrose 50% Injectable 25 Gram(s) IV Push once  enoxaparin Injectable 60 milliGRAM(s) SubCutaneous every 12 hours  glucagon  Injectable 1 milliGRAM(s) IntraMuscular once  influenza   Vaccine 0.5 milliLiter(s) IntraMuscular once  insulin glargine Injectable (LANTUS) 25 Unit(s) SubCutaneous at bedtime  insulin lispro (ADMELOG) corrective regimen sliding scale   SubCutaneous three times a day before meals  insulin lispro (ADMELOG) corrective regimen sliding scale   SubCutaneous at bedtime  insulin lispro Injectable (ADMELOG) 11 Unit(s) SubCutaneous three times a day before meals  remdesivir  IVPB   IV Intermittent   remdesivir  IVPB 100 milliGRAM(s) IV Intermittent every 24 hours    MEDICATIONS  (PRN):  acetaminophen   Tablet .. 650 milliGRAM(s) Oral every 4 hours PRN Mild Pain (1 - 3)  ALBUTerol    90 MICROgram(s) HFA Inhaler 2 Puff(s) Inhalation every 6 hours PRN Shortness of Breath and/or Wheezing      CAPILLARY BLOOD GLUCOSE      POCT Blood Glucose.: 149 mg/dL (12 Mar 2021 07:32)  POCT Blood Glucose.: 219 mg/dL (11 Mar 2021 21:20)  POCT Blood Glucose.: 213 mg/dL (11 Mar 2021 16:33)  POCT Blood Glucose.: 169 mg/dL (11 Mar 2021 12:07)    I&O's Summary    11 Mar 2021 07:01  -  12 Mar 2021 07:00  --------------------------------------------------------  IN: 1090 mL / OUT: 1075 mL / NET: 15 mL        PHYSICAL EXAM:  Vital Signs Last 24 Hrs  T(C): 37.1 (12 Mar 2021 04:36), Max: 37.1 (12 Mar 2021 04:36)  T(F): 98.8 (12 Mar 2021 04:36), Max: 98.8 (12 Mar 2021 04:36)  HR: 118 (12 Mar 2021 04:36) (90 - 118)  BP: 110/67 (12 Mar 2021 04:36) (110/67 - 134/84)  BP(mean): --  RR: 21 (12 Mar 2021 10:01) (17 - 21)  SpO2: 97% (12 Mar 2021 10:01) (78% - 97%)    PHYSICAL EXAM:  GENERAL: NAD, well-developed  HEAD:  Atraumatic, Normocephalic  EYES:  conjunctiva and sclera clear  NECK: Supple, No JVD  CHEST/LUNG: Clear to auscultation bilaterally; No wheeze  HEART: Regular rate and rhythm; No murmurs, rubs, or gallops  ABDOMEN: Soft, Nontender, Nondistended; Bowel sounds present  EXTREMITIES:  2+ Peripheral Pulses, No clubbing, cyanosis, or edema  PSYCH: affect is appropriate   NEUROLOGY: non-focal, AAOx3      LABS:                        12.7   11.31 )-----------( 398      ( 12 Mar 2021 05:22 )             40.0     03-12    136  |  101  |  20  ----------------------------<  119<H>  4.6   |  23  |  0.70    Ca    9.2      12 Mar 2021 05:27    TPro  6.8  /  Alb  3.3  /  TBili  0.7  /  DBili  x   /  AST  35  /  ALT  31  /  AlkPhos  95  03-12                RADIOLOGY & ADDITIONAL TESTS:    Imaging Personally Reviewed:    Electrocardiogram Personally Reviewed:    COORDINATION OF CARE:  Care Discussed with Consultants/Other Providers [Y/N]:  Prior or Outpatient Records Reviewed [Y/N]:

## 2021-03-12 NOTE — PROGRESS NOTE ADULT - SUBJECTIVE AND OBJECTIVE BOX
Chief complaint  Patient is a 60y old  Male who presents with a chief complaint of hypoxia 2/2 COVID (12 Mar 2021 10:59)   Review of systems  Patient in bed, looks comfortable, no hypoglycemic episodes.    Labs and Fingersticks  CAPILLARY BLOOD GLUCOSE      POCT Blood Glucose.: 278 mg/dL (12 Mar 2021 12:03)  POCT Blood Glucose.: 149 mg/dL (12 Mar 2021 07:32)  POCT Blood Glucose.: 219 mg/dL (11 Mar 2021 21:20)  POCT Blood Glucose.: 213 mg/dL (11 Mar 2021 16:33)      Anion Gap, Serum: 12 (03-12 @ 05:27)  Anion Gap, Serum: 16 (03-11 @ 06:40)      Calcium, Total Serum: 9.2 (03-12 @ 05:27)  Calcium, Total Serum: 9.2 (03-11 @ 06:40)  Albumin, Serum: 3.3 (03-12 @ 05:27)  Albumin, Serum: 3.3 (03-11 @ 06:40)    Alanine Aminotransferase (ALT/SGPT): 31 (03-12 @ 05:27)  Alanine Aminotransferase (ALT/SGPT): 34 (03-11 @ 06:40)  Alkaline Phosphatase, Serum: 95 (03-12 @ 05:27)  Alkaline Phosphatase, Serum: 92 (03-11 @ 06:40)  Aspartate Aminotransferase (AST/SGOT): 35 (03-12 @ 05:27)  Aspartate Aminotransferase (AST/SGOT): 35 (03-11 @ 06:40)        03-12    136  |  101  |  20  ----------------------------<  119<H>  4.6   |  23  |  0.70    Ca    9.2      12 Mar 2021 05:27    TPro  6.8  /  Alb  3.3  /  TBili  0.7  /  DBili  x   /  AST  35  /  ALT  31  /  AlkPhos  95  03-12                        12.7   11.31 )-----------( 398      ( 12 Mar 2021 05:22 )             40.0     Medications  MEDICATIONS  (STANDING):  dexAMETHasone  Injectable 6 milliGRAM(s) IV Push daily  dextrose 40% Gel 15 Gram(s) Oral once  dextrose 5%. 1000 milliLiter(s) (50 mL/Hr) IV Continuous <Continuous>  dextrose 5%. 1000 milliLiter(s) (100 mL/Hr) IV Continuous <Continuous>  dextrose 50% Injectable 25 Gram(s) IV Push once  dextrose 50% Injectable 12.5 Gram(s) IV Push once  dextrose 50% Injectable 25 Gram(s) IV Push once  enoxaparin Injectable 60 milliGRAM(s) SubCutaneous every 12 hours  glucagon  Injectable 1 milliGRAM(s) IntraMuscular once  influenza   Vaccine 0.5 milliLiter(s) IntraMuscular once  insulin glargine Injectable (LANTUS) 25 Unit(s) SubCutaneous at bedtime  insulin lispro (ADMELOG) corrective regimen sliding scale   SubCutaneous three times a day before meals  insulin lispro (ADMELOG) corrective regimen sliding scale   SubCutaneous at bedtime  insulin lispro Injectable (ADMELOG) 11 Unit(s) SubCutaneous three times a day before meals  remdesivir  IVPB 100 milliGRAM(s) IV Intermittent every 24 hours  remdesivir  IVPB   IV Intermittent       Physical Exam  General: Patient comfortable in bed  Vital Signs Last 12 Hrs  T(F): 97.2 (03-12-21 @ 11:53), Max: 98.8 (03-12-21 @ 04:36)  HR: 101 (03-12-21 @ 11:53) (101 - 118)  BP: 113/77 (03-12-21 @ 11:53) (110/67 - 113/77)  BP(mean): --  RR: 20 (03-12-21 @ 11:53) (20 - 21)  SpO2: 92% (03-12-21 @ 11:53) (91% - 97%)  Neck: No palpable thyroid nodules.  CVS: S1S2, No murmurs  Respiratory: No wheezing, no crepitations  GI: Abdomen soft, bowel sounds positive  Musculoskeletal:  edema lower extremities.   Skin: No skin rashes, no ecchymosis    Diagnostics             Chief complaint  Patient is a 60y old  Male who presents with a chief complaint of hypoxia 2/2 COVID (12 Mar 2021 10:59)   Review of systems  Patient in bed, looks comfortable, no hypoglycemic episodes.    Labs and Fingersticks  CAPILLARY BLOOD GLUCOSE      POCT Blood Glucose.: 278 mg/dL (12 Mar 2021 12:03)  POCT Blood Glucose.: 149 mg/dL (12 Mar 2021 07:32)  POCT Blood Glucose.: 219 mg/dL (11 Mar 2021 21:20)  POCT Blood Glucose.: 213 mg/dL (11 Mar 2021 16:33)      Anion Gap, Serum: 12 (03-12 @ 05:27)  Anion Gap, Serum: 16 (03-11 @ 06:40)      Calcium, Total Serum: 9.2 (03-12 @ 05:27)  Calcium, Total Serum: 9.2 (03-11 @ 06:40)  Albumin, Serum: 3.3 (03-12 @ 05:27)  Albumin, Serum: 3.3 (03-11 @ 06:40)    Alanine Aminotransferase (ALT/SGPT): 31 (03-12 @ 05:27)  Alanine Aminotransferase (ALT/SGPT): 34 (03-11 @ 06:40)  Alkaline Phosphatase, Serum: 95 (03-12 @ 05:27)  Alkaline Phosphatase, Serum: 92 (03-11 @ 06:40)  Aspartate Aminotransferase (AST/SGOT): 35 (03-12 @ 05:27)  Aspartate Aminotransferase (AST/SGOT): 35 (03-11 @ 06:40)        03-12    136  |  101  |  20  ----------------------------<  119<H>  4.6   |  23  |  0.70    Ca    9.2      12 Mar 2021 05:27    TPro  6.8  /  Alb  3.3  /  TBili  0.7  /  DBili  x   /  AST  35  /  ALT  31  /  AlkPhos  95  03-12                        12.7   11.31 )-----------( 398      ( 12 Mar 2021 05:22 )             40.0     Medications  MEDICATIONS  (STANDING):  dexAMETHasone  Injectable 6 milliGRAM(s) IV Push daily  dextrose 40% Gel 15 Gram(s) Oral once  dextrose 5%. 1000 milliLiter(s) (50 mL/Hr) IV Continuous <Continuous>  dextrose 5%. 1000 milliLiter(s) (100 mL/Hr) IV Continuous <Continuous>  dextrose 50% Injectable 25 Gram(s) IV Push once  dextrose 50% Injectable 12.5 Gram(s) IV Push once  dextrose 50% Injectable 25 Gram(s) IV Push once  enoxaparin Injectable 60 milliGRAM(s) SubCutaneous every 12 hours  glucagon  Injectable 1 milliGRAM(s) IntraMuscular once  influenza   Vaccine 0.5 milliLiter(s) IntraMuscular once  insulin glargine Injectable (LANTUS) 25 Unit(s) SubCutaneous at bedtime  insulin lispro (ADMELOG) corrective regimen sliding scale   SubCutaneous three times a day before meals  insulin lispro (ADMELOG) corrective regimen sliding scale   SubCutaneous at bedtime  insulin lispro Injectable (ADMELOG) 11 Unit(s) SubCutaneous three times a day before meals  remdesivir  IVPB 100 milliGRAM(s) IV Intermittent every 24 hours  remdesivir  IVPB   IV Intermittent       Physical Exam  General: Patient comfortable in bed  Vital Signs Last 12 Hrs  T(F): 97.2 (03-12-21 @ 11:53), Max: 98.8 (03-12-21 @ 04:36)  HR: 101 (03-12-21 @ 11:53) (101 - 118)  BP: 113/77 (03-12-21 @ 11:53) (110/67 - 113/77)  BP(mean): --  RR: 20 (03-12-21 @ 11:53) (20 - 21)  SpO2: 92% (03-12-21 @ 11:53) (91% - 97%)  Neck: No palpable thyroid nodules.  CVS: S1S2, No murmurs  Respiratory: No wheezing, no crepitations  GI: Abdomen soft, bowel sounds positive  Musculoskeletal:  edema lower extremities.   Skin: No skin rashes, no ecchymosis    Diagnostics

## 2021-03-12 NOTE — PROGRESS NOTE ADULT - PROBLEM SELECTOR PLAN 4
- lovenox qd  - diet dash Pt is on Lovenox 60mg SQ twice a day   F/up TTE   Pulm consult is called   Neg LLE Doppler

## 2021-03-12 NOTE — CHART NOTE - NSCHARTNOTEFT_GEN_A_CORE
MEDICINE NP    TAMARA CHEW  60y Male    Patient is a 60y old  Male who presents with a chief complaint of hypoxia 2/2 COVID (11 Mar 2021 16:03)       > Event Summary:  Notified by RN, Patient with recurrent Hypoxia SpO2 76-84% on HFNC 40L/90%  Patient seen at bedside, AOX4, denies sob, cough, feverish, pain.    Respiration even and unlabored.  Lungs w/ rhonchi  at bases  -increased HFNC 55L/100% for rpt SpO2 92%  -Prone PRN   -Incentive Spirometer prn  -Albuterol INH now & PRN  -C/w Current regimen of Remdesivir and Decadron iv   -Can consider Pulmonary consult  -ICU consult if condition deteriorates   -Will c/w close monitoring and f/u with day team        -Vital Signs Last 24 Hrs  T(C): 36.8 (11 Mar 2021 20:28), Max: 36.9 (11 Mar 2021 04:50)  T(F): 98.2 (11 Mar 2021 20:28), Max: 98.5 (11 Mar 2021 10:56)  HR: 100 (11 Mar 2021 22:55) (90 - 111)  BP: 134/84 (11 Mar 2021 20:28) (113/76 - 137/83)  RR: 20 (11 Mar 2021 23:26) (17 - 20)  SpO2: 92% (11 Mar 2021 23:26) (78% - 96%)        HIRO Olivo-BC  Medicine Department  #[id number]

## 2021-03-12 NOTE — CONSULT NOTE ADULT - PROBLEM SELECTOR RECOMMENDATION 2
2nd to COVID PNA 2nd to COVID PNA  -C/w HFNC 55L/100%, wean O2 as tolerated, keep sats >90%  -If worsening hypoxia, may require bipap  -Prone/side lying positioning as tolerated

## 2021-03-12 NOTE — PROGRESS NOTE ADULT - PROBLEM SELECTOR PROBLEM 3
Hyperglycemia Type 2 diabetes mellitus without complication, without long-term current use of insulin

## 2021-03-12 NOTE — CONSULT NOTE ADULT - PROBLEM SELECTOR RECOMMENDATION 9
Will start Lantus 20 units at bed time.  Will start Admelog 8 units before each meal in addition to Admelog correction scale coverage.  Patient counseled for compliance with consistent low carb diet.
+COVID PCR  -CXR b/l LL opacities   -Remdesivir x 5 days (monitor creatinine, LFTs)  -Decadron 6mg qd x 10 days   -Not a candidate for Tocilizumab at this point   -Trend inflammatory markers. Ddimer significantly uptrended - suggest start full dose AC with Lovenox. Check LE duplex, CTA chest (if able to tolerate 100% NRB)

## 2021-03-12 NOTE — PROGRESS NOTE ADULT - PROBLEM SELECTOR PLAN 1
Will continue current insulin regimen for now. Will continue monitoring FS and FU, will adjust insulin dose as steroids are tapered/dc.  Patient with newly dx DM, A1C 12.4%. His insulin requirement is large while on IV steroids. Suggest to start insulin administration teaching, will likely DC on insulin. Will continue monitoring and make recommendations for outpatient management prior to DC.  Patient counseled for compliance with consistent low carb diet and exercise as tolerated outpatient.

## 2021-03-13 NOTE — RAPID RESPONSE TEAM SUMMARY - NSADDTLFINDINGSRRT_GEN_ALL_CORE
During the RRT the patient was noted to have a spo2 on 100% and 60 Lit of High flow of 79%, Temp reported was 102, hrt rate 120's and rr high 30's.

## 2021-03-13 NOTE — CHART NOTE - NSCHARTNOTEFT_GEN_A_CORE
RRT was called for Hypoxia O2 sat dropped to 79% on Hiflow NC 60L/100%, HR upto 130s.   Noted + fever 102.9 rectally. s/p Tylenol 1 gm IV X1, blood cultures, UA and urine culture, CXR ordered during RRT.   pt was put on BIPAP 6/12, 100% and in prone position, O2 sat now 97%    Vital Signs Last 24 Hrs  T(C): 37.1 (13 Mar 2021 11:51), Max: 37.1 (13 Mar 2021 11:51)  T(F): 98.7 (13 Mar 2021 11:51), Max: 98.7 (13 Mar 2021 11:51)  HR: 78 (13 Mar 2021 18:09) (78 - 111)  BP: 126/82 (13 Mar 2021 11:51) (126/82 - 146/90)  BP(mean): --  RR: 20 (13 Mar 2021 16:49) (18 - 24)  SpO2: 95% (13 Mar 2021 18:09) (87% - 95%)      Labs:                          13.0   13.62 )-----------( 386      ( 13 Mar 2021 06:15 )             41.1     03-13    136  |  100  |  20  ----------------------------<  124<H>  4.5   |  22  |  0.69    Ca    9.2      13 Mar 2021 06:15    TPro  6.7  /  Alb  3.1<L>  /  TBili  0.8  /  DBili  x   /  AST  35  /  ALT  26  /  AlkPhos  117  03-13    Radiology:  < from: CT Angio Chest w/ IV Cont (03.12.21 @ 15:19) >    IMPRESSION:      1. Right lower lobe medial segmental pulmonary embolism.    2. Diffuse bilateral groundglass opacities, septal thickening, bronchiectasis, representative of lung injury.    < end of copied text >    Physical Exam:  General: prone position and on Bipap  Neurology: A&Ox3, nonfocal, JOHNSON x 4  Head:  Normocephalic, atraumatic  Respiratory: B/L decreased  CV: RRR, S1S2, no murmur  Abdominal: Soft, NT, ND no palpable mass  MSK: No edema, + peripheral pulses, FROM all 4 extremity    Assessment & Plan:  61 yo male w/no known pmh (does not f/u with PCP) presents to [facility] for cc fevers, weakness, fatigue, shortness of breath for 5 days. Tested positive for covid 3 days ago at an urgent care.   Admitted with Hypoxic respiratory failure 2/2 COVID pneumonia. Found to have uncontrolled DM with hyperglycemia. Also Right lower lobe medical segmental PE positive on CTA on 3/13.  s/p Remdesivir 5 days, currently on Dexamethasone 6 mg IV daily, on therapeutic Lovenox 70 mg BID.   Hypoxia O2 sat down to 79% on Hiflow 60L/100% with fever     - s/p Tylenol 1gm IV X1  - Blood cultures, UA, Urine cultures, procal sent during RRT- follow up with the results   - Check CXR  - Zosyn X1 dose during RRT, will continue Zosyn for now and Vancomycin 1 gm IV X1 as discussed with Dr. Schwarz   - Continue BIPAP 6/12/ 100% now  - Continue Lovenox full dose for PE  - Continue to monitor on Continuous pulseox   - ICU consulted for hypoxia   - case discussed with Dr. Chong Musa NP-C  #[id number]

## 2021-03-13 NOTE — RAPID RESPONSE TEAM SUMMARY - NSSITUATIONBACKGROUNDRRT_GEN_ALL_CORE
59 yo male w/no known pmh presents with hypoxic respiratory failure due to covid pneumonia now c/b Rt lung segmental PE.  Today an RRT was called for Hypoxia with associated fever of 102.

## 2021-03-13 NOTE — PROGRESS NOTE ADULT - PROBLEM SELECTOR PLAN 2
- cont HFNC  - TOciluzimab was ordered and Rx ( Sameet ) called who states that based on the Criteria , patient is out of the window of opportunity for Toci and not approved   - Pulm consult is called   - Patient is full code  - NEW RT PE-on full dose lovenox  Chest CT with Bronchiectasis / Fibrosis / Pos H/O Cigarette smoking for many years and stopped about 10 years ago

## 2021-03-13 NOTE — PROGRESS NOTE ADULT - ASSESSMENT
61 yo male w/no known pmh presents with hypoxic respiratory failure due to covid pneumonia now c/b Rt lung segmental PE.

## 2021-03-13 NOTE — PROGRESS NOTE ADULT - PROBLEM SELECTOR PLAN 3
- NEW DM   - hyperglycemia will worsen with decadron---> now improving   - A1C = 12.4   - Cw Lantus and Admelog/ enDO IS F/UP .  MIght need to adjust post prandial if continued to be high   - monitor blood glucose   - Endo consult with Dr Mcmanus is appreciated    - cont sliding scale   - Nutrition and DM educator consults

## 2021-03-13 NOTE — PROGRESS NOTE ADULT - SUBJECTIVE AND OBJECTIVE BOX
Chief complaint  Patient is a 60y old  Male who presents with a chief complaint of hypoxia 2/2 COVID (13 Mar 2021 11:42)   Review of systems  Patient in bed, appears comfortable.    Labs and Fingersticks  CAPILLARY BLOOD GLUCOSE    POCT Blood Glucose.: 133 mg/dL (13 Mar 2021 12:02)  POCT Blood Glucose.: 106 mg/dL (13 Mar 2021 07:51)  POCT Blood Glucose.: 230 mg/dL (12 Mar 2021 21:54)  POCT Blood Glucose.: 282 mg/dL (12 Mar 2021 16:43)      Anion Gap, Serum: 14 (03-13 @ 06:15)  Anion Gap, Serum: 12 (03-12 @ 05:27)      Calcium, Total Serum: 9.2 (03-13 @ 06:15)  Calcium, Total Serum: 9.2 (03-12 @ 05:27)  Albumin, Serum: 3.1 <L> (03-13 @ 06:15)  Albumin, Serum: 3.3 (03-12 @ 05:27)    Alanine Aminotransferase (ALT/SGPT): 26 (03-13 @ 06:15)  Alanine Aminotransferase (ALT/SGPT): 31 (03-12 @ 05:27)  Alkaline Phosphatase, Serum: 117 (03-13 @ 06:15)  Alkaline Phosphatase, Serum: 95 (03-12 @ 05:27)  Aspartate Aminotransferase (AST/SGOT): 35 (03-13 @ 06:15)  Aspartate Aminotransferase (AST/SGOT): 35 (03-12 @ 05:27)        03-13    136  |  100  |  20  ----------------------------<  124<H>  4.5   |  22  |  0.69    Ca    9.2      13 Mar 2021 06:15    TPro  6.7  /  Alb  3.1<L>  /  TBili  0.8  /  DBili  x   /  AST  35  /  ALT  26  /  AlkPhos  117  03-13                        13.0   13.62 )-----------( 386      ( 13 Mar 2021 06:15 )             41.1     Medications  MEDICATIONS  (STANDING):  dexAMETHasone  Injectable 6 milliGRAM(s) IV Push daily  dextrose 40% Gel 15 Gram(s) Oral once  dextrose 5%. 1000 milliLiter(s) (50 mL/Hr) IV Continuous <Continuous>  dextrose 5%. 1000 milliLiter(s) (100 mL/Hr) IV Continuous <Continuous>  dextrose 50% Injectable 25 Gram(s) IV Push once  dextrose 50% Injectable 12.5 Gram(s) IV Push once  dextrose 50% Injectable 25 Gram(s) IV Push once  enoxaparin Injectable 60 milliGRAM(s) SubCutaneous every 12 hours  glucagon  Injectable 1 milliGRAM(s) IntraMuscular once  influenza   Vaccine 0.5 milliLiter(s) IntraMuscular once  insulin glargine Injectable (LANTUS) 25 Unit(s) SubCutaneous at bedtime  insulin lispro (ADMELOG) corrective regimen sliding scale   SubCutaneous three times a day before meals  insulin lispro (ADMELOG) corrective regimen sliding scale   SubCutaneous at bedtime  insulin lispro Injectable (ADMELOG) 9 Unit(s) SubCutaneous three times a day before meals      Physical Exam  General: Patient comfortable in bed  Vital Signs Last 12 Hrs  T(F): 98.7 (03-13-21 @ 11:51), Max: 98.7 (03-13-21 @ 11:51)  HR: 111 (03-13-21 @ 11:51) (104 - 111)  BP: 126/82 (03-13-21 @ 11:51) (126/82 - 134/82)  BP(mean): --  RR: 20 (03-13-21 @ 11:51) (19 - 24)  SpO2: 90% (03-13-21 @ 11:51) (87% - 91%)  Neck: No palpable thyroid nodules.  CVS: S1S2, No murmurs  Respiratory: No wheezing, no crepitations  GI: Abdomen soft, bowel sounds positive  Musculoskeletal:  edema lower extremities.     Diagnostics

## 2021-03-13 NOTE — PROGRESS NOTE ADULT - PROBLEM SELECTOR PLAN 1
- presented with sepsis due to severe covid infection  - remdesivir x5 days ( start on 3/8)   - decadron x10 days ( start on 3/8)   - inflammatory markers q48-72h  - Neg blood cultures so far

## 2021-03-13 NOTE — CONSULT NOTE ADULT - SUBJECTIVE AND OBJECTIVE BOX
CHIEF COMPLAINT: Tachypnea Hypoxia and and fever     HPI:  61 yo male w/no known pmh presents with hypoxic respiratory failure due to covid -19  pneumonia now c/b Rt lung segmental PE.  Today an RRT was called for Hypoxia with associated fever of 102. Now MICU consulted for Bipap requirement.     PAST MEDICAL & SURGICAL HISTORY:  No pertinent past medical history    No significant past surgical history      FAMILY HISTORY:  No pertinent family history in first degree relatives  Allergies    No Known Allergies    Intolerances        HOME MEDICATIONS:    REVIEW OF SYSTEMS:  Constitutional: [ ] fevers [ ] chills [ ] weight loss [ ] weight gain  HEENT: [ ] dry eyes [ ] eye irritation [ ] postnasal drip [ ] nasal congestion  CV: [ ] chest pain [ ] orthopnea [ ] palpitations [ ] murmur  Resp: [ ] cough [ ] shortness of breath [ ] dyspnea [ ] wheezing [ ] sputum [ ] hemoptysis  GI: [ ] nausea [ ] vomiting [ ] diarrhea [ ] constipation [ ] abd pain [ ] dysphagia   : [ ] dysuria [ ] nocturia [ ] hematuria [ ] increased urinary frequency  Musculoskeletal: [ ] back pain [ ] myalgias [ ] arthralgias [ ] fracture  Skin: [ ] rash [ ] itch  Neurological: [ ] headache [ ] dizziness [ ] syncope [ ] weakness [ ] numbness  Psychiatric: [ ] anxiety [ ] depression  Endocrine: [ ] diabetes [ ] thyroid problem  Hematologic/Lymphatic: [ ] anemia [ ] bleeding problem  Allergic/Immunologic: [ ] itchy eyes [ ] nasal discharge [ ] hives [ ] angioedema  [ ] All other systems negative  [x ] Unable to assess ROS because ________    OBJECTIVE:  ICU Vital Signs Last 24 Hrs  T(C): 37.1 (13 Mar 2021 11:51), Max: 37.1 (13 Mar 2021 11:51)  T(F): 98.7 (13 Mar 2021 11:51), Max: 98.7 (13 Mar 2021 11:51)  HR: 78 (13 Mar 2021 18:09) (78 - 111)  BP: 126/82 (13 Mar 2021 11:51) (126/82 - 146/90)  BP(mean): --  ABP: --  ABP(mean): --  RR: 20 (13 Mar 2021 16:49) (18 - 24)  SpO2: 95% (13 Mar 2021 18:09) (87% - 95%)        03-12 @ 07:01  - 03-13 @ 07:00  --------------------------------------------------------  IN: 240 mL / OUT: 800 mL / NET: -560 mL    03-13 @ 06:01  - 03-13 @ 18:33  --------------------------------------------------------  IN: 100 mL / OUT: 0 mL / NET: 100 mL      CAPILLARY BLOOD GLUCOSE      POCT Blood Glucose.: 91 mg/dL (13 Mar 2021 17:37)      PHYSICAL EXAM:  General: Prone position on Bipap    HEENT: PERRLA   Lymph Nodes: No palpable Lymph node adenopathy   Neck: Supple   Respiratory: (+) tachypnea (+) course breath sounds to posterior lungs fiels   Cardiovascular: S1 S2 no M/C/G/R  Abdomen: Soft (+) non tender   Extremities: No edema to all peripheral extremities   Skin: warm and dry normal color for race   Neurological: awake and alert   Psychiatry: Pleasant / compliant     LINES:   PIV ML   HOSPITAL MEDICATIONS:  MEDICATIONS  (STANDING):  dexAMETHasone  Injectable 6 milliGRAM(s) IV Push daily  dextrose 40% Gel 15 Gram(s) Oral once  dextrose 5%. 1000 milliLiter(s) (50 mL/Hr) IV Continuous <Continuous>  dextrose 5%. 1000 milliLiter(s) (100 mL/Hr) IV Continuous <Continuous>  dextrose 50% Injectable 25 Gram(s) IV Push once  dextrose 50% Injectable 12.5 Gram(s) IV Push once  dextrose 50% Injectable 25 Gram(s) IV Push once  enoxaparin Injectable 70 milliGRAM(s) SubCutaneous every 12 hours  glucagon  Injectable 1 milliGRAM(s) IntraMuscular once  influenza   Vaccine 0.5 milliLiter(s) IntraMuscular once  insulin glargine Injectable (LANTUS) 25 Unit(s) SubCutaneous at bedtime  insulin lispro (ADMELOG) corrective regimen sliding scale   SubCutaneous three times a day before meals  insulin lispro (ADMELOG) corrective regimen sliding scale   SubCutaneous at bedtime  insulin lispro Injectable (ADMELOG) 9 Unit(s) SubCutaneous three times a day before meals  piperacillin/tazobactam IVPB.. 3.375 Gram(s) IV Intermittent every 8 hours    MEDICATIONS  (PRN):  acetaminophen   Tablet .. 650 milliGRAM(s) Oral every 4 hours PRN Mild Pain (1 - 3)  ALBUTerol    90 MICROgram(s) HFA Inhaler 2 Puff(s) Inhalation every 6 hours PRN Shortness of Breath and/or Wheezing      LABS:                        13.0   13.62 )-----------( 386      ( 13 Mar 2021 06:15 )             41.1     Hgb Trend: 13.0<--, 12.7<--, 12.6<--, 12.5<--, 12.7<--  03-13    136  |  100  |  20  ----------------------------<  124<H>  4.5   |  22  |  0.69    Ca    9.2      13 Mar 2021 06:15    TPro  6.7  /  Alb  3.1<L>  /  TBili  0.8  /  DBili  x   /  AST  35  /  ALT  26  /  AlkPhos  117  03-13    Creatinine Trend: 0.69<--, 0.70<--, 0.66<--, 0.73<--, 0.77<--, 0.84<--            MICROBIOLOGY:   cltuCulture - Blood (03.08.21 @ 17:52)   Specimen Source: .Blood Blood-Peripheral   Culture Results:   No Growth Final       Historical Values  Culture - Blood (03.08.21 @ 17:52)   Specimen Source: .Blood Blood-Peripheral   Culture Results:   No Growth Final   Culture - Blood (03.08.21 @ 17:52)   Specimen Source: .Blood Blood-Peripheral   Culture Results:   No Growth Final   RADIOLOGY:  < from: VA Duplex Lower Ext Vein Scan, Bilat (03.12.21 @ 15:50) >  MPRESSION:  No evidence of deep venous thrombosis in the visualized bilateral lower extremities.    rad< from: CT Angio Chest w/ IV Cont (03.12.21 @ 15:19) >  IMPRESSION:      1. Right lower lobe medial segmental pulmonary embolism.    2. Diffuse bilateral groundglass opacities, septal thickening, bronchiectasis, representative of lung injury.    These findings were discussed with NP LIANG KOO on  3/12/2021 at 4:32 PM by Dr. Steele with read back confirmation.        < end of copied text >        EKG:  g< from: 12 Lead ECG (03.08.21 @ 10:58) >    Ventricular Rate 120 BPM    Atrial Rate 120 BPM    P-R Interval 150 ms    QRS Duration 78 ms    Q-T Interval 310 ms    QTC Calculation(Bazett) 438 ms    P Axis 34 degrees    R Axis -46 degrees    T Axis 41 degrees    Diagnosis Line SINUS TACHYCARDIA  POSSIBLE LEFT ATRIAL ENLARGEMENT  LEFT AXIS DEVIATION  ABNORMAL ECG  NO PREVIOUS ECGS AVAILABLE  Confirmed by DERIAN FARIAS, KATHERINE ([id number]) on 3/9/2021 2:08:13 PM    < end of copied text >    Amy Ybarra ACNP- BC ex [phone]

## 2021-03-13 NOTE — RAPID RESPONSE TEAM SUMMARY - NSOTHERINTERVENTIONSRRT_GEN_ALL_CORE
The patient reported feeling better and was left in prone position   - would attempt to supine once fever and tachypnea resolves   - Please repeat blood gas in 1 hr   - Hold IV hydration for now   F/U with cxs  and Procalcitonin if elevation or high suspicion for  super-imposed bacterial infection would  continue ABT   - May send MRSA swab   -if d dimer trend is elevating would consider IV heparin Vs argatroban gtt

## 2021-03-13 NOTE — CONSULT NOTE ADULT - ASSESSMENT
61 yo male w/no known pmh presents with hypoxic respiratory failure due to covid -19  pneumonia now c/b Rt lung segmental PE. S/P RRT  today for Hypoxia with associated fever. Now MICU consulted for further recommendations.     Hypoxia secondary to COVID - 19 PNA    - Would continue with Prone position for now   - Maintain Bi pap at cuurrent setting of 12/6  and 100%   - F/u with repeat blood gas in 1 hour kimberly fio2 goal Po2 is 60   - If able to kimberly fio2 and tachypnea improves to mid 20's would give another trial of High flow in the am   - Please maintain NPO over night while on Bi pap   - Maintain aspiration precautions   - will need blood gas in the am if able to tolerate High flow   - May consider ID consult for any further recommendations for COVID - 19 treatment   - Please trend D dimer/ crp / Ferritin/ and Pro bnp and Cardiac enzymes if elevated the patient may benefit from systemic AC   - Please obtain official echo        Fever   - Please f/u with all Cx Blood Urine sputum   - Send MRSA swab and Urine legionella   - s/p 1 dose of Piptazo during RRT would continue for now   - would recommend  ID for further tx for COVID - 19         59 yo male w/no known pmh presents with hypoxic respiratory failure due to covid -19  pneumonia now c/b Rt lung segmental PE. S/P RRT  today for Hypoxia with associated fever. Now MICU consulted for further recommendations.     Hypoxia secondary to COVID - 19 PNA    - Would continue with Prone position for now   - Maintain Bi pap at cuurrent setting of 12/6  and 100%   - F/u with repeat blood gas in 1 hour kimberly fio2 goal Po2 is 60   - If able to kibmerly fio2 and tachypnea improves to mid 20's would give another trial of High flow in the am   - Please maintain NPO over night while on Bi pap   - Maintain aspiration precautions   - will need blood gas in the am if able to tolerate High flow   - May consider ID consult for any further recommendations for COVID - 19 treatment   - Please trend D dimer/ crp / Ferritin/ and Pro bnp and Cardiac enzymes if elevated the patient may benefit from systemic AC   - Please obtain official echo        Fever   - Please f/u with all Cx Blood Urine sputum   - Send MRSA swab and Urine legionella   - s/p 1 dose of Piptazo during RRT would continue for now   - would recommend  ID for further tx for COVID - 19      Dispo   At this time the patient is not a 5 ICU candidate if the clinical status declines please Call RRT/ 5 ICU      61 yo male w/no known pmh presents with hypoxic respiratory failure due to covid -19  pneumonia now c/b Rt lung segmental PE. S/P RRT  today for Hypoxia with associated fever. Now MICU consulted for further recommendations.     Hypoxia secondary to COVID - 19 PNA    - Would continue with Prone position for now   - Maintain Bi pap at cuurrent setting of 12/6  and 100%   - F/u with repeat blood gas in 1 hour kimberly fio2 goal Po2 is 60   - If able to kimberly fio2 and tachypnea improves to mid 20's would give another trial of High flow in the am   - Please maintain NPO over night while on Bi pap may need to adjust Insulin   - Maintain aspiration precautions   - will need blood gas in the am if able to tolerate High flow   - May consider ID consult for any further recommendations for COVID - 19 treatment   - Please trend D dimer/ crp / Ferritin/ and Pro bnp and Cardiac enzymes if elevated the patient may benefit from systemic AC   - Please obtain official echo        Fever   - Please f/u with all Cx Blood Urine sputum   - Send MRSA swab and Urine legionella   - s/p 1 dose of Piptazo during RRT would continue for now   - would recommend  ID for further tx for COVID - 19      Dispo   At this time the patient is not a 5 ICU candidate if the clinical status declines please Call RRT/ 5 ICU

## 2021-03-13 NOTE — PROGRESS NOTE ADULT - SUBJECTIVE AND OBJECTIVE BOX
[facility] Division of Hospital Medicine  Saurabh LeslyeDO nadiya  Pager (BHARGAVI, 3P-5X): [phone]  Other Times:  [phone]    Patient is a 60y old  Male who presents with a chief complaint of hypoxia 2/2 COVID (12 Mar 2021 13:43)    SUBJECTIVE / OVERNIGHT EVENTS: No acute events overnight. Patient seen and examined at bedside this morning, denies chest pain, shortness of breath, abdominal pain, nausea, vomiting or diarrhea.    REVIEW OF SYSTEMS:    CONSTITUTIONAL: No weakness, fevers or chills  EYES/ENT: No visual changes;  No vertigo or throat pain   NECK: No pain or stiffness  RESPIRATORY: No cough, wheezing, hemoptysis; No shortness of breath  CARDIOVASCULAR: No chest pain or palpitations  GASTROINTESTINAL: No abdominal or epigastric pain. No nausea, vomiting, or hematemesis; No diarrhea or constipation. No melena or hematochezia.  GENITOURINARY: No dysuria, frequency or hematuria  NEUROLOGICAL: No numbness or weakness  SKIN: No itching, burning, rashes, or lesions  MSK: No joint pain, no back pain  HEME: No easy bleeding, no easy bruising  All other review of systems is negative unless indicated above.    MEDICATIONS  (STANDING):  dexAMETHasone  Injectable 6 milliGRAM(s) IV Push daily  dextrose 40% Gel 15 Gram(s) Oral once  dextrose 5%. 1000 milliLiter(s) (50 mL/Hr) IV Continuous <Continuous>  dextrose 5%. 1000 milliLiter(s) (100 mL/Hr) IV Continuous <Continuous>  dextrose 50% Injectable 25 Gram(s) IV Push once  dextrose 50% Injectable 12.5 Gram(s) IV Push once  dextrose 50% Injectable 25 Gram(s) IV Push once  enoxaparin Injectable 60 milliGRAM(s) SubCutaneous every 12 hours  glucagon  Injectable 1 milliGRAM(s) IntraMuscular once  influenza   Vaccine 0.5 milliLiter(s) IntraMuscular once  insulin glargine Injectable (LANTUS) 25 Unit(s) SubCutaneous at bedtime  insulin lispro (ADMELOG) corrective regimen sliding scale   SubCutaneous three times a day before meals  insulin lispro (ADMELOG) corrective regimen sliding scale   SubCutaneous at bedtime  insulin lispro Injectable (ADMELOG) 9 Unit(s) SubCutaneous three times a day before meals    MEDICATIONS  (PRN):  acetaminophen   Tablet .. 650 milliGRAM(s) Oral every 4 hours PRN Mild Pain (1 - 3)  ALBUTerol    90 MICROgram(s) HFA Inhaler 2 Puff(s) Inhalation every 6 hours PRN Shortness of Breath and/or Wheezing      CAPILLARY BLOOD GLUCOSE      POCT Blood Glucose.: 106 mg/dL (13 Mar 2021 07:51)  POCT Blood Glucose.: 230 mg/dL (12 Mar 2021 21:54)  POCT Blood Glucose.: 282 mg/dL (12 Mar 2021 16:43)  POCT Blood Glucose.: 278 mg/dL (12 Mar 2021 12:03)    I&O's Summary    12 Mar 2021 07:01  -  13 Mar 2021 07:00  --------------------------------------------------------  IN: 240 mL / OUT: 800 mL / NET: -560 mL        PHYSICAL EXAM:  Vital Signs Last 24 Hrs  T(C): 36.9 (13 Mar 2021 04:01), Max: 37 (12 Mar 2021 22:11)  T(F): 98.5 (13 Mar 2021 04:01), Max: 98.6 (12 Mar 2021 22:11)  HR: 104 (13 Mar 2021 04:33) (87 - 108)  BP: 134/82 (13 Mar 2021 04:01) (113/77 - 146/90)  BP(mean): --  RR: 24 (13 Mar 2021 09:16) (18 - 24)  SpO2: 91% (13 Mar 2021 09:16) (87% - 95%)    CONSTITUTIONAL: NAD, well-developed, well-groomed, HFNC in place  RESPIRATORY: Normal respiratory effort; lungs are clear to auscultation bilaterally  CARDIOVASCULAR: Regular rate and rhythm, normal S1 and S2, no murmur/rub/gallop; No lower extremity edema; Peripheral pulses are 2+ bilaterally  ABDOMEN: Nontender to palpation, normoactive bowel sounds, no rebound/guarding  MUSCULOSKELETAL: No clubbing or cyanosis of digits; no joint swelling or tenderness to palpation  PSYCH: A+O to person, place, and time; affect appropriate  NEUROLOGY: CN 2-12 are intact and symmetric; no gross sensory deficits   SKIN: No rashes; no palpable lesions    LABS:                        13.0   13.62 )-----------( 386      ( 13 Mar 2021 06:15 )             41.1     03-13    136  |  100  |  20  ----------------------------<  124<H>  4.5   |  22  |  0.69    Ca    9.2      13 Mar 2021 06:15    TPro  6.7  /  Alb  3.1<L>  /  TBili  0.8  /  DBili  x   /  AST  35  /  ALT  26  /  AlkPhos  117  03-13

## 2021-03-13 NOTE — PROGRESS NOTE ADULT - ASSESSMENT
Assessment  DMT2: 60y Male with newly diagnosed DM T2 with hyperglycemia admitted with COVID19 , A1C is 12.4%, now on basal bolus insulin, blood sugars are improving, no hypoglycemic episodes, eating meals, remains on dexamethasone.  COVID19: On medications, monitored, stable.  Overweight: No strict exercise routines, neither on low calorie diet.      Anderson Mcmanus MD  Cell: [phone]  Office: [phone]

## 2021-03-14 NOTE — PROVIDER CONTACT NOTE (OTHER) - ASSESSMENT
ALERT
Pt is AXOX4, resting in bed. Pt denies SOB, and appears in no distress.
Pt axox4. Pt is warm to touch. Denies sob,chest pain, palpitations. VSS on BIPAP.
no s/s's hypoglycemia

## 2021-03-14 NOTE — PROGRESS NOTE ADULT - ASSESSMENT
59 yo male w/no known pmh presents with hypoxic respiratory failure due to covid pneumonia now c/b Rt lung segmental PE.

## 2021-03-14 NOTE — PROGRESS NOTE ADULT - ASSESSMENT
Assessment  DMT2: 60y Male with newly diagnosed DM T2 with hyperglycemia admitted with COVID19 , A1C is 12.4%, now on basal bolus insulin, blood sugars are in acceptable range this AM and running high postprandially, no hypoglycemic episodes. Acute events noted, s/p RRT for hypoxia yesterday, remains on dexamethasone.  COVID19: On medications, monitored, stable.  Overweight: No strict exercise routines, neither on low calorie diet.      Anderson Mcmanus MD  Cell: [phone]  Office: [phone]       Assessment  DMT2: 60y Male with newly diagnosed DM T2 with hyperglycemia admitted with COVID19 , A1C is 12.4%, now on basal bolus insulin,  blood sugars are in acceptable range this AM and running high postprandially, no hypoglycemic episodes. Acute events noted, s/p RRT for hypoxia yesterday, remains on dexamethasone.  COVID19: On medications, monitored, stable.  Overweight: No strict exercise routines, neither on low calorie diet.      Anderson Mcmanus MD  Cell: [phone]  Office: [phone]

## 2021-03-14 NOTE — PROVIDER CONTACT NOTE (OTHER) - BACKGROUND
admitting dx Infection due to severe acute respiratory syndrome coronavirus 2 (SARS-CoV-2)
admitting dx Infection due to severe acute respiratory syndrome coronavirus 2 (SARS-CoV-2)
adm for covid hypoxic resp failure. new onset DM

## 2021-03-14 NOTE — PROGRESS NOTE ADULT - PROBLEM SELECTOR PLAN 1
Will continue Lantus 25u at bedtime.  Will increase Admelog to 12u before each meal and continue coverage scale. Will continue monitoring FS and FU, will adjust insulin dose as steroids are tapered/dc.  Patient with newly dx DM, A1C 12.4%. His insulin requirement is large while on IV steroids. Suggest to start insulin administration teaching, will likely DC on insulin. Will continue monitoring and make recommendations for outpatient management prior to DC.  Patient counseled for compliance with consistent low carb diet and exercise as tolerated outpatient.

## 2021-03-14 NOTE — PROVIDER CONTACT NOTE (OTHER) - ACTION/TREATMENT ORDERED:
1 gm IV tylenol ordered and given. Will re-assess temp and continue to closely monitor pt.
NP notified and aware. [facility] order to be changed. Albuterol PRN to be ordered. Encourage use of incentive spirometer.
NP notified and aware.  Pre-dinner insulin held.   Monitor for s/s's and report.
PA aware, will manjutor

## 2021-03-14 NOTE — PROGRESS NOTE ADULT - PROBLEM SELECTOR PLAN 1
- presented with sepsis due to severe covid infection  - remdesivir x5 days ( start on 3/8)   - decadron x10 days ( start on 3/8)   - inflammatory markers q48-72h  - Neg blood cultures so far  - Still with fevers and hypoxia, will call ID, follow up recs  - Last night was RRT and ICU consult, required proning and placed on Bipap, ABG indicating respiratory alkalosis  -Patient currently tolerating HFNC but will closely monitor respiratory status, may require Bipap again  -Started zosyn for possible superimposed bacterial infection  -Follow up culture data

## 2021-03-14 NOTE — PROGRESS NOTE ADULT - SUBJECTIVE AND OBJECTIVE BOX
Chief complaint  Patient is a 60y old  Male who presents with a chief complaint of hypoxia 2/2 COVID (14 Mar 2021 11:26)   Review of systems  Acute events noted, looks comfortable, no hypoglycemic episodes.    Labs and Fingersticks  CAPILLARY BLOOD GLUCOSE      POCT Blood Glucose.: 245 mg/dL (14 Mar 2021 11:49)  POCT Blood Glucose.: 138 mg/dL (14 Mar 2021 07:38)  POCT Blood Glucose.: 126 mg/dL (13 Mar 2021 21:01)  POCT Blood Glucose.: 91 mg/dL (13 Mar 2021 17:37)  POCT Blood Glucose.: 74 mg/dL (13 Mar 2021 16:27)      Anion Gap, Serum: 16 (03-14 @ 06:19)  Anion Gap, Serum: 13 (03-13 @ 21:22)  Anion Gap, Serum: 14 (03-13 @ 06:15)      Calcium, Total Serum: 8.9 (03-14 @ 06:19)  Calcium, Total Serum: 8.8 (03-13 @ 21:22)  Calcium, Total Serum: 9.2 (03-13 @ 06:15)  Albumin, Serum: 2.7 <L> (03-14 @ 06:19)  Albumin, Serum: 3.1 <L> (03-13 @ 21:22)  Albumin, Serum: 3.1 <L> (03-13 @ 06:15)    Alanine Aminotransferase (ALT/SGPT): 24 (03-14 @ 06:19)  Alanine Aminotransferase (ALT/SGPT): 27 (03-13 @ 21:22)  Alanine Aminotransferase (ALT/SGPT): 26 (03-13 @ 06:15)  Alkaline Phosphatase, Serum: 132 <H> (03-14 @ 06:19)  Alkaline Phosphatase, Serum: 104 (03-13 @ 21:22)  Alkaline Phosphatase, Serum: 117 (03-13 @ 06:15)  Aspartate Aminotransferase (AST/SGOT): 41 <H> (03-14 @ 06:19)  Aspartate Aminotransferase (AST/SGOT): 39 (03-13 @ 21:22)  Aspartate Aminotransferase (AST/SGOT): 35 (03-13 @ 06:15)        03-14    130<L>  |  96  |  19  ----------------------------<  148<H>  4.0   |  18<L>  |  0.71    Ca    8.9      14 Mar 2021 06:19    TPro  6.9  /  Alb  2.7<L>  /  TBili  1.3<H>  /  DBili  x   /  AST  41<H>  /  ALT  24  /  AlkPhos  132<H>  03-14                        12.9   14.47 )-----------( 429      ( 14 Mar 2021 06:19 )             40.7     Medications  MEDICATIONS  (STANDING):  dexAMETHasone  Injectable 6 milliGRAM(s) IV Push daily  dextrose 40% Gel 15 Gram(s) Oral once  dextrose 5%. 1000 milliLiter(s) (50 mL/Hr) IV Continuous <Continuous>  dextrose 5%. 1000 milliLiter(s) (100 mL/Hr) IV Continuous <Continuous>  dextrose 50% Injectable 25 Gram(s) IV Push once  dextrose 50% Injectable 12.5 Gram(s) IV Push once  dextrose 50% Injectable 25 Gram(s) IV Push once  enoxaparin Injectable 70 milliGRAM(s) SubCutaneous every 12 hours  glucagon  Injectable 1 milliGRAM(s) IntraMuscular once  influenza   Vaccine 0.5 milliLiter(s) IntraMuscular once  insulin glargine Injectable (LANTUS) 25 Unit(s) SubCutaneous at bedtime  insulin lispro (ADMELOG) corrective regimen sliding scale   SubCutaneous three times a day before meals  insulin lispro (ADMELOG) corrective regimen sliding scale   SubCutaneous at bedtime  insulin lispro Injectable (ADMELOG) 12 Unit(s) SubCutaneous three times a day before meals  piperacillin/tazobactam IVPB.. 3.375 Gram(s) IV Intermittent every 8 hours      Physical Exam  General: Patient comfortable in bed  Vital Signs Last 12 Hrs  T(F): 97.7 (03-14-21 @ 11:22), Max: 102 (03-14-21 @ 05:20)  HR: 87 (03-14-21 @ 11:22) (87 - 120)  BP: 109/73 (03-14-21 @ 11:22) (103/68 - 109/73)  BP(mean): --  RR: 20 (03-14-21 @ 11:22) (20 - 22)  SpO2: 92% (03-14-21 @ 11:22) (91% - 96%)           Chief complaint  Patient is a 60y old  Male who presents with a chief complaint of hypoxia 2/2 COVID (14 Mar 2021 11:26)   Review of systems  Acute events noted, looks comfortable, no hypoglycemic episodes.    Labs and Fingersticks  CAPILLARY BLOOD GLUCOSE      POCT Blood Glucose.: 245 mg/dL (14 Mar 2021 11:49)  POCT Blood Glucose.: 138 mg/dL (14 Mar 2021 07:38)  POCT Blood Glucose.: 126 mg/dL (13 Mar 2021 21:01)  POCT Blood Glucose.: 91 mg/dL (13 Mar 2021 17:37)  POCT Blood Glucose.: 74 mg/dL (13 Mar 2021 16:27)      Anion Gap, Serum: 16 (03-14 @ 06:19)  Anion Gap, Serum: 13 (03-13 @ 21:22)  Anion Gap, Serum: 14 (03-13 @ 06:15)      Calcium, Total Serum: 8.9 (03-14 @ 06:19)  Calcium, Total Serum: 8.8 (03-13 @ 21:22)  Calcium, Total Serum: 9.2 (03-13 @ 06:15)  Albumin, Serum: 2.7 <L> (03-14 @ 06:19)  Albumin, Serum: 3.1 <L> (03-13 @ 21:22)  Albumin, Serum: 3.1 <L> (03-13 @ 06:15)    Alanine Aminotransferase (ALT/SGPT): 24 (03-14 @ 06:19)  Alanine Aminotransferase (ALT/SGPT): 27 (03-13 @ 21:22)  Alanine Aminotransferase (ALT/SGPT): 26 (03-13 @ 06:15)  Alkaline Phosphatase, Serum: 132 <H> (03-14 @ 06:19)  Alkaline Phosphatase, Serum: 104 (03-13 @ 21:22)  Alkaline Phosphatase, Serum: 117 (03-13 @ 06:15)  Aspartate Aminotransferase (AST/SGOT): 41 <H> (03-14 @ 06:19)  Aspartate Aminotransferase (AST/SGOT): 39 (03-13 @ 21:22)  Aspartate Aminotransferase (AST/SGOT): 35 (03-13 @ 06:15)        03-14    130<L>  |  96  |  19  ----------------------------<  148<H>  4.0   |  18<L>  |  0.71    Ca    8.9      14 Mar 2021 06:19    TPro  6.9  /  Alb  2.7<L>  /  TBili  1.3<H>  /  DBili  x   /  AST  41<H>  /  ALT  24  /  AlkPhos  132<H>  03-14                        12.9   14.47 )-----------( 429      ( 14 Mar 2021 06:19 )             40.7     Medications  MEDICATIONS  (STANDING):  dexAMETHasone  Injectable 6 milliGRAM(s) IV Push daily  dextrose 40% Gel 15 Gram(s) Oral once  dextrose 5%. 1000 milliLiter(s) (50 mL/Hr) IV Continuous <Continuous>  dextrose 5%. 1000 milliLiter(s) (100 mL/Hr) IV Continuous <Continuous>  dextrose 50% Injectable 25 Gram(s) IV Push once  dextrose 50% Injectable 12.5 Gram(s) IV Push once  dextrose 50% Injectable 25 Gram(s) IV Push once  enoxaparin Injectable 70 milliGRAM(s) SubCutaneous every 12 hours  glucagon  Injectable 1 milliGRAM(s) IntraMuscular once  influenza   Vaccine 0.5 milliLiter(s) IntraMuscular once  insulin glargine Injectable (LANTUS) 25 Unit(s) SubCutaneous at bedtime  insulin lispro (ADMELOG) corrective regimen sliding scale   SubCutaneous three times a day before meals  insulin lispro (ADMELOG) corrective regimen sliding scale   SubCutaneous at bedtime  insulin lispro Injectable (ADMELOG) 12 Unit(s) SubCutaneous three times a day before meals  piperacillin/tazobactam IVPB.. 3.375 Gram(s) IV Intermittent every 8 hours      Physical Exam  General: Patient comfortable in bed  Vital Signs Last 12 Hrs  T(F): 97.7 (03-14-21 @ 11:22), Max: 102 (03-14-21 @ 05:20)  HR: 87 (03-14-21 @ 11:22) (87 - 120)  BP: 109/73 (03-14-21 @ 11:22) (103/68 - 109/73)  BP(mean): --  RR: 20 (03-14-21 @ 11:22) (20 - 22)  SpO2: 92% (03-14-21 @ 11:22) (91% - 96%)

## 2021-03-14 NOTE — PROGRESS NOTE ADULT - SUBJECTIVE AND OBJECTIVE BOX
[facility] Division of Hospital Medicine  Saurabh Schwarz DO  Pager (BHARGAVI, 5B-9K): [phone]  Other Times:  [phone]    Patient is a 60y old  Male who presents with a chief complaint of hypoxia 2/2 COVID (13 Mar 2021 18:31)    SUBJECTIVE / OVERNIGHT EVENTS: Overnight, patient was rapid response for fever and hypoxia. He received IV Tylenol vancomycin and zosyn, was placed on Bipap and proned (see RRT note/MICU consult note for full details). Patient seen and examined at bedside this morning, feels much better, denies chest pain, shortness of breath, abdominal pain, nausea, vomiting diarrhea. Placed back on HFNC.    REVIEW OF SYSTEMS:    CONSTITUTIONAL: No weakness, fevers or chills  EYES/ENT: No visual changes;  No vertigo or throat pain   NECK: No pain or stiffness  RESPIRATORY: No cough, wheezing, hemoptysis; No shortness of breath  CARDIOVASCULAR: No chest pain or palpitations  GASTROINTESTINAL: No abdominal or epigastric pain. No nausea, vomiting, or hematemesis; No diarrhea or constipation. No melena or hematochezia.  GENITOURINARY: No dysuria, frequency or hematuria  NEUROLOGICAL: No numbness or weakness  SKIN: No itching, burning, rashes, or lesions  MSK: No joint pain, no back pain  HEME: No easy bleeding, no easy bruising  All other review of systems is negative unless indicated above.    MEDICATIONS  (STANDING):  dexAMETHasone  Injectable 6 milliGRAM(s) IV Push daily  dextrose 40% Gel 15 Gram(s) Oral once  dextrose 5%. 1000 milliLiter(s) (50 mL/Hr) IV Continuous <Continuous>  dextrose 5%. 1000 milliLiter(s) (100 mL/Hr) IV Continuous <Continuous>  dextrose 50% Injectable 25 Gram(s) IV Push once  dextrose 50% Injectable 12.5 Gram(s) IV Push once  dextrose 50% Injectable 25 Gram(s) IV Push once  enoxaparin Injectable 70 milliGRAM(s) SubCutaneous every 12 hours  glucagon  Injectable 1 milliGRAM(s) IntraMuscular once  influenza   Vaccine 0.5 milliLiter(s) IntraMuscular once  insulin glargine Injectable (LANTUS) 25 Unit(s) SubCutaneous at bedtime  insulin lispro (ADMELOG) corrective regimen sliding scale   SubCutaneous three times a day before meals  insulin lispro (ADMELOG) corrective regimen sliding scale   SubCutaneous at bedtime  insulin lispro Injectable (ADMELOG) 9 Unit(s) SubCutaneous three times a day before meals  piperacillin/tazobactam IVPB.. 3.375 Gram(s) IV Intermittent every 8 hours    MEDICATIONS  (PRN):  acetaminophen   Tablet .. 650 milliGRAM(s) Oral every 4 hours PRN Mild Pain (1 - 3)  ALBUTerol    90 MICROgram(s) HFA Inhaler 2 Puff(s) Inhalation every 6 hours PRN Shortness of Breath and/or Wheezing      CAPILLARY BLOOD GLUCOSE      POCT Blood Glucose.: 138 mg/dL (14 Mar 2021 07:38)  POCT Blood Glucose.: 126 mg/dL (13 Mar 2021 21:01)  POCT Blood Glucose.: 91 mg/dL (13 Mar 2021 17:37)  POCT Blood Glucose.: 74 mg/dL (13 Mar 2021 16:27)  POCT Blood Glucose.: 133 mg/dL (13 Mar 2021 12:02)    I&O's Summary    13 Mar 2021 06:01  -  14 Mar 2021 07:00  --------------------------------------------------------  IN: 100 mL / OUT: 900 mL / NET: -800 mL        PHYSICAL EXAM:  Vital Signs Last 24 Hrs  T(C): 36.5 (14 Mar 2021 11:22), Max: 38.9 (14 Mar 2021 05:20)  T(F): 97.7 (14 Mar 2021 11:22), Max: 102 (14 Mar 2021 05:20)  HR: 87 (14 Mar 2021 11:22) (78 - 120)  BP: 109/73 (14 Mar 2021 11:22) (103/68 - 126/82)  BP(mean): --  RR: 20 (14 Mar 2021 11:22) (20 - 25)  SpO2: 92% (14 Mar 2021 11:22) (90% - 100%)    CONSTITUTIONAL: NAD, well-developed, well-groomed  RESPIRATORY: Normal respiratory effort; lungs with bilateral rales but no wheezing or rhonchi currently  CARDIOVASCULAR: Regular rate and rhythm, normal S1 and S2, no murmur/rub/gallop; No lower extremity edema; Peripheral pulses are 2+ bilaterally  ABDOMEN: Nontender to palpation, normoactive bowel sounds, no rebound/guarding  MUSCULOSKELETAL: No clubbing or cyanosis of digits; no joint swelling or tenderness to palpation  PSYCH: A+O to person, place, and time; affect appropriate  NEUROLOGY: CN 2-12 are intact and symmetric; no gross sensory deficits   SKIN: No rashes; no palpable lesions    LABS:                        12.9   14.47 )-----------( 429      ( 14 Mar 2021 06:19 )             40.7     03-14    130<L>  |  96  |  19  ----------------------------<  148<H>  4.0   |  18<L>  |  0.71    Ca    8.9      14 Mar 2021 06:19    TPro  6.9  /  Alb  2.7<L>  /  TBili  1.3<H>  /  DBili  x   /  AST  41<H>  /  ALT  24  /  AlkPhos  132<H>  03-14      CARDIAC MARKERS ( 13 Mar 2021 21:22 )  x     / x     / 42 U/L / x     / 1.0 ng/mL      Urinalysis Basic - ( 14 Mar 2021 01:58 )    Color: Yellow / Appearance: Clear / S.040 / pH: x  Gluc: x / Ketone: Trace  / Bili: Negative / Urobili: 2 mg/dL   Blood: x / Protein: 100 / Nitrite: Negative   Leuk Esterase: Negative / RBC: 20 /hpf / WBC 2 /HPF   Sq Epi: x / Non Sq Epi: 1 /hpf / Bacteria: Negative

## 2021-03-15 NOTE — CONSULT NOTE ADULT - SUBJECTIVE AND OBJECTIVE BOX
INFECTIOUS DISEASE SERVICE INITIAL CONSULTATION NOTE    HPI:  59 yo male w/no known pmh (does not f/u with PCP) presents to [facility] for cc fevers, weakness, fatigue, shortness of breath for 5 days. Tested positive for covid 3 days ago at an urgent care. Today presented again to urgent care and noted to be hypoxic and sent to the ER. Patient denies dysuria, diarrhea, constipation. Currently denies shortness of breath on high flow.     In the ER, patient was febrile to 101.5F, tachycardic to 120s, tachypneic to 40, hypoxic to 88% on 6L. Improved on high flow nasal cannula to 95%.  (08 Mar 2021 14:08)    ID consulted for COVID.   The patient reports feeling better than he did when he arrived to the hospital    PAST MEDICAL & SURGICAL HISTORY:  No pertinent past medical history    No significant past surgical history        REVIEW OF SYSTEMS:  Constitutional: no weakness, no fevers, no chills  Dermatologic: no rash  Respiratory: no SOB, no cough  Cardiovascular: no chest pain, no palpitations  Gastrointestinal: no nausea, no vomiting, no diarrhea  Genitourinary: no dysuria, no urinary frequency, no hematuria, no urinary retention  Musculoskeletal:	no weakness, no joint swelling/pain  Neurological: no focal weakness or numbness  Endocrine: no polyuria, no polydipsia    ACTIVE ANTIMICROBIAL/ANTIBIOTIC MEDICATIONS:  piperacillin/tazobactam IVPB.. 3.375 Gram(s) IV Intermittent every 8 hours      OTHER MEDICATIONS:  acetaminophen   Tablet .. 650 milliGRAM(s) Oral every 4 hours PRN  ALBUTerol    90 MICROgram(s) HFA Inhaler 2 Puff(s) Inhalation every 6 hours PRN  dexAMETHasone  Injectable 6 milliGRAM(s) IV Push daily  dextrose 40% Gel 15 Gram(s) Oral once  dextrose 5%. 1000 milliLiter(s) IV Continuous <Continuous>  dextrose 5%. 1000 milliLiter(s) IV Continuous <Continuous>  dextrose 50% Injectable 25 Gram(s) IV Push once  dextrose 50% Injectable 12.5 Gram(s) IV Push once  dextrose 50% Injectable 25 Gram(s) IV Push once  enoxaparin Injectable 70 milliGRAM(s) SubCutaneous every 12 hours  glucagon  Injectable 1 milliGRAM(s) IntraMuscular once  influenza   Vaccine 0.5 milliLiter(s) IntraMuscular once  insulin glargine Injectable (LANTUS) 25 Unit(s) SubCutaneous at bedtime  insulin lispro (ADMELOG) corrective regimen sliding scale   SubCutaneous three times a day before meals  insulin lispro (ADMELOG) corrective regimen sliding scale   SubCutaneous at bedtime  insulin lispro Injectable (ADMELOG) 12 Unit(s) SubCutaneous three times a day before meals      ALLERGIES:  Allergies    No Known Allergies    Intolerances        SOCIAL HISTORY: Moved to the  from [facility] 35 years ago. Works in construction, was working in multiple different hospitals.     FAMILY HISTORY:  No family history of COVID, no family history of TB.         VITAL SIGNS:  ICU Vital Signs Last 24 Hrs  T(C): 36.8 (15 Mar 2021 04:53), Max: 36.8 (15 Mar 2021 04:53)  T(F): 98.3 (15 Mar 2021 04:53), Max: 98.3 (15 Mar 2021 04:53)  HR: 99 (15 Mar 2021 04:55) (87 - 106)  BP: 111/77 (15 Mar 2021 04:53) (109/73 - 113/71)  BP(mean): --  ABP: --  ABP(mean): --  RR: 20 (15 Mar 2021 04:55) (20 - 20)  SpO2: 90% (15 Mar 2021 07:23) (90% - 93%)      PHYSICAL EXAM:  Constitutional: WDWN  Head: NC/AT  Eyes: PERRLA, EOMI; anicteric slcera  ENMT: no rhinorrhea; no sinus tenderness on palpation; no oropharyngeal lesions, erythema, or exudates	  Neck: supple; no JVD or LAD  Respiratory: CTA B/L  Cardiovascular: +S1/S2, RRR; no appreciable murmurs  Gastrointestinal: soft, NT/ND; +BSx4, no HSM  Extremities: WWP; no clubbing, cyanosis, or edema  Vascular: 2+ radial, DP/PT pulses B/L  Dermatologic: skin warm and dry; no visible rashes or lesions  Neurologic: AAOx3; no focal deficits    LABS:                        12.4   14.54 )-----------( 467      ( 15 Mar 2021 07:24 )             39.1     03-15    133<L>  |  98  |  24<H>  ----------------------------<  217<H>  5.6<H>   |  20<L>  |  0.70    Ca    9.0      15 Mar 2021 07:24  Phos  3.1     03-15  Mg     2.6     03-15    TPro  6.6  /  Alb  2.5<L>  /  TBili  0.8  /  DBili  0.2  /  AST  42<H>  /  ALT  23  /  AlkPhos  167<H>  03-15      Urinalysis Basic - ( 14 Mar 2021 01:58 )    Color: Yellow / Appearance: Clear / S.040 / pH: x  Gluc: x / Ketone: Trace  / Bili: Negative / Urobili: 2 mg/dL   Blood: x / Protein: 100 / Nitrite: Negative   Leuk Esterase: Negative / RBC: 20 /hpf / WBC 2 /HPF   Sq Epi: x / Non Sq Epi: 1 /hpf / Bacteria: Negative        MICROBIOLOGY:    Culture - Blood (collected 21 @ 21:11)  Source: .Blood Blood  Preliminary Report (21 @ 23:02):    No growth to date.    Culture - Blood (collected 21 @ 17:52)  Source: .Blood Blood-Peripheral  Final Report (21 @ 18:01):    No Growth Final    Culture - Blood (collected 21 @ 17:52)  Source: .Blood Blood-Peripheral  Final Report (21 @ 18:01):    No Growth Final        RADIOLOGY & ADDITIONAL STUDIES: INFECTIOUS DISEASE SERVICE INITIAL CONSULTATION NOTE    HPI:  59 yo male w/no known pmh (does not f/u with PCP) presents to [facility] for cc fevers, weakness, fatigue, shortness of breath for 5 days. Tested positive for covid 3 days ago at an urgent care. Today presented again to urgent care and noted to be hypoxic and sent to the ER. Patient denies dysuria, diarrhea, constipation. Currently denies shortness of breath on high flow.     In the ER, patient was febrile to 101.5F, tachycardic to 120s, tachypneic to 40, hypoxic to 88% on 6L. Improved on high flow nasal cannula to 95%.  (08 Mar 2021 14:08)    ID consulted for COVID.   The patient reports feeling better than he did when he arrived to the hospital, less coughing, still short of breath, wants to go home. No fevers, chills, chest pain, nausea, vomiting, abdominal pain, diarrhea, dysuria, or rash.      PAST MEDICAL & SURGICAL HISTORY:  No pertinent past medical history    No significant past surgical history        REVIEW OF SYSTEMS:  Constitutional: no weakness, no fevers, no chills  Dermatologic: no rash  Respiratory: +SOB, +cough  Cardiovascular: no chest pain, no palpitations  Gastrointestinal: no nausea, no vomiting, no diarrhea  Genitourinary: no dysuria, no urinary frequency, no hematuria, no urinary retention  Musculoskeletal:	no weakness, no joint swelling/pain  Neurological: no focal weakness or numbness  Endocrine: no polyuria, no polydipsia    ACTIVE ANTIMICROBIAL/ANTIBIOTIC MEDICATIONS:  piperacillin/tazobactam IVPB.. 3.375 Gram(s) IV Intermittent every 8 hours      OTHER MEDICATIONS:  acetaminophen   Tablet .. 650 milliGRAM(s) Oral every 4 hours PRN  ALBUTerol    90 MICROgram(s) HFA Inhaler 2 Puff(s) Inhalation every 6 hours PRN  dexAMETHasone  Injectable 6 milliGRAM(s) IV Push daily  dextrose 40% Gel 15 Gram(s) Oral once  dextrose 5%. 1000 milliLiter(s) IV Continuous <Continuous>  dextrose 5%. 1000 milliLiter(s) IV Continuous <Continuous>  dextrose 50% Injectable 25 Gram(s) IV Push once  dextrose 50% Injectable 12.5 Gram(s) IV Push once  dextrose 50% Injectable 25 Gram(s) IV Push once  enoxaparin Injectable 70 milliGRAM(s) SubCutaneous every 12 hours  glucagon  Injectable 1 milliGRAM(s) IntraMuscular once  influenza   Vaccine 0.5 milliLiter(s) IntraMuscular once  insulin glargine Injectable (LANTUS) 25 Unit(s) SubCutaneous at bedtime  insulin lispro (ADMELOG) corrective regimen sliding scale   SubCutaneous three times a day before meals  insulin lispro (ADMELOG) corrective regimen sliding scale   SubCutaneous at bedtime  insulin lispro Injectable (ADMELOG) 12 Unit(s) SubCutaneous three times a day before meals      ALLERGIES:  Allergies    No Known Allergies    Intolerances        SOCIAL HISTORY: Moved to the  from [facility] 35 years ago. Works in construction, was working in multiple different hospitals.     FAMILY HISTORY:  No family history of COVID, no family history of TB.         VITAL SIGNS:  ICU Vital Signs Last 24 Hrs  T(C): 36.8 (15 Mar 2021 04:53), Max: 36.8 (15 Mar 2021 04:53)  T(F): 98.3 (15 Mar 2021 04:53), Max: 98.3 (15 Mar 2021 04:53)  HR: 99 (15 Mar 2021 04:55) (87 - 106)  BP: 111/77 (15 Mar 2021 04:53) (109/73 - 113/71)  BP(mean): --  ABP: --  ABP(mean): --  RR: 20 (15 Mar 2021 04:55) (20 - 20)  SpO2: 90% (15 Mar 2021 07:23) (90% - 93%)      PHYSICAL EXAM:  Constitutional: Appears comfortable  Head: NC/AT  Eyes: anicteric sclera  ENMT: no oropharyngeal lesions, erythema, or exudates, HFNC in place  Neck: supple; no JVD or LAD  Respiratory: Trace crackles at bases  Cardiovascular: +S1/S2, RRR; no appreciable murmurs  Gastrointestinal: soft, NT/ND; +BS  Extremities: WWP; no clubbing, cyanosis, or edema  Vascular: peripheral IV no phlebitis   Dermatologic: skin warm and dry; no visible rashes or lesions  Neurologic: AAOx3; no focal deficits    LABS:                        12.4   14.54 )-----------( 467      ( 15 Mar 2021 07:24 )             39.1     03-15    133<L>  |  98  |  24<H>  ----------------------------<  217<H>  5.6<H>   |  20<L>  |  0.70    Ca    9.0      15 Mar 2021 07:24  Phos  3.1     03-15  Mg     2.6     03-15    TPro  6.6  /  Alb  2.5<L>  /  TBili  0.8  /  DBili  0.2  /  AST  42<H>  /  ALT  23  /  AlkPhos  167<H>  03-15      Procalcitonin, Serum: 0.53    Urinalysis Basic - ( 14 Mar 2021 01:58 )    Color: Yellow / Appearance: Clear / S.040 / pH: x  Gluc: x / Ketone: Trace  / Bili: Negative / Urobili: 2 mg/dL   Blood: x / Protein: 100 / Nitrite: Negative   Leuk Esterase: Negative / RBC: 20 /hpf / WBC 2 /HPF   Sq Epi: x / Non Sq Epi: 1 /hpf / Bacteria: Negative        MICROBIOLOGY:    Culture - Blood (collected 21 @ 21:11)  Source: .Blood Blood  Preliminary Report (21 @ 23:02):    No growth to date.    Culture - Blood (collected 21 @ 17:52)  Source: .Blood Blood-Peripheral  Final Report (21 @ 18:01):    No Growth Final    Culture - Blood (collected 21 @ 17:52)  Source: .Blood Blood-Peripheral  Final Report (21 @ 18:01):    No Growth Final    RADIOLOGY & ADDITIONAL STUDIES:    < from: Xray Chest 1 View-PORTABLE IMMEDIATE (Xray Chest 1 View-PORTABLE IMMEDIATE .) (21 @ 18:22) >  IMPRESSION:    Increasing patchy bilateral airspace opacities in both upper and lower lobes. This may represent multifocal pneumonia versus pulmonary edema.    < end of copied text >  < from: CT Angio Chest w/ IV Cont (21 @ 15:19) >  IMPRESSION:      1. Right lower lobe medial segmental pulmonary embolism.    2. Diffuse bilateral groundglass opacities, septal thickening, bronchiectasis, representative of lung injury.    < end of copied text >

## 2021-03-15 NOTE — PROGRESS NOTE ADULT - PROBLEM SELECTOR PLAN 1
+COVID PCR  -CXR b/l LL opacities   -S/p Remdesivir x 5 days   -Decadron 6mg qd x 10 days   -Not a candidate for Tocilizumab at this point   -Trend inflammatory markers  -Empiric abx per primary team. PCT added to AM labs. F/u ID recs.

## 2021-03-15 NOTE — PROGRESS NOTE ADULT - PROBLEM SELECTOR PLAN 1
- presented with sepsis due to severe COVID-19 infection  - completed remdesivir x5 days  - decadron x10 days (start on 3/8)   - inflammatory markers q48-72h  - Neg blood cultures so far  - ID consult appreciated.   - Continue Zosyn IV given increasing procalcitonin level, suspected superimpozed bacterial pneumonia  - Obtain blood cultures in AM and sputum cultures.   - Patient currently tolerating HFNC but will closely monitor respiratory status, may require Bipap again  -Follow up cultures

## 2021-03-15 NOTE — PROGRESS NOTE ADULT - PROBLEM SELECTOR PLAN 2
- Acute respiratory failure with hypoxia due to COVID-19 pneumonia with suspected superimposed bacterial pneumonia, and an acute PE (seen on CTA chest)  - cont HFNC  - Patient is out of the window of opportunity for Tociluzimab  - Pulm consult appreciated  - Patient is full code  - New acute right-sded PE - continue full dose lovenox  Chest CT with Bronchiectasis / Fibrosis / Pos H/O Cigarette smoking for many years and stopped about 10 years ago

## 2021-03-15 NOTE — PROGRESS NOTE ADULT - PROBLEM SELECTOR PLAN 3
RLL medial segmental pulmonary embolism  -Lovenox 1mg/kg BID   -LE duplex neg DVT   -Troponin and pro-BNP normal  -F/u TTE

## 2021-03-15 NOTE — PROGRESS NOTE ADULT - PROBLEM SELECTOR PLAN 2
2nd to COVID PNA  -S/p RRT 3/13 for worsening hypoxia requiring bipap   -C/w HFNC 60L/100%, wean O2 as tolerated, keep sats >90%  -If worsening hypoxia, start bipap 12/5/100%   -Prone/side lying positioning as tolerated.

## 2021-03-15 NOTE — PROGRESS NOTE ADULT - SUBJECTIVE AND OBJECTIVE BOX
[facility] Division of Hospital Medicine  Demian Siu MD, DANNY  Pager (M-F, 8A-5P): [phone]  Other Times:  [phone]    Patient is a 60y old  Male who presents with a chief complaint of hypoxia 2/2 COVID (15 Mar 2021 14:56)      SUBJECTIVE / OVERNIGHT EVENTS:  No events overnight. The patient states that he feels fine; although still requiring max setting on High Flow O2.     MEDICATIONS  (STANDING):  dexAMETHasone  Injectable 6 milliGRAM(s) IV Push daily  dextrose 40% Gel 15 Gram(s) Oral once  dextrose 5%. 1000 milliLiter(s) (50 mL/Hr) IV Continuous <Continuous>  dextrose 5%. 1000 milliLiter(s) (100 mL/Hr) IV Continuous <Continuous>  dextrose 50% Injectable 25 Gram(s) IV Push once  dextrose 50% Injectable 12.5 Gram(s) IV Push once  dextrose 50% Injectable 25 Gram(s) IV Push once  enoxaparin Injectable 70 milliGRAM(s) SubCutaneous every 12 hours  glucagon  Injectable 1 milliGRAM(s) IntraMuscular once  influenza   Vaccine 0.5 milliLiter(s) IntraMuscular once  insulin glargine Injectable (LANTUS) 30 Unit(s) SubCutaneous at bedtime  insulin lispro (ADMELOG) corrective regimen sliding scale   SubCutaneous three times a day before meals  insulin lispro (ADMELOG) corrective regimen sliding scale   SubCutaneous at bedtime  insulin lispro Injectable (ADMELOG) 15 Unit(s) SubCutaneous three times a day before meals  piperacillin/tazobactam IVPB.. 3.375 Gram(s) IV Intermittent every 8 hours    MEDICATIONS  (PRN):  acetaminophen   Tablet .. 650 milliGRAM(s) Oral every 4 hours PRN Mild Pain (1 - 3)  ALBUTerol    90 MICROgram(s) HFA Inhaler 2 Puff(s) Inhalation every 6 hours PRN Shortness of Breath and/or Wheezing    CAPILLARY BLOOD GLUCOSE      POCT Blood Glucose.: 322 mg/dL (15 Mar 2021 17:02)  POCT Blood Glucose.: 302 mg/dL (15 Mar 2021 11:28)  POCT Blood Glucose.: 270 mg/dL (15 Mar 2021 07:35)  POCT Blood Glucose.: 251 mg/dL (14 Mar 2021 21:26)    I&O's Summary    14 Mar 2021 07:01  -  15 Mar 2021 07:00  --------------------------------------------------------  IN: 0 mL / OUT: 500 mL / NET: -500 mL    15 Mar 2021 07:01  -  15 Mar 2021 18:15  --------------------------------------------------------  IN: 720 mL / OUT: 560 mL / NET: 160 mL        PHYSICAL EXAM:  Vital Signs Last 24 Hrs  T(C): 36.8 (15 Mar 2021 11:14), Max: 36.8 (15 Mar 2021 04:53)  T(F): 98.3 (15 Mar 2021 11:14), Max: 98.3 (15 Mar 2021 04:53)  HR: 108 (15 Mar 2021 11:14) (97 - 108)  BP: 118/75 (15 Mar 2021 11:14) (111/77 - 118/75)  BP(mean): --  RR: 20 (15 Mar 2021 16:09) (20 - 22)  SpO2: 91% (15 Mar 2021 16:09) (90% - 93%)    CONSTITUTIONAL: NAD, well-developed, well-groomed, HFNC in place  RESPIRATORY: Normal respiratory effort; lungs are clear to auscultation bilaterally  CARDIOVASCULAR: Regular rate and rhythm, normal S1 and S2, no murmur/rub/gallop; No lower extremity edema  ABDOMEN: Nontender to palpation, normoactive bowel sounds, no rebound/guarding  MUSCULOSKELETAL: No clubbing or cyanosis of digits; no joint swelling or tenderness to palpation  PSYCH: A+O to person, place, and time; affect appropriate  NEUROLOGY: CN 2-12 are intact and symmetric; no gross sensory deficits    LABS:                        12.4   14.54 )-----------( 467      ( 15 Mar 2021 07:24 )             39.1     03-15    131<L>  |  96  |  24<H>  ----------------------------<  301<H>  4.6   |  22  |  0.63    Ca    8.9      15 Mar 2021 12:19  Phos  3.1     03-15  Mg     2.6     03-15    TPro  6.6  /  Alb  2.5<L>  /  TBili  0.8  /  DBili  0.2  /  AST  42<H>  /  ALT  23  /  AlkPhos  167<H>  03-15      CARDIAC MARKERS ( 13 Mar 2021 21:22 )  x     / x     / 42 U/L / x     / 1.0 ng/mL      Urinalysis Basic - ( 14 Mar 2021 01:58 )    Color: Yellow / Appearance: Clear / S.040 / pH: x  Gluc: x / Ketone: Trace  / Bili: Negative / Urobili: 2 mg/dL   Blood: x / Protein: 100 / Nitrite: Negative   Leuk Esterase: Negative / RBC: 20 /hpf / WBC 2 /HPF   Sq Epi: x / Non Sq Epi: 1 /hpf / Bacteria: Negative        Culture - Blood (collected 13 Mar 2021 21:11)  Source: .Blood Blood  Preliminary Report (14 Mar 2021 23:02):    No growth to date.        RADIOLOGY & ADDITIONAL TESTS:    Lab Results Reviewed    Imaging Personally Reviewed:   CTA chest

## 2021-03-15 NOTE — PROGRESS NOTE ADULT - SUBJECTIVE AND OBJECTIVE BOX
Follow-up Pulm Progress Note    Feeling okay  Sats 92% on HFNC 60L/100%   Denies SOB/CP/cough     Medications:  MEDICATIONS  (STANDING):  dexAMETHasone  Injectable 6 milliGRAM(s) IV Push daily  dextrose 40% Gel 15 Gram(s) Oral once  dextrose 5%. 1000 milliLiter(s) (50 mL/Hr) IV Continuous <Continuous>  dextrose 5%. 1000 milliLiter(s) (100 mL/Hr) IV Continuous <Continuous>  dextrose 50% Injectable 25 Gram(s) IV Push once  dextrose 50% Injectable 12.5 Gram(s) IV Push once  dextrose 50% Injectable 25 Gram(s) IV Push once  enoxaparin Injectable 70 milliGRAM(s) SubCutaneous every 12 hours  glucagon  Injectable 1 milliGRAM(s) IntraMuscular once  influenza   Vaccine 0.5 milliLiter(s) IntraMuscular once  insulin glargine Injectable (LANTUS) 30 Unit(s) SubCutaneous at bedtime  insulin lispro (ADMELOG) corrective regimen sliding scale   SubCutaneous three times a day before meals  insulin lispro (ADMELOG) corrective regimen sliding scale   SubCutaneous at bedtime  insulin lispro Injectable (ADMELOG) 15 Unit(s) SubCutaneous three times a day before meals  piperacillin/tazobactam IVPB.. 3.375 Gram(s) IV Intermittent every 8 hours    MEDICATIONS  (PRN):  acetaminophen   Tablet .. 650 milliGRAM(s) Oral every 4 hours PRN Mild Pain (1 - 3)  ALBUTerol    90 MICROgram(s) HFA Inhaler 2 Puff(s) Inhalation every 6 hours PRN Shortness of Breath and/or Wheezing          Vital Signs Last 24 Hrs  T(C): 36.8 (15 Mar 2021 11:14), Max: 36.8 (15 Mar 2021 04:53)  T(F): 98.3 (15 Mar 2021 11:14), Max: 98.3 (15 Mar 2021 04:53)  HR: 108 (15 Mar 2021 11:14) (88 - 108)  BP: 118/75 (15 Mar 2021 11:14) (111/77 - 118/75)  BP(mean): --  RR: 21 (15 Mar 2021 11:14) (20 - 22)  SpO2: 91% (15 Mar 2021 11:14) (90% - 93%)    ABG - ( 14 Mar 2021 09:20 )  pH, Arterial: 7.49  pH, Blood: x     /  pCO2: 26    /  pO2: 133   / HCO3: 19    / Base Excess: -2.2  /  SaO2: 99                     @ 07:01  -  03-15 @ 07:00  --------------------------------------------------------  IN: 0 mL / OUT: 500 mL / NET: -500 mL          LABS:                        12.4   14.54 )-----------( 467      ( 15 Mar 2021 07:24 )             39.1     03-15    131<L>  |  96  |  24<H>  ----------------------------<  301<H>  4.6   |  22  |  0.63    Ca    8.9      15 Mar 2021 12:19  Phos  3.1     03-15  Mg     2.6     03-15    TPro  6.6  /  Alb  2.5<L>  /  TBili  0.8  /  DBili  0.2  /  AST  42<H>  /  ALT  23  /  AlkPhos  167<H>  03-15      CARDIAC MARKERS ( 13 Mar 2021 21:22 )  x     / x     / 42 U/L / x     / 1.0 ng/mL      CAPILLARY BLOOD GLUCOSE      POCT Blood Glucose.: 302 mg/dL (15 Mar 2021 11:28)      Urinalysis Basic - ( 14 Mar 2021 01:58 )    Color: Yellow / Appearance: Clear / S.040 / pH: x  Gluc: x / Ketone: Trace  / Bili: Negative / Urobili: 2 mg/dL   Blood: x / Protein: 100 / Nitrite: Negative   Leuk Esterase: Negative / RBC: 20 /hpf / WBC 2 /HPF   Sq Epi: x / Non Sq Epi: 1 /hpf / Bacteria: Negative      Procalcitonin, Serum: 0.53 ng/mL (21 @ 17:57)    Serum Pro-Brain Natriuretic Peptide: 79 pg/mL (21 @ 21:22)                CULTURES:     Culture - Blood (collected 21 @ 21:11)  Source: .Blood Blood  Preliminary Report (21 @ 23:02):    No growth to date.    Culture - Blood (collected 21 @ 17:52)  Source: .Blood Blood-Peripheral  Final Report (21 @ 18:01):    No Growth Final    Culture - Blood (collected 21 @ 17:52)  Source: .Blood Blood-Peripheral  Final Report (21 @ 18:01):    No Growth Final            Physical Examination:  GEN: NAD  NEURO: A&Ox4, moves all extremities   PULM: decreased BS  CVS: S1, S2 heard  EXT: no edema       RADIOLOGY REVIEWED  CT chest: < from: CT Angio Chest w/ IV Cont (21 @ 15:19) >  FINDINGS:    Pulmonary Artery:  Right lower medial segment filling defect concerning for pulmonary embolism (6:274). No main, right main, or lobar pulmonary was.    Tubes/Lines: None.    Lungs, airways and pleura: Diffuse bilateral groundglass opacities, septal thickening, bronchiectasis. Emphysema. No pleural effusion or pneumothorax. The airways are clear.    Mediastinum: A borderline enlarged left paratracheal/carinal lymph node measures 1.9 x 1.4 cm and a AP window lymph node measures 1.4 x 0.9 cm, which are likely reactive. The heart is normal. No pleural effusion. Minimal aortic calcifications. The thyroid gland is unremarkable.    Upper Abdomen: Steatosis.    Bones And Soft Tissues: There are degenerative changes of the spine.  The soft tissues are unremarkable.      IMPRESSION:      1. Right lower lobe medial segmental pulmonary embolism.    2. Diffuse bilateral groundglass opacities, septal thickening, bronchiectasis, representative of lung injury.    These findings were discussed with DIPESH KOO on  3/12/2021 at 4:32 PM by Dr. Steele with read back confirmation.    < end of copied text >

## 2021-03-15 NOTE — PROGRESS NOTE ADULT - ASSESSMENT
Assessment  DMT2: 60y Male with newly diagnosed DM T2 with hyperglycemia admitted with COVID19 , A1C is 12.4%, now on basal bolus insulin, blood sugars are running high and not at target in the setting of steroid management, no hypoglycemic episodes. Patient appears alert and comfortable, reports eating full meals with good appetite, denies acute complaints, remains on dexamethasone.  COVID19: On medications, monitored, stable.  Overweight: No strict exercise routines, neither on low calorie diet.      Anderson Mcmanus MD  Cell: [phone]  Office: [phone]       Assessment  DMT2: 60y Male with newly diagnosed DM T2 with hyperglycemia admitted with COVID19 , A1C is 12.4%,  now on basal bolus insulin, blood sugars are running high and not at target in the setting of steroid management, no hypoglycemic episodes. Patient appears alert and comfortable, reports eating full meals with good appetite, denies acute complaints, remains on dexamethasone.  COVID19: On medications, monitored, stable.  Overweight: No strict exercise routines, neither on low calorie diet.      Anderson Mcmanus MD  Cell: [phone]  Office: [phone]

## 2021-03-15 NOTE — CONSULT NOTE ADULT - ASSESSMENT
Mr Olivares is a 60 year old man who presented with hypoxia after being diagnosed with COVID outpatient. He has completed a course of remdesevir, but is still febrile and hypoxic.   He does not have a clear superimposed bacterial infection.   CT chest on admission with PE and ground glass opacities.   Febrile and requiring high flow nasal cannula.     COVID-19 pneumonia  - Remdesevir completed  - Continue dexamethasone  - Repeat procalcitonin, CXR  - Not a candidate for tocilizumab   - Follow up cultures  - Monitor for fevers  - Trend WBCs    Vani Jett, PGY-5  Infectious Disease Fellow   Pager: [phone]  After 5pm/weekends: [phone] Mr Olivares is a 60 year old man who presented with hypoxia after being diagnosed with COVID outpatient. He has completed a course of remdesevir, but is still febrile and hypoxic.   He does not have a clear superimposed bacterial infection.   CT chest on admission with PE and ground glass opacities.   Febrile and requiring high flow nasal cannula.     NOTE INCOMPLETE    COVID-19 pneumonia  - Remdesevir completed  - Continue dexamethasone  - Stop Zosyn  - Repeat procalcitonin, CXR  - Not a candidate for tocilizumab   - Follow up cultures  - Monitor for fevers  - Trend WBCs    Vani Jett, PGY-5  Infectious Disease Fellow   Pager: [phone]  After 5pm/weekends: [phone] Mr Olivares is a 60 year old man who presented with hypoxia after being diagnosed with COVID outpatient. He has completed a course of remdesevir, but is still febrile and hypoxic.   He does not have a clear superimposed bacterial infection.   CT chest on admission with PE and ground glass opacities.   Febrile and requiring high flow nasal cannula.     COVID-19 pneumonia  - Remdesevir completed  - Continue dexamethasone  - Continue Zosyn  - Check sputum culture  - Repeat procalcitonin, CXR  - Not a candidate for tocilizumab   - Follow up cultures  - Monitor for fevers  - Trend WBCs    Vani Jett, PGY-5  Infectious Disease Fellow   Pager: [phone]  After 5pm/weekends: [phone] Mr Olivares is a 60 year old man who presented with hypoxia after being diagnosed with COVID outpatient. He has completed a course of remdesevir, but is still febrile and hypoxic.   He does not have a clear superimposed bacterial infection, though bacterial pneumonia is possible.   CT chest on admission with PE and ground glass opacities.   Febrile and requiring high flow nasal cannula.     COVID-19 pneumonia  - Remdesevir completed  - Continue dexamethasone  - Continue Zosyn  - Check sputum culture  - Repeat procalcitonin, CXR  - Repeat blood cultures with AM labs  - Not a candidate for tocilizumab   - Follow up cultures  - Monitor for fevers  - Trend WBCs    Vani Jett, PGY-5  Infectious Disease Fellow   Pager: [phone]  After 5pm/weekends: [phone]

## 2021-03-15 NOTE — PROGRESS NOTE ADULT - SUBJECTIVE AND OBJECTIVE BOX
Chief complaint  Patient is a 60y old  Male who presents with a chief complaint of hypoxia 2/2 COVID (15 Mar 2021 13:15)   Review of systems  Patient in bed, looks comfortable, no hypoglycemic episodes.    Labs and Fingersticks  CAPILLARY BLOOD GLUCOSE      POCT Blood Glucose.: 302 mg/dL (15 Mar 2021 11:28)  POCT Blood Glucose.: 270 mg/dL (15 Mar 2021 07:35)  POCT Blood Glucose.: 251 mg/dL (14 Mar 2021 21:26)  POCT Blood Glucose.: 230 mg/dL (14 Mar 2021 16:59)      Anion Gap, Serum: 13 (03-15 @ 12:19)  Anion Gap, Serum: 15 (03-15 @ 07:24)  Anion Gap, Serum: 16 (03-14 @ 06:19)  Anion Gap, Serum: 13 (03-13 @ 21:22)      Calcium, Total Serum: 8.9 (03-15 @ 12:19)  Calcium, Total Serum: 9.0 (03-15 @ 07:24)  Calcium, Total Serum: 8.9 (03-14 @ 06:19)  Calcium, Total Serum: 8.8 (03-13 @ 21:22)  Albumin, Serum: 2.5 <L> (03-15 @ 07:24)  Albumin, Serum: 2.7 <L> (03-14 @ 06:19)  Albumin, Serum: 3.1 <L> (03-13 @ 21:22)    Alanine Aminotransferase (ALT/SGPT): 23 (03-15 @ 07:24)  Alanine Aminotransferase (ALT/SGPT): 24 (03-14 @ 06:19)  Alanine Aminotransferase (ALT/SGPT): 27 (03-13 @ 21:22)  Alkaline Phosphatase, Serum: 167 <H> (03-15 @ 07:24)  Alkaline Phosphatase, Serum: 132 <H> (03-14 @ 06:19)  Alkaline Phosphatase, Serum: 104 (03-13 @ 21:22)  Aspartate Aminotransferase (AST/SGOT): 42 <H> (03-15 @ 07:24)  Aspartate Aminotransferase (AST/SGOT): 41 <H> (03-14 @ 06:19)  Aspartate Aminotransferase (AST/SGOT): 39 (03-13 @ 21:22)        03-15    131<L>  |  96  |  24<H>  ----------------------------<  301<H>  4.6   |  22  |  0.63    Ca    8.9      15 Mar 2021 12:19  Phos  3.1     03-15  Mg     2.6     03-15    TPro  6.6  /  Alb  2.5<L>  /  TBili  0.8  /  DBili  0.2  /  AST  42<H>  /  ALT  23  /  AlkPhos  167<H>  03-15                        12.4   14.54 )-----------( 467      ( 15 Mar 2021 07:24 )             39.1     Medications  MEDICATIONS  (STANDING):  dexAMETHasone  Injectable 6 milliGRAM(s) IV Push daily  dextrose 40% Gel 15 Gram(s) Oral once  dextrose 5%. 1000 milliLiter(s) (50 mL/Hr) IV Continuous <Continuous>  dextrose 5%. 1000 milliLiter(s) (100 mL/Hr) IV Continuous <Continuous>  dextrose 50% Injectable 25 Gram(s) IV Push once  dextrose 50% Injectable 12.5 Gram(s) IV Push once  dextrose 50% Injectable 25 Gram(s) IV Push once  enoxaparin Injectable 70 milliGRAM(s) SubCutaneous every 12 hours  glucagon  Injectable 1 milliGRAM(s) IntraMuscular once  influenza   Vaccine 0.5 milliLiter(s) IntraMuscular once  insulin glargine Injectable (LANTUS) 30 Unit(s) SubCutaneous at bedtime  insulin lispro (ADMELOG) corrective regimen sliding scale   SubCutaneous three times a day before meals  insulin lispro (ADMELOG) corrective regimen sliding scale   SubCutaneous at bedtime  insulin lispro Injectable (ADMELOG) 15 Unit(s) SubCutaneous three times a day before meals  piperacillin/tazobactam IVPB.. 3.375 Gram(s) IV Intermittent every 8 hours      Physical Exam  General: Patient comfortable in bed  Vital Signs Last 12 Hrs  T(F): 98.3 (03-15-21 @ 11:14), Max: 98.3 (03-15-21 @ 04:53)  HR: 108 (03-15-21 @ 11:14) (99 - 108)  BP: 118/75 (03-15-21 @ 11:14) (111/77 - 118/75)  BP(mean): --  RR: 21 (03-15-21 @ 11:14) (20 - 22)  SpO2: 91% (03-15-21 @ 11:14) (90% - 91%)  Neck: No palpable thyroid nodules.  CVS: S1S2, No murmurs  Respiratory: No wheezing, no crepitations  GI: Abdomen soft, bowel sounds positive  Musculoskeletal:  edema lower extremities.   Skin: No skin rashes, no ecchymosis    Diagnostics             Chief complaint  Patient is a 60y old  Male who presents with a chief complaint of hypoxia 2/2 COVID (15 Mar 2021 13:15)   Review of systems  Patient in bed, looks comfortable, no hypoglycemic episodes.    Labs and Fingersticks  CAPILLARY BLOOD GLUCOSE      POCT Blood Glucose.: 302 mg/dL (15 Mar 2021 11:28)  POCT Blood Glucose.: 270 mg/dL (15 Mar 2021 07:35)  POCT Blood Glucose.: 251 mg/dL (14 Mar 2021 21:26)  POCT Blood Glucose.: 230 mg/dL (14 Mar 2021 16:59)      Anion Gap, Serum: 13 (03-15 @ 12:19)  Anion Gap, Serum: 15 (03-15 @ 07:24)  Anion Gap, Serum: 16 (03-14 @ 06:19)  Anion Gap, Serum: 13 (03-13 @ 21:22)      Calcium, Total Serum: 8.9 (03-15 @ 12:19)  Calcium, Total Serum: 9.0 (03-15 @ 07:24)  Calcium, Total Serum: 8.9 (03-14 @ 06:19)  Calcium, Total Serum: 8.8 (03-13 @ 21:22)  Albumin, Serum: 2.5 <L> (03-15 @ 07:24)  Albumin, Serum: 2.7 <L> (03-14 @ 06:19)  Albumin, Serum: 3.1 <L> (03-13 @ 21:22)    Alanine Aminotransferase (ALT/SGPT): 23 (03-15 @ 07:24)  Alanine Aminotransferase (ALT/SGPT): 24 (03-14 @ 06:19)  Alanine Aminotransferase (ALT/SGPT): 27 (03-13 @ 21:22)  Alkaline Phosphatase, Serum: 167 <H> (03-15 @ 07:24)  Alkaline Phosphatase, Serum: 132 <H> (03-14 @ 06:19)  Alkaline Phosphatase, Serum: 104 (03-13 @ 21:22)  Aspartate Aminotransferase (AST/SGOT): 42 <H> (03-15 @ 07:24)  Aspartate Aminotransferase (AST/SGOT): 41 <H> (03-14 @ 06:19)  Aspartate Aminotransferase (AST/SGOT): 39 (03-13 @ 21:22)        03-15    131<L>  |  96  |  24<H>  ----------------------------<  301<H>  4.6   |  22  |  0.63    Ca    8.9      15 Mar 2021 12:19  Phos  3.1     03-15  Mg     2.6     03-15    TPro  6.6  /  Alb  2.5<L>  /  TBili  0.8  /  DBili  0.2  /  AST  42<H>  /  ALT  23  /  AlkPhos  167<H>  03-15                        12.4   14.54 )-----------( 467      ( 15 Mar 2021 07:24 )             39.1     Medications  MEDICATIONS  (STANDING):  dexAMETHasone  Injectable 6 milliGRAM(s) IV Push daily  dextrose 40% Gel 15 Gram(s) Oral once  dextrose 5%. 1000 milliLiter(s) (50 mL/Hr) IV Continuous <Continuous>  dextrose 5%. 1000 milliLiter(s) (100 mL/Hr) IV Continuous <Continuous>  dextrose 50% Injectable 25 Gram(s) IV Push once  dextrose 50% Injectable 12.5 Gram(s) IV Push once  dextrose 50% Injectable 25 Gram(s) IV Push once  enoxaparin Injectable 70 milliGRAM(s) SubCutaneous every 12 hours  glucagon  Injectable 1 milliGRAM(s) IntraMuscular once  influenza   Vaccine 0.5 milliLiter(s) IntraMuscular once  insulin glargine Injectable (LANTUS) 30 Unit(s) SubCutaneous at bedtime  insulin lispro (ADMELOG) corrective regimen sliding scale   SubCutaneous three times a day before meals  insulin lispro (ADMELOG) corrective regimen sliding scale   SubCutaneous at bedtime  insulin lispro Injectable (ADMELOG) 15 Unit(s) SubCutaneous three times a day before meals  piperacillin/tazobactam IVPB.. 3.375 Gram(s) IV Intermittent every 8 hours      Physical Exam  General: Patient comfortable in bed  Vital Signs Last 12 Hrs  T(F): 98.3 (03-15-21 @ 11:14), Max: 98.3 (03-15-21 @ 04:53)  HR: 108 (03-15-21 @ 11:14) (99 - 108)  BP: 118/75 (03-15-21 @ 11:14) (111/77 - 118/75)  BP(mean): --  RR: 21 (03-15-21 @ 11:14) (20 - 22)  SpO2: 91% (03-15-21 @ 11:14) (90% - 91%)  Neck: No palpable thyroid nodules.  CVS: S1S2, No murmurs  Respiratory: No wheezing, no crepitations  GI: Abdomen soft, bowel sounds positive  Musculoskeletal:  edema lower extremities.   Skin: No skin rashes, no ecchymosis    Diagnostics

## 2021-03-15 NOTE — PROGRESS NOTE ADULT - PROBLEM SELECTOR PLAN 1
Will increase Lantus to 30u at bedtime.  Will increase Admelog to 15u before each meal and continue Admelog correction scale coverage. Will continue monitoring FS and FU, will adjust insulin dose as steroids are tapered/dc.  Patient with newly dx DM, A1C 12.4%. His insulin requirement is large while on IV steroids. Suggest to start insulin administration teaching, will likely DC on insulin. Will continue monitoring and make recommendations for outpatient management prior to DC.  Patient counseled for compliance with consistent low carb diet and exercise as tolerated outpatient.

## 2021-03-15 NOTE — PROGRESS NOTE ADULT - PROBLEM SELECTOR PLAN 3
- New DM   - hyperglycemia will worsen with decadron---> now improving   - A1C = 12.4   - Cw Lantus and Admelog  - monitor blood glucose   - Endo consult with Dr Mcmanus is appreciated    - cont sliding scale   - Nutrition and DM educator consults

## 2021-03-16 NOTE — PROGRESS NOTE ADULT - SUBJECTIVE AND OBJECTIVE BOX
TAMARA CHEW 60y [id number]    Patient is a 60y old  Male who presents with a chief complaint of hypoxia 2/2 COVID (16 Mar 2021 15:24)      Follow Up/CC:  ID following for COVID    Interval History/ROS: on HFNC, no fever    Allergies    No Known Allergies    Intolerances        ANTIMICROBIALS:  piperacillin/tazobactam IVPB.. 3.375 every 8 hours      MEDICATIONS  (STANDING):  dexAMETHasone  Injectable 6 milliGRAM(s) IV Push daily  dextrose 40% Gel 15 Gram(s) Oral once  dextrose 5%. 1000 milliLiter(s) (50 mL/Hr) IV Continuous <Continuous>  dextrose 5%. 1000 milliLiter(s) (100 mL/Hr) IV Continuous <Continuous>  dextrose 50% Injectable 25 Gram(s) IV Push once  dextrose 50% Injectable 25 Gram(s) IV Push once  dextrose 50% Injectable 12.5 Gram(s) IV Push once  enoxaparin Injectable 70 milliGRAM(s) SubCutaneous every 12 hours  glucagon  Injectable 1 milliGRAM(s) IntraMuscular once  influenza   Vaccine 0.5 milliLiter(s) IntraMuscular once  insulin glargine Injectable (LANTUS) 30 Unit(s) SubCutaneous at bedtime  insulin lispro (ADMELOG) corrective regimen sliding scale   SubCutaneous three times a day before meals  insulin lispro (ADMELOG) corrective regimen sliding scale   SubCutaneous at bedtime  insulin lispro Injectable (ADMELOG) 15 Unit(s) SubCutaneous three times a day before meals  piperacillin/tazobactam IVPB.. 3.375 Gram(s) IV Intermittent every 8 hours    MEDICATIONS  (PRN):  acetaminophen   Tablet .. 650 milliGRAM(s) Oral every 4 hours PRN Mild Pain (1 - 3)  ALBUTerol    90 MICROgram(s) HFA Inhaler 2 Puff(s) Inhalation every 6 hours PRN Shortness of Breath and/or Wheezing  polyethylene glycol 3350 17 Gram(s) Oral daily PRN Constipation        Vital Signs Last 24 Hrs  T(C): 36.9 (16 Mar 2021 15:00), Max: 36.9 (15 Mar 2021 21:51)  T(F): 98.4 (16 Mar 2021 15:00), Max: 98.4 (15 Mar 2021 21:51)  HR: 102 (16 Mar 2021 16:54) (87 - 104)  BP: 114/70 (16 Mar 2021 15:00) (114/70 - 117/73)  BP(mean): --  RR: 20 (16 Mar 2021 16:54) (20 - 20)  SpO2: 92% (16 Mar 2021 16:54) (88% - 94%)    CBC Full  -  ( 16 Mar 2021 05:39 )  WBC Count : 16.63 K/uL  RBC Count : 6.00 M/uL  Hemoglobin : 12.4 g/dL  Hematocrit : 39.2 %  Platelet Count - Automated : 532 K/uL  Mean Cell Volume : 65.3 fl  Mean Cell Hemoglobin : 20.7 pg  Mean Cell Hemoglobin Concentration : 31.6 gm/dL  Auto Neutrophil # : 15.02 K/uL  Auto Lymphocyte # : 0.85 K/uL  Auto Monocyte # : 0.50 K/uL  Auto Eosinophil # : 0.02 K/uL  Auto Basophil # : 0.02 K/uL  Auto Neutrophil % : 90.4 %  Auto Lymphocyte % : 5.1 %  Auto Monocyte % : 3.0 %  Auto Eosinophil % : 0.1 %  Auto Basophil % : 0.1 %    03-16    130<L>  |  94<L>  |  25<H>  ----------------------------<  174<H>  4.4   |  20<L>  |  0.65    Ca    8.9      16 Mar 2021 05:41  Phos  3.1     03-15  Mg     2.6     03-15    TPro  6.5  /  Alb  2.8<L>  /  TBili  0.6  /  DBili  0.2  /  AST  32  /  ALT  30  /  AlkPhos  167<H>  03-16    LIVER FUNCTIONS - ( 16 Mar 2021 05:41 )  Alb: 2.8 g/dL / Pro: 6.5 g/dL / ALK PHOS: 167 U/L / ALT: 30 U/L / AST: 32 U/L / GGT: x           Procalcitonin, Serum: 1.19 (03-15)  Procalcitonin, Serum: 0.53 (03-13)    C-Reactive Protein, Serum: 20.43 (03-15)  C-Reactive Protein, Serum: 25.62 (03-14)  C-Reactive Protein, Serum: 19.07 (03-13)  C-Reactive Protein, Serum: 10.60 (03-12)  C-Reactive Protein, Serum: 6.12 (03-10)    Ferritin, Serum: 1144 (03-15)  Ferritin, Serum: 740 (03-13)  Ferritin, Serum: 694 (03-12)  Ferritin, Serum: 987 (03-10)      D-Dimer Assay, Quantitative: 2759 (03-15)  D-Dimer Assay, Quantitative: 7399 (03-14)  D-Dimer Assay, Quantitative: <150 (03-14)  D-Dimer Assay, Quantitative: 66886 (03-13)  D-Dimer Assay, Quantitative: 42515 (03-12)  D-Dimer Assay, Quantitative: 636 (03-10)        MICROBIOLOGY:  .Blood Blood  03-13-21   No growth to date.  --  --      .Blood Blood-Peripheral  03-08-21   No Growth Final  --  --      RADIOLOGY    < from: Xray Chest 1 View-PORTABLE IMMEDIATE (Xray Chest 1 View-PORTABLE IMMEDIATE .) (03.13.21 @ 18:22) >  Increasing patchy bilateral airspace opacities in both upper and lower lobes. This may represent multifocal pneumonia versus pulmonary edema.      < end of copied text >

## 2021-03-16 NOTE — PROGRESS NOTE ADULT - ATTENDING COMMENTS
Rj Rouse  Attending Physician   Division of Infectious Disease  Pager #[phone]  Available on Microsoft Teams also  After 5pm/weekend or no response, call #[phone]

## 2021-03-16 NOTE — PROGRESS NOTE ADULT - SUBJECTIVE AND OBJECTIVE BOX
Follow-up Pulm Progress Note    Feeling okay  Sats 93% 60L/100%  Denies cough/dyspnea/CP/pleuritic CP     Medications:  MEDICATIONS  (STANDING):  dexAMETHasone  Injectable 6 milliGRAM(s) IV Push daily  dextrose 40% Gel 15 Gram(s) Oral once  dextrose 5%. 1000 milliLiter(s) (50 mL/Hr) IV Continuous <Continuous>  dextrose 5%. 1000 milliLiter(s) (100 mL/Hr) IV Continuous <Continuous>  dextrose 50% Injectable 25 Gram(s) IV Push once  dextrose 50% Injectable 12.5 Gram(s) IV Push once  dextrose 50% Injectable 25 Gram(s) IV Push once  enoxaparin Injectable 70 milliGRAM(s) SubCutaneous every 12 hours  glucagon  Injectable 1 milliGRAM(s) IntraMuscular once  influenza   Vaccine 0.5 milliLiter(s) IntraMuscular once  insulin glargine Injectable (LANTUS) 30 Unit(s) SubCutaneous at bedtime  insulin lispro (ADMELOG) corrective regimen sliding scale   SubCutaneous three times a day before meals  insulin lispro (ADMELOG) corrective regimen sliding scale   SubCutaneous at bedtime  insulin lispro Injectable (ADMELOG) 15 Unit(s) SubCutaneous three times a day before meals  piperacillin/tazobactam IVPB.. 3.375 Gram(s) IV Intermittent every 8 hours    MEDICATIONS  (PRN):  acetaminophen   Tablet .. 650 milliGRAM(s) Oral every 4 hours PRN Mild Pain (1 - 3)  ALBUTerol    90 MICROgram(s) HFA Inhaler 2 Puff(s) Inhalation every 6 hours PRN Shortness of Breath and/or Wheezing  polyethylene glycol 3350 17 Gram(s) Oral daily PRN Constipation          Vital Signs Last 24 Hrs  T(C): 36.7 (16 Mar 2021 04:44), Max: 36.9 (15 Mar 2021 16:09)  T(F): 98.1 (16 Mar 2021 04:44), Max: 98.4 (15 Mar 2021 16:09)  HR: 104 (16 Mar 2021 04:44) (87 - 104)  BP: 117/73 (16 Mar 2021 04:44) (114/78 - 117/73)  BP(mean): --  RR: 20 (16 Mar 2021 08:00) (20 - 20)  SpO2: 94% (16 Mar 2021 08:00) (88% - 94%)          03-15 @ 07:01  -  03-16 @ 07:00  --------------------------------------------------------  IN: 720 mL / OUT: 1160 mL / NET: -440 mL          LABS:                        12.4   16.63 )-----------( 532      ( 16 Mar 2021 05:39 )             39.2     03-16    130<L>  |  94<L>  |  25<H>  ----------------------------<  174<H>  4.4   |  20<L>  |  0.65    Ca    8.9      16 Mar 2021 05:41  Phos  3.1     03-15  Mg     2.6     03-15    TPro  6.5  /  Alb  2.8<L>  /  TBili  0.6  /  DBili  0.2  /  AST  32  /  ALT  30  /  AlkPhos  167<H>  03-16          CAPILLARY BLOOD GLUCOSE      POCT Blood Glucose.: 189 mg/dL (16 Mar 2021 11:56)        Procalcitonin, Serum: 1.19 ng/mL (03-15-21 @ 12:19)  Procalcitonin, Serum: 0.53 ng/mL (03-13-21 @ 17:57)    Serum Pro-Brain Natriuretic Peptide: 79 pg/mL (03-13-21 @ 21:22)        CULTURES:     Culture - Blood (collected 03-13-21 @ 21:11)  Source: .Blood Blood  Preliminary Report (03-14-21 @ 23:02):    No growth to date.    Culture - Blood (collected 03-08-21 @ 17:52)  Source: .Blood Blood-Peripheral  Final Report (03-13-21 @ 18:01):    No Growth Final    Culture - Blood (collected 03-08-21 @ 17:52)  Source: .Blood Blood-Peripheral  Final Report (03-13-21 @ 18:01):    No Growth Final        Physical Examination:  GEN: NAD  NEURO: A&Ox4, moves all extremities   PULM: decreased BS  CVS: S1, S2 heard  EXT: no edema       RADIOLOGY REVIEWED  CXR 3/13: worsening b/l opacities      CT chest: < from: CT Angio Chest w/ IV Cont (03.12.21 @ 15:19) >  FINDINGS:    Pulmonary Artery:  Right lower medial segment filling defect concerning for pulmonary embolism (6:274). No main, right main, or lobar pulmonary was.    Tubes/Lines: None.    Lungs, airways and pleura: Diffuse bilateral groundglass opacities, septal thickening, bronchiectasis. Emphysema. No pleural effusion or pneumothorax. The airways are clear.    Mediastinum: A borderline enlarged left paratracheal/carinal lymph node measures 1.9 x 1.4 cm and a AP window lymph node measures 1.4 x 0.9 cm, which are likely reactive. The heart is normal. No pleural effusion. Minimal aortic calcifications. The thyroid gland is unremarkable.    Upper Abdomen: Steatosis.    Bones And Soft Tissues: There are degenerative changes of the spine.  The soft tissues are unremarkable.      IMPRESSION:      1. Right lower lobe medial segmental pulmonary embolism.    2. Diffuse bilateral groundglass opacities, septal thickening, bronchiectasis, representative of lung injury.

## 2021-03-16 NOTE — PROGRESS NOTE ADULT - SUBJECTIVE AND OBJECTIVE BOX
[facility] Division of Hospital Medicine  Demian Siu MD, DANNY  Pager (M-F, 8A-5P): [phone]  Other Times:  [phone]    Patient is a 60y old  Male who presents with a chief complaint of hypoxia 2/2 COVID (16 Mar 2021 12:19)      SUBJECTIVE / OVERNIGHT EVENTS:  No events overnight. No new complaints.     MEDICATIONS  (STANDING):  dexAMETHasone  Injectable 6 milliGRAM(s) IV Push daily  dextrose 40% Gel 15 Gram(s) Oral once  dextrose 5%. 1000 milliLiter(s) (50 mL/Hr) IV Continuous <Continuous>  dextrose 5%. 1000 milliLiter(s) (100 mL/Hr) IV Continuous <Continuous>  dextrose 50% Injectable 25 Gram(s) IV Push once  dextrose 50% Injectable 12.5 Gram(s) IV Push once  dextrose 50% Injectable 25 Gram(s) IV Push once  enoxaparin Injectable 70 milliGRAM(s) SubCutaneous every 12 hours  glucagon  Injectable 1 milliGRAM(s) IntraMuscular once  influenza   Vaccine 0.5 milliLiter(s) IntraMuscular once  insulin glargine Injectable (LANTUS) 30 Unit(s) SubCutaneous at bedtime  insulin lispro (ADMELOG) corrective regimen sliding scale   SubCutaneous three times a day before meals  insulin lispro (ADMELOG) corrective regimen sliding scale   SubCutaneous at bedtime  insulin lispro Injectable (ADMELOG) 15 Unit(s) SubCutaneous three times a day before meals  piperacillin/tazobactam IVPB.. 3.375 Gram(s) IV Intermittent every 8 hours    MEDICATIONS  (PRN):  acetaminophen   Tablet .. 650 milliGRAM(s) Oral every 4 hours PRN Mild Pain (1 - 3)  ALBUTerol    90 MICROgram(s) HFA Inhaler 2 Puff(s) Inhalation every 6 hours PRN Shortness of Breath and/or Wheezing  polyethylene glycol 3350 17 Gram(s) Oral daily PRN Constipation    CAPILLARY BLOOD GLUCOSE      POCT Blood Glucose.: 189 mg/dL (16 Mar 2021 11:56)  POCT Blood Glucose.: 173 mg/dL (16 Mar 2021 07:40)  POCT Blood Glucose.: 236 mg/dL (15 Mar 2021 22:21)  POCT Blood Glucose.: 322 mg/dL (15 Mar 2021 17:02)    I&O's Summary    15 Mar 2021 07:01  -  16 Mar 2021 07:00  --------------------------------------------------------  IN: 720 mL / OUT: 1160 mL / NET: -440 mL    16 Mar 2021 07:01  -  16 Mar 2021 14:52  --------------------------------------------------------  IN: 780 mL / OUT: 200 mL / NET: 580 mL        PHYSICAL EXAM:  Vital Signs Last 24 Hrs  T(C): 36.7 (16 Mar 2021 04:44), Max: 36.9 (15 Mar 2021 16:09)  T(F): 98.1 (16 Mar 2021 04:44), Max: 98.4 (15 Mar 2021 16:09)  HR: 99 (16 Mar 2021 10:02) (87 - 104)  BP: 117/73 (16 Mar 2021 04:44) (114/78 - 117/73)  BP(mean): --  RR: 20 (16 Mar 2021 10:02) (20 - 20)  SpO2: 91% (16 Mar 2021 10:02) (88% - 94%)    CONSTITUTIONAL: NAD, well-developed, well-groomed, HFNC in place  RESPIRATORY: Normal respiratory effort; lungs are clear to auscultation bilaterally  CARDIOVASCULAR: Regular rate and rhythm, normal S1 and S2, no murmur/rub/gallop; No lower extremity edema  ABDOMEN: Nontender to palpation, normoactive bowel sounds, no rebound/guarding  MUSCULOSKELETAL: No clubbing or cyanosis of digits; no joint swelling or tenderness to palpation  PSYCH: A+O to person, place, and time; affect appropriate  NEUROLOGY: CN 2-12 are intact and symmetric; no gross sensory deficits    LABS:                        12.4   16.63 )-----------( 532      ( 16 Mar 2021 05:39 )             39.2     03-16    130<L>  |  94<L>  |  25<H>  ----------------------------<  174<H>  4.4   |  20<L>  |  0.65    Ca    8.9      16 Mar 2021 05:41  Phos  3.1     03-15  Mg     2.6     03-15    TPro  6.5  /  Alb  2.8<L>  /  TBili  0.6  /  DBili  0.2  /  AST  32  /  ALT  30  /  AlkPhos  167<H>  03-16      Culture - Blood (collected 13 Mar 2021 21:11)  Source: .Blood Blood  Preliminary Report (14 Mar 2021 23:02):    No growth to date.      RADIOLOGY & ADDITIONAL TESTS:    Lab Results Reviewed

## 2021-03-16 NOTE — PROGRESS NOTE ADULT - PROBLEM SELECTOR PLAN 1
+COVID PCR  -CXR b/l LL opacities   -S/p Remdesivir x 5 days   -Decadron 6mg qd x 10 days   -Not a candidate for Tocilizumab at this point   -Trend inflammatory markers  -PCT uptrending. C/w abx as per ID for possible superimposed bacterial PNA. F/u cultures.  -MRSA nasal swab negative   -Check sputum culture if able

## 2021-03-16 NOTE — PROGRESS NOTE ADULT - ASSESSMENT
Mr Olivares is a 60 year old man who presented with hypoxia after being diagnosed with COVID outpatient. He has completed a course of remdesevir, but is still febrile and hypoxic.   He does not have a clear superimposed bacterial infection, though bacterial pneumonia is possible.   CT chest on admission with PE and ground glass opacities.   Febrile and requiring high flow nasal cannula.     COVID-19 pneumonia, leukocytosis   - Remdesevir completed  - Continue dexamethasone  - Continue Zosyn  - Check sputum culture  - Repeat blood cultures with AM labs  - Not a candidate for tocilizumab in setting of possible infection   - Follow up cultures  - Monitor for fevers  - Trend WBCs  - add vancomycin 1 gm iv q12

## 2021-03-16 NOTE — PROGRESS NOTE ADULT - PROBLEM SELECTOR PLAN 1
- presented with sepsis due to severe COVID-19 infection  - completed remdesivir x5 days  - decadron x10 days (start on 3/8)   - inflammatory markers q48-72h  - Neg blood cultures so far  - ID consult appreciated.   - Continue Zosyn IV given increasing procalcitonin level, suspected superimposed bacterial pneumonia  - try to obtain sputum cultures  - Patient currently tolerating HFNC but will closely monitor respiratory status, may require Bipap again  - Follow up cultures

## 2021-03-16 NOTE — PROGRESS NOTE ADULT - ASSESSMENT
Assessment  DMT2: 60y Male with newly diagnosed DM T2 with hyperglycemia admitted with COVID19 , A1C is 12.4%,  now on basal bolus insulin, increased dose yesterday, blood sugars are improving and trending within acceptable range today, no hypoglycemic episodes, eating meals, remains on dexamethasone.  COVID19: On medications, monitored, stable.  Overweight: No strict exercise routines, neither on low calorie diet.      Anderson Mcmanus MD  Cell: [phone]  Office: [phone]       Assessment  DMT2: 60y Male with newly diagnosed DM T2 with hyperglycemia admitted with COVID19 , A1C is 12.4%,  now on basal bolus insulin, increased dose yesterday, blood sugars are improving and trending within acceptable range  today, no hypoglycemic episodes, eating meals, remains on dexamethasone.  COVID19: On medications, monitored, stable.  Overweight: No strict exercise routines, neither on low calorie diet.      Anderson Mcmanus MD  Cell: [phone]  Office: [phone]

## 2021-03-16 NOTE — PROGRESS NOTE ADULT - SUBJECTIVE AND OBJECTIVE BOX
Chief complaint  Patient is a 60y old  Male who presents with a chief complaint of hypoxia 2/2 COVID (16 Mar 2021 14:52)   Review of systems  Patient in bed, looks comfortable, no hypoglycemic episodes.    Labs and Fingersticks  CAPILLARY BLOOD GLUCOSE      POCT Blood Glucose.: 189 mg/dL (16 Mar 2021 11:56)  POCT Blood Glucose.: 173 mg/dL (16 Mar 2021 07:40)  POCT Blood Glucose.: 236 mg/dL (15 Mar 2021 22:21)  POCT Blood Glucose.: 322 mg/dL (15 Mar 2021 17:02)      Anion Gap, Serum: 16 (03-16 @ 05:41)  Anion Gap, Serum: 13 (03-15 @ 12:19)  Anion Gap, Serum: 15 (03-15 @ 07:24)      Calcium, Total Serum: 8.9 (03-16 @ 05:41)  Calcium, Total Serum: 8.9 (03-15 @ 12:19)  Calcium, Total Serum: 9.0 (03-15 @ 07:24)  Albumin, Serum: 2.8 <L> (03-16 @ 05:41)  Albumin, Serum: 2.5 <L> (03-15 @ 07:24)    Alanine Aminotransferase (ALT/SGPT): 30 (03-16 @ 05:41)  Alanine Aminotransferase (ALT/SGPT): 23 (03-15 @ 07:24)  Alkaline Phosphatase, Serum: 167 <H> (03-16 @ 05:41)  Alkaline Phosphatase, Serum: 167 <H> (03-15 @ 07:24)  Aspartate Aminotransferase (AST/SGOT): 32 (03-16 @ 05:41)  Aspartate Aminotransferase (AST/SGOT): 42 <H> (03-15 @ 07:24)        03-16    130<L>  |  94<L>  |  25<H>  ----------------------------<  174<H>  4.4   |  20<L>  |  0.65    Ca    8.9      16 Mar 2021 05:41  Phos  3.1     03-15  Mg     2.6     03-15    TPro  6.5  /  Alb  2.8<L>  /  TBili  0.6  /  DBili  0.2  /  AST  32  /  ALT  30  /  AlkPhos  167<H>  03-16                        12.4   16.63 )-----------( 532      ( 16 Mar 2021 05:39 )             39.2     Medications  MEDICATIONS  (STANDING):  dexAMETHasone  Injectable 6 milliGRAM(s) IV Push daily  dextrose 40% Gel 15 Gram(s) Oral once  dextrose 5%. 1000 milliLiter(s) (50 mL/Hr) IV Continuous <Continuous>  dextrose 5%. 1000 milliLiter(s) (100 mL/Hr) IV Continuous <Continuous>  dextrose 50% Injectable 25 Gram(s) IV Push once  dextrose 50% Injectable 12.5 Gram(s) IV Push once  dextrose 50% Injectable 25 Gram(s) IV Push once  enoxaparin Injectable 70 milliGRAM(s) SubCutaneous every 12 hours  glucagon  Injectable 1 milliGRAM(s) IntraMuscular once  influenza   Vaccine 0.5 milliLiter(s) IntraMuscular once  insulin glargine Injectable (LANTUS) 30 Unit(s) SubCutaneous at bedtime  insulin lispro (ADMELOG) corrective regimen sliding scale   SubCutaneous three times a day before meals  insulin lispro (ADMELOG) corrective regimen sliding scale   SubCutaneous at bedtime  insulin lispro Injectable (ADMELOG) 15 Unit(s) SubCutaneous three times a day before meals  piperacillin/tazobactam IVPB.. 3.375 Gram(s) IV Intermittent every 8 hours      Physical Exam  General: Patient comfortable in bed  Vital Signs Last 12 Hrs  T(F): 98.1 (03-16-21 @ 04:44), Max: 98.1 (03-16-21 @ 04:44)  HR: 99 (03-16-21 @ 10:02) (99 - 104)  BP: 117/73 (03-16-21 @ 04:44) (117/73 - 117/73)  BP(mean): --  RR: 20 (03-16-21 @ 10:02) (20 - 20)  SpO2: 91% (03-16-21 @ 10:02) (88% - 94%)  Neck: No palpable thyroid nodules.  CVS: S1S2, No murmurs  Respiratory: No wheezing, no crepitations  GI: Abdomen soft, bowel sounds positive  Musculoskeletal:  edema lower extremities.   Skin: No skin rashes, no ecchymosis    Diagnostics           Chief complaint  Patient is a 60y old  Male who presents with a chief complaint of hypoxia 2/2 COVID (16 Mar 2021 14:52)   Review of systems  Patient in bed, looks comfortable, no hypoglycemic episodes.    Labs and Fingersticks  CAPILLARY BLOOD GLUCOSE    POCT Blood Glucose.: 189 mg/dL (16 Mar 2021 11:56)  POCT Blood Glucose.: 173 mg/dL (16 Mar 2021 07:40)  POCT Blood Glucose.: 236 mg/dL (15 Mar 2021 22:21)  POCT Blood Glucose.: 322 mg/dL (15 Mar 2021 17:02)      Anion Gap, Serum: 16 (03-16 @ 05:41)  Anion Gap, Serum: 13 (03-15 @ 12:19)  Anion Gap, Serum: 15 (03-15 @ 07:24)      Calcium, Total Serum: 8.9 (03-16 @ 05:41)  Calcium, Total Serum: 8.9 (03-15 @ 12:19)  Calcium, Total Serum: 9.0 (03-15 @ 07:24)  Albumin, Serum: 2.8 <L> (03-16 @ 05:41)  Albumin, Serum: 2.5 <L> (03-15 @ 07:24)    Alanine Aminotransferase (ALT/SGPT): 30 (03-16 @ 05:41)  Alanine Aminotransferase (ALT/SGPT): 23 (03-15 @ 07:24)  Alkaline Phosphatase, Serum: 167 <H> (03-16 @ 05:41)  Alkaline Phosphatase, Serum: 167 <H> (03-15 @ 07:24)  Aspartate Aminotransferase (AST/SGOT): 32 (03-16 @ 05:41)  Aspartate Aminotransferase (AST/SGOT): 42 <H> (03-15 @ 07:24)        03-16    130<L>  |  94<L>  |  25<H>  ----------------------------<  174<H>  4.4   |  20<L>  |  0.65    Ca    8.9      16 Mar 2021 05:41  Phos  3.1     03-15  Mg     2.6     03-15    TPro  6.5  /  Alb  2.8<L>  /  TBili  0.6  /  DBili  0.2  /  AST  32  /  ALT  30  /  AlkPhos  167<H>  03-16                        12.4   16.63 )-----------( 532      ( 16 Mar 2021 05:39 )             39.2     Medications  MEDICATIONS  (STANDING):  dexAMETHasone  Injectable 6 milliGRAM(s) IV Push daily  dextrose 40% Gel 15 Gram(s) Oral once  dextrose 5%. 1000 milliLiter(s) (50 mL/Hr) IV Continuous <Continuous>  dextrose 5%. 1000 milliLiter(s) (100 mL/Hr) IV Continuous <Continuous>  dextrose 50% Injectable 25 Gram(s) IV Push once  dextrose 50% Injectable 12.5 Gram(s) IV Push once  dextrose 50% Injectable 25 Gram(s) IV Push once  enoxaparin Injectable 70 milliGRAM(s) SubCutaneous every 12 hours  glucagon  Injectable 1 milliGRAM(s) IntraMuscular once  influenza   Vaccine 0.5 milliLiter(s) IntraMuscular once  insulin glargine Injectable (LANTUS) 30 Unit(s) SubCutaneous at bedtime  insulin lispro (ADMELOG) corrective regimen sliding scale   SubCutaneous three times a day before meals  insulin lispro (ADMELOG) corrective regimen sliding scale   SubCutaneous at bedtime  insulin lispro Injectable (ADMELOG) 15 Unit(s) SubCutaneous three times a day before meals  piperacillin/tazobactam IVPB.. 3.375 Gram(s) IV Intermittent every 8 hours      Physical Exam  General: Patient comfortable in bed  Vital Signs Last 12 Hrs  T(F): 98.1 (03-16-21 @ 04:44), Max: 98.1 (03-16-21 @ 04:44)  HR: 99 (03-16-21 @ 10:02) (99 - 104)  BP: 117/73 (03-16-21 @ 04:44) (117/73 - 117/73)  BP(mean): --  RR: 20 (03-16-21 @ 10:02) (20 - 20)  SpO2: 91% (03-16-21 @ 10:02) (88% - 94%)  Neck: No palpable thyroid nodules.  CVS: S1S2, No murmurs  Respiratory: No wheezing, no crepitations  GI: Abdomen soft, bowel sounds positive  Musculoskeletal:  edema lower extremities.   Skin: No skin rashes, no ecchymosis    Diagnostics

## 2021-03-16 NOTE — PROGRESS NOTE ADULT - PROBLEM SELECTOR PLAN 2
- Acute respiratory failure with hypoxia due to COVID-19 pneumonia with suspected superimposed bacterial pneumonia, and an acute PE (seen on CTA chest)  - cont HFNC  - Patient is out of the window of opportunity for Tociluzimab  - Pulm consult appreciated  - Patient is full code  - New acute right-sided PE - continue full dose lovenox  Chest CT with Bronchiectasis / Fibrosis / Pos H/O Cigarette smoking for many years and stopped about 10 years ago

## 2021-03-16 NOTE — PROGRESS NOTE ADULT - PROBLEM SELECTOR PLAN 4
Pt is on Lovenox 70mg SQ twice a day   F/up TTE (to be done later today) - I called the echo department   Pulm consult following  Neg LLE Doppler

## 2021-03-17 NOTE — RAPID RESPONSE TEAM SUMMARY - NSSITUATIONBACKGROUNDRRT_GEN_ALL_CORE
59 yo male with no significant PMH admitted for hypoxemic resp failure secondary to COVID 19 complicated by PE and superimposed bacterial pna now with worsening hypoxemia.  Patient on High flow 100% 60L with desat to 60s. RRT called for hypoxemia. Prior to team arrival, pt placed on NRB over high flow--O2 sat remaining in high 70s. Attempted BIPAP support, titrating up to 18/10 100%, remaining tachypneic to 50s with shallow, abdominal respirations. MD present at RRT discussed code status with family. Patient full code. Intubated by anesthesia for hypoxemic resp failure in setting of COVID/PE/PNA. Etomidate/Succ per Anesthesia.  Prop post intubation. -160s. Transferred to 5ICU post intubation.

## 2021-03-17 NOTE — PROGRESS NOTE ADULT - SUBJECTIVE AND OBJECTIVE BOX
Follow-up Pulm Progress Note    S/p RRT and intubation this AM for worsening hypoxic respiratory failure       Medications:  MEDICATIONS  (STANDING):  chlorhexidine 0.12% Liquid 15 milliLiter(s) Oral Mucosa every 12 hours  dextrose 40% Gel 15 Gram(s) Oral once  dextrose 50% Injectable 25 Gram(s) IV Push once  dextrose 50% Injectable 12.5 Gram(s) IV Push once  dextrose 50% Injectable 25 Gram(s) IV Push once  enoxaparin Injectable 70 milliGRAM(s) SubCutaneous every 12 hours  fentaNYL   Infusion... 0.5 MICROgram(s)/kG/Hr (1.67 mL/Hr) IV Continuous <Continuous>  glucagon  Injectable 1 milliGRAM(s) IntraMuscular once  glucagon  Injectable 1 milliGRAM(s) IntraMuscular once  influenza   Vaccine 0.5 milliLiter(s) IntraMuscular once  insulin lispro (ADMELOG) corrective regimen sliding scale   SubCutaneous Before meals and at bedtime  norepinephrine Infusion 0.05 MICROgram(s)/kG/Min (6.25 mL/Hr) IV Continuous <Continuous>  pantoprazole   Suspension 40 milliGRAM(s) Oral daily  piperacillin/tazobactam IVPB.. 3.375 Gram(s) IV Intermittent every 8 hours  propofol Infusion 15 MICROgram(s)/kG/Min (6 mL/Hr) IV Continuous <Continuous>  sodium chloride 0.9% Bolus 500 milliLiter(s) IV Bolus once  vasopressin Infusion 0.04 Unit(s)/Min (2.4 mL/Hr) IV Continuous <Continuous>    MEDICATIONS  (PRN):  acetaminophen   Tablet .. 650 milliGRAM(s) Oral every 4 hours PRN Mild Pain (1 - 3)  ALBUTerol    90 MICROgram(s) HFA Inhaler 2 Puff(s) Inhalation every 6 hours PRN Shortness of Breath and/or Wheezing  polyethylene glycol 3350 17 Gram(s) Oral daily PRN Constipation      Mode: AC/ CMV (Assist Control/ Continuous Mandatory Ventilation)  RR (machine): 26  TV (machine): 400  FiO2: 80  PEEP: 5  ITime: 1  MAP: 12  PIP: 33      Vital Signs Last 24 Hrs  T(C): 35.7 (17 Mar 2021 12:00), Max: 36.9 (16 Mar 2021 15:00)  T(F): 96.3 (17 Mar 2021 12:00), Max: 98.4 (16 Mar 2021 15:00)  HR: 52 (17 Mar 2021 14:15) (52 - 110)  BP: 117/73 (17 Mar 2021 07:30) (98/66 - 131/77)  BP(mean): 90 (17 Mar 2021 07:30) (77 - 97)  RR: 30 (17 Mar 2021 14:15) (0 - 31)  SpO2: 96% (17 Mar 2021 14:15) (90% - 100%)    ABG - ( 17 Mar 2021 13:12 )  pH, Arterial: 7.43  pH, Blood: x     /  pCO2: 33    /  pO2: 110   / HCO3: 21    / Base Excess: -2.0  /  SaO2: 98                    03-16 @ 07:01  -  03-17 @ 07:00  --------------------------------------------------------  IN: 1727.3 mL / OUT: 950 mL / NET: 777.3 mL          LABS:                        12.4   16.63 )-----------( 532      ( 16 Mar 2021 05:39 )             39.2     03-16    130<L>  |  94<L>  |  25<H>  ----------------------------<  174<H>  4.4   |  20<L>  |  0.65    Ca    8.9      16 Mar 2021 05:41    TPro  6.5  /  Alb  2.8<L>  /  TBili  0.6  /  DBili  0.2  /  AST  32  /  ALT  30  /  AlkPhos  167<H>  03-16          CAPILLARY BLOOD GLUCOSE      POCT Blood Glucose.: 199 mg/dL (17 Mar 2021 11:13)        Procalcitonin, Serum: 1.19 ng/mL (03-15-21 @ 12:19)      CULTURES:     Culture - Blood (collected 03-16-21 @ 10:03)  Source: .Blood Blood-Peripheral  Preliminary Report (03-17-21 @ 11:01):    No growth to date.    Culture - Blood (collected 03-16-21 @ 10:03)  Source: .Blood Blood-Venous  Preliminary Report (03-17-21 @ 11:01):    No growth to date.    Culture - Blood (collected 03-13-21 @ 21:11)  Source: .Blood Blood  Preliminary Report (03-14-21 @ 23:02):    No growth to date.    Culture - Blood (collected 03-08-21 @ 17:52)  Source: .Blood Blood-Peripheral  Final Report (03-13-21 @ 18:01):    No Growth Final    Culture - Blood (collected 03-08-21 @ 17:52)  Source: .Blood Blood-Peripheral  Final Report (03-13-21 @ 18:01):    No Growth Final            Physical Examination:  GEN: NAD  NEURO: BLOSSOM, intubated/sedated   PULM: Coarse bilaterally   CVS: S1, S2 heard  EXT: no edema     RADIOLOGY REVIEWED  CXR: b/l opacities R>L

## 2021-03-17 NOTE — PROGRESS NOTE ADULT - PROBLEM SELECTOR PLAN 2
+COVID PCR  -CXR b/l LL opacities   -S/p Remdesivir x 5 days   -Decadron 6mg qd x 10 days   -Not a candidate for Tocilizumab at this point   -Trend inflammatory markers  -C/w abx as per ID for possible superimposed bacterial PNA.   -BC NGTD   -MRSA nasal swab negative

## 2021-03-17 NOTE — RAPID RESPONSE TEAM SUMMARY - NSSITUATIONBACKGROUNDRRT_GEN_ALL_CORE
Hypoxia  61 yo male with no significant PMH admitted for hypoxemic resp failure secondary to COVID 19 complicated by PE and superimposed bacterial pna now with worsening hypoxemia.  Patient on High flow 100% 60L with desat to 60s. RRT called for hypoxemia. Prior to team arrival, pt placed on NRB over high flow--O2 sat remaining in high 70s. Attempted BIPAP support, titrating up to 18/10 100%, remaining tachypneic to 50s with shallow, abdominal respirations. MD present at RRT discussed code status with family. Patient full code. Intubated by anesthesia for hypoxemic resp failure in setting of COVID/PE/PNA.  Hypoxia  Called by RN to evaluate 59 yo male with no significant PMH admitted for hypoxemic resp failure secondary to COVID 19 complicated by PE and superimposed bacterial pna with worsening hypoxemia.  Patient initially on High flow 100% 60L with desat to 60s. Rapid Response called for hypoxemia. Patient  oxygen supplement changed to NRB -o2 sat remaining in the 70s. Patient switched to BIPAP support, titrating up to 18/10 100%, remaining tachypneic to 50s with shallow, abdominal respirations.  family called and wants patient mechanically ventilated. Patient full code. Intubated by anesthesia for hypoxemic resp failure in setting of COVID/PE/PNA.

## 2021-03-17 NOTE — PROGRESS NOTE ADULT - ATTENDING COMMENTS
Pt seen and examined. 60 year old M with medical hx as noted above, now with acute hypoxic resp failure/ ARDS 2/2 COVID19 PNA, possible superimposed bacterial pneumonia, RLL acute PE, distributive shock 2/2 sepsis + vasoplegia from sedation and lactic acidosis. Remains sedated on lung protective ventilation. Plateau pressure and driving pressure high, vent parameters adjusted with improvement in both plateau and driving pressure, FUP ABG. FiO2 titrated down to 70 %, PEEP at 8, cont to titrated down as tolerated. Cont empiric ABx coverage with Zosyn, check serum procal, BCxs, SCx. Titrate pressor support to keep MAP >65, follow lactate. Remains on AC with therapeutic Lovenox for PE. Cr stable, mild hyponatremia, check urine electrolytes, urine and serum osm. Cont to follow UO/ electrolytes. Overall prognosis extremely guarded. Remains critically ill requiring multiple bedside visits and changes in therapy.

## 2021-03-17 NOTE — PROGRESS NOTE ADULT - ASSESSMENT
Assessment  DMT2: 60y Male with newly diagnosed DM T2 with hyperglycemia admitted with COVID19 , A1C is 12.4%, was on basal bolus insulin, received basal dose last night, blood sugars in acceptable range this AM and now trending up, no hypoglycemic episodes. Patient is s/p RRT this AM for respiratory failure, intubated, monitored in the icu, dexamethasone DC'd, NPO on tube feeds.  COVID19: On medications, monitored, stable.  Overweight: No strict exercise routines, neither on low calorie diet.      Anderson Mcmanus MD  Cell: [phone]  Office: [phone]       Assessment  DMT2: 60y Male with newly diagnosed DM T2 with hyperglycemia admitted with COVID19 , A1C is 12.4%, was on basal bolus insulin,  received basal dose last night, blood sugars in acceptable range this AM and now trending up, no hypoglycemic episodes. Patient is s/p RRT this AM for respiratory failure, intubated, monitored in the icu, dexamethasone DC'd, NPO on tube feeds.  COVID19: On medications, monitored, stable.  Overweight: No strict exercise routines, neither on low calorie diet.      Anderson Mcmanus MD  Cell: [phone]  Office: [phone]

## 2021-03-17 NOTE — RAPID RESPONSE TEAM SUMMARY - NSAIRWAYBREATHINGRRT_GEN_ALL_CORE
Bag mask/Intubated/NIPPV/CPAP/O2 mask/nasal cannula/Suctioned
Intubated
ABG/NIPPV/CPAP/Other (specify)...

## 2021-03-17 NOTE — PROGRESS NOTE ADULT - ASSESSMENT
61 y/o M with no significant PMH. Presents with c/o fevers, weakness, fatigue, SOB for 5 days. Tested positive for COVID 3 days prior to admission at an urgent care. Day of admission presented again to urgent care and noted to be hypoxic and sent to the ER and placed on HFNC. CXR with b/l LL opacities. Now with significant increase in ddimer. Remains on HFNC.

## 2021-03-17 NOTE — CHART NOTE - NSCHARTNOTEFT_GEN_A_CORE
61 y/o M with no significant PMH and a recent diagnosis of COVID on 03/05. Patient presented to [facility] from Urgent Care on 03/08 with complaints of a fever, weakness, SOB, and found to have worsening hypoxia requiring HFNC. CXR performed reporting bilateral lower lobe opacities. 61 y/o M with no significant PMH and a recent diagnosis of COVID on 03/05. Patient presented to [facility] from Urgent Care on 03/08 with complaints of a fever, weakness, SOB, and was found to have worsening hypoxia requiring HFNC. Patient completed RDV (3/8- 3/12) and Decadron (3/8 - 3/17). Course c/b newly diagnosed T2DM with a HbA1c 12.4 and a right lower lobe medial segmental pulmonary embolism on CTA on 3/12 currently on full dose Lovenox. RRT was called 3/17 due to increased work of breathing with an SpO2 70/80's while on HFNC and Nonrebreather mask. Patient intubated 3/17 and transferred to [facility] ICU for further assessment and management.       ASSESSMENT & PLAN:  60y-year-old Male with no significant past medical history with recent diagnosis of COVID-19 ( 3/05) admitted for Acute Hypoxic Respiratory Failure requiring HFNC. Hospital course c/b increasing oxygen requirements and was subsequently intubated on 3/17. Patient admitted to [facility] ICU for further assessment and management .     NEUROLOGY:  - Continue sedation with Propofol and Fentanyl.  Plan to wean sedation as tolerated.   - Neurologic checks as per ICU protocol.     PULMONARY:  - Intubated on mechanical ventilation with volume control Mode: AC/ CMV (Assist Control/ Continuous Mandatory Ventilation), RR (machine): 26, TV (machine): 400, FiO2: 100, PEEP: 5, PIP: 21. Plan to wean ventilator settings as tolerated.   -     CARDIOLOGY:  - Requiring vasopressor support with levo and vaso to maintain MAP above 65. Plan to wean vasopressors as tolerated.   -    GASTROINTESTINAL:  - NPO  - Continue PPI   - Bowel regimen with Senna and Miralax.     RENAL/:  - Strict I&Os. Coates catheter in place.     INFECTIOUS DISEASE:  COVID   - Completed Redesivir for 7 days (3/8- 3/12) and Decadron for 10 days (3/8 - 3/17) to treat COVID-19.   - Continue Empiric Zosyn and Vanco  - Follow culture results.   - COVID-19 isolation precautions.     HEMATOLOGY:  - Continue Lovenox 70mg BID    ENDOCRINE  - HbA1c 12.4  - Continue Lantus 30U at bedtime  - Blood glucose checks Q6 with low dose correctional insulin sliding scale    ETHICS/DISPOSITION:  - Full code.   - Admitted to COVID ICU.     LINES/DRAINS/TUBES/DEVICES:  - RIJ TLC 03/17  -Right Radial [place] 03/17    Above assesment and plan of care performed, created, and reviewed in real time with MICU attending physician Dr. Kael Hay PA #[id number] 61 y/o M with no significant PMH and a recent diagnosis of COVID on 03/05. Patient presented to [facility] from Urgent Care on 03/08 with complaints of a fever, weakness, SOB, and was found to have worsening hypoxia requiring HFNC. Patient completed RDV (3/8- 3/12) and Decadron (3/8 - 3/17). Course c/b newly diagnosed T2DM with a HbA1c 12.4 and a right lower lobe medial segmental pulmonary embolism on CTA on 3/12 currently on full dose Lovenox. RRT was called 3/17 due to increased work of breathing with an SpO2 70/80's while on HFNC and Nonrebreather mask. Patient intubated 3/17 and transferred to [facility] ICU for further assessment and management.       ASSESSMENT & PLAN:  60y-year-old Male with no significant past medical history with recent diagnosis of COVID-19 ( 3/05) admitted for Acute Hypoxic Respiratory Failure requiring HFNC. Hospital course c/b increasing oxygen requirements and was subsequently intubated on 3/17. Patient admitted to [facility] ICU for further assessment and management .     NEUROLOGY:  - Continue sedation with Propofol and Fentanyl.  Plan to wean sedation as tolerated.   - Neurologic checks as per ICU protocol.     PULMONARY:  - Intubated on mechanical ventilation with volume control Mode: AC/ CMV (Assist Control/ Continuous Mandatory Ventilation), RR (machine): 26, TV (machine): 400, FiO2: 100, PEEP: 5, PIP: 21. Plan to wean ventilator settings as tolerated.   -     CARDIOLOGY:  - Requiring vasopressor support with levo and vaso to maintain MAP above 65. Plan to wean vasopressors as tolerated.   -    GASTROINTESTINAL:  - NPO  - Continue PPI   - Bowel regimen with Senna and Miralax.     RENAL/:  - Strict I&Os. Coates catheter in place.     INFECTIOUS DISEASE:  COVID   - Completed Redesivir for 7 days (3/8- 3/12) and Decadron for 10 days (3/8 - 3/17) to treat COVID-19.   - Continue Empiric Zosyn and Vanco  - Follow culture results.   - COVID-19 isolation precautions.     HEMATOLOGY:  - Continue Lovenox 70mg BID    ENDOCRINE  - HbA1c 12.4  - Continue Lantus 30U at bedtime  - Blood glucose checks Q6 with low dose correctional insulin sliding scale    ETHICS/DISPOSITION:  - Full code.   - Admitted to COVID ICU.     LINES/DRAINS/TUBES/DEVICES:  - RIJ TLC 03/17  -Right Radial [place] 03/17    Above assessment and plan of care performed, created, and reviewed in real time with MICU attending physician Dr. Kael Hay PA #[id number]      Attending Attestation    I spent 60 minutes of critical care time separate from procedure in the care of this patient. I agree with the documentation above with the exceptions noted below.    59 yo male with new diagnosis DM2 admitted on 3/8 with COVID PNA complicated by RLL segmental PE diagnosed on 3/12 now on therapeutic Lovenox. For his COVID-19 he completed a course of remdesivir and decadron. However a RRT was called on 3/17 in the setting of worsening hypoxemia SPO2 70's while on HFNC and a NRB. He was briefly trialed on BiPAP however he was noted to have significant belly breathing and tachypnea with RR's 50's and was subsequently intubated prior to transfer to the MICU for further care.     Neuro: RASS goal -1; titrate fentanyl and propofol as tolerated  CV: Hypotension likely in the setting of septic shock from COVID-19 PNA with possible superimposed bacterial PNA; MAP Goal >65; will need a formal Echo  Resp: hypoxemic respiratory failure in the setting of COVID-19, ? superimposed bacterial infection and RLL PE. Lung protective ventilation strategy; f/u ABG, plateau pressure goal <30; wean vent as tolerated  GI: PPI prophylaxis; start trickle feeds  Endo: Newly diagnosed DM2; Continue lantus and SSI  Heme: RLL PE likely hypercoag from COVID-19 PNA; Continue Lovenox 1mg/kg BID  ID: COVID PNA; r/o superimposed bacterial infection; f/u SCx, BCx, MRSA swab; Continue Vanc/Zosyn empirically  Renal: strict I/O's goal net negative 500-1L  Code: Full as per floor team  Prognosis: guarded

## 2021-03-17 NOTE — PROGRESS NOTE ADULT - ASSESSMENT
59 y/o M with no significant PMH and a recent diagnosis of COVID on 03/05. Patient presented to [facility] from Urgent Care on 03/08 with complaints of a fever, weakness, SOB, and was found to have worsening hypoxia requiring HFNC. Patient completed RDV (3/8- 3/12) and Decadron (3/8 - 3/17). Course c/b newly diagnosed T2DM with a HbA1c 12.4 and a right lower lobe medial segmental pulmonary embolism on CTA on 3/12 currently on full dose Lovenox. RRT was called 3/17 due to increased work of breathing with an SpO2 70/80's while on HFNC and Nonrebreather mask. Patient intubated 3/17 and transferred to [facility] ICU for further assessment and management.     NEUROLOGY:  - Continue sedation with Propofol and Fentanyl.  Plan to wean sedation as tolerated.   - Neurologic checks as per ICU protocol.     PULMONARY:  - Intubated on mechanical ventilation with volume control Mode: AC/ CMV (Assist Control/ Continuous Mandatory Ventilation), RR (machine): 26, TV (machine): 400, FiO2: 100, PEEP: 5, PIP: 21. Plan to wean ventilator settings as tolerated.       CARDIOLOGY:  - Requiring vasopressor support with levo and vaso to maintain MAP above 65. Plan to wean vasopressors as tolerated.       GASTROINTESTINAL:  - NPO  - Continue PPI   - Bowel regimen with Senna and Miralax.     RENAL/:  - Strict I&Os. Coates catheter in place.     INFECTIOUS DISEASE:  COVID   - Completed Redesivir for 7 days (3/8- 3/12) and Decadron for 10 days (3/8 - 3/17) to treat COVID-19.   - Continue Empiric Zosyn and Vanco  - Follow culture results.   - COVID-19 isolation precautions.     HEMATOLOGY:  - Continue Lovenox 70mg BID    ENDOCRINE  - HbA1c 12.4  - Continue Lantus 30U at bedtime  - Blood glucose checks Q6 with low dose correctional insulin sliding scale    ETHICS/DISPOSITION:  - Full code.   - Admitted to [facility] ICU.     LINES/DRAINS/TUBES/DEVICES:  - RIJ TLC 03/17  -Right Radial [place] 03/17

## 2021-03-17 NOTE — PROGRESS NOTE ADULT - SUBJECTIVE AND OBJECTIVE BOX
Chief complaint  Patient is a 60y old  Male who presents with a chief complaint of hypoxia 2/2 COVID (17 Mar 2021 14:20)   Review of systems  Acute events noted, RRT this AM, now intubated, no hypoglycemic episodes.    Labs and Fingersticks  CAPILLARY BLOOD GLUCOSE      POCT Blood Glucose.: 199 mg/dL (17 Mar 2021 11:13)  POCT Blood Glucose.: 100 mg/dL (17 Mar 2021 07:04)  POCT Blood Glucose.: 120 mg/dL (17 Mar 2021 04:24)  POCT Blood Glucose.: 138 mg/dL (16 Mar 2021 21:58)  POCT Blood Glucose.: 159 mg/dL (16 Mar 2021 16:48)    Medications  MEDICATIONS  (STANDING):  chlorhexidine 0.12% Liquid 15 milliLiter(s) Oral Mucosa every 12 hours  dextrose 40% Gel 15 Gram(s) Oral once  dextrose 50% Injectable 25 Gram(s) IV Push once  dextrose 50% Injectable 12.5 Gram(s) IV Push once  dextrose 50% Injectable 25 Gram(s) IV Push once  enoxaparin Injectable 70 milliGRAM(s) SubCutaneous every 12 hours  fentaNYL   Infusion... 0.5 MICROgram(s)/kG/Hr (1.67 mL/Hr) IV Continuous <Continuous>  glucagon  Injectable 1 milliGRAM(s) IntraMuscular once  glucagon  Injectable 1 milliGRAM(s) IntraMuscular once  influenza   Vaccine 0.5 milliLiter(s) IntraMuscular once  insulin lispro (ADMELOG) corrective regimen sliding scale   SubCutaneous Before meals and at bedtime  ketamine Infusion. 0.25 mG/kG/Hr (1.67 mL/Hr) IV Continuous <Continuous>  norepinephrine Infusion 0.05 MICROgram(s)/kG/Min (6.25 mL/Hr) IV Continuous <Continuous>  pantoprazole   Suspension 40 milliGRAM(s) Oral daily  piperacillin/tazobactam IVPB.. 3.375 Gram(s) IV Intermittent every 8 hours  propofol Infusion 15 MICROgram(s)/kG/Min (6 mL/Hr) IV Continuous <Continuous>  vasopressin Infusion 0.04 Unit(s)/Min (2.4 mL/Hr) IV Continuous <Continuous>      Physical Exam  General: Patient intubated  Vital Signs Last 12 Hrs  T(F): 96.3 (03-17-21 @ 12:00), Max: 98.2 (03-17-21 @ 05:30)  HR: 49 (03-17-21 @ 15:03) (49 - 110)  BP: 117/73 (03-17-21 @ 07:30) (98/66 - 131/77)  BP(mean): 90 (03-17-21 @ 07:30) (77 - 97)  RR: 30 (03-17-21 @ 15:00) (0 - 31)  SpO2: 96% (03-17-21 @ 15:03) (90% - 100%)           Chief complaint  Patient is a 60y old  Male who presents with a chief complaint of hypoxia 2/2 COVID (17 Mar 2021 14:20)   Review of systems  Acute events noted, RRT this AM, now intubated, no hypoglycemic episodes.    Labs and Fingersticks  CAPILLARY BLOOD GLUCOSE      POCT Blood Glucose.: 199 mg/dL (17 Mar 2021 11:13)  POCT Blood Glucose.: 100 mg/dL (17 Mar 2021 07:04)  POCT Blood Glucose.: 120 mg/dL (17 Mar 2021 04:24)  POCT Blood Glucose.: 138 mg/dL (16 Mar 2021 21:58)  POCT Blood Glucose.: 159 mg/dL (16 Mar 2021 16:48)    Medications  MEDICATIONS  (STANDING):  chlorhexidine 0.12% Liquid 15 milliLiter(s) Oral Mucosa every 12 hours  dextrose 40% Gel 15 Gram(s) Oral once  dextrose 50% Injectable 25 Gram(s) IV Push once  dextrose 50% Injectable 12.5 Gram(s) IV Push once  dextrose 50% Injectable 25 Gram(s) IV Push once  enoxaparin Injectable 70 milliGRAM(s) SubCutaneous every 12 hours  fentaNYL   Infusion... 0.5 MICROgram(s)/kG/Hr (1.67 mL/Hr) IV Continuous <Continuous>  glucagon  Injectable 1 milliGRAM(s) IntraMuscular once  glucagon  Injectable 1 milliGRAM(s) IntraMuscular once  influenza   Vaccine 0.5 milliLiter(s) IntraMuscular once  insulin lispro (ADMELOG) corrective regimen sliding scale   SubCutaneous Before meals and at bedtime  ketamine Infusion. 0.25 mG/kG/Hr (1.67 mL/Hr) IV Continuous <Continuous>  norepinephrine Infusion 0.05 MICROgram(s)/kG/Min (6.25 mL/Hr) IV Continuous <Continuous>  pantoprazole   Suspension 40 milliGRAM(s) Oral daily  piperacillin/tazobactam IVPB.. 3.375 Gram(s) IV Intermittent every 8 hours  propofol Infusion 15 MICROgram(s)/kG/Min (6 mL/Hr) IV Continuous <Continuous>  vasopressin Infusion 0.04 Unit(s)/Min (2.4 mL/Hr) IV Continuous <Continuous>      Physical Exam  General: Patient intubated  Vital Signs Last 12 Hrs  T(F): 96.3 (03-17-21 @ 12:00), Max: 98.2 (03-17-21 @ 05:30)  HR: 49 (03-17-21 @ 15:03) (49 - 110)  BP: 117/73 (03-17-21 @ 07:30) (98/66 - 131/77)  BP(mean): 90 (03-17-21 @ 07:30) (77 - 97)  RR: 30 (03-17-21 @ 15:00) (0 - 31)  SpO2: 96% (03-17-21 @ 15:03) (90% - 100%)

## 2021-03-17 NOTE — PROCEDURE NOTE - ADDITIONAL PROCEDURE DETAILS
Called on STAT pager for emergency intubation for hypoxic respiratory failure from COVID-19.  On arrival, 100% FiO2 being performed w/ BiPAP.  History briefly reviewed with housestaff.  VS: / /70/ SpO2 93%, RR 30s. RSI w/ Etomidate 10 mg, Succinylcholine 100 mg. Indirect laryngoscopy w/ Glidescope 3 x 1, good view of vocal cords, 7.5 cuffed ETT passed without trauma, + ETCO2 via EasyCap, + b/l BS, taped at 24 cm, teeth intact, no complications. VSS.     Emanuel Greco MD PGY-3  Pager: [phone] Called on STAT pager for emergency intubation for hypoxic respiratory failure from COVID-19.  On arrival, 100% FiO2 being performed w/ BiPAP.  History briefly reviewed with housestaff.  VS: / /70/ SpO2 93%, RR 30s. RSI w/ Etomidate 10 mg, Succinylcholine 100 mg. Indirect laryngoscopy w/ Glidescope 3 x 1, good view of vocal cords, 7.5 cuffed ETT passed without trauma, + ETCO2 via EasyCap, + b/l BS, taped at 24 cm, teeth intact, no complications. VSS.     Emanuel Greco MD PGY-4  Pager: [phone]

## 2021-03-17 NOTE — PROGRESS NOTE ADULT - SUBJECTIVE AND OBJECTIVE BOX
TAMARA CHEW  MRN-[id number]  Patient is a 60y old  Male who presents with a chief complaint of hypoxia 2/2 COVID (16 Mar 2021 15:45)    HPI:  61 yo male w/no known pmh (does not f/u with PCP) presents to [facility] for cc fevers, weakness, fatigue, shortness of breath for 5 days. Tested positive for covid 3 days ago at an urgent care. Today presented again to urgent care and noted to be hypoxic and sent to the ER. Patient denies dysuria, diarrhea, constipation. Currently denies shortness of breath on high flow.     In the ER, patient was febrile to 101.5F, tachycardic to 120s, tachypneic to 40, hypoxic to 88% on 6L. Improved on high flow nasal cannula to 95%.  (08 Mar 2021 14:08)    Today: Patient s/p intubation now sedated    REVIEW OF SYSTEMS:  Gen: No fever  EYES/ENT: No visual changes;  No vertigo or throat pain   NECK: No pain   RES:  No shortness of breath or Cough  Chest: + incisional pain  CARD: No chest pain   GI: No abdominal pain  : No dysuria  NEURO: No weakness  SKIN: No itching, rashes     Physical Exam:  Vital Signs Last 24 Hrs  T(C): 36.6 (17 Mar 2021 08:00), Max: 36.9 (16 Mar 2021 15:00)  T(F): 97.9 (17 Mar 2021 08:00), Max: 98.4 (16 Mar 2021 15:00)  HR: 58 (17 Mar 2021 09:06) (58 - 110)  BP: 117/73 (17 Mar 2021 07:30) (98/66 - 131/77)  BP(mean): 90 (17 Mar 2021 07:30) (77 - 97)  RR: 26 (17 Mar 2021 09:00) (0 - 31)  SpO2: 97% (17 Mar 2021 09:06) (90% - 99%)  Gen:  Awake, alert   CNS: non focal 	  Neck: no JVD  RES : clear , no wheezing    Chest:   + chest tubes                     CVS: Regular  rhythm. Normal S1/S2  Abd: Soft, non-distended. Bowel sounds present.  Skin: No rash.  Ext:  no edema, A Line    ============================I/O===========================   I&O's Detail    16 Mar 2021 07:01  -  17 Mar 2021 07:00  --------------------------------------------------------  IN:    FentaNYL: 20 mL    IV PiggyBack: 200 mL    Norepinephrine: 52.5 mL    Oral Fluid: 1430 mL    Propofol: 20 mL    Vasopressin: 4.8 mL  Total IN: 1727.3 mL    OUT:    Indwelling Catheter - Urethral (mL): 350 mL    Voided (mL): 600 mL  Total OUT: 950 mL    Total NET: 777.3 mL      17 Mar 2021 07:01  -  17 Mar 2021 09:22  --------------------------------------------------------  IN:    FentaNYL: 6.7 mL    IV PiggyBack: 50 mL    Norepinephrine: 10 mL    Propofol: 34 mL    Vasopressin: 4.8 mL  Total IN: 105.5 mL    OUT:    Indwelling Catheter - Urethral (mL): 20 mL  Total OUT: 20 mL    Total NET: 85.5 mL        ============================ LABS =========================                        12.4   16.63 )-----------( 532      ( 16 Mar 2021 05:39 )             39.2     03-16    130<L>  |  94<L>  |  25<H>  ----------------------------<  174<H>  4.4   |  20<L>  |  0.65    Ca    8.9      16 Mar 2021 05:41    TPro  6.5  /  Alb  2.8<L>  /  TBili  0.6  /  DBili  0.2  /  AST  32  /  ALT  30  /  AlkPhos  167<H>  03-16    LIVER FUNCTIONS - ( 16 Mar 2021 05:41 )  Alb: 2.8 g/dL / Pro: 6.5 g/dL / ALK PHOS: 167 U/L / ALT: 30 U/L / AST: 32 U/L / GGT: x             ABG - ( 17 Mar 2021 08:10 )  pH, Arterial: 7.45  pH, Blood: x     /  pCO2: 34    /  pO2: 110   / HCO3: 23    / Base Excess: -.2   /  SaO2: 98                  ======================Micro/Rad/Cardio=================  Culture: Reviewed   CXR: Reviewed  Echo:Reviewed  ======================================================  PAST MEDICAL & SURGICAL HISTORY:  No pertinent past medical history    No significant past surgical history

## 2021-03-17 NOTE — PROGRESS NOTE ADULT - PROBLEM SELECTOR PLAN 1
2nd to COVID PNA - s/p intubation 3/17 for worsening hypoxic respiratory failure   -Keep pH >7.20, sats >90%

## 2021-03-17 NOTE — PROGRESS NOTE ADULT - PROBLEM SELECTOR PLAN 1
Suggest to continue coverage scale q6 for now. If blood sugars remain elevated, suggest to keep patient on IV insulin for glycemic control.  Will continue monitoring FS and FU.

## 2021-03-18 NOTE — PROGRESS NOTE ADULT - ASSESSMENT
61 y/o M with no significant PMH. Presents with c/o fevers, weakness, fatigue, SOB for 5 days. Tested positive for COVID 3 days prior to admission at an urgent care. Day of admission presented again to urgent care and noted to be hypoxic and sent to the ER and placed on HFNC. CXR with b/l LL opacities. Course c/b RLL medial segmental pulmonary embolism and worsening hypoxic respiratory failure - s/p intubation 3/17.

## 2021-03-18 NOTE — PROGRESS NOTE ADULT - ASSESSMENT
Mr Olivares is a 60 year old man who presented with hypoxia after being diagnosed with COVID outpatient. He has completed a course of remdesevir, but is still febrile and hypoxic.   He does not have a clear superimposed bacterial infection, though bacterial pneumonia is possible.   CT chest on admission with PE and ground glass opacities.   Febrile and requiring high flow nasal cannula.     COVID-19 pneumonia, leukocytosis, resp failure  - Remdesevir completed  - Continue Zosyn  - Check sputum culture  - Not a candidate for tocilizumab in setting of possible infection   - Follow up cultures - bcx negative  - Monitor for fevers  - Trend WBCs  - vent per MICU

## 2021-03-18 NOTE — PROGRESS NOTE ADULT - EYES
Subjective:   Patient doing well. No complaints. Minimal lochia. Pain controlled.    Objective:   T(F): 97.8 (10-23 @ 07:50), Max: 98.8 (10-22 @ 13:20)  HR: 58 (10-23 @ 07:50)  BP: 95/54 (10-23 @ 07:50) (93/57 - 130/72)  RR: 18 (10-23 @ 07:50)  SpO2: 99% (10-22 @ 14:10) (77% - 100%)  Gen: AAOx3, NAD  Abd: Soft, Nontender, Nondistended, Fundus firm below the umbilicus  Ext: no tender, mild edema  Min Lochia Rubra    Labs:                        12.3   8.30  )-----------( 181      ( 23 Oct 2020 07:00 )             37.3             Tolerating regular diet  Passed flatus, passed bowel movement  Breast/Bottle feeding    Assessment:   23y s/p , PPD#1, doing well    Plan:  -Routine postpartum care  -Encouraged ambulation and PO hydration  -Tolerating regular diet conjunctiva clear detailed exam

## 2021-03-18 NOTE — PROGRESS NOTE ADULT - PROBLEM SELECTOR PLAN 2
+COVID PCR  -CXR b/l LL opacities   -S/p Remdesivir x 5 days   -S/p Decadron 6mg qd x 10 days   -Not a candidate for Tocilizumab at this point   -Trend inflammatory markers  -C/w abx as per ID for possible superimposed bacterial PNA.   -BC NGTD   -MRSA nasal swab negative

## 2021-03-18 NOTE — CHART NOTE - NSCHARTNOTEFT_GEN_A_CORE
Nutrition Chart Note.  Pt seen for: Initial nutrition assessment on [facility] ICU    Source: EMR, Team    Chart reviewed, events noted. Per chart: 60M c no significant PMH (does not f/u with PCP) and a recent diagnosis of COVID on 03/05. Patient presented 3/08 with c/o fever, weakness, SOB, and was found to have worsening hypoxia requiring HFNC. S/p Remdesivir (3/8- 3/12) & Decadron (3/8 - 3/17). Course c/b newly diagnosed T2DM with a HbA1c 12.4 and a right lower lobe medial segmental pulmonary embolism on CTA (3/12). RRT 3/17 for increased WOB with an SpO2 70/80's while on HFNC and Nonrebreather mask. Pt intubated 3/17 and transferred to [facility] ICU for further assessment and management.     Diet : Diet, NPO with Tube Feed:   Tube Feeding Modality: Orogastric  Glucerna 1.2 Robby (GLUCERNARTH)  Total Volume for 24 Hours (mL): 960  Continuous  Starting Tube Feed Rate {mL per Hour}: 10  Increase Tube Feed Rate by (mL): 10     Every 4 hours  Until Goal Tube Feed Rate (mL per Hour): 40  Tube Feed Duration (in Hours): 24  Tube Feed Start Time: 10:30 (03-17-21 @ 10:29)    CURRENT EN ORDER PROVIDES: 1152kcals, 58g protein, 773ml free H2O  - EN provisions per flowsheets:    (3/18) 400ml, 42% of EN goal (so far) - currently infusing at 40ml/hr    (3/17) 160ml, 17% of EN goal   - Propofol ordered - Pt has received ~510 kcals via lipids in the last 24hrs; if infusion continues at current rate (12 mL/hr), will provide 317kcals/day     Per flowsheets, PO intake documented for 3/15 & 3/16 - pt was consuming, on average 50% at meals    Nutrition-Related Events:  - Hyponatremic 3/17   - Sedated (prop, ketamine, fent); Pressor support (levo, vaso)  - Newly Dx uncontrolled DM, HbA1c 12.4% - while on TF,  NPH 16u q6hr, and mod ISS q6h   - Upon initial RD assessment 3/11 pt noted fair appetite/PO intake in-house and PTA; noted eating a little more than 50% of most meals & drinking 1 Mighty Shake/day  - PO diet ordered 3/8-3/17    GI: Upon last RD assessment, pt noted last BM 3/11; no additional BM's documented since - bowel regimen ordered (miralax - PRN) lactulose added this AM    Current Weight: No additional weights documented at this time  - Upon RD assessment 3/11 Pt reports UBW is ~158. RD also noted "Pt's dosing wt is 146.7 lbs. Discussed difference in UBW with pt, reported he believes he weighs more than his dosing weight."    Drug Dosing Weight  Weight (kg): 66.7 (08 Mar 2021 18:47)  BMI (kg/m2): 23.7 (08 Mar 2021 18:47)    Pertinent Medications: MEDICATIONS  (STANDING):  chlorhexidine 0.12% Liquid 15 milliLiter(s) Oral Mucosa every 12 hours  dextrose 50% Injectable 25 Gram(s) IV Push once  enoxaparin Injectable 70 milliGRAM(s) SubCutaneous every 12 hours  fentaNYL   Infusion... 0.5 MICROgram(s)/kG/Hr (1.67 mL/Hr) IV Continuous <Continuous>  glucagon  Injectable 1 milliGRAM(s) IntraMuscular once  glucagon  Injectable 1 milliGRAM(s) IntraMuscular once  influenza   Vaccine 0.5 milliLiter(s) IntraMuscular once  insulin lispro (ADMELOG) corrective regimen sliding scale   SubCutaneous every 6 hours  insulin NPH human recombinant 16 Unit(s) SubCutaneous every 6 hours  ketamine Infusion. 0.25 mG/kG/Hr (1.67 mL/Hr) IV Continuous <Continuous>  lactulose Syrup 20 Gram(s) Oral once  norepinephrine Infusion 0.05 MICROgram(s)/kG/Min (6.25 mL/Hr) IV Continuous <Continuous>  pantoprazole  Injectable 40 milliGRAM(s) IV Push daily  piperacillin/tazobactam IVPB.. 3.375 Gram(s) IV Intermittent every 8 hours  propofol Infusion 15 MICROgram(s)/kG/Min (6 mL/Hr) IV Continuous <Continuous>  vasopressin Infusion 0.04 Unit(s)/Min (2.4 mL/Hr) IV Continuous <Continuous>    MEDICATIONS  (PRN):  acetaminophen   Tablet .. 650 milliGRAM(s) Oral every 4 hours PRN Mild Pain (1 - 3)  ALBUTerol    90 MICROgram(s) HFA Inhaler 2 Puff(s) Inhalation every 6 hours PRN Shortness of Breath and/or Wheezing  polyethylene glycol 3350 17 Gram(s) Oral daily PRN Constipation    Pertinent Labs:  03-18 @ 00:46: Sodium --, Potassium --, Calcium --, Magnesium --, Phosphorus --, BUN --, Creatinine --, Glucose --, Alk Phos --, ALT/SGPT --, AST/SGOT --, Albumin --, Prealbumin --, Total Bilirubin --, Hemoglobin --, Hematocrit --, Ferritin --, C-Reactive Protein 9.01<H>, Creatine Kinase <<27>  03-18 @ 00:22: Sodium --, Potassium --, Calcium --, Magnesium 2.5, Phosphorus 4.4, BUN --, Creatinine --, Glucose --, Alk Phos --, ALT/SGPT --, AST/SGOT --, Albumin --, Prealbumin --, Total Bilirubin --, Hemoglobin --, Hematocrit --, Ferritin --, C-Reactive Protein --, Creatine Kinase <<27>  03-17 @ 23:46: Sodium 129<L>, Potassium 4.9, Calcium 8.1<L>, Magnesium --, Phosphorus --, BUN 26<H>, Creatinine 0.64, Glucose 139<H>, Alk Phos 152<H>, ALT/SGPT 54<H>, AST/SGOT 45<H>, Albumin 2.6<L>, Prealbumin --, Total Bilirubin 0.5, Hemoglobin 10.7<L>, Hematocrit 34.4<L>, Ferritin --, C-Reactive Protein --, Creatine Kinase <<27>  03-17 @ 14:29: Sodium 129<L>, Potassium 4.6, Calcium 7.8<L>, Magnesium 2.2, Phosphorus 3.9, BUN 24<H>, Creatinine 0.53, Glucose 235<H>, Alk Phos 160<H>, ALT/SGPT 56<H>, AST/SGOT 60<H>, Albumin 2.3<L>, Prealbumin --, Total Bilirubin 0.6, Hemoglobin 10.7<L>, Hematocrit 33.6<L>, Ferritin --, C-Reactive Protein --, Creatine Kinase <<27>    CAPILLARY BLOOD GLUCOSE  POCT Blood Glucose.: 177 mg/dL (18 Mar 2021 06:06)  POCT Blood Glucose.: 152 mg/dL (18 Mar 2021 03:48)  POCT Blood Glucose.: 141 mg/dL (17 Mar 2021 23:17)  POCT Blood Glucose.: 120 mg/dL (17 Mar 2021 22:10)  POCT Blood Glucose.: 123 mg/dL (17 Mar 2021 20:54)  POCT Blood Glucose.: 149 mg/dL (17 Mar 2021 19:53)  POCT Blood Glucose.: 174 mg/dL (17 Mar 2021 18:49)  POCT Blood Glucose.: 192 mg/dL (17 Mar 2021 18:15)  POCT Blood Glucose.: 199 mg/dL (17 Mar 2021 11:13)        Skin per nursing documentation: no pressure injuries noted  Edema: none noted    Estimated Needs: Based on dosing wt 66.7kg with consideration for intubation 3/17 & BMI <30  Energy (15-20 kcals/kg): 9936-6284  Protein (1.2-1.8 g pro/kg):   [place] Equation (REE): 1749 (26kcals/kg)    Previous Nutrition Diagnosis: Altered Nutrition Related Labs   Nutrition Diagnosis is [x ] ongoing - being addressed with EN and medically managed with insulin regimen per team       New Nutrition Diagnosis: Mild Malnutrition in the setting of Acute Illness  Related to: inadequate protein-energy intake in setting of complicated hospital course 2/2 COVID19  As evidenced by: meeting <75% estimated nutritional needs >7 days, sudden weight loss PTA    Interventions:   1. In setting of significant calories being provided by propofol, recommend modifying EN regimen to Vital AF initiated @10ml/hr x 24hrs and increase as medically feasible/tolerated to goal rate of 30ml/hr x 24hrs + 2 'No Carb Prosource'. At goal to provide 720ml formula, 984kcals, 84g protein, 584ml free H2O. Propofol at current rate to provide an additional 317kcals/day. (whole regimen meets 19.5kcals/kg & 1.26g protein/kg based on dosing wt 66.7kg)  2. Consider addition of Multivitamin & Vitamin D supplementation pending no existing contraindications   3. Please trend trig levels while pt on propofol. RD to monitor propofol infusions and adjust EN as appropriate.  4 Monitor GI tolerance and BM's - consider more aggressive bowel regimen  5. New malnutrition alert placed - Will follow-up according to protocol.        Monitoring and Evaluation:   Continue to monitor Nutritional intake, Tolerance to diet prescription, weights, labs, skin integrity  RD remains available upon request and will follow up per protocol    Payal Foreman, MS, RD, CDN, CNSC  pager #[phone]

## 2021-03-18 NOTE — PROGRESS NOTE ADULT - ASSESSMENT
Assessment  DMT2: 60y Male with newly diagnosed DM T2 with hyperglycemia admitted with COVID19, A1C is 12.4%, was on basal bolus insulin, transitioned to NPH insulin and coverage yesterday, blood sugars in overall acceptable range/running slightly high today, no hypoglycemic episodes. Patient remains intubated and monitored in the icu, dexamethasone course completed, NPO on tube feeds.  COVID19: On medications, monitored, stable.  Overweight: No strict exercise routines, neither on low calorie diet.      Anderson Mcmanus MD  Cell: [phone]  Office: [phone]       Assessment  DMT2: 60y Male with newly diagnosed DM T2 with hyperglycemia admitted with COVID19, A1C is 12.4%, was on basal bolus insulin, transitioned to NPH insulin and coverage yesterday, blood sugars in overall  acceptable range/running slightly high today, no hypoglycemic episodes. Patient remains intubated and monitored in the icu, dexamethasone course completed, NPO on tube feeds.  COVID19: On medications, monitored, stable.  Overweight: No strict exercise routines, neither on low calorie diet.      Anderson Mcmanus MD  Cell: [phone]  Office: [phone]

## 2021-03-18 NOTE — PROGRESS NOTE ADULT - PROBLEM SELECTOR PLAN 1
Suggest to continue current insulin regimen for now.  Hold NPH if tube feeds are being held.  If blood sugars remain elevated, can increase NPH to 20u q6.  Will continue monitoring FS and FU.

## 2021-03-18 NOTE — PROGRESS NOTE ADULT - SUBJECTIVE AND OBJECTIVE BOX
TAMARA CHEW 60y [id number]    Patient is a 60y old  Male who presents with a chief complaint of hypoxia 2/2 COVID (18 Mar 2021 14:33)      Follow Up/CC:  ID following for COVID    Interval History/ROS: intubated, in MICU, no fever    Allergies    No Known Allergies    Intolerances        ANTIMICROBIALS:  piperacillin/tazobactam IVPB.. 3.375 every 8 hours      MEDICATIONS  (STANDING):  chlorhexidine 0.12% Liquid 15 milliLiter(s) Oral Mucosa every 12 hours  cisatracurium Infusion 3 MICROgram(s)/kG/Min (12 mL/Hr) IV Continuous <Continuous>  dextrose 50% Injectable 25 Gram(s) IV Push once  enoxaparin Injectable 70 milliGRAM(s) SubCutaneous every 12 hours  fentaNYL   Infusion... 0.5 MICROgram(s)/kG/Hr (1.67 mL/Hr) IV Continuous <Continuous>  glucagon  Injectable 1 milliGRAM(s) IntraMuscular once  glucagon  Injectable 1 milliGRAM(s) IntraMuscular once  influenza   Vaccine 0.5 milliLiter(s) IntraMuscular once  insulin lispro (ADMELOG) corrective regimen sliding scale   SubCutaneous every 6 hours  insulin NPH human recombinant 16 Unit(s) SubCutaneous every 6 hours  ketamine Infusion. 0.25 mG/kG/Hr (1.67 mL/Hr) IV Continuous <Continuous>  norepinephrine Infusion 0.05 MICROgram(s)/kG/Min (6.25 mL/Hr) IV Continuous <Continuous>  pantoprazole  Injectable 40 milliGRAM(s) IV Push daily  piperacillin/tazobactam IVPB.. 3.375 Gram(s) IV Intermittent every 8 hours  propofol Infusion 15 MICROgram(s)/kG/Min (6 mL/Hr) IV Continuous <Continuous>  vasopressin Infusion 0.04 Unit(s)/Min (2.4 mL/Hr) IV Continuous <Continuous>    MEDICATIONS  (PRN):  acetaminophen   Tablet .. 650 milliGRAM(s) Oral every 4 hours PRN Mild Pain (1 - 3)  ALBUTerol    90 MICROgram(s) HFA Inhaler 2 Puff(s) Inhalation every 6 hours PRN Shortness of Breath and/or Wheezing  polyethylene glycol 3350 17 Gram(s) Oral daily PRN Constipation        Vital Signs Last 24 Hrs  T(C): 37.5 (18 Mar 2021 12:00), Max: 37.7 (18 Mar 2021 00:00)  T(F): 99.5 (18 Mar 2021 12:00), Max: 99.8 (18 Mar 2021 00:00)  HR: 64 (18 Mar 2021 15:38) (53 - 87)  BP: --  BP(mean): --  RR: 34 (18 Mar 2021 15:00) (30 - 34)  SpO2: 93% (18 Mar 2021 15:38) (74% - 100%)    CBC Full  -  ( 18 Mar 2021 10:33 )  WBC Count : 14.94 K/uL  RBC Count : 5.04 M/uL  Hemoglobin : 10.3 g/dL  Hematocrit : 33.8 %  Platelet Count - Automated : 382 K/uL  Mean Cell Volume : 67.1 fl  Mean Cell Hemoglobin : 20.4 pg  Mean Cell Hemoglobin Concentration : 30.5 gm/dL  Auto Neutrophil # : 13.12 K/uL  Auto Lymphocyte # : 1.01 K/uL  Auto Monocyte # : 0.40 K/uL  Auto Eosinophil # : 0.21 K/uL  Auto Basophil # : 0.02 K/uL  Auto Neutrophil % : 87.8 %  Auto Lymphocyte % : 6.8 %  Auto Monocyte % : 2.7 %  Auto Eosinophil % : 1.4 %  Auto Basophil % : 0.1 %    03-18    127<L>  |  95<L>  |  28<H>  ----------------------------<  216<H>  4.6   |  20<L>  |  0.66    Ca    8.4      18 Mar 2021 10:33  Phos  4.4     03-18  Mg     2.5     03-18    TPro  5.8<L>  /  Alb  2.4<L>  /  TBili  0.4  /  DBili  x   /  AST  31  /  ALT  43  /  AlkPhos  135<H>  03-18    LIVER FUNCTIONS - ( 18 Mar 2021 10:33 )  Alb: 2.4 g/dL / Pro: 5.8 g/dL / ALK PHOS: 135 U/L / ALT: 43 U/L / AST: 31 U/L / GGT: x               MICROBIOLOGY:  .Blood Blood-Venous  03-16-21   No growth to date.  --  --      .Blood Blood  03-13-21   No growth to date.  --  --      .Blood Blood-Peripheral  03-08-21   No Growth Final  --  --      RADIOLOGY    < from: Xray Chest 1 View- PORTABLE-Urgent (Xray Chest 1 View- PORTABLE-Urgent .) (03.17.21 @ 07:17) >    Enteric tube within the stomach and endotracheal tube above the dariel. Right IJ central venous catheter with distal tip in the SVC.    Increased bilateral patchy airspace opacities when compared with chest x-ray 3/8/2021.    < end of copied text >

## 2021-03-18 NOTE — PROGRESS NOTE ADULT - PROBLEM SELECTOR PLAN 3
RLL medial segmental pulmonary embolism  -Lovenox 1mg/kg BID   -LE duplex neg DVT   -Troponin and pro-BNP normal  -TTE with no RV strain

## 2021-03-18 NOTE — PROGRESS NOTE ADULT - SUBJECTIVE AND OBJECTIVE BOX
Follow-up Pulm Progress Note    intubated/sedated   Elevated peak pressures     Medications:  MEDICATIONS  (STANDING):  chlorhexidine 0.12% Liquid 15 milliLiter(s) Oral Mucosa every 12 hours  cisatracurium Infusion 3 MICROgram(s)/kG/Min (12 mL/Hr) IV Continuous <Continuous>  cisatracurium Injectable 10 milliGRAM(s) IV Push once  dextrose 50% Injectable 25 Gram(s) IV Push once  enoxaparin Injectable 70 milliGRAM(s) SubCutaneous every 12 hours  fentaNYL   Infusion... 0.5 MICROgram(s)/kG/Hr (1.67 mL/Hr) IV Continuous <Continuous>  glucagon  Injectable 1 milliGRAM(s) IntraMuscular once  glucagon  Injectable 1 milliGRAM(s) IntraMuscular once  influenza   Vaccine 0.5 milliLiter(s) IntraMuscular once  insulin lispro (ADMELOG) corrective regimen sliding scale   SubCutaneous every 6 hours  insulin NPH human recombinant 16 Unit(s) SubCutaneous every 6 hours  ketamine Infusion. 0.25 mG/kG/Hr (1.67 mL/Hr) IV Continuous <Continuous>  norepinephrine Infusion 0.05 MICROgram(s)/kG/Min (6.25 mL/Hr) IV Continuous <Continuous>  pantoprazole  Injectable 40 milliGRAM(s) IV Push daily  piperacillin/tazobactam IVPB.. 3.375 Gram(s) IV Intermittent every 8 hours  propofol Infusion 15 MICROgram(s)/kG/Min (6 mL/Hr) IV Continuous <Continuous>  vasopressin Infusion 0.04 Unit(s)/Min (2.4 mL/Hr) IV Continuous <Continuous>    MEDICATIONS  (PRN):  acetaminophen   Tablet .. 650 milliGRAM(s) Oral every 4 hours PRN Mild Pain (1 - 3)  ALBUTerol    90 MICROgram(s) HFA Inhaler 2 Puff(s) Inhalation every 6 hours PRN Shortness of Breath and/or Wheezing  polyethylene glycol 3350 17 Gram(s) Oral daily PRN Constipation      Mode: AC/ CMV (Assist Control/ Continuous Mandatory Ventilation)  RR (machine): 34  TV (machine): 320  FiO2: 80  PEEP: 8  ITime: 1  MAP: 15  PIP: 31      Vital Signs Last 24 Hrs  T(C): 37.5 (18 Mar 2021 12:00), Max: 37.7 (18 Mar 2021 00:00)  T(F): 99.5 (18 Mar 2021 12:00), Max: 99.8 (18 Mar 2021 00:00)  HR: 87 (18 Mar 2021 13:00) (49 - 87)  BP: --  BP(mean): --  RR: 34 (18 Mar 2021 13:00) (30 - 34)  SpO2: 74% (18 Mar 2021 13:00) (74% - 100%)    ABG - ( 18 Mar 2021 10:30 )  pH, Arterial: 7.40  pH, Blood: x     /  pCO2: 40    /  pO2: 72    / HCO3: 24    / Base Excess: -.3   /  SaO2: 94                    03-17 @ 07:01  -  03-18 @ 07:00  --------------------------------------------------------  IN: 3984.1 mL / OUT: 675 mL / NET: 3309.1 mL          LABS:                        10.3   14.94 )-----------( 382      ( 18 Mar 2021 10:33 )             33.8     03-18    127<L>  |  95<L>  |  28<H>  ----------------------------<  216<H>  4.6   |  20<L>  |  0.66    Ca    8.4      18 Mar 2021 10:33  Phos  4.4     03-18  Mg     2.5     03-18    TPro  5.8<L>  /  Alb  2.4<L>  /  TBili  0.4  /  DBili  x   /  AST  31  /  ALT  43  /  AlkPhos  135<H>  03-18          CAPILLARY BLOOD GLUCOSE      POCT Blood Glucose.: 220 mg/dL (18 Mar 2021 12:10)    PT/INR - ( 18 Mar 2021 10:33 )   PT: 17.1 sec;   INR: 1.45 ratio         PTT - ( 18 Mar 2021 10:33 )  PTT:34.9 sec          CULTURES:     Culture - Blood (collected 03-16-21 @ 10:03)  Source: .Blood Blood-Peripheral  Preliminary Report (03-17-21 @ 11:01):    No growth to date.    Culture - Blood (collected 03-16-21 @ 10:03)  Source: .Blood Blood-Venous  Preliminary Report (03-17-21 @ 11:01):    No growth to date.    Culture - Blood (collected 03-13-21 @ 21:11)  Source: .Blood Blood  Preliminary Report (03-14-21 @ 23:02):    No growth to date.    Culture - Blood (collected 03-08-21 @ 17:52)  Source: .Blood Blood-Peripheral  Final Report (03-13-21 @ 18:01):    No Growth Final    Culture - Blood (collected 03-08-21 @ 17:52)  Source: .Blood Blood-Peripheral  Final Report (03-13-21 @ 18:01):    No Growth Final                Physical Examination:  GEN: NAD  NEURO: BLOSSOM, intubated/sedated   PULM: Coarse bilaterally   CVS: S1, S2 heard  EXT: no edema     RADIOLOGY REVIEWED  CXR 3/17: b/l opacities R>L

## 2021-03-18 NOTE — PROGRESS NOTE ADULT - SUBJECTIVE AND OBJECTIVE BOX
TAMARA CHEW  MRN-[id number]  Patient is a 60y old  Male who presents with a chief complaint of hypoxia 2/2 COVID (16 Mar 2021 15:45)    HPI:  61 yo male w/no known pmh (does not f/u with PCP) presents to [facility] for cc fevers, weakness, fatigue, shortness of breath for 5 days. Tested positive for covid 3 days ago at an urgent care. Today presented again to urgent care and noted to be hypoxic and sent to the ER. Patient denies dysuria, diarrhea, constipation. Currently denies shortness of breath on high flow.       Interval event:  Remains on sedation  On /30/80/8 doing poorly  Decreased UO received 500ml bolus  Insulin drip changed to NPH 16u q 6hr       MEDICATIONS  (STANDING):  chlorhexidine 0.12% Liquid 15 milliLiter(s) Oral Mucosa every 12 hours  dextrose 50% Injectable 25 Gram(s) IV Push once  enoxaparin Injectable 70 milliGRAM(s) SubCutaneous every 12 hours  fentaNYL   Infusion... 0.5 MICROgram(s)/kG/Hr (1.67 mL/Hr) IV Continuous <Continuous>  glucagon  Injectable 1 milliGRAM(s) IntraMuscular once  glucagon  Injectable 1 milliGRAM(s) IntraMuscular once  influenza   Vaccine 0.5 milliLiter(s) IntraMuscular once  insulin lispro (ADMELOG) corrective regimen sliding scale   SubCutaneous every 6 hours  insulin NPH human recombinant 16 Unit(s) SubCutaneous every 6 hours  ketamine Infusion. 0.25 mG/kG/Hr (1.67 mL/Hr) IV Continuous <Continuous>  lactulose Syrup 20 Gram(s) Oral once  norepinephrine Infusion 0.05 MICROgram(s)/kG/Min (6.25 mL/Hr) IV Continuous <Continuous>  pantoprazole  Injectable 40 milliGRAM(s) IV Push daily  piperacillin/tazobactam IVPB.. 3.375 Gram(s) IV Intermittent every 8 hours  propofol Infusion 15 MICROgram(s)/kG/Min (6 mL/Hr) IV Continuous <Continuous>  vasopressin Infusion 0.04 Unit(s)/Min (2.4 mL/Hr) IV Continuous <Continuous>    MEDICATIONS  (PRN):  acetaminophen   Tablet .. 650 milliGRAM(s) Oral every 4 hours PRN Mild Pain (1 - 3)  ALBUTerol    90 MICROgram(s) HFA Inhaler 2 Puff(s) Inhalation every 6 hours PRN Shortness of Breath and/or Wheezing  polyethylene glycol 3350 17 Gram(s) Oral daily PRN Constipation      PAST MEDICAL & SURGICAL HISTORY:  No pertinent past medical history    No significant past surgical history        Vital Signs Last 24 Hrs  T(C): 37.2 (18 Mar 2021 05:00), Max: 37.7 (18 Mar 2021 00:00)  T(F): 99 (18 Mar 2021 05:00), Max: 99.8 (18 Mar 2021 00:00)  HR: 73 (18 Mar 2021 06:30) (49 - 83)  BP: 117/73 (17 Mar 2021 07:30) (117/73 - 117/73)  BP(mean): 90 (17 Mar 2021 07:30) (90 - 90)  RR: 30 (18 Mar 2021 06:30) (13 - 34)  SpO2: 96% (18 Mar 2021 06:30) (83% - 100%)    I&O's Summary    17 Mar 2021 07:01  -  18 Mar 2021 07:00  --------------------------------------------------------  IN: 3903.9 mL / OUT: 630 mL / NET: 3273.9 mL                              10.7   14.93 )-----------( 454      ( 17 Mar 2021 23:46 )             34.4     03-17    129<L>  |  96  |  26<H>  ----------------------------<  139<H>  4.9   |  22  |  0.64    Ca    8.1<L>      17 Mar 2021 23:46  Phos  4.4     03-18  Mg     2.5     03-18    TPro  6.0  /  Alb  2.6<L>  /  TBili  0.5  /  DBili  x   /  AST  45<H>  /  ALT  54<H>  /  AlkPhos  152<H>  03-17          Culture - Blood (collected 03-16-21 @ 10:03)  Source: .Blood Blood-Peripheral  Preliminary Report (03-17-21 @ 11:01):    No growth to date.    Culture - Blood (collected 03-16-21 @ 10:03)  Source: .Blood Blood-Venous  Preliminary Report (03-17-21 @ 11:01):    No growth to date.    Culture - Blood (collected 03-13-21 @ 21:11)  Source: .Blood Blood  Preliminary Report (03-14-21 @ 23:02):    No growth to date.                REVIEW OF SYSTEMS:  Sedated vented    Physical Exam:     Gen: sedated  CNS: non focal 	  Neck: no JVD  RES : on vent                CVS: Regular  rhythm. Normal S1/S2  Abd: Soft, non-distended. Bowel sounds present.  Skin: No rash.  Ext:  no edema, +TLC, A Line

## 2021-03-18 NOTE — PROGRESS NOTE ADULT - ATTENDING COMMENTS
Rj Rouse  Attending Physician   Division of Infectious Disease  Pager #[phone]  Available on Microsoft Teams also  After 5pm/weekend or no response, call #[phone]

## 2021-03-18 NOTE — PROGRESS NOTE ADULT - ATTENDING COMMENTS
Pt seen and examined. 60 year old M with medical hx as noted above, now with acute hypoxic resp failure/ ARDS 2/2 COVID19 PNA, possible superimposed bacterial pneumonia, RLL acute PE, distributive shock 2/2 sepsis + vasoplegia from sedation and lactic acidosis. Ongoing dyssynchrony with the vent and desaturation despite optimization of sedation and high plateaus/ driving pressure. Dyssynchrony resolved following initiation of paralytics, TV reduced to 5.5 cc/kg and RR increased with improvement in plateau and driving pressure but P/F ratio remains < 100. Trial of prone positioning. Ongoing leukocytosis, procal > 1, BCxs NGTD, cont empiric ABx coverage with Zosyn, check SCx. IVC < 1 cm on POCUS, will bolus with LR. Titrate pressor support to keep MAP >65, follow lactate. Remains on AC with therapeutic Lovenox for PE. Cr stable, ongoing hyponatremia. Cont to follow UO/ electrolytes. Overall prognosis extremely guarded. Remains critically ill requiring multiple bedside visits and changes in therapy.

## 2021-03-18 NOTE — DIETITIAN NUTRITION RISK NOTIFICATION - ADDITIONAL COMMENTS/DIETITIAN RECOMMENDATIONS
1. In setting of significant calories being provided by propofol, recommend modifying EN regimen to Vital AF initiated @10ml/hr x 24hrs and increase as medically feasible/tolerated to goal rate of 30ml/hr x 24hrs + 2 'No Carb Prosource'. At goal to provide 720ml formula, 984kcals, 84g protein, 584ml free H2O. Propofol at current rate to provide an additional 317kcals/day. (whole regimen meets 19.5kcals/kg & 1.26g protein/kg based on dosing wt 66.7kg)  2. Consider addition of Multivitamin & Vitamin D

## 2021-03-18 NOTE — PROGRESS NOTE ADULT - SUBJECTIVE AND OBJECTIVE BOX
Chief complaint  Patient is a 60y old  Male who presents with a chief complaint of hypoxia 2/2 COVID (18 Mar 2021 13:25)   Review of systems  Patient remains intubated, no hypoglycemic episodes.    Labs and Fingersticks  CAPILLARY BLOOD GLUCOSE      POCT Blood Glucose.: 220 mg/dL (18 Mar 2021 12:10)  POCT Blood Glucose.: 177 mg/dL (18 Mar 2021 06:06)  POCT Blood Glucose.: 152 mg/dL (18 Mar 2021 03:48)  POCT Blood Glucose.: 141 mg/dL (17 Mar 2021 23:17)  POCT Blood Glucose.: 120 mg/dL (17 Mar 2021 22:10)  POCT Blood Glucose.: 123 mg/dL (17 Mar 2021 20:54)  POCT Blood Glucose.: 149 mg/dL (17 Mar 2021 19:53)  POCT Blood Glucose.: 174 mg/dL (17 Mar 2021 18:49)  POCT Blood Glucose.: 192 mg/dL (17 Mar 2021 18:15)      Medications  MEDICATIONS  (STANDING):  chlorhexidine 0.12% Liquid 15 milliLiter(s) Oral Mucosa every 12 hours  cisatracurium Infusion 3 MICROgram(s)/kG/Min (12 mL/Hr) IV Continuous <Continuous>  dextrose 50% Injectable 25 Gram(s) IV Push once  enoxaparin Injectable 70 milliGRAM(s) SubCutaneous every 12 hours  fentaNYL   Infusion... 0.5 MICROgram(s)/kG/Hr (1.67 mL/Hr) IV Continuous <Continuous>  glucagon  Injectable 1 milliGRAM(s) IntraMuscular once  glucagon  Injectable 1 milliGRAM(s) IntraMuscular once  influenza   Vaccine 0.5 milliLiter(s) IntraMuscular once  insulin lispro (ADMELOG) corrective regimen sliding scale   SubCutaneous every 6 hours  insulin NPH human recombinant 16 Unit(s) SubCutaneous every 6 hours  ketamine Infusion. 0.25 mG/kG/Hr (1.67 mL/Hr) IV Continuous <Continuous>  norepinephrine Infusion 0.05 MICROgram(s)/kG/Min (6.25 mL/Hr) IV Continuous <Continuous>  pantoprazole  Injectable 40 milliGRAM(s) IV Push daily  piperacillin/tazobactam IVPB.. 3.375 Gram(s) IV Intermittent every 8 hours  propofol Infusion 15 MICROgram(s)/kG/Min (6 mL/Hr) IV Continuous <Continuous>  vasopressin Infusion 0.04 Unit(s)/Min (2.4 mL/Hr) IV Continuous <Continuous>      Physical Exam  General: Patient intubated  Vital Signs Last 12 Hrs  T(F): 99.5 (03-18-21 @ 12:00), Max: 99.5 (03-18-21 @ 12:00)  HR: 69 (03-18-21 @ 14:15) (69 - 87)  BP: --  BP(mean): --  RR: 34 (03-18-21 @ 14:15) (30 - 34)  SpO2: 99% (03-18-21 @ 14:15) (74% - 100%)       Chief complaint  Patient is a 60y old  Male who presents with a chief complaint of hypoxia 2/2 COVID (18 Mar 2021 13:25)   Review of systems  Patient remains intubated, no hypoglycemic episodes.    Labs and Fingersticks  CAPILLARY BLOOD GLUCOSE      POCT Blood Glucose.: 220 mg/dL (18 Mar 2021 12:10)  POCT Blood Glucose.: 177 mg/dL (18 Mar 2021 06:06)  POCT Blood Glucose.: 152 mg/dL (18 Mar 2021 03:48)  POCT Blood Glucose.: 141 mg/dL (17 Mar 2021 23:17)  POCT Blood Glucose.: 120 mg/dL (17 Mar 2021 22:10)  POCT Blood Glucose.: 123 mg/dL (17 Mar 2021 20:54)  POCT Blood Glucose.: 149 mg/dL (17 Mar 2021 19:53)  POCT Blood Glucose.: 174 mg/dL (17 Mar 2021 18:49)  POCT Blood Glucose.: 192 mg/dL (17 Mar 2021 18:15)      Medications  MEDICATIONS  (STANDING):  chlorhexidine 0.12% Liquid 15 milliLiter(s) Oral Mucosa every 12 hours  cisatracurium Infusion 3 MICROgram(s)/kG/Min (12 mL/Hr) IV Continuous <Continuous>  dextrose 50% Injectable 25 Gram(s) IV Push once  enoxaparin Injectable 70 milliGRAM(s) SubCutaneous every 12 hours  fentaNYL   Infusion... 0.5 MICROgram(s)/kG/Hr (1.67 mL/Hr) IV Continuous <Continuous>  glucagon  Injectable 1 milliGRAM(s) IntraMuscular once  glucagon  Injectable 1 milliGRAM(s) IntraMuscular once  influenza   Vaccine 0.5 milliLiter(s) IntraMuscular once  insulin lispro (ADMELOG) corrective regimen sliding scale   SubCutaneous every 6 hours  insulin NPH human recombinant 16 Unit(s) SubCutaneous every 6 hours  ketamine Infusion. 0.25 mG/kG/Hr (1.67 mL/Hr) IV Continuous <Continuous>  norepinephrine Infusion 0.05 MICROgram(s)/kG/Min (6.25 mL/Hr) IV Continuous <Continuous>  pantoprazole  Injectable 40 milliGRAM(s) IV Push daily  piperacillin/tazobactam IVPB.. 3.375 Gram(s) IV Intermittent every 8 hours  propofol Infusion 15 MICROgram(s)/kG/Min (6 mL/Hr) IV Continuous <Continuous>  vasopressin Infusion 0.04 Unit(s)/Min (2.4 mL/Hr) IV Continuous <Continuous>      Physical Exam  General: Patient intubated  Vital Signs Last 12 Hrs  T(F): 99.5 (03-18-21 @ 12:00), Max: 99.5 (03-18-21 @ 12:00)  HR: 69 (03-18-21 @ 14:15) (69 - 87)  BP: --  BP(mean): --  RR: 34 (03-18-21 @ 14:15) (30 - 34)  SpO2: 99% (03-18-21 @ 14:15) (74% - 100%)

## 2021-03-19 NOTE — PROGRESS NOTE ADULT - ASSESSMENT
Mr Olivares is a 60 year old man who presented with hypoxia after being diagnosed with COVID outpatient. He has completed a course of remdesevir, but is still febrile and hypoxic.   He does not have a clear superimposed bacterial infection, though bacterial pneumonia is possible.   CT chest on admission with PE and ground glass opacities.   Febrile and requiring high flow nasal cannula.     COVID-19 pneumonia, leukocytosis, resp failure  - Remdesevir completed  - Continue Zosyn  - Check sputum culture  - Not a candidate for tocilizumab in setting of possible infection   - Follow up cultures - bcx negative  - Monitor for fevers  - Trend WBCs  - vent per MICU   - consider CT C/A/P if fevers persist     Prognosis guarded

## 2021-03-19 NOTE — PROGRESS NOTE ADULT - ASSESSMENT
60 M w/ no known pmh now admitted with Respiratory failure secondary to COVID - 19 PNA      NEUROLOGY:  Sedation for Ventilator Synchrony   - Propofol, Ketamine, Fentanyl  - Nimbex started 3/18  - No plans for sedation Vacation at this time     Critical Care Myopathy   - PT/OT when medically appropriate       PULMONARY:  Acute Hypoxemic respiratory failure   - s/p   intubation 3/17 on Vol AC: 320/34/50%/+8  - F/U with blood gas once supined   - MDI q 6   - Ayden vent settings as tolerated / when medically appropriate       CARDIOLOGY:  Vaso Plegia secondary to sedation vs Hypovolemia   - s/p 1 liter of IVF 3/18 temporarily weaned off pressors overnight   - will continue with vasopressor support for goal Map of 65  - Bedside Pocus today for fluid status      GASTROINTESTINAL:  Nutrition   - Trickle feeds while proned; Glucerna TF @ 10ml/hr once supine will increase as tolerated   - Continue PPI   - Bowel regimen with Senna and Miralax.     RENAL/:  Hyponatremia   - Probably to hypovolemia   - will send urine lytes and urine osom   - Strict I&Os. Coates catheter in place.   -    INFECTIOUS DISEASE:  COVID   - Completed Redesivir for 7 days (3/8- 3/12) and Decadron for 10 days (3/8 - 3/17)  - Continue Empiric Zosyn for 7 Days 3/13-3/20   - BCx 3/16 NGTD   - WILL send Sputum cx   - not Toci candidate due to possible active infxn     HEMATOLOGY:  VTE  RLL segmental PE (dx CTA 3/12)  - Lovenox 70mg BID   - 3/16 TTE: EF 55-60%, normal RVSF  - monitor D Dimmer     ENDOCRINE  Newly Dx Uncontrolled DM2  - HbA1c 12.4  - c/w NPH 16u Q6hr, and mod ISS q6h while on TF   - will adjust per Endo Reccs

## 2021-03-19 NOTE — PROGRESS NOTE ADULT - SUBJECTIVE AND OBJECTIVE BOX
Chief complaint  Patient is a 60y old  Male who presents with a chief complaint of hypoxia 2/2 COVID (19 Mar 2021 13:04)   Review of systems  Patient intubated, no hypoglycemic episodes.    Labs and Fingersticks  CAPILLARY BLOOD GLUCOSE      POCT Blood Glucose.: 129 mg/dL (19 Mar 2021 11:29)  POCT Blood Glucose.: 121 mg/dL (19 Mar 2021 05:44)  POCT Blood Glucose.: 189 mg/dL (18 Mar 2021 17:13)      Medications  MEDICATIONS  (STANDING):  chlorhexidine 0.12% Liquid 15 milliLiter(s) Oral Mucosa every 12 hours  cholecalciferol 2000 Unit(s) Oral daily  cisatracurium Infusion 3 MICROgram(s)/kG/Min (12 mL/Hr) IV Continuous <Continuous>  dextrose 50% Injectable 25 Gram(s) IV Push once  enoxaparin Injectable 70 milliGRAM(s) SubCutaneous every 12 hours  fentaNYL   Infusion... 0.5 MICROgram(s)/kG/Hr (1.67 mL/Hr) IV Continuous <Continuous>  glucagon  Injectable 1 milliGRAM(s) IntraMuscular once  glucagon  Injectable 1 milliGRAM(s) IntraMuscular once  influenza   Vaccine 0.5 milliLiter(s) IntraMuscular once  insulin lispro (ADMELOG) corrective regimen sliding scale   SubCutaneous every 6 hours  insulin NPH human recombinant 16 Unit(s) SubCutaneous every 6 hours  ketamine Infusion. 0.25 mG/kG/Hr (1.67 mL/Hr) IV Continuous <Continuous>  lactulose Syrup 30 Gram(s) Oral every 6 hours  metoclopramide Injectable 5 milliGRAM(s) IV Push every 6 hours  multivitamin/minerals/iron Oral Solution (CENTRUM) 15 milliLiter(s) Oral daily  norepinephrine Infusion 0.01 MICROgram(s)/kG/Min (1.25 mL/Hr) IV Continuous <Continuous>  pantoprazole  Injectable 40 milliGRAM(s) IV Push daily  piperacillin/tazobactam IVPB.. 3.375 Gram(s) IV Intermittent every 8 hours  propofol Infusion 15 MICROgram(s)/kG/Min (6 mL/Hr) IV Continuous <Continuous>      Physical Exam  General: Patient comfortable in bed  Vital Signs Last 12 Hrs  T(F): 99 (03-19-21 @ 08:00), Max: 101.4 (03-19-21 @ 03:30)  HR: 81 (03-19-21 @ 10:30) (81 - 94)  BP: --  BP(mean): --  RR: 34 (03-19-21 @ 10:30) (32 - 34)  SpO2: 97% (03-19-21 @ 10:30) (92% - 100%)     Chief complaint  Patient is a 60y old  Male who presents with a chief complaint of hypoxia 2/2 COVID (19 Mar 2021 13:04)   Review of systems  Patient intubated, no hypoglycemic episodes.    Labs and Fingersticks  CAPILLARY BLOOD GLUCOSE      POCT Blood Glucose.: 129 mg/dL (19 Mar 2021 11:29)  POCT Blood Glucose.: 121 mg/dL (19 Mar 2021 05:44)  POCT Blood Glucose.: 189 mg/dL (18 Mar 2021 17:13)      Medications  MEDICATIONS  (STANDING):  chlorhexidine 0.12% Liquid 15 milliLiter(s) Oral Mucosa every 12 hours  cholecalciferol 2000 Unit(s) Oral daily  cisatracurium Infusion 3 MICROgram(s)/kG/Min (12 mL/Hr) IV Continuous <Continuous>  dextrose 50% Injectable 25 Gram(s) IV Push once  enoxaparin Injectable 70 milliGRAM(s) SubCutaneous every 12 hours  fentaNYL   Infusion... 0.5 MICROgram(s)/kG/Hr (1.67 mL/Hr) IV Continuous <Continuous>  glucagon  Injectable 1 milliGRAM(s) IntraMuscular once  glucagon  Injectable 1 milliGRAM(s) IntraMuscular once  influenza   Vaccine 0.5 milliLiter(s) IntraMuscular once  insulin lispro (ADMELOG) corrective regimen sliding scale   SubCutaneous every 6 hours  insulin NPH human recombinant 16 Unit(s) SubCutaneous every 6 hours  ketamine Infusion. 0.25 mG/kG/Hr (1.67 mL/Hr) IV Continuous <Continuous>  lactulose Syrup 30 Gram(s) Oral every 6 hours  metoclopramide Injectable 5 milliGRAM(s) IV Push every 6 hours  multivitamin/minerals/iron Oral Solution (CENTRUM) 15 milliLiter(s) Oral daily  norepinephrine Infusion 0.01 MICROgram(s)/kG/Min (1.25 mL/Hr) IV Continuous <Continuous>  pantoprazole  Injectable 40 milliGRAM(s) IV Push daily  piperacillin/tazobactam IVPB.. 3.375 Gram(s) IV Intermittent every 8 hours  propofol Infusion 15 MICROgram(s)/kG/Min (6 mL/Hr) IV Continuous <Continuous>      Physical Exam  General: Patient comfortable in bed  Vital Signs Last 12 Hrs  T(F): 99 (03-19-21 @ 08:00), Max: 101.4 (03-19-21 @ 03:30)  HR: 81 (03-19-21 @ 10:30) (81 - 94)  BP: --  BP(mean): --  RR: 34 (03-19-21 @ 10:30) (32 - 34)  SpO2: 97% (03-19-21 @ 10:30) (92% - 100%)

## 2021-03-19 NOTE — PROGRESS NOTE ADULT - SUBJECTIVE AND OBJECTIVE BOX
CHIEF COMPLAINT: COVID - 19 respiratory failure       Interval Events:  61 yo male w/no known pmh presents with hypoxic respiratory failure due to covid pneumonia now c/b Rt lung segmental PE. On 3/13  RRT was called for Hypoxia with associated fever of 102 requiring  intubation. The patient was initiated on Remdisivir and dexamethasone tx 3/8 and has now been completed. Bumex gtt was initiated and has now been stopped     Overnight:  The patient remained on Paralytic therapy and was placed in Prone position. Scheduled to be placed in supine position at 9am       REVIEW OF SYSTEMS:  Constitutional: [ ] fevers [ ] chills [ ] weight loss [ ] weight gain  HEENT: [ ] dry eyes [ ] eye irritation [ ] postnasal drip [ ] nasal congestion  CV: [ ] chest pain [ ] orthopnea [ ] palpitations [ ] murmur  Resp: [ ] cough [ ] shortness of breath [ ] dyspnea [ ] wheezing [ ] sputum [ ] hemoptysis  GI: [ ] nausea [ ] vomiting [ ] diarrhea [ ] constipation [ ] abd pain [ ] dysphagia   : [ ] dysuria [ ] nocturia [ ] hematuria [ ] increased urinary frequency  Musculoskeletal: [ ] back pain [ ] myalgias [ ] arthralgias [ ] fracture  Skin: [ ] rash [ ] itch  Neurological: [ ] headache [ ] dizziness [ ] syncope [ ] weakness [ ] numbness  Psychiatric: [ ] anxiety [ ] depression  Endocrine: [ ] diabetes [ ] thyroid problem  Hematologic/Lymphatic: [ ] anemia [ ] bleeding problem  Allergic/Immunologic: [ ] itchy eyes [ ] nasal discharge [ ] hives [ ] angioedema  [ ] All other systems negative  [x ] Unable to assess ROS because ________    OBJECTIVE:  ICU Vital Signs Last 24 Hrs  T(C): 38.2 (19 Mar 2021 06:00), Max: 38.6 (19 Mar 2021 02:00)  T(F): 100.8 (19 Mar 2021 06:00), Max: 101.4 (19 Mar 2021 02:00)  HR: 90 (19 Mar 2021 06:15) (64 - 94)  BP: --  BP(mean): --  ABP: 111/56 (19 Mar 2021 06:15) (90/49 - 163/80)  ABP(mean): 69 (19 Mar 2021 06:15) (60 - 107)  RR: 32 (19 Mar 2021 06:00) (17 - 34)  SpO2: 94% (19 Mar 2021 06:15) (74% - 100%)    Mode: AC/ CMV (Assist Control/ Continuous Mandatory Ventilation), RR (machine): 34, TV (machine): 320, FiO2: 50, PEEP: 8, ITime: 1, MAP: 15, PIP: 33    03-18 @ 07:01  -  03-19 @ 07:00  --------------------------------------------------------  IN: 3010.9 mL / OUT: 1265 mL / NET: 1745.9 mL      CAPILLARY BLOOD GLUCOSE      POCT Blood Glucose.: 121 mg/dL (19 Mar 2021 05:44)      PHYSICAL EXAM:  General:   HEENT:   Lymph Nodes:  Neck:   Respiratory:   Cardiovascular:   Abdomen:   Extremities:   Skin:   Neurological:  Psychiatry:    LINES: CVP A Line     HOSPITAL MEDICATIONS:  MEDICATIONS  (STANDING):  chlorhexidine 0.12% Liquid 15 milliLiter(s) Oral Mucosa every 12 hours  cholecalciferol 2000 Unit(s) Oral daily  cisatracurium Infusion 3 MICROgram(s)/kG/Min (12 mL/Hr) IV Continuous <Continuous>  dextrose 50% Injectable 25 Gram(s) IV Push once  enoxaparin Injectable 70 milliGRAM(s) SubCutaneous every 12 hours  fentaNYL   Infusion... 0.5 MICROgram(s)/kG/Hr (1.67 mL/Hr) IV Continuous <Continuous>  glucagon  Injectable 1 milliGRAM(s) IntraMuscular once  glucagon  Injectable 1 milliGRAM(s) IntraMuscular once  influenza   Vaccine 0.5 milliLiter(s) IntraMuscular once  insulin lispro (ADMELOG) corrective regimen sliding scale   SubCutaneous every 6 hours  insulin NPH human recombinant 16 Unit(s) SubCutaneous every 6 hours  ketamine Infusion. 0.25 mG/kG/Hr (1.67 mL/Hr) IV Continuous <Continuous>  multivitamin/minerals/iron Oral Solution (CENTRUM) 15 milliLiter(s) Oral daily  norepinephrine Infusion 0.01 MICROgram(s)/kG/Min (1.25 mL/Hr) IV Continuous <Continuous>  pantoprazole  Injectable 40 milliGRAM(s) IV Push daily  piperacillin/tazobactam IVPB.. 3.375 Gram(s) IV Intermittent every 8 hours  propofol Infusion 15 MICROgram(s)/kG/Min (6 mL/Hr) IV Continuous <Continuous>    MEDICATIONS  (PRN):  acetaminophen   Tablet .. 650 milliGRAM(s) Oral every 4 hours PRN Mild Pain (1 - 3)  ALBUTerol    90 MICROgram(s) HFA Inhaler 2 Puff(s) Inhalation every 6 hours PRN Shortness of Breath and/or Wheezing  polyethylene glycol 3350 17 Gram(s) Oral daily PRN Constipation      LABS:                        10.4   14.31 )-----------( 366      ( 19 Mar 2021 00:19 )             33.5     Hgb Trend: 10.4<--, 10.3<--, 10.7<--, 10.7<--, 12.4<--  03-19    131<L>  |  98  |  14  ----------------------------<  158<H>  4.1   |  21<L>  |  0.48<L>    Ca    8.4      19 Mar 2021 00:19  Phos  2.6     03-19  Mg     2.2     03-19    TPro  5.6<L>  /  Alb  2.4<L>  /  TBili  0.3  /  DBili  x   /  AST  20  /  ALT  32  /  AlkPhos  142<H>  03-19    Creatinine Trend: 0.48<--, 0.66<--, 0.64<--, 0.53<--, 0.65<--, 0.63<--  PT/INR - ( 19 Mar 2021 00:19 )   PT: 16.2 sec;   INR: 1.37 ratio         PTT - ( 18 Mar 2021 10:33 )  PTT:34.9 sec    Arterial Blood Gas:  03-19 @ 00:12  7.39/39/87/23/96/-1.2  ABG lactate: --  Arterial Blood Gas:  03-18 @ 18:05  7.35/40/84/22/95/-3.0  ABG lactate: --  Arterial Blood Gas:  03-18 @ 14:25  7.39/39/60/23/89/-.9  ABG lactate: --  Arterial Blood Gas:  03-18 @ 10:30  7.40/40/72/24/94/-.3  ABG lactate: --  Arterial Blood Gas:  03-18 @ 04:35  7.35/42/94/23/96/-2.3  ABG lactate: --  Arterial Blood Gas:  03-17 @ 23:42  7.42/38/115/24/97/.1  ABG lactate: --  Arterial Blood Gas:  03-17 @ 16:47  7.36/42/73/23/92/-1.6  ABG lactate: --  Arterial Blood Gas:  03-17 @ 14:27  7.41/34/94/21/97/-2.1  ABG lactate: --  Arterial Blood Gas:  03-17 @ 13:12  7.43/33/110/21/98/-2.0  ABG lactate: --  Arterial Blood Gas:  03-17 @ 09:27  7.43/35/73/23/93/-.8  ABG lactate: --  Arterial Blood Gas:  03-17 @ 08:10  7.45/34/110/23/98/-.2  ABG lactate: --    MICROBIOLOGY:   Culture - Blood in AM (03.16.21 @ 10:03)   Specimen Source: .Blood Blood-Peripheral   Culture Results:   No growth to date.       Historical Values  Culture - Blood in AM (03.16.21 @ 10:03)   Specimen Source: .Blood Blood-Peripheral   Culture Results:   No growth to date.   Culture - Blood in AM (03.16.21 @ 10:03)   Specimen Source: .Blood Blood-Venous   Culture Results:   No growth to date. Culture - Blood in AM (03.16.21 @ 10:03)   Specimen Source: .Blood Blood-Venous   Culture Results:   No growth to date.       Historical Values  Culture - Blood in AM (03.16.21 @ 10:03)   Specimen Source: .Blood Blood-Peripheral   Culture Results:   No growth to date.   Culture - Blood in AM (03.16.21 @ 10:03)   Specimen Source: .Blood Blood-Venous   Culture Results:   No growth to date. Culture - Blood (03.13.21 @ 21:11)   Specimen Source: .Blood Blood   Culture Results:   No Growth Final       Historical Values  Culture - Blood (03.13.21 @ 21:11)   Specimen Source: .Blood Blood   Culture Results:   No Growth Final   Culture - Blood (03.08.21 @ 17:52)   Specimen Source: .Blood Blood-Peripheral   Culture Results:   No Growth Final   Culture - Blood (03.08.21 @ 17:52)   Specimen Source: .Blood Blood-Peripheral   Culture Results:   No Growth Final     RADIOLOGY:  ra< from: Xray Chest 1 View- PORTABLE-Urgent (Xray Chest 1 View- PORTABLE-Urgent .) (03.17.21 @ 07:17) >  IMPRESSION:    Enteric tube within the stomach and endotracheal tube above the dariel. Right IJ central venous catheter with distal tip in the SVC.    Increased bilateral patchy airspace opacities when compared with chest x-ray 3/8/2021.      < end of copied text >    EKG  < from: 12 Lead ECG (03.08.21 @ 10:58) >  Ventricular Rate 120 BPM    Atrial Rate 120 BPM    P-R Interval 150 ms    QRS Duration 78 ms    Q-T Interval 310 ms    QTC Calculation(Bazett) 438 ms    P Axis 34 degrees    R Axis -46 degrees    T Axis 41 degrees    Diagnosis Line SINUS TACHYCARDIA  POSSIBLE LEFT ATRIAL ENLARGEMENT  LEFT AXIS DEVIATION  ABNORMAL ECG  NO PREVIOUS ECGS AVAILABLE  Confirmed by DERIAN FARIAS, KATHERINE ([id number]) on 3/9/2021 2:08:13 PM    < end of copied text >      EKG:        [facility] [place]  ex [phone]  CHIEF COMPLAINT: COVID - 19 respiratory failure       Interval Events:  61 yo male w/no known pmh presents with hypoxic respiratory failure due to covid pneumonia now c/b Rt lung segmental PE. On 3/13  RRT was called for Hypoxia with associated fever of 102 requiring  intubation. The patient was initiated on Remdisivir and dexamethasone tx 3/8 and has now been completed. Bumex gtt was initiated and has now been stopped     Overnight:  The patient remained on Paralytic therapy and was placed in Prone position. Scheduled to be placed in supine position at 9am       REVIEW OF SYSTEMS:  Constitutional: [ ] fevers [ ] chills [ ] weight loss [ ] weight gain  HEENT: [ ] dry eyes [ ] eye irritation [ ] postnasal drip [ ] nasal congestion  CV: [ ] chest pain [ ] orthopnea [ ] palpitations [ ] murmur  Resp: [ ] cough [ ] shortness of breath [ ] dyspnea [ ] wheezing [ ] sputum [ ] hemoptysis  GI: [ ] nausea [ ] vomiting [ ] diarrhea [ ] constipation [ ] abd pain [ ] dysphagia   : [ ] dysuria [ ] nocturia [ ] hematuria [ ] increased urinary frequency  Musculoskeletal: [ ] back pain [ ] myalgias [ ] arthralgias [ ] fracture  Skin: [ ] rash [ ] itch  Neurological: [ ] headache [ ] dizziness [ ] syncope [ ] weakness [ ] numbness  Psychiatric: [ ] anxiety [ ] depression  Endocrine: [ ] diabetes [ ] thyroid problem  Hematologic/Lymphatic: [ ] anemia [ ] bleeding problem  Allergic/Immunologic: [ ] itchy eyes [ ] nasal discharge [ ] hives [ ] angioedema  [ ] All other systems negative  [x ] Unable to assess ROS because ________    OBJECTIVE:  ICU Vital Signs Last 24 Hrs  T(C): 38.2 (19 Mar 2021 06:00), Max: 38.6 (19 Mar 2021 02:00)  T(F): 100.8 (19 Mar 2021 06:00), Max: 101.4 (19 Mar 2021 02:00)  HR: 90 (19 Mar 2021 06:15) (64 - 94)  BP: --  BP(mean): --  ABP: 111/56 (19 Mar 2021 06:15) (90/49 - 163/80)  ABP(mean): 69 (19 Mar 2021 06:15) (60 - 107)  RR: 32 (19 Mar 2021 06:00) (17 - 34)  SpO2: 94% (19 Mar 2021 06:15) (74% - 100%)    Mode: AC/ CMV (Assist Control/ Continuous Mandatory Ventilation), RR (machine): 34, TV (machine): 320, FiO2: 50, PEEP: 8, ITime: 1, MAP: 15, PIP: 33    03-18 @ 07:01  -  03-19 @ 07:00  --------------------------------------------------------  IN: 3010.9 mL / OUT: 1265 mL / NET: 1745.9 mL      CAPILLARY BLOOD GLUCOSE      POCT Blood Glucose.: 121 mg/dL (19 Mar 2021 05:44)      PHYSICAL EXAM:  General: No acute distress noted Prone position this am   HEENT: [place]   Lymph Nodes: Np palpable lymph node adenopathy    Neck: unable to assess this am due to prone position   Respiratory: Intubated diminished sounds bilaterally   Cardiovascular: S1 S2 no M/C/G R  Abdomen: Unable to assess while prone position OGT with trickle feed   Extremities: No edema   Skin: Intact   Neurological: Sedated and parlayed   Psychiatry: Unable to assess     LINES: CVP A Line     HOSPITAL MEDICATIONS:  MEDICATIONS  (STANDING):  chlorhexidine 0.12% Liquid 15 milliLiter(s) Oral Mucosa every 12 hours  cholecalciferol 2000 Unit(s) Oral daily  cisatracurium Infusion 3 MICROgram(s)/kG/Min (12 mL/Hr) IV Continuous <Continuous>  dextrose 50% Injectable 25 Gram(s) IV Push once  enoxaparin Injectable 70 milliGRAM(s) SubCutaneous every 12 hours  fentaNYL   Infusion... 0.5 MICROgram(s)/kG/Hr (1.67 mL/Hr) IV Continuous <Continuous>  glucagon  Injectable 1 milliGRAM(s) IntraMuscular once  glucagon  Injectable 1 milliGRAM(s) IntraMuscular once  influenza   Vaccine 0.5 milliLiter(s) IntraMuscular once  insulin lispro (ADMELOG) corrective regimen sliding scale   SubCutaneous every 6 hours  insulin NPH human recombinant 16 Unit(s) SubCutaneous every 6 hours  ketamine Infusion. 0.25 mG/kG/Hr (1.67 mL/Hr) IV Continuous <Continuous>  multivitamin/minerals/iron Oral Solution (CENTRUM) 15 milliLiter(s) Oral daily  norepinephrine Infusion 0.01 MICROgram(s)/kG/Min (1.25 mL/Hr) IV Continuous <Continuous>  pantoprazole  Injectable 40 milliGRAM(s) IV Push daily  piperacillin/tazobactam IVPB.. 3.375 Gram(s) IV Intermittent every 8 hours  propofol Infusion 15 MICROgram(s)/kG/Min (6 mL/Hr) IV Continuous <Continuous>    MEDICATIONS  (PRN):  acetaminophen   Tablet .. 650 milliGRAM(s) Oral every 4 hours PRN Mild Pain (1 - 3)  ALBUTerol    90 MICROgram(s) HFA Inhaler 2 Puff(s) Inhalation every 6 hours PRN Shortness of Breath and/or Wheezing  polyethylene glycol 3350 17 Gram(s) Oral daily PRN Constipation      LABS:                        10.4   14.31 )-----------( 366      ( 19 Mar 2021 00:19 )             33.5     Hgb Trend: 10.4<--, 10.3<--, 10.7<--, 10.7<--, 12.4<--  03-19    131<L>  |  98  |  14  ----------------------------<  158<H>  4.1   |  21<L>  |  0.48<L>    Ca    8.4      19 Mar 2021 00:19  Phos  2.6     03-19  Mg     2.2     03-19    TPro  5.6<L>  /  Alb  2.4<L>  /  TBili  0.3  /  DBili  x   /  AST  20  /  ALT  32  /  AlkPhos  142<H>  03-19    Creatinine Trend: 0.48<--, 0.66<--, 0.64<--, 0.53<--, 0.65<--, 0.63<--  PT/INR - ( 19 Mar 2021 00:19 )   PT: 16.2 sec;   INR: 1.37 ratio         PTT - ( 18 Mar 2021 10:33 )  PTT:34.9 sec    Arterial Blood Gas:  03-19 @ 00:12  7.39/39/87/23/96/-1.2  ABG lactate: --  Arterial Blood Gas:  03-18 @ 18:05  7.35/40/84/22/95/-3.0  ABG lactate: --  Arterial Blood Gas:  03-18 @ 14:25  7.39/39/60/23/89/-.9  ABG lactate: --  Arterial Blood Gas:  03-18 @ 10:30  7.40/40/72/24/94/-.3  ABG lactate: --  Arterial Blood Gas:  03-18 @ 04:35  7.35/42/94/23/96/-2.3  ABG lactate: --  Arterial Blood Gas:  03-17 @ 23:42  7.42/38/115/24/97/.1  ABG lactate: --  Arterial Blood Gas:  03-17 @ 16:47  7.36/42/73/23/92/-1.6  ABG lactate: --  Arterial Blood Gas:  03-17 @ 14:27  7.41/34/94/21/97/-2.1  ABG lactate: --  Arterial Blood Gas:  03-17 @ 13:12  7.43/33/110/21/98/-2.0  ABG lactate: --  Arterial Blood Gas:  03-17 @ 09:27  7.43/35/73/23/93/-.8  ABG lactate: --  Arterial Blood Gas:  03-17 @ 08:10  7.45/34/110/23/98/-.2  ABG lactate: --    MICROBIOLOGY:   Culture - Blood in AM (03.16.21 @ 10:03)   Specimen Source: .Blood Blood-Peripheral   Culture Results:   No growth to date.       Historical Values  Culture - Blood in AM (03.16.21 @ 10:03)   Specimen Source: .Blood Blood-Peripheral   Culture Results:   No growth to date.   Culture - Blood in AM (03.16.21 @ 10:03)   Specimen Source: .Blood Blood-Venous   Culture Results:   No growth to date. Culture - Blood in AM (03.16.21 @ 10:03)   Specimen Source: .Blood Blood-Venous   Culture Results:   No growth to date.       Historical Values  Culture - Blood in AM (03.16.21 @ 10:03)   Specimen Source: .Blood Blood-Peripheral   Culture Results:   No growth to date.   Culture - Blood in AM (03.16.21 @ 10:03)   Specimen Source: .Blood Blood-Venous   Culture Results:   No growth to date. Culture - Blood (03.13.21 @ 21:11)   Specimen Source: .Blood Blood   Culture Results:   No Growth Final       Historical Values  Culture - Blood (03.13.21 @ 21:11)   Specimen Source: .Blood Blood   Culture Results:   No Growth Final   Culture - Blood (03.08.21 @ 17:52)   Specimen Source: .Blood Blood-Peripheral   Culture Results:   No Growth Final   Culture - Blood (03.08.21 @ 17:52)   Specimen Source: .Blood Blood-Peripheral   Culture Results:   No Growth Final     RADIOLOGY:  ra< from: Xray Chest 1 View- PORTABLE-Urgent (Xray Chest 1 View- PORTABLE-Urgent .) (03.17.21 @ 07:17) >  IMPRESSION:    Enteric tube within the stomach and endotracheal tube above the dariel. Right IJ central venous catheter with distal tip in the SVC.    Increased bilateral patchy airspace opacities when compared with chest x-ray 3/8/2021.      < end of copied text >    EKG  < from: 12 Lead ECG (03.08.21 @ 10:58) >  Ventricular Rate 120 BPM    Atrial Rate 120 BPM    P-R Interval 150 ms    QRS Duration 78 ms    Q-T Interval 310 ms    QTC Calculation(Bazett) 438 ms    P Axis 34 degrees    R Axis -46 degrees    T Axis 41 degrees    Diagnosis Line SINUS TACHYCARDIA  POSSIBLE LEFT ATRIAL ENLARGEMENT  LEFT AXIS DEVIATION  ABNORMAL ECG  NO PREVIOUS ECGS AVAILABLE  Confirmed by DERIAN FARIAS, KATHERINE ([id number]) on 3/9/2021 2:08:13 PM    < end of copied text >      EKG:        [facility] [place]  ex [phone]

## 2021-03-19 NOTE — CHART NOTE - NSCHARTNOTEFT_GEN_A_CORE
Nutrition Chart Note.  Pt seen for: Malnutrition Follow-up Note.    Source: electronic medical record, team    Chart reviewed, events noted. Per chart: 60M c no significant PMH (does not f/u with PCP) and a recent diagnosis of COVID on 03/05. Patient presented 3/08 with c/o fever, weakness, SOB, and was found to have worsening hypoxia requiring HFNC. S/p Remdesivir (3/8- 3/12) & Decadron (3/8 - 3/17). Course c/b newly diagnosed T2DM with a HbA1c 12.4 and a right lower lobe medial segmental pulmonary embolism on CTA (3/12). RRT 3/17 for increased WOB with an SpO2 70/80's while on HFNC and Nonrebreather mask. Pt intubated 3/17 and transferred to COVID ICU for further assessment and management.     Diet : Diet, NPO with Tube Feed:   Tube Feeding Modality: Orogastric  Vital AF (VITALFRTH)  Total Volume for 24 Hours (mL): 960  Continuous  Starting Tube Feed Rate {mL per Hour}: 10  Increase Tube Feed Rate by (mL): 10     Every 4 hours  Until Goal Tube Feed Rate (mL per Hour): 40  Tube Feed Duration (in Hours): 24  Tube Feed Start Time: 10:30 (03-18-21 @ 16:29)    CURRENT EN ORDER PROVIDES: 720ml formula, 864kcals, 54g protein, 583ml free H2O 1.2k   - EN provisions per flowsheets:    (3/19) 260ml, 36% of EN goal (so far) - currently infusing at 40ml/hr    (3/18) 650ml, 90% of EN goal     (3/17) 160ml, 17% of EN goal   - Propofol ordered, now running at 20ml/hr - Pt has received ~493 kcals via lipids in the last 24hrs; if infusion continues at current rate (20 mL/hr), will provide 528kcals/day   - Pt proned overnight with trickle feed @10ml/hr; per recent NP note (3/19) " Trickle feeds while proned; Glucerna TF @ 10ml/hr once supine will increase as tolerated."  - Pt currently not ordered for x2 NoCarb ProSource (provides additional 60 kcal, 15g protein per packet)    Nutrition-Related Events:  - Hyponatremic 3/17   - Sedated (prop, ketamine, fent); Pressor support (levo, vaso)  - Newly Dx uncontrolled DM, HbA1c 12.4% - while on TF,  NPH 16u q6hr, and mod ISS q6h   - Upon initial RD assessment 3/11 pt noted fair appetite/PO intake in-house and PTA; noted eating a little more than 50% of most meals & drinking 1 Mighty Shake/day  - PO diet ordered 3/8-3/17  - Pt NPO with feeding tube 3/18    GI: No BM's documented since 3/11- Pt currently on bowel regimen     Drug Dosing Weight  Weight (kg): 66.7 (08 Mar 2021 18:47)  BMI (kg/m2): 23.7 (08 Mar 2021 18:47)    Current Weight: No additional weights documented at this time  *Per RD initial assessment (3/11) Pt reports UBW is ~158. RD also noted "Pt's dosing wt is 146.7 lbs. Discussed difference in UBW with pt, reported he believes he weighs more than his dosing weight."    MEDICATIONS  (STANDING):  chlorhexidine 0.12% Liquid 15 milliLiter(s) Oral Mucosa every 12 hours  cholecalciferol 2000 Unit(s) Oral daily  cisatracurium Infusion 3 MICROgram(s)/kG/Min (12 mL/Hr) IV Continuous <Continuous>  dextrose 50% Injectable 25 Gram(s) IV Push once  enoxaparin Injectable 70 milliGRAM(s) SubCutaneous every 12 hours  fentaNYL   Infusion... 0.5 MICROgram(s)/kG/Hr (1.67 mL/Hr) IV Continuous <Continuous>  glucagon  Injectable 1 milliGRAM(s) IntraMuscular once  glucagon  Injectable 1 milliGRAM(s) IntraMuscular once  influenza   Vaccine 0.5 milliLiter(s) IntraMuscular once  insulin lispro (ADMELOG) corrective regimen sliding scale   SubCutaneous every 6 hours  insulin NPH human recombinant 16 Unit(s) SubCutaneous every 6 hours  ketamine Infusion. 0.25 mG/kG/Hr (1.67 mL/Hr) IV Continuous <Continuous>  lactulose Syrup 30 Gram(s) Oral every 6 hours  metoclopramide Injectable 5 milliGRAM(s) IV Push every 6 hours  multivitamin/minerals/iron Oral Solution (CENTRUM) 15 milliLiter(s) Oral daily  norepinephrine Infusion 0.01 MICROgram(s)/kG/Min (1.25 mL/Hr) IV Continuous <Continuous>  pantoprazole  Injectable 40 milliGRAM(s) IV Push daily  piperacillin/tazobactam IVPB.. 3.375 Gram(s) IV Intermittent every 8 hours  potassium chloride  20 mEq/100 mL IVPB 20 milliEquivalent(s) IV Intermittent once  propofol Infusion 15 MICROgram(s)/kG/Min (6 mL/Hr) IV Continuous <Continuous>    MEDICATIONS  (PRN):  acetaminophen   Tablet .. 650 milliGRAM(s) Oral every 4 hours PRN Mild Pain (1 - 3)  ALBUTerol    90 MICROgram(s) HFA Inhaler 2 Puff(s) Inhalation every 6 hours PRN Shortness of Breath and/or Wheezing  polyethylene glycol 3350 17 Gram(s) Oral daily PRN Constipation    (3/19) Na 131<L>, BUN 14, Cr 0.48<L>, <H>, K+ 4.1, Phos 2.6, Mg 2.2, Alk Phos 142<H>, ALT/SGPT 32, AST/SGOT 20  POCT Blood Glucose x24 hrs: 121-220 mg/dL     Skin: no pressure-related injuries per nursing flowsheets   Edema: 1+ generalized per nursing flowsheets     Estimated Needs: Based on dosing wt 66.7kg with consideration for intubation 3/17 & BMI <30  Energy (15-20 kcals/kg): 1212-8056  Protein (1.2-1.8 g pro/kg):   [place] Equation (REE): 1749 (26kcals/kg)    Previous Nutrition Diagnosis:   1. Altered Nutrition Related Labs   Nutrition Diagnosis is [X] ongoing - being addressed with EN and medically managed with insulin regimen per team     2. Mild Malnutrition in the setting of Acute Illness  Nutrition Diagnosis is [X] ongoing - being addressed with EN regimen    Recommendations:  1. In setting of significant calories being provided by propofol, continue Vital AF @30ml/hr x 24hrs + 2 'No Carb Prosource'. At goal to provide 720ml formula, 984kcals, 84g protein, 584ml free H2O. Propofol at current rate to provide an additional 528 kcals/day. (whole regimen meets 23kcals/kg & 1.26g protein/kg based on dosing wt 66.7kg)  *Pt currently not ordered for 'No Carb Prosource' will discuss with team to add to regimen.  2. Continue Multivitamin & Cholecalciferol as ordered  3. While Pt prone provide trickle feeds at rate between 10-20 ml/hr as tolerated and keep HOB (reverse Trendelenburg) at 10-25 degrees to decrease aspiration risk  4. Please trend trig levels while pt on propofol. RD to monitor propofol infusions and adjust EN as appropriate.  5. Continue to monitor GI tolerance and BM's - consider more aggressive bowel regimen in setting of Pt still without BM per nursing flowsheets      Monitoring and Evaluation:   Continue to monitor nutritional intake, tolerance to diet prescription, weights, labs, skin integrity    RD remains available upon request and will follow up per protocol.    Mirta Colindres RD, CDN (Pager: #[phone])

## 2021-03-19 NOTE — PROGRESS NOTE ADULT - SUBJECTIVE AND OBJECTIVE BOX
Follow-up Pulm Progress Note    intubated/sedated/paralyzed     Medications:  MEDICATIONS  (STANDING):  chlorhexidine 0.12% Liquid 15 milliLiter(s) Oral Mucosa every 12 hours  cholecalciferol 2000 Unit(s) Oral daily  cisatracurium Infusion 3 MICROgram(s)/kG/Min (12 mL/Hr) IV Continuous <Continuous>  dextrose 50% Injectable 25 Gram(s) IV Push once  enoxaparin Injectable 70 milliGRAM(s) SubCutaneous every 12 hours  fentaNYL   Infusion... 0.5 MICROgram(s)/kG/Hr (1.67 mL/Hr) IV Continuous <Continuous>  glucagon  Injectable 1 milliGRAM(s) IntraMuscular once  glucagon  Injectable 1 milliGRAM(s) IntraMuscular once  influenza   Vaccine 0.5 milliLiter(s) IntraMuscular once  insulin lispro (ADMELOG) corrective regimen sliding scale   SubCutaneous every 6 hours  insulin NPH human recombinant 16 Unit(s) SubCutaneous every 6 hours  ketamine Infusion. 0.25 mG/kG/Hr (1.67 mL/Hr) IV Continuous <Continuous>  lactulose Syrup 30 Gram(s) Oral every 6 hours  metoclopramide Injectable 5 milliGRAM(s) IV Push every 6 hours  multivitamin/minerals/iron Oral Solution (CENTRUM) 15 milliLiter(s) Oral daily  norepinephrine Infusion 0.01 MICROgram(s)/kG/Min (1.25 mL/Hr) IV Continuous <Continuous>  pantoprazole  Injectable 40 milliGRAM(s) IV Push daily  piperacillin/tazobactam IVPB.. 3.375 Gram(s) IV Intermittent every 8 hours  potassium chloride  20 mEq/100 mL IVPB 20 milliEquivalent(s) IV Intermittent once  propofol Infusion 15 MICROgram(s)/kG/Min (6 mL/Hr) IV Continuous <Continuous>    MEDICATIONS  (PRN):  acetaminophen   Tablet .. 650 milliGRAM(s) Oral every 4 hours PRN Mild Pain (1 - 3)  ALBUTerol    90 MICROgram(s) HFA Inhaler 2 Puff(s) Inhalation every 6 hours PRN Shortness of Breath and/or Wheezing  polyethylene glycol 3350 17 Gram(s) Oral daily PRN Constipation      Mode: AC/ CMV (Assist Control/ Continuous Mandatory Ventilation)  RR (machine): 34  TV (machine): 320  FiO2: 50  PEEP: 8  ITime: 0.6  MAP: 16  PIP: 35      Vital Signs Last 24 Hrs  T(C): 37.2 (19 Mar 2021 08:00), Max: 38.6 (19 Mar 2021 02:00)  T(F): 99 (19 Mar 2021 08:00), Max: 101.4 (19 Mar 2021 02:00)  HR: 81 (19 Mar 2021 10:30) (64 - 94)  BP: --  BP(mean): --  RR: 34 (19 Mar 2021 10:30) (17 - 34)  SpO2: 97% (19 Mar 2021 10:30) (92% - 100%)    ABG - ( 19 Mar 2021 11:47 )  pH, Arterial: 7.41  pH, Blood: x     /  pCO2: 38    /  pO2: 67    / HCO3: 23    / Base Excess: -.8   /  SaO2: 92            03-18 @ 07:01  -  03-19 @ 07:00  --------------------------------------------------------  IN: 3107.5 mL / OUT: 1365 mL / NET: 1742.5 mL          LABS:                        10.4   14.31 )-----------( 366      ( 19 Mar 2021 00:19 )             33.5     03-19    131<L>  |  98  |  14  ----------------------------<  158<H>  4.1   |  21<L>  |  0.48<L>    Ca    8.4      19 Mar 2021 00:19  Phos  2.6     03-19  Mg     2.2     03-19    TPro  5.6<L>  /  Alb  2.4<L>  /  TBili  0.3  /  DBili  x   /  AST  20  /  ALT  32  /  AlkPhos  142<H>  03-19      CAPILLARY BLOOD GLUCOSE      POCT Blood Glucose.: 129 mg/dL (19 Mar 2021 11:29)    PT/INR - ( 19 Mar 2021 00:19 )   PT: 16.2 sec;   INR: 1.37 ratio         PTT - ( 18 Mar 2021 10:33 )  PTT:34.9 sec            CULTURES:     Culture - Blood (collected 03-16-21 @ 10:03)  Source: .Blood Blood-Peripheral  Preliminary Report (03-17-21 @ 11:01):    No growth to date.    Culture - Blood (collected 03-16-21 @ 10:03)  Source: .Blood Blood-Venous  Preliminary Report (03-17-21 @ 11:01):    No growth to date.    Culture - Blood (collected 03-13-21 @ 21:11)  Source: .Blood Blood  Final Report (03-18-21 @ 23:01):    No Growth Final        Physical Examination:  GEN: NAD  NEURO: BLOSSOM, intubated/sedated   PULM: Coarse bilaterally   CVS: S1, S2 heard  EXT: no edema     RADIOLOGY REVIEWED  CXR 3/17: b/l opacities R>L

## 2021-03-19 NOTE — PROGRESS NOTE ADULT - SUBJECTIVE AND OBJECTIVE BOX
TAMARA CHEW 60y MRN-[id number]    Patient is a 60y old  Male who presents with a chief complaint of hypoxia 2/2 COVID (19 Mar 2021 14:01)      Follow Up/CC:  ID following for COVID    Interval History/ROS: fever+, intubated    Allergies    No Known Allergies    Intolerances        ANTIMICROBIALS:  piperacillin/tazobactam IVPB.. 3.375 every 8 hours      MEDICATIONS  (STANDING):  chlorhexidine 0.12% Liquid 15 milliLiter(s) Oral Mucosa every 12 hours  cholecalciferol 2000 Unit(s) Oral daily  cisatracurium Infusion 3 MICROgram(s)/kG/Min (12 mL/Hr) IV Continuous <Continuous>  dextrose 50% Injectable 25 Gram(s) IV Push once  enoxaparin Injectable 70 milliGRAM(s) SubCutaneous every 12 hours  fentaNYL   Infusion... 0.5 MICROgram(s)/kG/Hr (1.67 mL/Hr) IV Continuous <Continuous>  glucagon  Injectable 1 milliGRAM(s) IntraMuscular once  glucagon  Injectable 1 milliGRAM(s) IntraMuscular once  influenza   Vaccine 0.5 milliLiter(s) IntraMuscular once  insulin lispro (ADMELOG) corrective regimen sliding scale   SubCutaneous every 6 hours  insulin NPH human recombinant 16 Unit(s) SubCutaneous every 6 hours  ketamine Infusion. 0.25 mG/kG/Hr (1.67 mL/Hr) IV Continuous <Continuous>  metoclopramide Injectable 5 milliGRAM(s) IV Push every 6 hours  multivitamin/minerals/iron Oral Solution (CENTRUM) 15 milliLiter(s) Oral daily  norepinephrine Infusion 0.01 MICROgram(s)/kG/Min (1.25 mL/Hr) IV Continuous <Continuous>  pantoprazole  Injectable 40 milliGRAM(s) IV Push daily  piperacillin/tazobactam IVPB.. 3.375 Gram(s) IV Intermittent every 8 hours  propofol Infusion 15 MICROgram(s)/kG/Min (6 mL/Hr) IV Continuous <Continuous>    MEDICATIONS  (PRN):  acetaminophen   Tablet .. 650 milliGRAM(s) Oral every 4 hours PRN Mild Pain (1 - 3)  ALBUTerol    90 MICROgram(s) HFA Inhaler 2 Puff(s) Inhalation every 6 hours PRN Shortness of Breath and/or Wheezing  polyethylene glycol 3350 17 Gram(s) Oral daily PRN Constipation        Vital Signs Last 24 Hrs  T(C): 36.7 (19 Mar 2021 12:00), Max: 38.6 (19 Mar 2021 02:00)  T(F): 98.1 (19 Mar 2021 12:00), Max: 101.4 (19 Mar 2021 02:00)  HR: 82 (19 Mar 2021 15:56) (74 - 94)  BP: --  BP(mean): --  RR: 34 (19 Mar 2021 14:30) (17 - 34)  SpO2: 97% (19 Mar 2021 15:56) (92% - 100%)    CBC Full  -  ( 19 Mar 2021 00:19 )  WBC Count : 14.31 K/uL  RBC Count : 5.00 M/uL  Hemoglobin : 10.4 g/dL  Hematocrit : 33.5 %  Platelet Count - Automated : 366 K/uL  Mean Cell Volume : 67.0 fl  Mean Cell Hemoglobin : 20.8 pg  Mean Cell Hemoglobin Concentration : 31.0 gm/dL  Auto Neutrophil # : 12.57 K/uL  Auto Lymphocyte # : 0.98 K/uL  Auto Monocyte # : 0.25 K/uL  Auto Eosinophil # : 0.32 K/uL  Auto Basophil # : 0.02 K/uL  Auto Neutrophil % : 88.0 %  Auto Lymphocyte % : 6.8 %  Auto Monocyte % : 1.7 %  Auto Eosinophil % : 2.2 %  Auto Basophil % : 0.1 %    03-19    131<L>  |  98  |  14  ----------------------------<  158<H>  4.1   |  21<L>  |  0.48<L>    Ca    8.4      19 Mar 2021 00:19  Phos  2.6     03-19  Mg     2.2     03-19    TPro  5.6<L>  /  Alb  2.4<L>  /  TBili  0.3  /  DBili  x   /  AST  20  /  ALT  32  /  AlkPhos  142<H>  03-19    LIVER FUNCTIONS - ( 19 Mar 2021 00:19 )  Alb: 2.4 g/dL / Pro: 5.6 g/dL / ALK PHOS: 142 U/L / ALT: 32 U/L / AST: 20 U/L / GGT: x               MICROBIOLOGY:  .Blood Blood-Venous  03-16-21   No growth to date.  --  --      .Blood Blood  03-13-21   No Growth Final  --  --      .Blood Blood-Peripheral  03-08-21   No Growth Final  --  --      RADIOLOGY    < from: Xray Abdomen 1 View PORTABLE -Routine (Xray Abdomen 1 View PORTABLE -Routine .) (03.19.21 @ 13:27) >  Distended stomach.    < end of copied text >

## 2021-03-19 NOTE — PROGRESS NOTE ADULT - ATTENDING COMMENTS
Rj Rouse  Attending Physician   Division of Infectious Disease  Pager #[phone]  Available on Microsoft Teams also  After 5pm/weekend or no response, call #[phone]    Please call the ID service [phone] with questions or concerns over the weekend.

## 2021-03-19 NOTE — PROGRESS NOTE ADULT - PROBLEM SELECTOR PLAN 1
Suggest to continue holding NPH while tube feeds are held.  Suggest to continue current insulin regimen for now. Will continue monitoring FS and FU.

## 2021-03-19 NOTE — PROGRESS NOTE ADULT - ASSESSMENT
Assessment  DMT2: 60y Male with newly diagnosed DM T2 with hyperglycemia admitted with COVID19, A1C is 12.4%, now on NPH insulin and coverage, blood sugars are trending within acceptable range, no hypoglycemic episodes. Patient remains intubated and monitored in the icu, NPO on tube feeds, tube feeds being held 2/2 high residuals.  COVID19: On medications, monitored, stable.  Overweight: No strict exercise routines, neither on low calorie diet.      Anderson Mcmanus MD  Cell: [phone]  Office: [phone]       Assessment  DMT2: 60y Male with newly diagnosed DM T2 with hyperglycemia admitted with COVID19, A1C is 12.4%, now on NPH insulin and coverage,  blood sugars are trending within acceptable range, no hypoglycemic episodes. Patient remains intubated and monitored in the icu, NPO on tube feeds, tube feeds being held 2/2 high residuals.  COVID19: On medications, monitored, stable.  Overweight: No strict exercise routines, neither on low calorie diet.      Anderson Mcmanus MD  Cell: [phone]  Office: [phone]

## 2021-03-19 NOTE — CHART NOTE - NSCHARTNOTEFT_GEN_A_CORE
: Amy ONEIL NP     INDICATION:  Respiratory failure     PROCEDURE:  [ ] LIMITED ECHO  [x ] LIMITED CHEST  [ ] LIMITED RETROPERITONEAL  [ ] LIMITED ABDOMINAL  [ ] LIMITED DVT  [ ] NEEDLE GUIDANCE VASCULAR  [ ] NEEDLE GUIDANCE THORACENTESIS  [ ] NEEDLE GUIDANCE PARACENTESIS  [ ] NEEDLE GUIDANCE PERICARDIOCENTESIS  [ ] OTHER    FINDINGS:  Chest   - Bilateral B Line predominance noted to Posterior chest wall (patient in prone position)   - irregular pleural line (+) bi basilar consolidation      INTERPRETATION:  Findings consistent with COVID -19 PNA : Amy ONEIL NP/ Bart Tanner MD    INDICATION:  Respiratory failure     PROCEDURE:  [ ] LIMITED ECHO  [x ] LIMITED CHEST  [ ] LIMITED RETROPERITONEAL  [ ] LIMITED ABDOMINAL  [ ] LIMITED DVT  [ ] NEEDLE GUIDANCE VASCULAR  [ ] NEEDLE GUIDANCE THORACENTESIS  [ ] NEEDLE GUIDANCE PARACENTESIS  [ ] NEEDLE GUIDANCE PERICARDIOCENTESIS  [ ] OTHER    FINDINGS:  Chest   - Bilateral B Line predominance noted to Posterior chest wall (patient in prone position)   - irregular pleural line (+) bi basilar consolidation      INTERPRETATION:  Findings consistent with COVID -19 PNA    Images uploaded to [employer]

## 2021-03-19 NOTE — PROGRESS NOTE ADULT - ATTENDING COMMENTS
Pt seen and examined. 60 year old M with medical hx as noted above, now with acute hypoxic resp failure/ ARDS 2/2 COVID19 PNA, possible superimposed bacterial pneumonia, RLL acute PE, distributive shock 2/2 sepsis + vasoplegia from sedation and lactic acidosis. Remains proned and paralyzed on lung protective ventilation. FiO2 down to 50 %, PEEP at 8, cont to titrate down as tolerated. Completed a course of Decadron and Remdesivir, follow inflammatory markers. Ongoing leukocytosis, cont ABx coverage with zosyn, BCxs NGTD, FUP SCx and procal. Titrate pressor support to keep MAP >65, follow lactate. Will reassess volume status once supine. Remains on AC with therapeutic Lovenox for PE. Cr stable, hyponatremia improving. Cont to follow UO/ electrolytes. Overall prognosis extremely guarded. Remains critically ill requiring multiple bedside visits and changes in therapy.

## 2021-03-19 NOTE — PROGRESS NOTE ADULT - PROBLEM SELECTOR PLAN 1
2nd to COVID PNA - s/p intubation 3/17 for worsening hypoxic respiratory failure   -Keep pH >7.20, sats >90%  -Wean O2 as tolerated   -More synchronous with vent with addition of paralytics

## 2021-03-20 NOTE — PROGRESS NOTE ADULT - SUBJECTIVE AND OBJECTIVE BOX
TAMARA CHEW  MRN-[id number]  Patient is a 60y old  Male who presents with a chief complaint of hypoxia 2/2 COVID (19 Mar 2021 15:59)    HPI:  59 yo male w/no known pmh (does not f/u with PCP) presents to [facility] for cc fevers, weakness, fatigue, shortness of breath for 5 days. Tested positive for covid 3 days ago at an urgent care. Today presented again to urgent care and noted to be hypoxic and sent to the ER. Patient denies dysuria, diarrhea, constipation. Currently denies shortness of breath on high flow.     In the ER, patient was febrile to 101.5F, tachycardic to 120s, tachypneic to 40, hypoxic to 88% on 6L. Improved on high flow nasal cannula to 95%.  (08 Mar 2021 14:08)      24 HOUR EVENTS:    REVIEW OF SYSTEMS:    CONSTITUTIONAL: No weakness, fevers or chills  EYES/ENT: No visual changes;  No vertigo or throat pain   NECK: No pain or stiffness  RESPIRATORY: No cough, wheezing, hemoptysis; No shortness of breath  CARDIOVASCULAR: No chest pain or palpitations  GASTROINTESTINAL: No abdominal or epigastric pain. No nausea, vomiting, or hematemesis; No diarrhea or constipation. No melena or hematochezia.  GENITOURINARY: No dysuria, frequency or hematuria  NEUROLOGICAL: No numbness or weakness  SKIN: No itching, rashes      ICU Vital Signs Last 24 Hrs  T(C): 37.5 (20 Mar 2021 04:00), Max: 37.5 (20 Mar 2021 00:00)  T(F): 99.5 (20 Mar 2021 04:00), Max: 99.5 (20 Mar 2021 00:00)  HR: 80 (20 Mar 2021 06:00) (77 - 93)  BP: --  BP(mean): --  ABP: 110/53 (20 Mar 2021 06:00) (86/47 - 137/70)  ABP(mean): 70 (20 Mar 2021 06:00) (58 - 89)  RR: 34 (20 Mar 2021 06:00) (34 - 34)  SpO2: 100% (20 Mar 2021 06:00) (90% - 100%)    Mode: AC/ CMV (Assist Control/ Continuous Mandatory Ventilation), RR (machine): 34, TV (machine): 320, FiO2: 60, PEEP: 8, ITime: 1  CVP(mm Hg): --  CO: --  CI: --  PA: --  PA(mean): --  PA(direct): --  PCWP: --  LA: --  RA: --  SVR: --  SVRI: --  PVR: --  PVRI: --  I&O's Summary    18 Mar 2021 07:01  -  19 Mar 2021 07:00  --------------------------------------------------------  IN: 3107.9 mL / OUT: 1365 mL / NET: 1742.9 mL    19 Mar 2021 07:01  -  20 Mar 2021 06:28  --------------------------------------------------------  IN: 2506 mL / OUT: 3850 mL / NET: -1344 mL        CAPILLARY BLOOD GLUCOSE    CAPILLARY BLOOD GLUCOSE      POCT Blood Glucose.: 207 mg/dL (20 Mar 2021 06:03)      PHYSICAL EXAM:  GENERAL: No acute distress, well-developed  HEAD:  Atraumatic, Normocephalic  EYES: EOMI, PERRLA, conjunctiva and sclera clear  NECK: Supple, no lymphadenopathy, no JVD  CHEST/LUNG: CTAB; No wheezes, rales, or rhonchi  HEART: Regular rate and rhythm. Normal S1/S2. No murmurs, rubs, or gallops  ABDOMEN: Soft, non-tender, non-distended; normal bowel sounds, no organomegaly  EXTREMITIES:  2+ peripheral pulses b/l, No clubbing, cyanosis, or edema  NEUROLOGY: A&O x 3, no focal deficits  SKIN: No rashes or lesions    ============================I/O===========================   I&O's Detail    18 Mar 2021 07:01  -  19 Mar 2021 07:00  --------------------------------------------------------  IN:    Cisatracurium: 228 mL    FentaNYL: 274.4 mL    Glucerna: 580 mL    IV PiggyBack: 262 mL    Ketamine: 281.1 mL    Lactated Ringers Bolus: 1000 mL    Norepinephrine: 5 mL    Norepinephrine: 11 mL    Propofol: 440 mL    Vasopressin: 26.4 mL  Total IN: 3107.9 mL    OUT:    Indwelling Catheter - Urethral (mL): 1365 mL  Total OUT: 1365 mL    Total NET: 1742.9 mL      19 Mar 2021 07:01  -  20 Mar 2021 06:28  --------------------------------------------------------  IN:    Cisatracurium: 276 mL    Enteral Tube Flush: 100 mL    FentaNYL: 384.1 mL    Glucerna: 170 mL    IV PiggyBack: 225 mL    Ketamine: 305.9 mL    Norepinephrine: 105 mL    Propofol: 440 mL    Sodium Chloride 0.9% Bolus: 500 mL  Total IN: 2506 mL    OUT:    Indwelling Catheter - Urethral (mL): 3300 mL    Nasogastric/Oral tube (mL): 550 mL  Total OUT: 3850 mL    Total NET: -1344 mL        ============================ LABS =========================                        10.0   16.36 )-----------( 374      ( 20 Mar 2021 00:46 )             32.5     03-20    141  |  108  |  9   ----------------------------<  107<H>  4.2   |  23  |  0.43<L>    Ca    7.9<L>      20 Mar 2021 00:46  Phos  2.6     03-19  Mg     2.2     03-19    TPro  5.7<L>  /  Alb  2.2<L>  /  TBili  0.4  /  DBili  x   /  AST  33  /  ALT  30  /  AlkPhos  146<H>  03-20                LIVER FUNCTIONS - ( 20 Mar 2021 00:46 )  Alb: 2.2 g/dL / Pro: 5.7 g/dL / ALK PHOS: 146 U/L / ALT: 30 U/L / AST: 33 U/L / GGT: x           PT/INR - ( 20 Mar 2021 00:46 )   PT: 19.2 sec;   INR: 1.64 ratio         PTT - ( 20 Mar 2021 00:46 )  PTT:39.5 sec  ABG - ( 20 Mar 2021 00:43 )  pH, Arterial: 7.43  pH, Blood: x     /  pCO2: 40    /  pO2: 78    / HCO3: 26    / Base Excess: 2.2   /  SaO2: 95                Blood Gas Arterial, Lactate: 1.2 mmol/L (03-20-21 @ 00:43)  Blood Gas Arterial, Lactate: 1.6 mmol/L (03-19-21 @ 11:47)  Blood Gas Arterial, Lactate: 2.4 mmol/L (03-19-21 @ 00:12)  Blood Gas Arterial, Lactate: 2.0 mmol/L (03-18-21 @ 18:05)  Blood Gas Arterial, Lactate: 2.5 mmol/L (03-18-21 @ 14:25)  Blood Gas Arterial, Lactate: 2.4 mmol/L (03-18-21 @ 10:30)  Blood Gas Arterial, Lactate: 1.9 mmol/L (03-17-21 @ 23:42)  Blood Gas Arterial, Lactate: 2.2 mmol/L (03-17-21 @ 16:47)  Blood Gas Arterial, Lactate: 2.0 mmol/L (03-17-21 @ 14:27)  Blood Gas Arterial, Lactate: 2.3 mmol/L (03-17-21 @ 13:12)  Blood Gas Arterial, Lactate: 2.0 mmol/L (03-17-21 @ 09:27)  Blood Gas Arterial, Lactate: 2.4 mmol/L (03-17-21 @ 08:10)      ======================Micro/Rad/Cardio=================  Telemetry: Reviewed   EKG: Reviewed  CXR: Reviewed  Culture: Reviewed   Echo:   Cath:   ======================================================  PAST MEDICAL & SURGICAL HISTORY:  No pertinent past medical history    No significant past surgical history      ====================ASSESSMENT AND PLAN==============      ====================CARDIOVASCULAR==================    Mechanical Circulatory Support:  [ ] IABP   [ ] Impella 2.5   [ ] Impella CP  Settings:  Augmented Diastolic Pressure:  Impella Flow:     ==Hemodynamics==     (Date) CVP:      PCWP:        PA S/D:            Cardiac Output:             Cardiac Index:            SVR:    Preload (fluids, diuretics):  Afterload (anti-hypertensives, pressors):  Inotropes:    ==Pump==    TTE Date:  LVEF:                              Regional Wall Motion Abnormailities?:  [ ]Yes   [ ] No   If Yes, Details  Diastolic function:  RV function:   Any change from prior?: [ ] Yes   [ ] No   If Yes, Details:   Volume status:   [ ] Hypovolemia    [ ] Euvolemic    [ ] Hypervolemic    ==Coronaries==    Last ischemic workup:   Antiplatelet regimen:   Anticoagulant:   Statin:   Beta blocker:    ==Rhythm==    Current rhythm:  AM EKG Interpretation:   Anti-arrhythmic therapies:   TVP with settings:     norepinephrine Infusion 0.01 MICROgram(s)/kG/Min (1.25 mL/Hr) IV Continuous <Continuous>      ====================== NEUROLOGY=====================  Sedation [ ]Yes   [ ] No   If Yes, Medication/Dose:   CAM ICU [ ] Positive  [ ] Negative    acetaminophen   Tablet .. 650 milliGRAM(s) Oral every 4 hours PRN Mild Pain (1 - 3)  cisatracurium Infusion 3 MICROgram(s)/kG/Min (12 mL/Hr) IV Continuous <Continuous>  fentaNYL   Infusion... 0.5 MICROgram(s)/kG/Hr (1.67 mL/Hr) IV Continuous <Continuous>  ketamine Infusion. 0.25 mG/kG/Hr (1.67 mL/Hr) IV Continuous <Continuous>  metoclopramide Injectable 5 milliGRAM(s) IV Push every 6 hours  propofol Infusion 15 MICROgram(s)/kG/Min (6 mL/Hr) IV Continuous <Continuous>    ==================== RESPIRATORY======================  [ ] Invasive Mechanical Ventilation   [ ] BIPAP    [ ] HFNC    [ ] NC   Non-invasive Mechanical Ventilation/ HFNC Settings:   Mode: AC/ CMV (Assist Control/ Continuous Mandatory Ventilation)  RR (machine): 34  TV (machine): 320  FiO2: 60  PEEP: 8  ITime: 1  MAP: 16  PIP: 34    ABG - ( 20 Mar 2021 00:43 )  pH, Arterial: 7.43  pH, Blood: x     /  pCO2: 40    /  pO2: 78    / HCO3: 26    / Base Excess: 2.2   /  SaO2: 95                  Blood Gas Arterial - Lytes,H+H,iCa,Lact: Performed In Lab (03-19-21 @ 11:47)      ALBUTerol    90 MICROgram(s) HFA Inhaler 2 Puff(s) Inhalation every 6 hours PRN Shortness of Breath and/or Wheezing    ===================== RENAL =========================    03-18-21 @ 07:01 - 03-19-21 @ 07:00  --------------------------------------------------------  IN: 3107.9 mL / OUT: 1365 mL / NET: 1742.9 mL    03-19-21 @ 07:01 - 03-20-21 @ 06:28  --------------------------------------------------------  IN: 2506 mL / OUT: 3850 mL / NET: -1344 mL      Renal Replacement Therapy:  [ ] CRRT      [ ] IHD   Last Session:    Fluid removal:     [ ] Diuretic therapy, Regimen:       ==================== GASTROINTESTINAL===================    Diet:  Last BM:   Indication for Stress Ulcer Prophylaxis, [ ] Yes    [ ] No   If Yes, Medication:     cholecalciferol 2000 Unit(s) Oral daily  multivitamin/minerals/iron Oral Solution (CENTRUM) 15 milliLiter(s) Oral daily  pantoprazole  Injectable 40 milliGRAM(s) IV Push daily  polyethylene glycol 3350 17 Gram(s) Oral daily PRN Constipation    ========================INFECTIOUS DISEASE================  T(C): 37.5 (03-20-21 @ 04:00), Max: 37.5 (03-20-21 @ 00:00)  WBC Count: 16.36 K/uL (03-20-21 @ 00:46)  WBC Count: 14.31 K/uL (03-19-21 @ 00:19)  WBC Count: 14.94 K/uL (03-18-21 @ 10:33)  WBC Count: 14.93 K/uL (03-17-21 @ 23:46)  WBC Count: 16.16 K/uL (03-17-21 @ 14:29)      Culture - Blood (collected 03-16-21 @ 10:03)  Source: .Blood Blood-Peripheral  Preliminary Report (03-17-21 @ 11:01):    No growth to date.    Culture - Blood (collected 03-16-21 @ 10:03)  Source: .Blood Blood-Venous  Preliminary Report (03-17-21 @ 11:01):    No growth to date.    Culture - Blood (collected 03-13-21 @ 21:11)  Source: .Blood Blood  Final Report (03-18-21 @ 23:01):    No Growth Final        Current Antibiotics/Antifungals/ Antivirals with start date:     piperacillin/tazobactam IVPB.. 3.375 Gram(s) IV Intermittent every 8 hours    ===================HEMATOLOGIC/ONC ===================  Hemoglobin: 10.0 g/dL (03-20-21 @ 00:46)  Hemoglobin: 10.4 g/dL (03-19-21 @ 00:19)  Hemoglobin: 10.3 g/dL (03-18-21 @ 10:33)  Hemoglobin: 10.7 g/dL (03-17-21 @ 23:46)  Hemoglobin: 10.7 g/dL (03-17-21 @ 14:29)    Platelet Count - Automated: 374 K/uL (03-20-21 @ 00:46)  Platelet Count - Automated: 366 K/uL (03-19-21 @ 00:19)  Platelet Count - Automated: 382 K/uL (03-18-21 @ 10:33)  Platelet Count - Automated: 454 K/uL (03-17-21 @ 23:46)  Platelet Count - Automated: 451 K/uL (03-17-21 @ 14:29)    Chemical VTE Prophylaxis:  [ ] Lovenox    [ ] SQH   [ ]NA  Systemic Anticogaulation:  [ ] Yes    [ ] No,  If Yes, Medication and Indication:     enoxaparin Injectable 70 milliGRAM(s) SubCutaneous every 12 hours    =======================    ENDOCRINE  =====================  Insulin drip  [ ] Yes    [ ] No  Basal Insulin [ ] Yes    [ ] No, Details:   Bolus insulin [ ] Yes    [ ] No  Sliding Scale  [ ] Yes    [ ] No    POCT Blood Glucose.: 207 mg/dL (03-20-21 @ 06:03)  POCT Blood Glucose.: 77 mg/dL (03-20-21 @ 05:07)  POCT Blood Glucose.: 149 mg/dL (03-19-21 @ 16:44)  POCT Blood Glucose.: 129 mg/dL (03-19-21 @ 11:29)        dextrose 50% Injectable 25 Gram(s) IV Push once  dextrose 50% Injectable 50 milliLiter(s) IV Push once  glucagon  Injectable 1 milliGRAM(s) IntraMuscular once  glucagon  Injectable 1 milliGRAM(s) IntraMuscular once  insulin lispro (ADMELOG) corrective regimen sliding scale   SubCutaneous every 6 hours    ======================= LINES/TUBES  =====================  Caret: (Date placed)  Central Line: (Date placed)  HD Catheter: (Date placed)  Arterial Line: (Date placed)  Endotracheal Tube: (Date placed)  Coates: (Date placed)  ======================= DISPOSITION  =====================  [ ] Critical   [ ] Guarded    [ ] Stable    [ ] Maintain in CICU  [ ] Downgrade to Telemtry  [ ] Discharge Home   TAMARA CHEW  MRN-[id number]  Patient is a 60y old  Male who presents with a chief complaint of hypoxia 2/2 COVID (19 Mar 2021 15:59)    HPI:  61 yo male w/no known pmh (does not f/u with PCP) presents to [facility] for cc fevers, weakness, fatigue, shortness of breath for 5 days. Tested positive for covid 3 days ago at an urgent care. Today presented again to urgent care and noted to be hypoxic and sent to the ER. Patient denies dysuria, diarrhea, constipation. Currently denies shortness of breath on high flow.     In the ER, patient was febrile to 101.5F, tachycardic to 120s, tachypneic to 40, hypoxic to 88% on 6L. Improved on high flow nasal cannula to 95%.  (08 Mar 2021 14:08)      24 HOUR EVENTS:    REVIEW OF SYSTEMS:  Pt intubated and sedated on fentanyl @5 and ketamine @2 and Propofol @50      ICU Vital Signs Last 24 Hrs  T(C): 37.5 (20 Mar 2021 04:00), Max: 37.5 (20 Mar 2021 00:00)  T(F): 99.5 (20 Mar 2021 04:00), Max: 99.5 (20 Mar 2021 00:00)  HR: 80 (20 Mar 2021 06:00) (77 - 93)  BP: --  BP(mean): --  ABP: 110/53 (20 Mar 2021 06:00) (86/47 - 137/70)  ABP(mean): 70 (20 Mar 2021 06:00) (58 - 89)  RR: 34 (20 Mar 2021 06:00) (34 - 34)  SpO2: 100% (20 Mar 2021 06:00) (90% - 100%)    Mode: AC/ CMV (Assist Control/ Continuous Mandatory Ventilation), RR (machine): 34, TV (machine): 320, FiO2: 60, PEEP: 8, ITime: 1      18 Mar 2021 07:01  -  19 Mar 2021 07:00  --------------------------------------------------------  IN: 3107.9 mL / OUT: 1365 mL / NET: 1742.9 mL    19 Mar 2021 07:01  -  20 Mar 2021 06:28  --------------------------------------------------------  IN: 2506 mL / OUT: 3850 mL / NET: -1344 mL        CAPILLARY BLOOD GLUCOSE    CAPILLARY BLOOD GLUCOSE      POCT Blood Glucose.: 207 mg/dL (20 Mar 2021 06:03)      PHYSICAL EXAM:  GENERAL: intubated and sedated, No acute distress, well-developed  HEAD:  Atraumatic, Normocephalic  NECK: Supple, no lymphadenopathy, no JVD  CHEST/LUNG: CTAB; No wheezes, rales, or rhonchi  HEART: deferred pt proned  ABDOMEN: deferred pt proned  EXTREMITIES:  2+ peripheral pulses b/l, No clubbing, cyanosis, or edema  NEUROLOGY: pt intubated and sedated  SKIN: No rashes or lesions    ============================I/O===========================   I&O's Detail    18 Mar 2021 07:01  -  19 Mar 2021 07:00  --------------------------------------------------------  IN:    Cisatracurium: 228 mL    FentaNYL: 274.4 mL    Glucerna: 580 mL    IV PiggyBack: 262 mL    Ketamine: 281.1 mL    Lactated Ringers Bolus: 1000 mL    Norepinephrine: 5 mL    Norepinephrine: 11 mL    Propofol: 440 mL    Vasopressin: 26.4 mL  Total IN: 3107.9 mL    OUT:    Indwelling Catheter - Urethral (mL): 1365 mL  Total OUT: 1365 mL    Total NET: 1742.9 mL      19 Mar 2021 07:01  -  20 Mar 2021 06:28  --------------------------------------------------------  IN:    Cisatracurium: 276 mL    Enteral Tube Flush: 100 mL    FentaNYL: 384.1 mL    Glucerna: 170 mL    IV PiggyBack: 225 mL    Ketamine: 305.9 mL    Norepinephrine: 105 mL    Propofol: 440 mL    Sodium Chloride 0.9% Bolus: 500 mL  Total IN: 2506 mL    OUT:    Indwelling Catheter - Urethral (mL): 3300 mL    Nasogastric/Oral tube (mL): 550 mL  Total OUT: 3850 mL    Total NET: -1344 mL      ============================ LABS =========================                        10.0   16.36 )-----------( 374      ( 20 Mar 2021 00:46 )             32.5     03-20    141  |  108  |  9   ----------------------------<  107<H>  4.2   |  23  |  0.43<L>    Ca    7.9<L>      20 Mar 2021 00:46  Phos  2.6     03-19  Mg     2.2     03-19    TPro  5.7<L>  /  Alb  2.2<L>  /  TBili  0.4  /  DBili  x   /  AST  33  /  ALT  30  /  AlkPhos  146<H>  03-20                LIVER FUNCTIONS - ( 20 Mar 2021 00:46 )  Alb: 2.2 g/dL / Pro: 5.7 g/dL / ALK PHOS: 146 U/L / ALT: 30 U/L / AST: 33 U/L / GGT: x           PT/INR - ( 20 Mar 2021 00:46 )   PT: 19.2 sec;   INR: 1.64 ratio         PTT - ( 20 Mar 2021 00:46 )  PTT:39.5 sec  ABG - ( 20 Mar 2021 00:43 )  pH, Arterial: 7.43  pH, Blood: x     /  pCO2: 40    /  pO2: 78    / HCO3: 26    / Base Excess: 2.2   /  SaO2: 95                Blood Gas Arterial, Lactate: 1.2 mmol/L (03-20-21 @ 00:43)  Blood Gas Arterial, Lactate: 1.6 mmol/L (03-19-21 @ 11:47)  Blood Gas Arterial, Lactate: 2.4 mmol/L (03-19-21 @ 00:12)  Blood Gas Arterial, Lactate: 2.0 mmol/L (03-18-21 @ 18:05)  Blood Gas Arterial, Lactate: 2.5 mmol/L (03-18-21 @ 14:25)  Blood Gas Arterial, Lactate: 2.4 mmol/L (03-18-21 @ 10:30)  Blood Gas Arterial, Lactate: 1.9 mmol/L (03-17-21 @ 23:42)  Blood Gas Arterial, Lactate: 2.2 mmol/L (03-17-21 @ 16:47)  Blood Gas Arterial, Lactate: 2.0 mmol/L (03-17-21 @ 14:27)  Blood Gas Arterial, Lactate: 2.3 mmol/L (03-17-21 @ 13:12)  Blood Gas Arterial, Lactate: 2.0 mmol/L (03-17-21 @ 09:27)  Blood Gas Arterial, Lactate: 2.4 mmol/L (03-17-21 @ 08:10)      ======================Micro/Rad/Cardio=================  Telemetry: Reviewed   EKG: Reviewed  CXR: Reviewed  Culture: Reviewed   Echo:   Cath:   ======================================================  PAST MEDICAL & SURGICAL HISTORY:  No pertinent past medical history    No significant past surgical history    Central Line: (Date placed)  HD Catheter: (Date placed)  Arterial Line: (Date placed)  Endotracheal Tube: (Date placed)  Coates: (Date placed)  ======================= DISPOSITION  =====================  [x ] Critical   [ ] Guarded    [ ] Stable    [x ] Maintain in 5ICU  [ ] Downgrade to Telemtry  [ ] Discharge Home

## 2021-03-20 NOTE — PROGRESS NOTE ADULT - ASSESSMENT
Assessment  DMT2: 60y Male with newly diagnosed DM T2 with hyperglycemia admitted with COVID19, A1C is 12.4%, now on NPH insulin and coverage, blood sugars are within acceptable range, no hypoglycemic episodes. Patient remains intubated and monitored in the icu, NPO on tube feeds, tube feeds being held 2/2 high residuals.  COVID19: On medications, monitored, stable.  Overweight: No strict exercise routines, neither on low calorie diet.      Anderson Mcmanus MD  Cell: [phone]  Office: [phone]       Assessment  DMT2: 60y Male with newly diagnosed DM T2 with hyperglycemia admitted with COVID19, A1C is 12.4%, now on insulin, blood sugars fluctuating , had hypoglycemic episodes. Patient remains intubated and monitored in the icu, NPO on tube feeds, tube feeds being held 2/2 high residuals.  COVID19: On medications, monitored, stable.  Overweight: No strict exercise routines, neither on low calorie diet.      Anderson Mcmanus MD  Cell: [phone]  Office: [phone]

## 2021-03-20 NOTE — PROGRESS NOTE ADULT - SUBJECTIVE AND OBJECTIVE BOX
Patient is a 60y old  Male who presents with a chief complaint of hypoxia 2/2 COVID (20 Mar 2021 06:28)    Being followed by ID for COVID    Interval history:  Fever, last yesterday afternoon  No acute events      ROS:  Intubated, sedated      Antimicrobials:    piperacillin/tazobactam IVPB.. 3.375 Gram(s) IV Intermittent every 8 hours      Vital Signs Last 24 Hrs  T(C): 37.2 (03-20-21 @ 07:30), Max: 37.5 (03-20-21 @ 00:00)  T(F): 99 (03-20-21 @ 07:30), Max: 99.5 (03-20-21 @ 00:00)  HR: 75 (03-20-21 @ 09:00) (74 - 93)  BP: --  BP(mean): --  RR: 34 (03-20-21 @ 09:00) (34 - 34)  SpO2: 100% (03-20-21 @ 09:00) (90% - 100%)    Physical Exam:    Constitutional well preserved, NAD    HEENT No pallor or icterus, ETT, R IJ, OGT    Neck no LN    Chest Clear to auscultation    CVS S1 S2 WNl No murmur or rub or gallop    Abd soft BS normal No tenderness no masses    Ext No cyanosis clubbing or edema    IV site no erythema tenderness or discharge, RIJ    Joints no swelling or LOM    CNS Intubated, sedated    Lab Data:                          10.0   16.36 )-----------( 374      ( 20 Mar 2021 00:46 )             32.5       03-20    141  |  108  |  9   ----------------------------<  107<H>  4.2   |  23  |  0.43<L>    Ca    7.9<L>      20 Mar 2021 00:46  Phos  2.6     03-19  Mg     2.2     03-19    TPro  5.7<L>  /  Alb  2.2<L>  /  TBili  0.4  /  DBili  x   /  AST  33  /  ALT  30  /  AlkPhos  146<H>  03-20        .Blood Blood-Venous  03-16-21   No growth to date.  --  --      .Blood Blood  03-13-21   No Growth Final  --  --    < from: Xray Abdomen 1 View PORTABLE -Routine (Xray Abdomen 1 View PORTABLE -Routine .) (03.19.21 @ 13:27) >    EXAM:  XR ABDOMEN PORTABLE ROUTINE 1V                            PROCEDURE DATE:  03/19/2021            INTERPRETATION:  Abdomen one view    HISTORY: Abdominal distention    Frontal view of the abdomen shows gaseous distention of the stomach out ofproportion to other bowel loops. Probe devices are seen over the lower pelvis. No definite free air is identified.    IMPRESSION:  Distended stomach.    Thank you for this referral.        < end of copied text >  < from: Xray Chest 1 View- PORTABLE-Urgent (Xray Chest 1 View- PORTABLE-Urgent .) (03.17.21 @ 07:17) >    EXAM:  XR CHEST PORTABLE URGENT 1V                            PROCEDURE DATE:  03/17/2021            INTERPRETATION:  CLINICAL INFORMATION: Evaluate line placement, shortness of breath, COVID.    TECHNIQUE: Frontal radiograph of the chest.    COMPARISON: Chest x-ray 3/8/2021.    FINDINGS:    LINES, TUBES, AND HARDWARE: Enteric tube in the stomach. Right IJ central venous catheter with distal tip in the SVC. Enteric tube terminating above the dariel.    LUNGS: Bilateral patchy airspace opacities.    PLEURA: No pleural effusion or pneumothorax.    HEART AND MEDIASTINUM: Unable to accurately assess cardiac size in this projection.    SKELETON: Unremarkable skeletal structures.      IMPRESSION:    Enteric tube within the stomach and endotracheal tube above the dariel. Right IJ central venous catheter with distal tip in the SVC.    Increased bilateral patchy airspace opacities when compared with chest x-ray 3/8/2021.        < end of copied text >                       Patient is a 60y old  Male who presents with a chief complaint of hypoxia 2/2 COVID (20 Mar 2021 06:28)    Being followed by ID for COVID    Interval history:  Fever, last yesterday afternoon  No acute events      ROS:  Intubated, sedated      Antimicrobials:    piperacillin/tazobactam IVPB.. 3.375 Gram(s) IV Intermittent every 8 hours      Vital Signs Last 24 Hrs  T(C): 37.2 (03-20-21 @ 07:30), Max: 37.5 (03-20-21 @ 00:00)  T(F): 99 (03-20-21 @ 07:30), Max: 99.5 (03-20-21 @ 00:00)  HR: 75 (03-20-21 @ 09:00) (74 - 93)  BP: --  BP(mean): --  RR: 34 (03-20-21 @ 09:00) (34 - 34)  SpO2: 100% (03-20-21 @ 09:00) (90% - 100%)    Physical Exam:    Constitutional well preserved, NAD, prone    HEENT No pallor or icterus, ETT, R IJ, OGT    Neck no LN    Chest Clear to auscultation    CVS S1 S2 WNl No murmur or rub or gallop    Abd soft BS normal No tenderness no masses    Ext No cyanosis clubbing or edema    IV site no erythema tenderness or discharge, RIJ    Joints no swelling or LOM    CNS Intubated, sedated    Lab Data:                          10.0   16.36 )-----------( 374      ( 20 Mar 2021 00:46 )             32.5       03-20    141  |  108  |  9   ----------------------------<  107<H>  4.2   |  23  |  0.43<L>    Ca    7.9<L>      20 Mar 2021 00:46  Phos  2.6     03-19  Mg     2.2     03-19    TPro  5.7<L>  /  Alb  2.2<L>  /  TBili  0.4  /  DBili  x   /  AST  33  /  ALT  30  /  AlkPhos  146<H>  03-20        .Blood Blood-Venous  03-16-21   No growth to date.  --  --      .Blood Blood  03-13-21   No Growth Final  --  --    < from: Xray Abdomen 1 View PORTABLE -Routine (Xray Abdomen 1 View PORTABLE -Routine .) (03.19.21 @ 13:27) >    EXAM:  XR ABDOMEN PORTABLE ROUTINE 1V                            PROCEDURE DATE:  03/19/2021            INTERPRETATION:  Abdomen one view    HISTORY: Abdominal distention    Frontal view of the abdomen shows gaseous distention of the stomach out ofproportion to other bowel loops. Probe devices are seen over the lower pelvis. No definite free air is identified.    IMPRESSION:  Distended stomach.    Thank you for this referral.        < end of copied text >  < from: Xray Chest 1 View- PORTABLE-Urgent (Xray Chest 1 View- PORTABLE-Urgent .) (03.17.21 @ 07:17) >    EXAM:  XR CHEST PORTABLE URGENT 1V                            PROCEDURE DATE:  03/17/2021            INTERPRETATION:  CLINICAL INFORMATION: Evaluate line placement, shortness of breath, COVID.    TECHNIQUE: Frontal radiograph of the chest.    COMPARISON: Chest x-ray 3/8/2021.    FINDINGS:    LINES, TUBES, AND HARDWARE: Enteric tube in the stomach. Right IJ central venous catheter with distal tip in the SVC. Enteric tube terminating above the dariel.    LUNGS: Bilateral patchy airspace opacities.    PLEURA: No pleural effusion or pneumothorax.    HEART AND MEDIASTINUM: Unable to accurately assess cardiac size in this projection.    SKELETON: Unremarkable skeletal structures.      IMPRESSION:    Enteric tube within the stomach and endotracheal tube above the dariel. Right IJ central venous catheter with distal tip in the SVC.    Increased bilateral patchy airspace opacities when compared with chest x-ray 3/8/2021.        < end of copied text >

## 2021-03-20 NOTE — PROGRESS NOTE ADULT - ATTENDING COMMENTS
60M no past hx. Admitted w/ COVID19. Course complicated by RLL segmental PE, worsening hypoxemic respiratory failure requiring intubation on 3/17. Pt also with newly diagnosed DM. SInce admission to the ICU pt requiring paralysis and proning and he developed an ileus.    - keep deeply sedated and paralyzed for severe ARDS  - low VT ventilation, titrate FiO2, f/u ABGs, prone PRN for 18 hrs and supine for 6 hrs as tolerated  - s/p remdesivir and decadron   - on low dose norepi likely sedation related, titrate to MAP >/65  - on zosyn for empiric coverage, all cx NGTD; pt is afebrile w/ stable leukocytosis at 16k; today is day 7 and will d/c when complete   - normal renal function, Na increased very quickly (131-141 in 24 hrs) so will give D5W @ 50 cc/hr for 24 hrs and monitor  - NGT had ~500 cc output overnight but belly is soft on exam, continue w/ bowel regimen and reglan; keep NGT to ILWS and NPO for now; if no improvement or worsening in next 24 hrs or so can consider CT A/P  - on full dose lovenox for PE, evidence of bleeding  - prognosis extremely guarded

## 2021-03-20 NOTE — PROGRESS NOTE ADULT - ASSESSMENT
A/P: 59 yo male with no known pmh presents with hypoxic respiratory failure due to covid pneumonia now c/b Rt lung segmental PE. On 3/13  RRT was called for Hypoxia with associated fever of 102 requiring intubation. The patient was initiated on Remdisivir and dexamethasone tx 3/8 and has now been completed.     Overnight:  Experiencing abd distention, NGT suction 500cc, Scheduled to be placed in supine position at 9am            A/P: 61 yo male with no known pmh presents with hypoxic respiratory failure due to covid pneumonia now c/b Rt lung segmental PE. On 3/13  RRT was called for Hypoxia with associated fever of 102 requiring intubation. The patient was initiated on Remdisivir and dexamethasone tx 3/8 and has now been completed.     Overnight:  Experiencing abd distention, NGT suction 500cc, Scheduled to be placed in supine position at 9am     Neuro:  - Paralyzed on Nimbex, sedated on: Fentanyl 5, Propofol 50, Ketamine 2  - daily sedation vacation as tolerated     CV:  #Hypotension likely in setting of vasodilatory shock   - Remains on Levo, attempt to wean, maintain MAP > 65    Resp:  #Hypoxic Respiratory Failure in setting of ARDS, Covid/PNA/ PE  - Intubated /34/8/60 p/f: peak? plateau:   - Attempt to lower PEEP as tolerated  - f/u ABG once supine  - Completed RDV, and Decadron course   - Vent management per ABG, pulm toileting     #Rt lung segmental PE  - CTA rt lung segmental PE  - on full dose Lovenox 70 BID    GI:  #abd distention  - NGT suction 500cc overnight  - keep NPO  - c/w bowel regimen  - GI ppx: PPI    Renal:  #hyponatremia  - now improved with Na 141, trend BMP  - Scr stable, strict I/Os, target net even     Endocrine:  #Uncontrolled DM, A1c 12.4  - hypoglycemia overnight received Amp of D50  - - 77, maintained ISS moderate coverage     Heme:  #Hypercoagulable state in setting of Covid  - D-dimer uptrending, c/w trending q72h  - VA duplex BLE (3/17): negative   - DVT ppx: Lovenox 70 BID     ID:  #Leukocytosis, on steroids   - WBC uptrending 16.36 < -14.31  - pt remains afebrile, lactate 1.2  - C/w Zosyn     #Covvid-19  - Completed RDV/Decadron course   - Trending inflammatory markers q72h    Lines:  RIJ 3/17  Right radial 3/17         A/P: 61 yo male with no known pmh presents with hypoxic respiratory failure due to covid pneumonia now c/b Rt lung segmental PE. On 3/13  RRT was called for Hypoxia with associated fever of 102 requiring intubation. The patient was initiated on Remdisivir and dexamethasone tx 3/8 and has now been completed.     Overnight:  Experiencing abd distention, NGT suction 500cc, FS decreased to 77, D50 amp given, Scheduled to be placed in supine position at 9am     Neuro:  - Paralyzed on Nimbex, sedated on: Fentanyl 5, Propofol 50, Ketamine 2  - daily sedation vacation as tolerated     CV:  #Hypotension likely in setting of vasodilatory shock   - Remains on Levo, attempt to wean, maintain MAP > 65    Resp:  #Hypoxic Respiratory Failure in setting of ARDS, Covid/PNA/ PE  - Intubated /34/8/60 p/f: peak? plateau:   - f/u ABG once supine  - Completed RDV, and Decadron course   - Vent management per ABG, pulm toileting     #Rt lung segmental PE  - CTA rt lung segmental PE  - on full dose Lovenox 70 BID    GI:  #abd distention  - NGT suction 500cc overnight  - keep NPO  - c/w reglan   - c/w bowel regimen  - GI ppx: PPI    Renal:  #hyponatremia  - now improved with Na 141, trend BMP  - start D5W @ 50/hr   - Scr stable, strict I/Os, target net even     Endocrine:  #Uncontrolled DM, A1c 12.4  - hypoglycemia overnight received Amp of D50  - - 77, maintained ISS moderate coverage     Heme:  #Hypercoagulable state in setting of Covid  - D-dimer uptrending, c/w trending q72h  - DVT ppx: Lovenox 70 BID     ID:  #Leukocytosis, on steroids   - WBC uptrending 16.36 < -14.31  - pt remains afebrile, lactate 1.2  - C/w Zosyn, completes tonight     #Covvid-19  - Completed RDV/Decadron course   - Trending inflammatory markers q72h    Lines:  RIJ 3/17  Right radial 3/17

## 2021-03-20 NOTE — PROGRESS NOTE ADULT - SUBJECTIVE AND OBJECTIVE BOX
Chief complaint  Patient is a 60y old  Male who presents with a chief complaint of hypoxia 2/2 COVID (20 Mar 2021 09:08)   Review of systems  Patient in bed, appears comfortable.    Labs and Fingersticks  CAPILLARY BLOOD GLUCOSE      POCT Blood Glucose.: 132 mg/dL (20 Mar 2021 11:15)  POCT Blood Glucose.: 207 mg/dL (20 Mar 2021 06:03)  POCT Blood Glucose.: 77 mg/dL (20 Mar 2021 05:07)      Anion Gap, Serum: 10 (03-20 @ 00:46)  Anion Gap, Serum: 10 (03-19 @ 18:48)  Anion Gap, Serum: 12 (03-19 @ 00:19)      Calcium, Total Serum: 7.9 <L> (03-20 @ 00:46)  Calcium, Total Serum: 7.8 <L> (03-19 @ 18:48)  Calcium, Total Serum: 8.4 (03-19 @ 00:19)  Albumin, Serum: 2.2 <L> (03-20 @ 00:46)  Albumin, Serum: 1.9 <L> (03-19 @ 18:48)  Albumin, Serum: 2.4 <L> (03-19 @ 00:19)    Alanine Aminotransferase (ALT/SGPT): 30 (03-20 @ 00:46)  Alanine Aminotransferase (ALT/SGPT): 32 (03-19 @ 18:48)  Alanine Aminotransferase (ALT/SGPT): 32 (03-19 @ 00:19)  Alkaline Phosphatase, Serum: 146 <H> (03-20 @ 00:46)  Alkaline Phosphatase, Serum: 151 <H> (03-19 @ 18:48)  Alkaline Phosphatase, Serum: 142 <H> (03-19 @ 00:19)  Aspartate Aminotransferase (AST/SGOT): 33 (03-20 @ 00:46)  Aspartate Aminotransferase (AST/SGOT): 35 (03-19 @ 18:48)  Aspartate Aminotransferase (AST/SGOT): 20 (03-19 @ 00:19)        03-20    141  |  108  |  9   ----------------------------<  107<H>  4.2   |  23  |  0.43<L>    Ca    7.9<L>      20 Mar 2021 00:46  Phos  2.6     03-19  Mg     2.2     03-19    TPro  5.7<L>  /  Alb  2.2<L>  /  TBili  0.4  /  DBili  x   /  AST  33  /  ALT  30  /  AlkPhos  146<H>  03-20                        9.5    14.21 )-----------( 345      ( 20 Mar 2021 14:54 )             31.1     Medications  MEDICATIONS  (STANDING):  chlorhexidine 0.12% Liquid 15 milliLiter(s) Oral Mucosa every 12 hours  cholecalciferol 2000 Unit(s) Oral daily  cisatracurium Infusion 3 MICROgram(s)/kG/Min (12 mL/Hr) IV Continuous <Continuous>  dextrose 5%. 1000 milliLiter(s) (50 mL/Hr) IV Continuous <Continuous>  dextrose 50% Injectable 25 Gram(s) IV Push once  fentaNYL   Infusion... 0.5 MICROgram(s)/kG/Hr (1.67 mL/Hr) IV Continuous <Continuous>  glucagon  Injectable 1 milliGRAM(s) IntraMuscular once  glucagon  Injectable 1 milliGRAM(s) IntraMuscular once  heparin  Infusion 1000 Unit(s)/Hr (10 mL/Hr) IV Continuous <Continuous>  influenza   Vaccine 0.5 milliLiter(s) IntraMuscular once  insulin lispro (ADMELOG) corrective regimen sliding scale   SubCutaneous every 6 hours  ketamine Infusion. 0.25 mG/kG/Hr (1.67 mL/Hr) IV Continuous <Continuous>  metoclopramide Injectable 5 milliGRAM(s) IV Push every 6 hours  multivitamin/minerals/iron Oral Solution (CENTRUM) 15 milliLiter(s) Oral daily  norepinephrine Infusion 0.01 MICROgram(s)/kG/Min (1.25 mL/Hr) IV Continuous <Continuous>  pantoprazole  Injectable 40 milliGRAM(s) IV Push two times a day  propofol Infusion 15 MICROgram(s)/kG/Min (6 mL/Hr) IV Continuous <Continuous>      Physical Exam  General: Patient comfortable in bed  Vital Signs Last 12 Hrs  T(F): 98.1 (03-20-21 @ 16:00), Max: 99 (03-20-21 @ 07:30)  HR: 72 (03-20-21 @ 16:45) (67 - 80)  BP: --  BP(mean): --  RR: 32 (03-20-21 @ 16:45) (20 - 34)  SpO2: 100% (03-20-21 @ 16:45) (91% - 100%)  Neck: No palpable thyroid nodules.  CVS: S1S2, No murmurs  Respiratory: No wheezing, no crepitations  GI: Abdomen soft, bowel sounds positive  Musculoskeletal:  edema lower extremities.     Diagnostics

## 2021-03-21 NOTE — PROGRESS NOTE ADULT - ASSESSMENT
A/P: 61 yo male with no known pmh presents with hypoxic respiratory failure due to covid pneumonia now c/b Rt lung segmental PE. On 3/13  RRT was called for Hypoxia with associated fever of 102 requiring intubation. The patient was initiated on Remdisivir and dexamethasone tx 3/8 and has now been completed.          Neuro:  - Paralyzed on Nimbex, sedated on: Fentanyl 5, Propofol 50, Ketamine 2  - daily sedation vacation as tolerated     CV:  #Hypotension likely in setting of vasodilatory shock   - Remains on low dose Levo, attempt to wean, maintain MAP > 65    Resp:  #Hypoxic Respiratory Failure in setting of ARDS, Covid/PNA/ PE  - Intubated /34/8/50 p/f: 295 peak: 44 plateau: 35  - f/u ABG once supine  - Completed RDV, and Decadron course   - Vent management per ABG, pulm toileting     #Rt lung segmental PE  - CTA rt lung segmental PE  - was on full dose Lovenox 70 BID, converted to heparin drip with questionable GI bleed.    GI:  #abd distention  - NGT suction 250cc overnight  - will clamp NGT x 4hrs, if residuals low then will resume TF  - c/w reglan   - c/w bowel regimen  - GI ppx: PPI    Renal:  #hyponatremia  - Na 130, trend BMP, will continue to monitor off D5W  - Scr stable, strict I/Os, target net even, +800/24 hrs  - Lasix x1 this morning.    Endocrine:  #Uncontrolled DM, A1c 12.4  - ,178,195, maintained ISS moderate coverage     Heme:  #Hypercoagulable state in setting of Covid  - D-dimer uptrending, c/w trending q72h  - DVT ppx: Transitioned to Heparin gtt for RLL PE, and in setting of possible GI bleed    #PE: RLL seen on CT  - Hemodynamically stable, on heparin drip  - LE Duplex negative    ID:  #Leukocytosis, on steroids   - WBC downtrending today 12.0 <- 16.36   - pt remains afebrile, lactate normal  - Zosyn for empiric completed yesterday 3/20    #Covvid-19  - Completed RDV/Decadron course   - Trending inflammatory markers q72h    Lines:  RIJ 3/17  Right radial 3/17

## 2021-03-21 NOTE — PROGRESS NOTE ADULT - SUBJECTIVE AND OBJECTIVE BOX
TAMARA CHEW  MRN-[id number]  Patient is a 60y old  Male who presents with a chief complaint of hypoxia 2/2 COVID (19 Mar 2021 15:59)    HPI:  61 yo male w/no known pmh (does not f/u with PCP) presents to [facility] for cc fevers, weakness, fatigue, shortness of breath for 5 days. Tested positive for covid 3 days ago at an urgent care. Today presented again to urgent care and noted to be hypoxic and sent to the ER. Patient denies dysuria, diarrhea, constipation. Currently denies shortness of breath on high flow.     In the ER, patient was febrile to 101.5F, tachycardic to 120s, tachypneic to 40, hypoxic to 88% on 6L. Improved on high flow nasal cannula to 95%.  (08 Mar 2021 14:08)      24 HOUR EVENTS:  No events overnight, Patient now having bowel movements. Proned, plan to supine at 11, will re-evaluate for possible re-proning pending ABG. NGT output improved. Lasix x1 given this AM for Fluid overload    REVIEW OF SYSTEMS:  Pt intubated and sedated on fentanyl @5 and ketamine @2 and Propofol @50      ICU Vital Signs Last 24 Hrs  T(C): 37.5 (20 Mar 2021 04:00), Max: 37.5 (20 Mar 2021 00:00)  T(F): 99.5 (20 Mar 2021 04:00), Max: 99.5 (20 Mar 2021 00:00)  HR: 80 (20 Mar 2021 06:00) (77 - 93)  BP: --  BP(mean): --  ABP: 110/53 (20 Mar 2021 06:00) (86/47 - 137/70)  ABP(mean): 70 (20 Mar 2021 06:00) (58 - 89)  RR: 34 (20 Mar 2021 06:00) (34 - 34)  SpO2: 100% (20 Mar 2021 06:00) (90% - 100%)    Mode: AC/ CMV (Assist Control/ Continuous Mandatory Ventilation), RR (machine): 34, TV (machine): 320, FiO2: 60, PEEP: 8, ITime: 1      18 Mar 2021 07:01  -  19 Mar 2021 07:00  --------------------------------------------------------  IN: 3107.9 mL / OUT: 1365 mL / NET: 1742.9 mL    19 Mar 2021 07:01  -  20 Mar 2021 06:28  --------------------------------------------------------  IN: 2506 mL / OUT: 3850 mL / NET: -1344 mL        CAPILLARY BLOOD GLUCOSE    CAPILLARY BLOOD GLUCOSE      POCT Blood Glucose.: 207 mg/dL (20 Mar 2021 06:03)      PHYSICAL EXAM:  GENERAL: intubated and sedated, No acute distress, well-developed  HEAD:  Atraumatic, Normocephalic  NECK: Supple, no lymphadenopathy, no JVD  CHEST/LUNG: CTAB; No wheezes, rales, or rhonchi  HEART: deferred pt proned  ABDOMEN: deferred pt proned  EXTREMITIES:  2+ peripheral pulses b/l, No clubbing, cyanosis, or edema  NEUROLOGY: pt intubated and sedated  SKIN: No rashes or lesions    ============================I/O===========================   I&O's Detail    18 Mar 2021 07:01  -  19 Mar 2021 07:00  --------------------------------------------------------  IN:    Cisatracurium: 228 mL    FentaNYL: 274.4 mL    Glucerna: 580 mL    IV PiggyBack: 262 mL    Ketamine: 281.1 mL    Lactated Ringers Bolus: 1000 mL    Norepinephrine: 5 mL    Norepinephrine: 11 mL    Propofol: 440 mL    Vasopressin: 26.4 mL  Total IN: 3107.9 mL    OUT:    Indwelling Catheter - Urethral (mL): 1365 mL  Total OUT: 1365 mL    Total NET: 1742.9 mL      19 Mar 2021 07:01  -  20 Mar 2021 06:28  --------------------------------------------------------  IN:    Cisatracurium: 276 mL    Enteral Tube Flush: 100 mL    FentaNYL: 384.1 mL    Glucerna: 170 mL    IV PiggyBack: 225 mL    Ketamine: 305.9 mL    Norepinephrine: 105 mL    Propofol: 440 mL    Sodium Chloride 0.9% Bolus: 500 mL  Total IN: 2506 mL    OUT:    Indwelling Catheter - Urethral (mL): 3300 mL    Nasogastric/Oral tube (mL): 550 mL  Total OUT: 3850 mL    Total NET: -1344 mL      ============================ LABS =========================                        10.0   16.36 )-----------( 374      ( 20 Mar 2021 00:46 )             32.5     03-20    141  |  108  |  9   ----------------------------<  107<H>  4.2   |  23  |  0.43<L>    Ca    7.9<L>      20 Mar 2021 00:46  Phos  2.6     03-19  Mg     2.2     03-19    TPro  5.7<L>  /  Alb  2.2<L>  /  TBili  0.4  /  DBili  x   /  AST  33  /  ALT  30  /  AlkPhos  146<H>  03-20                LIVER FUNCTIONS - ( 20 Mar 2021 00:46 )  Alb: 2.2 g/dL / Pro: 5.7 g/dL / ALK PHOS: 146 U/L / ALT: 30 U/L / AST: 33 U/L / GGT: x           PT/INR - ( 20 Mar 2021 00:46 )   PT: 19.2 sec;   INR: 1.64 ratio         PTT - ( 20 Mar 2021 00:46 )  PTT:39.5 sec  ABG - ( 20 Mar 2021 00:43 )  pH, Arterial: 7.43  pH, Blood: x     /  pCO2: 40    /  pO2: 78    / HCO3: 26    / Base Excess: 2.2   /  SaO2: 95                Blood Gas Arterial, Lactate: 1.2 mmol/L (03-20-21 @ 00:43)  Blood Gas Arterial, Lactate: 1.6 mmol/L (03-19-21 @ 11:47)  Blood Gas Arterial, Lactate: 2.4 mmol/L (03-19-21 @ 00:12)  Blood Gas Arterial, Lactate: 2.0 mmol/L (03-18-21 @ 18:05)  Blood Gas Arterial, Lactate: 2.5 mmol/L (03-18-21 @ 14:25)  Blood Gas Arterial, Lactate: 2.4 mmol/L (03-18-21 @ 10:30)  Blood Gas Arterial, Lactate: 1.9 mmol/L (03-17-21 @ 23:42)  Blood Gas Arterial, Lactate: 2.2 mmol/L (03-17-21 @ 16:47)  Blood Gas Arterial, Lactate: 2.0 mmol/L (03-17-21 @ 14:27)  Blood Gas Arterial, Lactate: 2.3 mmol/L (03-17-21 @ 13:12)  Blood Gas Arterial, Lactate: 2.0 mmol/L (03-17-21 @ 09:27)  Blood Gas Arterial, Lactate: 2.4 mmol/L (03-17-21 @ 08:10)      ======================Micro/Rad/Cardio=================  Telemetry: Reviewed   EKG: Reviewed  CXR: Reviewed  Culture: Reviewed   Echo:   Cath:   ======================================================  PAST MEDICAL & SURGICAL HISTORY:  No pertinent past medical history    No significant past surgical history    Central Line: (Date placed)  HD Catheter: (Date placed)  Arterial Line: (Date placed)  Endotracheal Tube: (Date placed)  Coates: (Date placed)  ======================= DISPOSITION  =====================  [x ] Critical   [ ] Guarded    [ ] Stable    [x ] Maintain in 5ICU  [ ] Downgrade to Telemtry  [ ] Discharge Home

## 2021-03-21 NOTE — PROGRESS NOTE ADULT - ASSESSMENT
Assessment  DMT2: 60y Male with newly diagnosed DM T2 with hyperglycemia admitted with COVID19, A1C is 12.4%, now on insulin, blood sugars improving, no new hypoglycemic episodes. Patient remains intubated and monitored in the icu, NPO on tube feeds.  COVID19: On medications, monitored, stable.  Overweight: No strict exercise routines, neither on low calorie diet.      Anderson Mcmanus MD  Cell: [phone]  Office: [phone]

## 2021-03-21 NOTE — PROGRESS NOTE ADULT - ATTENDING COMMENTS
60M no past hx. Admitted w/ COVID19. Course complicated by RLL segmental PE, worsening hypoxemic respiratory failure requiring intubation on 3/17. Pt also with newly diagnosed DM. SInce admission to the ICU pt requiring paralysis and proning and he developed an ileus, which has now resolved. Also noted to have melena/blood in stools w/ slowly dropping Hgb.    - keep deeply sedated and paralyzed for severe ARDS  - low VT ventilation, titrate FiO2, f/u ABGs, prone PRN for 18 hrs and supine for 6 hrs as needed  - s/p remdesivir and decadron   - on low dose norepi likely sedation related, titrate to MAP >/65  - s/p zosyn for empiric coverage; pt is afebrile w/ decreasing leukocytosis; monitor off abx   - normal renal function, Na improved today to the 130s so D5W stopped; other electrolytes acceptable  - NGT output significantly improved and pt had bowel movements yesterday so will do clamp trial and if no output will start trickle feeds; protonix bid; continue w/ bowel regimen and reglan  - on full dose AC for PE; noted to have melena/blood in stool yesterday; will send FOBT to confirm; switched to heparin gtt from lovenox yesterday; monitor CBC q8  - prognosis extremely guarded

## 2021-03-21 NOTE — PROGRESS NOTE ADULT - SUBJECTIVE AND OBJECTIVE BOX
Chief complaint  Patient is a 60y old  Male who presents with a chief complaint of hypoxia 2/2 COVID (21 Mar 2021 09:51)   Review of systems  Patient in bed, appears comfortable.    Labs and Fingersticks  CAPILLARY BLOOD GLUCOSE    POCT Blood Glucose.: 180 mg/dL (21 Mar 2021 05:18)  POCT Blood Glucose.: 178 mg/dL (20 Mar 2021 23:56)  POCT Blood Glucose.: 195 mg/dL (20 Mar 2021 17:43)      Anion Gap, Serum: 10 (03-21 @ 12:26)  Anion Gap, Serum: 9 (03-21 @ 04:58)  Anion Gap, Serum: 8 (03-20 @ 22:22)  Anion Gap, Serum: 10 (03-20 @ 00:46)  Anion Gap, Serum: 10 (03-19 @ 18:48)      Calcium, Total Serum: 7.6 <L> (03-21 @ 12:26)  Calcium, Total Serum: 7.4 <L> (03-21 @ 04:58)  Calcium, Total Serum: 7.5 <L> (03-20 @ 22:22)  Calcium, Total Serum: 7.9 <L> (03-20 @ 00:46)  Calcium, Total Serum: 7.8 <L> (03-19 @ 18:48)  Albumin, Serum: 2.0 <L> (03-21 @ 12:26)  Albumin, Serum: 1.6 <L> (03-21 @ 04:58)  Albumin, Serum: 1.5 <L> (03-20 @ 22:22)  Albumin, Serum: 2.2 <L> (03-20 @ 00:46)  Albumin, Serum: 1.9 <L> (03-19 @ 18:48)    Alanine Aminotransferase (ALT/SGPT): 30 (03-21 @ 12:26)  Alanine Aminotransferase (ALT/SGPT): 26 (03-21 @ 04:58)  Alanine Aminotransferase (ALT/SGPT): 28 (03-20 @ 22:22)  Alanine Aminotransferase (ALT/SGPT): 30 (03-20 @ 00:46)  Alanine Aminotransferase (ALT/SGPT): 32 (03-19 @ 18:48)  Alkaline Phosphatase, Serum: 155 <H> (03-21 @ 12:26)  Alkaline Phosphatase, Serum: 146 <H> (03-21 @ 04:58)  Alkaline Phosphatase, Serum: 147 <H> (03-20 @ 22:22)  Alkaline Phosphatase, Serum: 146 <H> (03-20 @ 00:46)  Alkaline Phosphatase, Serum: 151 <H> (03-19 @ 18:48)  Aspartate Aminotransferase (AST/SGOT): 37 (03-21 @ 12:26)  Aspartate Aminotransferase (AST/SGOT): 39 (03-21 @ 04:58)  Aspartate Aminotransferase (AST/SGOT): 46 <H> (03-20 @ 22:22)  Aspartate Aminotransferase (AST/SGOT): 33 (03-20 @ 00:46)  Aspartate Aminotransferase (AST/SGOT): 35 (03-19 @ 18:48)        03-21    135  |  101  |  8   ----------------------------<  163<H>  3.7   |  24  |  0.49<L>    Ca    7.6<L>      21 Mar 2021 12:26  Phos  2.9     03-21  Mg     2.0     03-21    TPro  5.7<L>  /  Alb  2.0<L>  /  TBili  0.4  /  DBili  x   /  AST  37  /  ALT  30  /  AlkPhos  155<H>  03-21                        9.4    13.43 )-----------( 343      ( 21 Mar 2021 12:26 )             30.3     Medications  MEDICATIONS  (STANDING):  chlorhexidine 0.12% Liquid 15 milliLiter(s) Oral Mucosa every 12 hours  cholecalciferol 2000 Unit(s) Oral daily  cisatracurium Infusion 3 MICROgram(s)/kG/Min (12 mL/Hr) IV Continuous <Continuous>  dextrose 50% Injectable 25 Gram(s) IV Push once  fentaNYL   Infusion... 0.5 MICROgram(s)/kG/Hr (1.67 mL/Hr) IV Continuous <Continuous>  glucagon  Injectable 1 milliGRAM(s) IntraMuscular once  glucagon  Injectable 1 milliGRAM(s) IntraMuscular once  heparin  Infusion 1000 Unit(s)/Hr (10 mL/Hr) IV Continuous <Continuous>  influenza   Vaccine 0.5 milliLiter(s) IntraMuscular once  insulin lispro (ADMELOG) corrective regimen sliding scale   SubCutaneous every 6 hours  ketamine Infusion. 0.25 mG/kG/Hr (1.67 mL/Hr) IV Continuous <Continuous>  multivitamin/minerals/iron Oral Solution (CENTRUM) 15 milliLiter(s) Oral daily  norepinephrine Infusion 0.01 MICROgram(s)/kG/Min (1.25 mL/Hr) IV Continuous <Continuous>  pantoprazole  Injectable 40 milliGRAM(s) IV Push two times a day  propofol Infusion 15 MICROgram(s)/kG/Min (6 mL/Hr) IV Continuous <Continuous>  senna Syrup 10 milliLiter(s) Oral at bedtime      Physical Exam  General: Patient comfortable in bed  Vital Signs Last 12 Hrs  T(F): 98.8 (03-21-21 @ 11:00), Max: 98.8 (03-21-21 @ 11:00)  HR: 76 (03-21-21 @ 13:45) (69 - 80)  BP: --  BP(mean): --  RR: 32 (03-21-21 @ 13:45) (32 - 32)  SpO2: 99% (03-21-21 @ 13:45) (95% - 100%)  Neck: No palpable thyroid nodules.  CVS: S1S2, No murmurs  Respiratory: No wheezing, no crepitations  GI: Abdomen soft, bowel sounds positive  Musculoskeletal:  edema lower extremities.     Diagnostics

## 2021-03-22 NOTE — PROGRESS NOTE ADULT - ATTENDING COMMENTS
Rj Rouse  Attending Physician   Division of Infectious Disease  Pager #[phone]  Available on Microsoft Teams also  After 5pm/weekend or no response, call #[phone]

## 2021-03-22 NOTE — PROGRESS NOTE ADULT - ASSESSMENT
Assessment  DMT2: 60y Male with newly diagnosed DM T2 with hyperglycemia admitted with COVID19, A1C is 12.4%, started on low-dose NPH insulin overnight, blood sugars fluctuating/running slightly high, no new hypoglycemic episodes, remains intubated, tube feeds increased.  COVID19: On medications, monitored, stable.  Overweight: No strict exercise routines, neither on low calorie diet.      Anderson Mcmanus MD  Cell: [phone]  Office: [phone]       Assessment  DMT2: 60y Male with newly diagnosed DM T2 with hyperglycemia admitted with COVID19, A1C is 12.4%, started on low-dose  NPH insulin overnight, blood sugars fluctuating/running slightly high, no new hypoglycemic episodes, remains intubated, tube feeds increased.  COVID19: On medications, monitored, stable.  Overweight: No strict exercise routines, neither on low calorie diet.      Anderson Mcmanus MD  Cell: [phone]  Office: [phone]

## 2021-03-22 NOTE — PROGRESS NOTE ADULT - SUBJECTIVE AND OBJECTIVE BOX
Chief complaint  Patient is a 60y old  Male who presents with a chief complaint of hypoxia 2/2 COVID (22 Mar 2021 12:47)   Review of systems  Patient remains intubated, no hypoglycemic episodes.    Labs and Fingersticks  CAPILLARY BLOOD GLUCOSE      POCT Blood Glucose.: 195 mg/dL (22 Mar 2021 11:08)  POCT Blood Glucose.: 220 mg/dL (22 Mar 2021 05:16)  POCT Blood Glucose.: 186 mg/dL (21 Mar 2021 23:01)      Medications  MEDICATIONS  (STANDING):  chlorhexidine 0.12% Liquid 15 milliLiter(s) Oral Mucosa every 12 hours  cholecalciferol 2000 Unit(s) Oral daily  cisatracurium Infusion 3 MICROgram(s)/kG/Min (12 mL/Hr) IV Continuous <Continuous>  dextrose 50% Injectable 25 Gram(s) IV Push once  fentaNYL   Infusion... 0.5 MICROgram(s)/kG/Hr (1.67 mL/Hr) IV Continuous <Continuous>  glucagon  Injectable 1 milliGRAM(s) IntraMuscular once  glucagon  Injectable 1 milliGRAM(s) IntraMuscular once  heparin  Infusion 1000 Unit(s)/Hr (10 mL/Hr) IV Continuous <Continuous>  influenza   Vaccine 0.5 milliLiter(s) IntraMuscular once  insulin lispro (ADMELOG) corrective regimen sliding scale   SubCutaneous every 6 hours  insulin NPH human recombinant 4 Unit(s) SubCutaneous every 6 hours  ketamine Infusion. 0.25 mG/kG/Hr (1.67 mL/Hr) IV Continuous <Continuous>  multivitamin/minerals/iron Oral Solution (CENTRUM) 15 milliLiter(s) Oral daily  norepinephrine Infusion 0.01 MICROgram(s)/kG/Min (1.25 mL/Hr) IV Continuous <Continuous>  pantoprazole  Injectable 40 milliGRAM(s) IV Push two times a day  propofol Infusion 15 MICROgram(s)/kG/Min (6 mL/Hr) IV Continuous <Continuous>  senna Syrup 10 milliLiter(s) Oral at bedtime      Physical Exam  Culture - Sputum (collected 03-22-21 @ 00:34)  Source: .Sputum Sputum  Gram Stain (03-22-21 @ 02:27):    Rare polymorphonuclear leukocytes per low power field    Rare Squamous epithelial cells per low power field    No organisms seen per oil power field      General: Patient comfortable in bed  Vital Signs Last 12 Hrs  T(F): 97.2 (03-22-21 @ 12:00), Max: 98.1 (03-22-21 @ 03:00)  HR: 94 (03-22-21 @ 12:45) (71 - 94)  BP: --  BP(mean): --  RR: 28 (03-22-21 @ 12:45) (28 - 32)  SpO2: 99% (03-22-21 @ 12:45) (95% - 100%)       Chief complaint  Patient is a 60y old  Male who presents with a chief complaint of hypoxia 2/2 COVID (22 Mar 2021 12:47)   Review of systems  Patient remains intubated, no hypoglycemic episodes.    Labs and Fingersticks  CAPILLARY BLOOD GLUCOSE      POCT Blood Glucose.: 195 mg/dL (22 Mar 2021 11:08)  POCT Blood Glucose.: 220 mg/dL (22 Mar 2021 05:16)  POCT Blood Glucose.: 186 mg/dL (21 Mar 2021 23:01)      Medications  MEDICATIONS  (STANDING):  chlorhexidine 0.12% Liquid 15 milliLiter(s) Oral Mucosa every 12 hours  cholecalciferol 2000 Unit(s) Oral daily  cisatracurium Infusion 3 MICROgram(s)/kG/Min (12 mL/Hr) IV Continuous <Continuous>  dextrose 50% Injectable 25 Gram(s) IV Push once  fentaNYL   Infusion... 0.5 MICROgram(s)/kG/Hr (1.67 mL/Hr) IV Continuous <Continuous>  glucagon  Injectable 1 milliGRAM(s) IntraMuscular once  glucagon  Injectable 1 milliGRAM(s) IntraMuscular once  heparin  Infusion 1000 Unit(s)/Hr (10 mL/Hr) IV Continuous <Continuous>  influenza   Vaccine 0.5 milliLiter(s) IntraMuscular once  insulin lispro (ADMELOG) corrective regimen sliding scale   SubCutaneous every 6 hours  insulin NPH human recombinant 4 Unit(s) SubCutaneous every 6 hours  ketamine Infusion. 0.25 mG/kG/Hr (1.67 mL/Hr) IV Continuous <Continuous>  multivitamin/minerals/iron Oral Solution (CENTRUM) 15 milliLiter(s) Oral daily  norepinephrine Infusion 0.01 MICROgram(s)/kG/Min (1.25 mL/Hr) IV Continuous <Continuous>  pantoprazole  Injectable 40 milliGRAM(s) IV Push two times a day  propofol Infusion 15 MICROgram(s)/kG/Min (6 mL/Hr) IV Continuous <Continuous>  senna Syrup 10 milliLiter(s) Oral at bedtime      Physical Exam  Culture - Sputum (collected 03-22-21 @ 00:34)  Source: .Sputum Sputum  Gram Stain (03-22-21 @ 02:27):    Rare polymorphonuclear leukocytes per low power field    Rare Squamous epithelial cells per low power field    No organisms seen per oil power field      General: Patient comfortable in bed  Vital Signs Last 12 Hrs  T(F): 97.2 (03-22-21 @ 12:00), Max: 98.1 (03-22-21 @ 03:00)  HR: 94 (03-22-21 @ 12:45) (71 - 94)  BP: --  BP(mean): --  RR: 28 (03-22-21 @ 12:45) (28 - 32)  SpO2: 99% (03-22-21 @ 12:45) (95% - 100%)

## 2021-03-22 NOTE — PROGRESS NOTE ADULT - ATTENDING COMMENTS
Patient seen and examined. Chart reviewed.    60 year old man with no known medical history now in the ICU with respiratory failure and ARDS secondary to viral pneumonia from COVID course complicated by PE recently completed a course of ABx febrile overnight repeat cultures sent    - sedated and paralyzed for vent coordination  - tolerating diet  - dose lasix today   - continue to monitor off ABx, follow up cultures  - continue AC for PE  - low dose vasopressors for BP support     prognosis guarded

## 2021-03-22 NOTE — PROGRESS NOTE ADULT - PROBLEM SELECTOR PLAN 2
+COVID PCR  -CXR b/l LL opacities   -S/p Remdesivir x 5 days   -S/p Decadron 6mg qd x 10 days   -Not a candidate for Tocilizumab at this point   -Trend inflammatory markers  -S/p course abx as per ID for possible superimposed bacterial PNA.   -BC NGTD   -MRSA nasal swab negative

## 2021-03-22 NOTE — PROGRESS NOTE ADULT - PROBLEM SELECTOR PLAN 1
Suggest to continue current insulin regimen for now; If blood sugars remain consistently elevated, suggest to increase NPH to 6-7u q6.  Will continue monitoring FS and FU.

## 2021-03-22 NOTE — PROGRESS NOTE ADULT - ASSESSMENT
Mr Olivares is a 60 year old man who presented with hypoxia after being diagnosed with COVID outpatient. He has completed a course of remdesevir, but is still febrile and hypoxic.   He does not have a clear superimposed bacterial infection, though bacterial pneumonia is possible.   CT chest on admission with PE and ground glass opacities.   Febrile and requiring high flow nasal cannula.     COVID-19 pneumonia, leukocytosis, resp failure  - Remdesevir completed  - s/p Zosyn  - Check sputum culture  - Not a candidate for tocilizumab in setting of possible infection   - Follow up cultures - bcx negative  - Monitor for fevers  - Trend WBCs  - vent per MICU   - consider CT C/A/P if fevers persist     Prognosis guarded

## 2021-03-22 NOTE — PROGRESS NOTE ADULT - ASSESSMENT
59 y/o M with no significant PMH. Presents with c/o fevers, weakness, fatigue, SOB for 5 days. Tested positive for COVID 3 days prior to admission at an urgent care. Day of admission presented again to urgent care and noted to be hypoxic and sent to the ER and placed on HFNC. CXR with b/l LL opacities. Course c/b RLL medial segmental pulmonary embolism and worsening hypoxic respiratory failure - s/p intubation 3/17.

## 2021-03-22 NOTE — PROGRESS NOTE ADULT - SUBJECTIVE AND OBJECTIVE BOX
Follow-up Pulm Progress Note    intubated/sedated     Medications:  MEDICATIONS  (STANDING):  chlorhexidine 0.12% Liquid 15 milliLiter(s) Oral Mucosa every 12 hours  cholecalciferol 2000 Unit(s) Oral daily  cisatracurium Infusion 3 MICROgram(s)/kG/Min (12 mL/Hr) IV Continuous <Continuous>  dextrose 50% Injectable 25 Gram(s) IV Push once  fentaNYL   Infusion... 0.5 MICROgram(s)/kG/Hr (1.67 mL/Hr) IV Continuous <Continuous>  glucagon  Injectable 1 milliGRAM(s) IntraMuscular once  glucagon  Injectable 1 milliGRAM(s) IntraMuscular once  heparin  Infusion 1000 Unit(s)/Hr (10 mL/Hr) IV Continuous <Continuous>  influenza   Vaccine 0.5 milliLiter(s) IntraMuscular once  insulin lispro (ADMELOG) corrective regimen sliding scale   SubCutaneous every 6 hours  insulin NPH human recombinant 4 Unit(s) SubCutaneous every 6 hours  ketamine Infusion. 0.25 mG/kG/Hr (1.67 mL/Hr) IV Continuous <Continuous>  multivitamin/minerals/iron Oral Solution (CENTRUM) 15 milliLiter(s) Oral daily  norepinephrine Infusion 0.01 MICROgram(s)/kG/Min (1.25 mL/Hr) IV Continuous <Continuous>  pantoprazole  Injectable 40 milliGRAM(s) IV Push two times a day  propofol Infusion 15 MICROgram(s)/kG/Min (6 mL/Hr) IV Continuous <Continuous>  senna Syrup 10 milliLiter(s) Oral at bedtime    MEDICATIONS  (PRN):  acetaminophen   Tablet .. 650 milliGRAM(s) Oral every 4 hours PRN Mild Pain (1 - 3)  ALBUTerol    90 MICROgram(s) HFA Inhaler 2 Puff(s) Inhalation every 6 hours PRN Shortness of Breath and/or Wheezing  polyethylene glycol 3350 17 Gram(s) Oral daily PRN Constipation      Mode: AC/ CMV (Assist Control/ Continuous Mandatory Ventilation)  RR (machine): 28  TV (machine): 320  FiO2: 50  PEEP: 8  ITime: 1  MAP: 17  PIP: 40      Vital Signs Last 24 Hrs  T(C): 36.2 (22 Mar 2021 12:00), Max: 38.5 (21 Mar 2021 20:00)  T(F): 97.2 (22 Mar 2021 12:00), Max: 101.3 (21 Mar 2021 20:00)  HR: 91 (22 Mar 2021 12:00) (71 - 116)  BP: --  BP(mean): --  RR: 28 (22 Mar 2021 12:00) (18 - 32)  SpO2: 100% (22 Mar 2021 12:00) (95% - 100%)    ABG - ( 22 Mar 2021 10:32 )  pH, Arterial: 7.42  pH, Blood: x     /  pCO2: 42    /  pO2: 71    / HCO3: 26    / Base Excess: 2.4   /  SaO2: 94                     @ 07:01  -  03- @ 07:00  --------------------------------------------------------  IN: 2423.2 mL / OUT: 2400 mL / NET: 23.2 mL          LABS:                        9.2    11.50 )-----------( 350      ( 22 Mar 2021 00:25 )             29.7     03-22    134<L>  |  98  |  8   ----------------------------<  204<H>  4.6   |  22  |  0.51    Ca    8.3<L>      22 Mar 2021 00:25  Phos  2.9     03-21  Mg     2.0     03-21    TPro  5.8<L>  /  Alb  2.1<L>  /  TBili  0.4  /  DBili  x   /  AST  36  /  ALT  28  /  AlkPhos  155<H>  03-22          CAPILLARY BLOOD GLUCOSE      POCT Blood Glucose.: 195 mg/dL (22 Mar 2021 11:08)    PT/INR - ( 21 Mar 2021 21:15 )   PT: 16.9 sec;   INR: 1.43 ratio         PTT - ( 22 Mar 2021 06:10 )  PTT:80.6 sec  Urinalysis Basic - ( 21 Mar 2021 20:37 )    Color: Yellow / Appearance: Clear / S.033 / pH: x  Gluc: x / Ketone: Small  / Bili: Negative / Urobili: 4 mg/dL   Blood: x / Protein: 30 mg/dL / Nitrite: Negative   Leuk Esterase: Small / RBC: 7 /hpf / WBC 18 /HPF   Sq Epi: x / Non Sq Epi: 2 /hpf / Bacteria: Negative      Procalcitonin, Serum: 0.30 ng/mL (21 @ 00:25)  Procalcitonin, Serum: 0.34 ng/mL (21 @ 04:58)  Procalcitonin, Serum: 0.42 ng/mL (21 @ 00:46)                  CULTURES:     Culture - Blood (collected 21 @ 10:03)  Source: .Blood Blood-Peripheral  Final Report (21 @ 11:00):    No Growth Final    Culture - Blood (collected 21 @ 10:03)  Source: .Blood Blood-Venous  Final Report (21 @ 11:00):    No Growth Final    Culture - Blood (collected 21 @ 21:11)  Source: .Blood Blood  Final Report (21 @ 23:01):    No Growth Final        Physical Examination:  GEN: NAD  NEURO: BLOSSOM, intubated/sedated   PULM: Coarse bilaterally   CVS: S1, S2 heard  EXT: no edema     RADIOLOGY REVIEWED  CXR 3/17: b/l opacities R>L

## 2021-03-22 NOTE — PROGRESS NOTE ADULT - PROBLEM SELECTOR PLAN 1
2nd to COVID PNA - s/p intubation 3/17 for worsening hypoxic respiratory failure   -Keep pH >7.20, sats >90%  -Wean O2 as tolerated

## 2021-03-22 NOTE — PROGRESS NOTE ADULT - PROBLEM SELECTOR PLAN 3
RLL medial segmental pulmonary embolism  -Heparin gtt per MICU   -LE duplex neg DVT   -Troponin and pro-BNP normal  -TTE with no RV strain

## 2021-03-22 NOTE — PROGRESS NOTE ADULT - SUBJECTIVE AND OBJECTIVE BOX
TAMARA CHEW 60y [id number]    Patient is a 60y old  Male who presents with a chief complaint of hypoxia 2/2 COVID (22 Mar 2021 12:03)      Follow Up/CC:  ID following for COVID    Interval History/ROS: fever over weekend, intubated     Allergies    No Known Allergies    Intolerances        ANTIMICROBIALS:      MEDICATIONS  (STANDING):  chlorhexidine 0.12% Liquid 15 milliLiter(s) Oral Mucosa every 12 hours  cholecalciferol 2000 Unit(s) Oral daily  cisatracurium Infusion 3 MICROgram(s)/kG/Min (12 mL/Hr) IV Continuous <Continuous>  dextrose 50% Injectable 25 Gram(s) IV Push once  fentaNYL   Infusion... 0.5 MICROgram(s)/kG/Hr (1.67 mL/Hr) IV Continuous <Continuous>  glucagon  Injectable 1 milliGRAM(s) IntraMuscular once  glucagon  Injectable 1 milliGRAM(s) IntraMuscular once  heparin  Infusion 1000 Unit(s)/Hr (10 mL/Hr) IV Continuous <Continuous>  influenza   Vaccine 0.5 milliLiter(s) IntraMuscular once  insulin lispro (ADMELOG) corrective regimen sliding scale   SubCutaneous every 6 hours  insulin NPH human recombinant 4 Unit(s) SubCutaneous every 6 hours  ketamine Infusion. 0.25 mG/kG/Hr (1.67 mL/Hr) IV Continuous <Continuous>  multivitamin/minerals/iron Oral Solution (CENTRUM) 15 milliLiter(s) Oral daily  norepinephrine Infusion 0.01 MICROgram(s)/kG/Min (1.25 mL/Hr) IV Continuous <Continuous>  pantoprazole  Injectable 40 milliGRAM(s) IV Push two times a day  propofol Infusion 15 MICROgram(s)/kG/Min (6 mL/Hr) IV Continuous <Continuous>  senna Syrup 10 milliLiter(s) Oral at bedtime    MEDICATIONS  (PRN):  acetaminophen   Tablet .. 650 milliGRAM(s) Oral every 4 hours PRN Mild Pain (1 - 3)  ALBUTerol    90 MICROgram(s) HFA Inhaler 2 Puff(s) Inhalation every 6 hours PRN Shortness of Breath and/or Wheezing  polyethylene glycol 3350 17 Gram(s) Oral daily PRN Constipation    Mode: AC/ CMV (Assist Control/ Continuous Mandatory Ventilation)  RR (machine): 28  TV (machine): 320  FiO2: 50  PEEP: 8  ITime: 1  MAP: 17  PIP: 40      Vital Signs Last 24 Hrs  T(C): 36.2 (22 Mar 2021 12:00), Max: 38.5 (21 Mar 2021 20:00)  T(F): 97.2 (22 Mar 2021 12:00), Max: 101.3 (21 Mar 2021 20:00)  HR: 94 (22 Mar 2021 12:45) (71 - 116)  BP: --  BP(mean): --  RR: 28 (22 Mar 2021 12:45) (18 - 32)  SpO2: 99% (22 Mar 2021 12:45) (95% - 100%)    CBC Full  -  ( 22 Mar 2021 00:25 )  WBC Count : 11.50 K/uL  RBC Count : 4.44 M/uL  Hemoglobin : 9.2 g/dL  Hematocrit : 29.7 %  Platelet Count - Automated : 350 K/uL  Mean Cell Volume : 66.9 fl  Mean Cell Hemoglobin : 20.7 pg  Mean Cell Hemoglobin Concentration : 31.0 gm/dL  Auto Neutrophil # : 9.64 K/uL  Auto Lymphocyte # : 0.91 K/uL  Auto Monocyte # : 0.43 K/uL  Auto Eosinophil # : 0.29 K/uL  Auto Basophil # : 0.02 K/uL  Auto Neutrophil % : 83.9 %  Auto Lymphocyte % : 7.9 %  Auto Monocyte % : 3.7 %  Auto Eosinophil % : 2.5 %  Auto Basophil % : 0.2 %        134<L>  |  98  |  8   ----------------------------<  204<H>  4.6   |  22  |  0.51    Ca    8.3<L>      22 Mar 2021 00:25  Phos  2.9       Mg     2.0         TPro  5.8<L>  /  Alb  2.1<L>  /  TBili  0.4  /  DBili  x   /  AST  36  /  ALT  28  /  AlkPhos  155<H>  22    LIVER FUNCTIONS - ( 22 Mar 2021 00:25 )  Alb: 2.1 g/dL / Pro: 5.8 g/dL / ALK PHOS: 155 U/L / ALT: 28 U/L / AST: 36 U/L / GGT: x           Urinalysis Basic - ( 21 Mar 2021 20:37 )    Color: Yellow / Appearance: Clear / S.033 / pH: x  Gluc: x / Ketone: Small  / Bili: Negative / Urobili: 4 mg/dL   Blood: x / Protein: 30 mg/dL / Nitrite: Negative   Leuk Esterase: Small / RBC: 7 /hpf / WBC 18 /HPF   Sq Epi: x / Non Sq Epi: 2 /hpf / Bacteria: Negative    Procalcitonin, Serum: 0.30 ()  Procalcitonin, Serum: 0.34 ()  Procalcitonin, Serum: 0.42 ()    C-Reactive Protein, Serum: 23.84 ()  C-Reactive Protein, Serum: 6.96 ()  C-Reactive Protein, Serum: 9.01 ()    Ferritin, Serum: 689 ()  Ferritin, Serum: 593 ()  Ferritin, Serum: 791 ()      D-Dimer Assay, Quantitative: 934 ()  D-Dimer Assay, Quantitative: 695 ()  D-Dimer Assay, Quantitative: 621 ()  D-Dimer Assay, Quantitative: 848 ()  D-Dimer Assay, Quantitative: 1128 ()        MICROBIOLOGY:  .Sputum Sputum  21 --  --    Rare polymorphonuclear leukocytes per low power field  Rare Squamous epithelial cells per low power field  No organisms seen per oil power field      .Blood Blood-Venous  21   No Growth Final  --  --      .Blood Blood  21   No Growth Final  --  --      .Blood Blood-Peripheral  21   No Growth Final  --  --      RADIOLOGY    < from: Xray Abdomen 1 View PORTABLE -Routine (Xray Abdomen 1 View PORTABLE -Routine in AM.) (21 @ 08:49) >  Resolution of previously noted gastric gaseous distention.    < end of copied text >

## 2021-03-22 NOTE — PROGRESS NOTE ADULT - ASSESSMENT
A/P: 59 yo male with no known pmh presents with hypoxic respiratory failure due to covid pneumonia now c/b Rt lung segmental PE. On 3/13  RRT was called for Hypoxia with associated fever of 102 requiring intubation. The patient was initiated on Remdisivir and dexamethasone tx 3/8 and has now been completed.          Neuro:  - Paralyzed on Nimbex, sedated on: Fentanyl , Propofol 50, Ketamine 3  - Wean off nimbex today then consider weaning off propofol    CV:  #Hypotension likely in setting of vasodilatory shock   - Remains on low dose Levo, attempt to wean, maintain MAP > 65    Resp:  #Hypoxic Respiratory Failure in setting of ARDS, Covid/PNA/ PE  - Intubated /32/8/50 p/f: 142 peak: 44 plateau: 36  - abg stable will reduce tv 300 and RR to 28  - Completed RDV, and Decadron course   - Vent management per ABG, pulm toileting   #Rt lung segmental PE treated with heparin gtt     GI:  #s/p abd distention now resolved  - TF restarted--monitor tolerance  - c/w reglan   - c/w bowel regimen last BM 3/20  - GI ppx: PPI  -Ck TG level in am    Renal:  #hyponatremia--stable and improved  - Na 134, trend BMP,   - Scr stable, strict I/Os,   - Lasix 20 x1 this morning.    Endocrine:  #Uncontrolled DM, A1c 12.4  - Monitor BS, maintained ISS moderate coverage     Heme:  #Hypercoagulable state in setting of Covid  - D-dimer uptrending, c/w trending q72h  - DVT ppx: Transitioned to Heparin gtt for RLL PE,   #PE: RLL seen on CT  - Hemodynamically stable, on heparin drip  - LE Duplex negative    ID:  # Mild Leukocytosis, stable  - - pt remains afebrile, lactate normal  - Zosyn for empiric completed yesterday 3/20  #[id number]  - Completed RDV/Decadron course   - Trending inflammatory markers q72h    Lines:  RIJ 3/17  Right radial 3/17

## 2021-03-22 NOTE — PROGRESS NOTE ADULT - SUBJECTIVE AND OBJECTIVE BOX
CHIEF COMPLAINT:  Patient is a 60y old  Male who presents with a chief complaint of hypoxia 2/2 COVID (21 Mar 2021 14:18)    HPI:  61 yo male w/no known pmh (does not f/u with PCP) presents to [facility] for cc fevers, weakness, fatigue, shortness of breath for 5 days. Tested positive for covid 3 days ago at an urgent care. Today presented again to urgent care and noted to be hypoxic and sent to the ER. Patient denies dysuria, diarrhea, constipation. Currently denies shortness of breath on high flow.     In the ER, patient was febrile to 101.5F, tachycardic to 120s, tachypneic to 40, hypoxic to 88% on 6L. Improved on high flow nasal cannula to 95%.  (08 Mar 2021 14:08)      Interval Events:      REVIEW OF SYSTEMS:          OBJECTIVE:  ICU Vital Signs Last 24 Hrs  T(C): 35.9 (22 Mar 2021 08:00), Max: 38.5 (21 Mar 2021 20:00)  T(F): 96.6 (22 Mar 2021 08:00), Max: 101.3 (21 Mar 2021 20:00)  HR: 74 (22 Mar 2021 08:30) (74 - 116)  BP: --  BP(mean): --  ABP: 111/57 (22 Mar 2021 08:30) (88/50 - 127/65)  ABP(mean): 75 (22 Mar 2021 08:30) (64 - 87)  RR: 32 (22 Mar 2021 08:30) (18 - 32)  SpO2: 100% (22 Mar 2021 08:30) (95% - 100%)    Mode: AC/ CMV (Assist Control/ Continuous Mandatory Ventilation), RR (machine): 32, TV (machine): 320, FiO2: 50, PEEP: 8, ITime: 0.58, MAP: 16, PIP: 39     @ 07:01  -   @ 07:00  --------------------------------------------------------  IN: 2423.2 mL / OUT: 2400 mL / NET: 23.2 mL     @ 07:  -   @ 08:45  --------------------------------------------------------  IN: 115.3 mL / OUT: 70 mL / NET: 45.3 mL      CAPILLARY BLOOD GLUCOSE      POCT Blood Glucose.: 220 mg/dL (22 Mar 2021 05:16)          PHYSICAL EXAM:          HOSPITAL MEDICATIONS:  MEDICATIONS  (STANDING):  chlorhexidine 0.12% Liquid 15 milliLiter(s) Oral Mucosa every 12 hours  cholecalciferol 2000 Unit(s) Oral daily  cisatracurium Infusion 3 MICROgram(s)/kG/Min (12 mL/Hr) IV Continuous <Continuous>  dextrose 50% Injectable 25 Gram(s) IV Push once  fentaNYL   Infusion... 0.5 MICROgram(s)/kG/Hr (1.67 mL/Hr) IV Continuous <Continuous>  glucagon  Injectable 1 milliGRAM(s) IntraMuscular once  glucagon  Injectable 1 milliGRAM(s) IntraMuscular once  heparin  Infusion 1000 Unit(s)/Hr (10 mL/Hr) IV Continuous <Continuous>  influenza   Vaccine 0.5 milliLiter(s) IntraMuscular once  insulin lispro (ADMELOG) corrective regimen sliding scale   SubCutaneous every 6 hours  insulin NPH human recombinant 4 Unit(s) SubCutaneous every 6 hours  ketamine Infusion. 0.25 mG/kG/Hr (1.67 mL/Hr) IV Continuous <Continuous>  multivitamin/minerals/iron Oral Solution (CENTRUM) 15 milliLiter(s) Oral daily  norepinephrine Infusion 0.01 MICROgram(s)/kG/Min (1.25 mL/Hr) IV Continuous <Continuous>  pantoprazole  Injectable 40 milliGRAM(s) IV Push two times a day  propofol Infusion 15 MICROgram(s)/kG/Min (6 mL/Hr) IV Continuous <Continuous>  senna Syrup 10 milliLiter(s) Oral at bedtime    MEDICATIONS  (PRN):  acetaminophen   Tablet .. 650 milliGRAM(s) Oral every 4 hours PRN Mild Pain (1 - 3)  ALBUTerol    90 MICROgram(s) HFA Inhaler 2 Puff(s) Inhalation every 6 hours PRN Shortness of Breath and/or Wheezing  polyethylene glycol 3350 17 Gram(s) Oral daily PRN Constipation      LABS:                        9.2    11.50 )-----------( 350      ( 22 Mar 2021 00:25 )             29.7     Hgb Trend: 9.2<--, 9.1<--, 9.4<--, 8.9<--, 9.0<--  22    134<L>  |  98  |  8   ----------------------------<  204<H>  4.6   |  22  |  0.51    Ca    8.3<L>      22 Mar 2021 00:25  Phos  2.9       Mg     2.0         TPro  5.8<L>  /  Alb  2.1<L>  /  TBili  0.4  /  DBili  x   /  AST  36  /  ALT  28  /  AlkPhos  155<H>      LIVER FUNCTIONS - ( 22 Mar 2021 00:25 )  Alb: 2.1 g/dL / Pro: 5.8 g/dL / ALK PHOS: 155 U/L / ALT: 28 U/L / AST: 36 U/L / GGT: x           Creatinine Trend: 0.51<--, 0.49<--, 0.45<--, 0.40<--, 0.43<--, 0.43<--  PT/INR - ( 21 Mar 2021 21:15 )   PT: 16.9 sec;   INR: 1.43 ratio         PTT - ( 22 Mar 2021 06:10 )  PTT:80.6 sec  Urinalysis Basic - ( 21 Mar 2021 20:37 )    Color: Yellow / Appearance: Clear / S.033 / pH: x  Gluc: x / Ketone: Small  / Bili: Negative / Urobili: 4 mg/dL   Blood: x / Protein: 30 mg/dL / Nitrite: Negative   Leuk Esterase: Small / RBC: 7 /hpf / WBC 18 /HPF   Sq Epi: x / Non Sq Epi: 2 /hpf / Bacteria: Negative      Arterial Blood Gas:   @ 00:17  7.39/44/71/26/92/1.2  ABG lactate: --  Arterial Blood Gas:   @ 16:04  7.43/42/72/27/93/2.9  ABG lactate: --  Arterial Blood Gas:   @ 12:30  7.43/40/72/26/94/2.4  ABG lactate: --  Arterial Blood Gas:   @ 04:55  7.42/40/177/26/99/1.9  ABG lactate: --  Arterial Blood Gas:   @ 18:53  7.39/43/113/26/98/1.3  ABG lactate: --  Arterial Blood Gas:   @ 15:48  7.42/42/69/26/93/2.2  ABG lactate: --  Arterial Blood Gas:   @ 14:48  7.42/41/51/26/83/1.9  ABG lactate: --  Arterial Blood Gas:   @ 11:51  7.46/37/82/26/96/2.3  ABG lactate: --        MICROBIOLOGY:     RADIOLOGY:  [ ] Reviewed and interpreted by me    EKG:       CHIEF COMPLAINT:  Patient is a 60y old  Male who presents with a chief complaint of hypoxia 2/2 COVID (21 Mar 2021 14:18)    HPI:  59 yo male w/no known pmh (does not f/u with PCP) presents to [facility] for cc fevers, weakness, fatigue, shortness of breath for 5 days. Tested positive for covid 3 days ago at an urgent care. Today presented again to urgent care and noted to be hypoxic and sent to the ER. Patient denies dysuria, diarrhea, constipation. Currently denies shortness of breath on high flow.     In the ER, patient was febrile to 101.5F, tachycardic to 120s, tachypneic to 40, hypoxic to 88% on 6L. Improved on high flow nasal cannula to 95%.  (08 Mar 2021 14:08)      Interval Events: Temp overnight 101pancultured. TF increased 30 placed on NPH      REVIEW OF SYSTEMS: Unable          OBJECTIVE:  ICU Vital Signs Last 24 Hrs  T(C): 35.9 (22 Mar 2021 08:00), Max: 38.5 (21 Mar 2021 20:00)  T(F): 96.6 (22 Mar 2021 08:00), Max: 101.3 (21 Mar 2021 20:00)  HR: 74 (22 Mar 2021 08:30) (74 - 116)  BP: --  BP(mean): --  ABP: 111/57 (22 Mar 2021 08:30) (88/50 - 127/65)  ABP(mean): 75 (22 Mar 2021 08:30) (64 - 87)  RR: 32 (22 Mar 2021 08:30) (18 - 32)  SpO2: 100% (22 Mar 2021 08:30) (95% - 100%)    Mode: AC/ CMV (Assist Control/ Continuous Mandatory Ventilation), RR (machine): 32, TV (machine): 320, FiO2: 50, PEEP: 8, ITime: 0.58, MAP: 16, PIP: 39    - @ 07:01  -  - @ 07:00  --------------------------------------------------------  IN: 2423.2 mL / OUT: 2400 mL / NET: 23.2 mL     @ 07:01  -   @ 08:45  --------------------------------------------------------  IN: 115.3 mL / OUT: 70 mL / NET: 45.3 mL      CAPILLARY BLOOD GLUCOSE      POCT Blood Glucose.: 220 mg/dL (22 Mar 2021 05:16)          PHYSICAL EXAM:          HOSPITAL MEDICATIONS:  MEDICATIONS  (STANDING):  chlorhexidine 0.12% Liquid 15 milliLiter(s) Oral Mucosa every 12 hours  cholecalciferol 2000 Unit(s) Oral daily  cisatracurium Infusion 3 MICROgram(s)/kG/Min (12 mL/Hr) IV Continuous <Continuous>  dextrose 50% Injectable 25 Gram(s) IV Push once  fentaNYL   Infusion... 0.5 MICROgram(s)/kG/Hr (1.67 mL/Hr) IV Continuous <Continuous>  glucagon  Injectable 1 milliGRAM(s) IntraMuscular once  glucagon  Injectable 1 milliGRAM(s) IntraMuscular once  heparin  Infusion 1000 Unit(s)/Hr (10 mL/Hr) IV Continuous <Continuous>  influenza   Vaccine 0.5 milliLiter(s) IntraMuscular once  insulin lispro (ADMELOG) corrective regimen sliding scale   SubCutaneous every 6 hours  insulin NPH human recombinant 4 Unit(s) SubCutaneous every 6 hours  ketamine Infusion. 0.25 mG/kG/Hr (1.67 mL/Hr) IV Continuous <Continuous>  multivitamin/minerals/iron Oral Solution (CENTRUM) 15 milliLiter(s) Oral daily  norepinephrine Infusion 0.01 MICROgram(s)/kG/Min (1.25 mL/Hr) IV Continuous <Continuous>  pantoprazole  Injectable 40 milliGRAM(s) IV Push two times a day  propofol Infusion 15 MICROgram(s)/kG/Min (6 mL/Hr) IV Continuous <Continuous>  senna Syrup 10 milliLiter(s) Oral at bedtime    MEDICATIONS  (PRN):  acetaminophen   Tablet .. 650 milliGRAM(s) Oral every 4 hours PRN Mild Pain (1 - 3)  ALBUTerol    90 MICROgram(s) HFA Inhaler 2 Puff(s) Inhalation every 6 hours PRN Shortness of Breath and/or Wheezing  polyethylene glycol 3350 17 Gram(s) Oral daily PRN Constipation      LABS:                        9.2    11.50 )-----------( 350      ( 22 Mar 2021 00:25 )             29.7     Hgb Trend: 9.2<--, 9.1<--, 9.4<--, 8.9<--, 9.0<--  0322    134<L>  |  98  |  8   ----------------------------<  204<H>  4.6   |  22  |  0.51    Ca    8.3<L>      22 Mar 2021 00:25  Phos  2.9       Mg     2.0         TPro  5.8<L>  /  Alb  2.1<L>  /  TBili  0.4  /  DBili  x   /  AST  36  /  ALT  28  /  AlkPhos  155<H>      LIVER FUNCTIONS - ( 22 Mar 2021 00:25 )  Alb: 2.1 g/dL / Pro: 5.8 g/dL / ALK PHOS: 155 U/L / ALT: 28 U/L / AST: 36 U/L / GGT: x           Creatinine Trend: 0.51<--, 0.49<--, 0.45<--, 0.40<--, 0.43<--, 0.43<--  PT/INR - ( 21 Mar 2021 21:15 )   PT: 16.9 sec;   INR: 1.43 ratio         PTT - ( 22 Mar 2021 06:10 )  PTT:80.6 sec  Urinalysis Basic - ( 21 Mar 2021 20:37 )    Color: Yellow / Appearance: Clear / S.033 / pH: x  Gluc: x / Ketone: Small  / Bili: Negative / Urobili: 4 mg/dL   Blood: x / Protein: 30 mg/dL / Nitrite: Negative   Leuk Esterase: Small / RBC: 7 /hpf / WBC 18 /HPF   Sq Epi: x / Non Sq Epi: 2 /hpf / Bacteria: Negative      Arterial Blood Gas:   @ 00:17  7.39/44/71/26/92/1.2  ABG lactate: --  Arterial Blood Gas:   @ 16:04  7.43/42/72/27/93/2.9  ABG lactate: --  Arterial Blood Gas:   @ 12:30  7.43/40/72/26/94/2.4  ABG lactate: --  Arterial Blood Gas:   @ 04:55  7.42/40/177/26/99/1.9  ABG lactate: --  Arterial Blood Gas:   @ 18:53  7.39/43/113/26/98/1.3  ABG lactate: --  Arterial Blood Gas:   @ 15:48  7.42/42/69//93/2.2  ABG lactate: --  Arterial Blood Gas:   @ 14:48  7.42/41/51/26/83/1.9  ABG lactate: --  Arterial Blood Gas:   @ 11:51  7.46/37/82/26/96/2.3  ABG lactate: --        MICROBIOLOGY:     RADIOLOGY:  [ ] Reviewed and interpreted by me    EKG:       CHIEF COMPLAINT:  Patient is a 60y old  Male who presents with a chief complaint of hypoxia 2/2 COVID (21 Mar 2021 14:18)    HPI:  59 yo male w/no known pmh (does not f/u with PCP) presents to [facility] for cc fevers, weakness, fatigue, shortness of breath for 5 days. Tested positive for covid 3 days ago at an urgent care. Today presented again to urgent care and noted to be hypoxic and sent to the ER. Patient denies dysuria, diarrhea, constipation. Currently denies shortness of breath on high flow.     In the ER, patient was febrile to 101.5F, tachycardic to 120s, tachypneic to 40, hypoxic to 88% on 6L. Improved on high flow nasal cannula to 95%.  (08 Mar 2021 14:08)      Interval Events: Temp overnight 101pancultured. TF increased 30 placed on NPH      REVIEW OF SYSTEMS: Unable          OBJECTIVE:  ICU Vital Signs Last 24 Hrs  T(C): 35.9 (22 Mar 2021 08:00), Max: 38.5 (21 Mar 2021 20:00)  T(F): 96.6 (22 Mar 2021 08:00), Max: 101.3 (21 Mar 2021 20:00)  HR: 74 (22 Mar 2021 08:30) (74 - 116)  BP: --  BP(mean): --  ABP: 111/57 (22 Mar 2021 08:30) (88/50 - 127/65)  ABP(mean): 75 (22 Mar 2021 08:30) (64 - 87)  RR: 32 (22 Mar 2021 08:30) (18 - 32)  SpO2: 100% (22 Mar 2021 08:30) (95% - 100%)    Mode: AC/ CMV (Assist Control/ Continuous Mandatory Ventilation), RR (machine): 32, TV (machine): 320, FiO2: 50, PEEP: 8, ITime: 0.58, MAP: 16, PIP: 39    - @ 07:01  -  - @ 07:00  --------------------------------------------------------  IN: 2423.2 mL / OUT: 2400 mL / NET: 23.2 mL     @ 07:01  -   @ 08:45  --------------------------------------------------------  IN: 115.3 mL / OUT: 70 mL / NET: 45.3 mL      CAPILLARY BLOOD GLUCOSE      POCT Blood Glucose.: 220 mg/dL (22 Mar 2021 05:16)          PHYSICAL EXAM:  GENERAL: NAD,  HEENT:  Atraumatic, Normocephalic  EYES: PERRLA, conjunctiva and sclera clear  NECK: Supple, No JVD  CHEST/LUNG: diminished bilaterally; No wheezes, rales, or rhonchi  HEART: Regular rate and rhythm; No murmurs, rubs, or gallops  ABDOMEN: Soft, Nontender, Nondistended; Bowel sounds present  EXTREMITIES:  2+ Peripheral Pulses, No clubbing, cyanosis, or edema  PSYCH: unable as pt is sedated  NEUROLOGY: sedated  SKIN: No rashes or lesions        HOSPITAL MEDICATIONS:  MEDICATIONS  (STANDING):  chlorhexidine 0.12% Liquid 15 milliLiter(s) Oral Mucosa every 12 hours  cholecalciferol 2000 Unit(s) Oral daily  cisatracurium Infusion 3 MICROgram(s)/kG/Min (12 mL/Hr) IV Continuous <Continuous>  dextrose 50% Injectable 25 Gram(s) IV Push once  fentaNYL   Infusion... 0.5 MICROgram(s)/kG/Hr (1.67 mL/Hr) IV Continuous <Continuous>  glucagon  Injectable 1 milliGRAM(s) IntraMuscular once  glucagon  Injectable 1 milliGRAM(s) IntraMuscular once  heparin  Infusion 1000 Unit(s)/Hr (10 mL/Hr) IV Continuous <Continuous>  influenza   Vaccine 0.5 milliLiter(s) IntraMuscular once  insulin lispro (ADMELOG) corrective regimen sliding scale   SubCutaneous every 6 hours  insulin NPH human recombinant 4 Unit(s) SubCutaneous every 6 hours  ketamine Infusion. 0.25 mG/kG/Hr (1.67 mL/Hr) IV Continuous <Continuous>  multivitamin/minerals/iron Oral Solution (CENTRUM) 15 milliLiter(s) Oral daily  norepinephrine Infusion 0.01 MICROgram(s)/kG/Min (1.25 mL/Hr) IV Continuous <Continuous>  pantoprazole  Injectable 40 milliGRAM(s) IV Push two times a day  propofol Infusion 15 MICROgram(s)/kG/Min (6 mL/Hr) IV Continuous <Continuous>  senna Syrup 10 milliLiter(s) Oral at bedtime    MEDICATIONS  (PRN):  acetaminophen   Tablet .. 650 milliGRAM(s) Oral every 4 hours PRN Mild Pain (1 - 3)  ALBUTerol    90 MICROgram(s) HFA Inhaler 2 Puff(s) Inhalation every 6 hours PRN Shortness of Breath and/or Wheezing  polyethylene glycol 3350 17 Gram(s) Oral daily PRN Constipation      LABS:                        9.2    11.50 )-----------( 350      ( 22 Mar 2021 00:25 )             29.7     Hgb Trend: 9.2<--, 9.1<--, 9.4<--, 8.9<--, 9.0<--  03-22    134<L>  |  98  |  8   ----------------------------<  204<H>  4.6   |  22  |  0.51    Ca    8.3<L>      22 Mar 2021 00:25  Phos  2.9     -  Mg     2.0     -    TPro  5.8<L>  /  Alb  2.1<L>  /  TBili  0.4  /  DBili  x   /  AST  36  /  ALT  28  /  AlkPhos  155<H>  -22    LIVER FUNCTIONS - ( 22 Mar 2021 00:25 )  Alb: 2.1 g/dL / Pro: 5.8 g/dL / ALK PHOS: 155 U/L / ALT: 28 U/L / AST: 36 U/L / GGT: x           Creatinine Trend: 0.51<--, 0.49<--, 0.45<--, 0.40<--, 0.43<--, 0.43<--  PT/INR - ( 21 Mar 2021 21:15 )   PT: 16.9 sec;   INR: 1.43 ratio         PTT - ( 22 Mar 2021 06:10 )  PTT:80.6 sec  Urinalysis Basic - ( 21 Mar 2021 20:37 )    Color: Yellow / Appearance: Clear / S.033 / pH: x  Gluc: x / Ketone: Small  / Bili: Negative / Urobili: 4 mg/dL   Blood: x / Protein: 30 mg/dL / Nitrite: Negative   Leuk Esterase: Small / RBC: 7 /hpf / WBC 18 /HPF   Sq Epi: x / Non Sq Epi: 2 /hpf / Bacteria: Negative      Arterial Blood Gas:   @ 00:17  7.39/44/71/26/92/1.2  ABG lactate: --  Arterial Blood Gas:   @ 16:04  7.43/42/72/27/93/2.9  ABG lactate: --  Arterial Blood Gas:   @ 12:30  7.43/40/72//94/2.4  ABG lactate: --  Arterial Blood Gas:   @ 04:55  7.42/40/177//99/1.9  ABG lactate: --  Arterial Blood Gas:   @ 18:53  7.39/43/113/26/98/1.3  ABG lactate: --  Arterial Blood Gas:   @ 15:48  7.42/42/69//93/2.2  ABG lactate: --  Arterial Blood Gas:   @ 14:48  7.42/41/51/26/83/1.9  ABG lactate: --  Arterial Blood Gas:   @ 11:51  7.46/37/82/26/96/2.3  ABG lactate: --        MICROBIOLOGY:     RADIOLOGY:  [ ] Reviewed and interpreted by me    EKG:

## 2021-03-23 NOTE — PROGRESS NOTE ADULT - ATTENDING COMMENTS
Agree with above.  Intubated for COVID-19 pneumonia on 60% / PEEP 8  On heparin gtt for R subsegmental PE  1.1L positive over past day, diurese with Lasix  On fent / ketamine / propofol  Paralyzed, supined  Minimal Levo requirements  Will remain in ICU while in moderate ARDS (p:F 120)

## 2021-03-23 NOTE — PROGRESS NOTE ADULT - ASSESSMENT
Mr Olivares is a 60 year old man who presented with hypoxia after being diagnosed with COVID outpatient. He has completed a course of remdesevir, but is still febrile and hypoxic.   He does not have a clear superimposed bacterial infection, though bacterial pneumonia is possible.   CT chest on admission with PE and ground glass opacities.   Febrile and requiring high flow nasal cannula.     COVID-19 pneumonia, leukocytosis, resp failure  - Remdesevir completed  - s/p Zosyn  - Check sputum culture  - Not a candidate for tocilizumab in setting of possible infection   - Follow up cultures - bcx negative  - Monitor for fevers  - Trend WBCs  - vent per MICU   - consider CT C/A/P given fevers     Prognosis guarded    Rj Rouse  Attending Physician   Division of Infectious Disease  Pager #[phone]  Available on Microsoft Teams also  After 5pm/weekend or no response, call #[phone]

## 2021-03-23 NOTE — PROGRESS NOTE ADULT - ASSESSMENT
Assessment  DMT2: 60y Male with newly diagnosed DM T2 with hyperglycemia admitted with COVID19, A1C is 12.4%, on NPH insulin and coverage, increased NPH dose, blood sugars are still running high and not at target, no new hypoglycemic episodes, remains intubated, on tube feeds, trying to wean as able.  COVID19: On medications, monitored, stable.  Overweight: No strict exercise routines, neither on low calorie diet.      Anderson Mcmanus MD  Cell: [phone]  Office: [phone]       Assessment  DMT2: 60y Male with newly diagnosed DM T2 with hyperglycemia admitted with COVID19, A1C is 12.4%,  on NPH insulin and coverage, increased NPH dose, blood sugars are still running high and not at target, no new hypoglycemic episodes, remains intubated, on tube feeds, trying to wean as able.  COVID19: On medications, monitored, stable.  Overweight: No strict exercise routines, neither on low calorie diet.      Anderson Mcmanus MD  Cell: [phone]  Office: [phone]

## 2021-03-23 NOTE — PROGRESS NOTE ADULT - SUBJECTIVE AND OBJECTIVE BOX
Chief complaint  Patient is a 60y old  Male who presents with a chief complaint of hypoxia 2/2 COVID (23 Mar 2021 11:58)   Review of systems  Patient intubated, no hypoglycemic episodes.    Labs and Fingersticks  CAPILLARY BLOOD GLUCOSE      POCT Blood Glucose.: 293 mg/dL (23 Mar 2021 05:04)  POCT Blood Glucose.: 236 mg/dL (22 Mar 2021 23:09)  POCT Blood Glucose.: 253 mg/dL (22 Mar 2021 17:35)      Medications  MEDICATIONS  (STANDING):  chlorhexidine 0.12% Liquid 15 milliLiter(s) Oral Mucosa every 12 hours  cholecalciferol 2000 Unit(s) Oral daily  cisatracurium Infusion 3 MICROgram(s)/kG/Min (12 mL/Hr) IV Continuous <Continuous>  dextrose 50% Injectable 25 Gram(s) IV Push once  fentaNYL   Infusion... 0.5 MICROgram(s)/kG/Hr (1.67 mL/Hr) IV Continuous <Continuous>  glucagon  Injectable 1 milliGRAM(s) IntraMuscular once  glucagon  Injectable 1 milliGRAM(s) IntraMuscular once  heparin  Infusion 1000 Unit(s)/Hr (10 mL/Hr) IV Continuous <Continuous>  influenza   Vaccine 0.5 milliLiter(s) IntraMuscular once  insulin lispro (ADMELOG) corrective regimen sliding scale   SubCutaneous every 6 hours  insulin NPH human recombinant 6 Unit(s) SubCutaneous every 6 hours  ketamine Infusion. 0.25 mG/kG/Hr (1.67 mL/Hr) IV Continuous <Continuous>  multivitamin/minerals/iron Oral Solution (CENTRUM) 15 milliLiter(s) Oral daily  norepinephrine Infusion 0.01 MICROgram(s)/kG/Min (1.25 mL/Hr) IV Continuous <Continuous>  pantoprazole  Injectable 40 milliGRAM(s) IV Push two times a day  propofol Infusion 15 MICROgram(s)/kG/Min (6 mL/Hr) IV Continuous <Continuous>  senna Syrup 10 milliLiter(s) Oral at bedtime      Physical Exam  General: Patient intubated  Vital Signs Last 12 Hrs  T(F): 100.6 (03-23-21 @ 12:00), Max: 100.6 (03-23-21 @ 12:00)  HR: 84 (03-23-21 @ 15:00) (84 - 111)  BP: --  BP(mean): --  RR: 32 (03-23-21 @ 15:00) (9 - 32)  SpO2: 100% (03-23-21 @ 15:00) (88% - 100%)             Chief complaint  Patient is a 60y old  Male who presents with a chief complaint of hypoxia 2/2 COVID (23 Mar 2021 11:58)   Review of systems  Patient intubated, no hypoglycemic episodes.    Labs and Fingersticks  CAPILLARY BLOOD GLUCOSE      POCT Blood Glucose.: 293 mg/dL (23 Mar 2021 05:04)  POCT Blood Glucose.: 236 mg/dL (22 Mar 2021 23:09)  POCT Blood Glucose.: 253 mg/dL (22 Mar 2021 17:35)      Medications  MEDICATIONS  (STANDING):  chlorhexidine 0.12% Liquid 15 milliLiter(s) Oral Mucosa every 12 hours  cholecalciferol 2000 Unit(s) Oral daily  cisatracurium Infusion 3 MICROgram(s)/kG/Min (12 mL/Hr) IV Continuous <Continuous>  dextrose 50% Injectable 25 Gram(s) IV Push once  fentaNYL   Infusion... 0.5 MICROgram(s)/kG/Hr (1.67 mL/Hr) IV Continuous <Continuous>  glucagon  Injectable 1 milliGRAM(s) IntraMuscular once  glucagon  Injectable 1 milliGRAM(s) IntraMuscular once  heparin  Infusion 1000 Unit(s)/Hr (10 mL/Hr) IV Continuous <Continuous>  influenza   Vaccine 0.5 milliLiter(s) IntraMuscular once  insulin lispro (ADMELOG) corrective regimen sliding scale   SubCutaneous every 6 hours  insulin NPH human recombinant 6 Unit(s) SubCutaneous every 6 hours  ketamine Infusion. 0.25 mG/kG/Hr (1.67 mL/Hr) IV Continuous <Continuous>  multivitamin/minerals/iron Oral Solution (CENTRUM) 15 milliLiter(s) Oral daily  norepinephrine Infusion 0.01 MICROgram(s)/kG/Min (1.25 mL/Hr) IV Continuous <Continuous>  pantoprazole  Injectable 40 milliGRAM(s) IV Push two times a day  propofol Infusion 15 MICROgram(s)/kG/Min (6 mL/Hr) IV Continuous <Continuous>  senna Syrup 10 milliLiter(s) Oral at bedtime      Physical Exam  General: Patient intubated  Vital Signs Last 12 Hrs  T(F): 100.6 (03-23-21 @ 12:00), Max: 100.6 (03-23-21 @ 12:00)  HR: 84 (03-23-21 @ 15:00) (84 - 111)  BP: --  BP(mean): --  RR: 32 (03-23-21 @ 15:00) (9 - 32)  SpO2: 100% (03-23-21 @ 15:00) (88% - 100%)

## 2021-03-23 NOTE — PROGRESS NOTE ADULT - PROBLEM SELECTOR PLAN 1
2nd to COVID PNA - s/p intubation 3/17 for worsening hypoxic respiratory failure   -Keep pH >7.20, sats >90%  -Wean O2 as tolerated  -Wean off paralytics as able as per MICU

## 2021-03-23 NOTE — PROGRESS NOTE ADULT - ASSESSMENT
A/P: 61 yo male with no known pmh presents with hypoxic respiratory failure due to covid pneumonia now c/b Rt lung segmental PE. On 3/13  RRT was called for Hypoxia with associated fever of 102 requiring intubation. The patient was initiated on Remdisivir and dexamethasone tx 3/8 and has now been completed. MICU course further complicated by illeus and intermittent hypoxia with lung noncompliance issues.         Neuro:  - Paralyzed on Nimbex, sedated on: Fentanyl , Propofol , Ketamine   - Wean off nimbex today then consider weaning off propofol    CV:  #Hypotension likely in setting of vasodilatory shock   - Remains on low dose Levo, attempt to wean, maintain MAP > 65    Resp:  #Hypoxic Respiratory Failure in setting of ARDS, Covid/PNA/ PE  - Intubated /32/8/50 p/f: 142 peak: 44 plateau: 36  - abg stable will reduce tv 300 and RR to 28  - Completed RDV, and Decadron course   - Vent management per ABG, pulm toileting   #Rt lung segmental PE treated with heparin gtt     GI:  #s/p abd distention now resolved  - TF restarted--monitor tolerance  - c/w reglan   - c/w bowel regimen last BM 3/20  - GI ppx: PPI  -Ck TG level in am    Renal:  #hyponatremia--stable and improved  - Na 134, trend BMP,   - Scr stable, strict I/Os,   - Lasix 20 x1 this morning.    Endocrine:  #Uncontrolled DM, A1c 12.4  - Monitor BS, maintained ISS moderate coverage     Heme:  #Hypercoagulable state in setting of Covid  - D-dimer uptrending, c/w trending q72h  - DVT ppx: Transitioned to Heparin gtt for RLL PE,   #PE: RLL seen on CT  - Hemodynamically stable, on heparin drip  - LE Duplex negative    ID:  # Mild Leukocytosis, stable  - - pt remains afebrile, lactate normal  - Zosyn for empiric completed yesterday 3/20  #Covvid-19  - Completed RDV/Decadron course   - Trending inflammatory markers q72h    Lines:  RIJ 3/17  Right radial 3/17         A/P: 61 yo male with no known pmh presents with hypoxic respiratory failure due to covid pneumonia now c/b Rt lung segmental PE. On 3/13  RRT was called for Hypoxia with associated fever of 102 requiring intubation. The patient was initiated on Remdisivir and dexamethasone tx 3/8 and has now been completed. MICU course further complicated by illeus and intermittent hypoxia with lung noncompliance issues.         Neuro:  - Paralyzed on Nimbex, sedated on: Fentanyl , Propofol , Ketamine   - Unable to tolerate off Nimbex as he becomes very discordant with vent    CV:  #Hypotension likely in setting of vasodilatory shock   - Remains on low dose Levo, attempt to wean, maintain MAP > 65    Resp:  #Hypoxic Respiratory Failure in setting of ARDS, Covid/PNA/ PE  - Intubated /32/8/60 p/f: 120 peak: 44 plateau: 36  - abg  7.26/50/73/28/60/94% on above settings  - Completed RDV, and Decadron course   - Vent management per ABG, pulm toileting   #Rt lung segmental PE treated with heparin gtt     GI:  #s/p abd distention now resolved  - TF tolerating  - c/w reglan   - c/w bowel regimen last BM 3/20  - GI ppx: PPI  -Ck TG level in am    Renal:  #hyponatremia--stable and improved  - Na 134, trend BMP,   - Scr stable, strict I/Os,   - Lasix 20 x1 this morning.    Endocrine:  #Uncontrolled DM, A1c 12.4  - Monitor BS, maintained ISS moderate coverage     Heme:  #Hypercoagulable state in setting of Covid  - D-dimer uptrending, c/w trending q72h  - DVT ppx: Transitioned to Heparin gtt for RLL PE,   #PE: RLL seen on CT  - Hemodynamically stable, on heparin drip  - LE Duplex negative    ID:  # Mild Leukocytosis, stable  - - pt remains afebrile, lactate normal  - Zosyn for empiric completed 3/20  #Covvid-19  - Completed RDV/Decadron course   - Trending inflammatory markers q72h    Lines:  RIJ 3/17  Right radial 3/17         A/P: 59 yo male with no known pmh presents with hypoxic respiratory failure due to covid pneumonia now c/b Rt lung segmental PE. On 3/17  RRT was called for Hypoxia with associated fever of 102 requiring intubation. The patient was initiated on Remdisivir and dexamethasone tx 3/8 and has now been completed. MICU course further complicated by illeus and intermittent hypoxia with lung noncompliance issues.         Neuro:  - Paralyzed on Nimbex, sedated on: Fentanyl , Propofol , Ketamine   - Unable to tolerate off Nimbex as he becomes very discordant with vent    CV:  #Hypotension likely in setting of vasodilatory shock   - Remains on low dose Levo, attempt to wean, maintain MAP > 65    Resp:  #Hypoxic Respiratory Failure in setting of ARDS, Covid/PNA/ PE  -3/17 Intubated /32/8/60 p/f: 120 peak: 44 plateau: 36  - abg  7.26/50/73/28/60/94% on above settings  - Completed RDV, and Decadron course   - Vent management per ABG, pulm toileting   #Rt lung segmental PE treated with heparin gtt     GI:  #s/p abd distention now resolved  - TF tolerating  - c/w reglan   - c/w bowel regimen last BM 3/20  - GI ppx: PPI  -Ck TG level in am    Renal:  #hyponatremia--stable and improved  - Na 134, trend BMP,   - Scr stable, strict I/Os,   - Lasix 20 x1 this morning.    Endocrine:  #Uncontrolled DM, A1c 12.4  - Monitor BS, maintained ISS moderate coverage     Heme:  #Hypercoagulable state in setting of Covid  - D-dimer uptrending, c/w trending q72h  - DVT ppx: Transitioned to Heparin gtt for RLL PE,   #PE: RLL seen on CT  - Hemodynamically stable, on heparin drip  - LE Duplex negative    ID:  # Mild Leukocytosis, stable  - - pt remains afebrile, lactate normal  - Zosyn for empiric completed 3/20  #Covvid-19  - Completed RDV/Decadron course   - Trending inflammatory markers q72h    Lines:  RIJ 3/17  Right radial 3/17

## 2021-03-23 NOTE — PROGRESS NOTE ADULT - SUBJECTIVE AND OBJECTIVE BOX
Follow-up Pulm Progress Note    intubated/sedated     Medications:  MEDICATIONS  (STANDING):  chlorhexidine 0.12% Liquid 15 milliLiter(s) Oral Mucosa every 12 hours  cholecalciferol 2000 Unit(s) Oral daily  cisatracurium Infusion 3 MICROgram(s)/kG/Min (12 mL/Hr) IV Continuous <Continuous>  dextrose 50% Injectable 25 Gram(s) IV Push once  fentaNYL   Infusion... 0.5 MICROgram(s)/kG/Hr (1.67 mL/Hr) IV Continuous <Continuous>  glucagon  Injectable 1 milliGRAM(s) IntraMuscular once  glucagon  Injectable 1 milliGRAM(s) IntraMuscular once  heparin  Infusion 1000 Unit(s)/Hr (10 mL/Hr) IV Continuous <Continuous>  influenza   Vaccine 0.5 milliLiter(s) IntraMuscular once  insulin lispro (ADMELOG) corrective regimen sliding scale   SubCutaneous every 6 hours  insulin NPH human recombinant 6 Unit(s) SubCutaneous every 6 hours  ketamine Infusion. 0.25 mG/kG/Hr (1.67 mL/Hr) IV Continuous <Continuous>  multivitamin/minerals/iron Oral Solution (CENTRUM) 15 milliLiter(s) Oral daily  norepinephrine Infusion 0.01 MICROgram(s)/kG/Min (1.25 mL/Hr) IV Continuous <Continuous>  pantoprazole  Injectable 40 milliGRAM(s) IV Push two times a day  propofol Infusion 15 MICROgram(s)/kG/Min (6 mL/Hr) IV Continuous <Continuous>  senna Syrup 10 milliLiter(s) Oral at bedtime    MEDICATIONS  (PRN):  acetaminophen   Tablet .. 650 milliGRAM(s) Oral every 4 hours PRN Mild Pain (1 - 3)  ALBUTerol    90 MICROgram(s) HFA Inhaler 2 Puff(s) Inhalation every 6 hours PRN Shortness of Breath and/or Wheezing  polyethylene glycol 3350 17 Gram(s) Oral daily PRN Constipation      Mode: AC/ CMV (Assist Control/ Continuous Mandatory Ventilation)  RR (machine): 32  TV (machine): 300  FiO2: 60  PEEP: 8  ITime: 1  MAP: 18  PIP: 38      Vital Signs Last 24 Hrs  T(C): 37.4 (23 Mar 2021 08:00), Max: 37.6 (23 Mar 2021 04:00)  T(F): 99.3 (23 Mar 2021 08:00), Max: 99.7 (23 Mar 2021 04:00)  HR: 91 (23 Mar 2021 11:00) (77 - 111)  BP: --  BP(mean): --  RR: 25 (23 Mar 2021 11:00) (9 - 32)  SpO2: 98% (23 Mar 2021 11:00) (88% - 100%)    ABG - ( 23 Mar 2021 11:10 )  pH, Arterial: 7.36  pH, Blood: x     /  pCO2: 50    /  pO2: 73    / HCO3: 28    / Base Excess: 2.2   /  SaO2: 94                    03 @ 07:01  -  03- @ 07:00  --------------------------------------------------------  IN: 2785 mL / OUT: 1595 mL / NET: 1190 mL          LABS:                        8.9    10.18 )-----------( 319      ( 23 Mar 2021 00:27 )             29.1     03-23    131<L>  |  98  |  12  ----------------------------<  246<H>  4.3   |  25  |  0.41<L>    Ca    7.8<L>      23 Mar 2021 00:27  Phos  2.4     03-23  Mg     1.9     03-23    TPro  6.0  /  Alb  1.8<L>  /  TBili  0.4  /  DBili  x   /  AST  37  /  ALT  32  /  AlkPhos  175<H>  03-23          CAPILLARY BLOOD GLUCOSE      POCT Blood Glucose.: 293 mg/dL (23 Mar 2021 05:04)    PT/INR - ( 23 Mar 2021 00:27 )   PT: 15.2 sec;   INR: 1.28 ratio         PTT - ( 23 Mar 2021 00:27 )  PTT:77.9 sec  Urinalysis Basic - ( 21 Mar 2021 20:37 )    Color: Yellow / Appearance: Clear / S.033 / pH: x  Gluc: x / Ketone: Small  / Bili: Negative / Urobili: 4 mg/dL   Blood: x / Protein: 30 mg/dL / Nitrite: Negative   Leuk Esterase: Small / RBC: 7 /hpf / WBC 18 /HPF   Sq Epi: x / Non Sq Epi: 2 /hpf / Bacteria: Negative      Procalcitonin, Serum: 0.30 ng/mL (21 @ 00:25)  Procalcitonin, Serum: 0.34 ng/mL (21 @ 04:58)                  CULTURES:     Culture - Blood (collected 21 @ 23:11)  Source: .Blood Blood-Peripheral  Preliminary Report (21 @ 01:01):    No growth to date.    Culture - Blood (collected 21 @ 23:11)  Source: .Blood Blood-Peripheral  Preliminary Report (21 @ 01:01):    No growth to date.    Culture - Blood (collected 21 @ 10:03)  Source: .Blood Blood-Peripheral  Final Report (21 @ 11:00):    No Growth Final    Culture - Blood (collected 21 @ 10:03)  Source: .Blood Blood-Venous  Final Report (21 @ 11:00):    No Growth Final    Culture - Blood (collected 21 @ 21:11)  Source: .Blood Blood  Final Report (21 @ 23:01):    No Growth Final          Physical Examination:  GEN: NAD  NEURO: BLOSSOM, intubated/sedated   PULM: Coarse bilaterally   CVS: S1, S2 heard  EXT: no edema     RADIOLOGY REVIEWED  CXR 3/17: b/l opacities R>L

## 2021-03-23 NOTE — PROGRESS NOTE ADULT - SUBJECTIVE AND OBJECTIVE BOX
CHIEF COMPLAINT:  Patient is a 60y old  Male who presents with a chief complaint of hypoxia 2/2 COVID (22 Mar 2021 13:55)    HPI:  59 yo male w/no known pmh (does not f/u with PCP) presents to [facility] for cc fevers, weakness, fatigue, shortness of breath for 5 days. Tested positive for covid 3 days ago at an urgent care. Today presented again to urgent care and noted to be hypoxic and sent to the ER. Patient denies dysuria, diarrhea, constipation. Currently denies shortness of breath on high flow.     In the ER, patient was febrile to 101.5F, tachycardic to 120s, tachypneic to 40, hypoxic to 88% on 6L. Improved on high flow nasal cannula to 95%.  (08 Mar 2021 14:08)      Interval Events: decreased fio2 to 60 and NPH increased      REVIEW OF SYSTEMS:  Unable 2/2 sedations        OBJECTIVE:  ICU Vital Signs Last 24 Hrs  T(C): 37.6 (23 Mar 2021 04:00), Max: 37.6 (23 Mar 2021 04:00)  T(F): 99.7 (23 Mar 2021 04:00), Max: 99.7 (23 Mar 2021 04:00)  HR: 109 (23 Mar 2021 06:00) (71 - 111)  BP: --  BP(mean): --  ABP: 103/58 (23 Mar 2021 06:00) (89/49 - 140/63)  ABP(mean): 74 (23 Mar 2021 06:00) (62 - 88)  RR: 32 (23 Mar 2021 06:00) (28 - 32)  SpO2: 90% (23 Mar 2021 06:00) (88% - 100%)    Mode: AC/ CMV (Assist Control/ Continuous Mandatory Ventilation), RR (machine): 32, TV (machine): 300, FiO2: 70, PEEP: 8, ITime: 1, MAP: 16, PIP: 39     @ 07:01  -   @ 07:00  --------------------------------------------------------  IN: 2423.2 mL / OUT: 2400 mL / NET: 23.2 mL     @ 07:01  -   @ 06:22  --------------------------------------------------------  IN: 2694.9 mL / OUT: 1550 mL / NET: 1144.9 mL      CAPILLARY BLOOD GLUCOSE      POCT Blood Glucose.: 293 mg/dL (23 Mar 2021 05:04)          PHYSICAL EXAM:  GENERAL: NAD,  HEENT:  Atraumatic, Normocephalic  EYES: PERRLA, conjunctiva and sclera clear  NECK: Supple, No JVD  CHEST/LUNG: diminished bilaterally; No wheezes, rales, or rhonchi  HEART: Regular rate and rhythm; No murmurs, rubs, or gallops  ABDOMEN: Soft, Nontender, Nondistended; Bowel sounds present  EXTREMITIES:  2+ Peripheral Pulses, No clubbing, cyanosis, or edema  PSYCH: unable as pt is sedated  NEUROLOGY: sedated  SKIN: No rashes or lesions        HOSPITAL MEDICATIONS:  MEDICATIONS  (STANDING):  chlorhexidine 0.12% Liquid 15 milliLiter(s) Oral Mucosa every 12 hours  cholecalciferol 2000 Unit(s) Oral daily  cisatracurium Infusion 3 MICROgram(s)/kG/Min (12 mL/Hr) IV Continuous <Continuous>  dextrose 50% Injectable 25 Gram(s) IV Push once  fentaNYL   Infusion... 0.5 MICROgram(s)/kG/Hr (1.67 mL/Hr) IV Continuous <Continuous>  glucagon  Injectable 1 milliGRAM(s) IntraMuscular once  glucagon  Injectable 1 milliGRAM(s) IntraMuscular once  heparin  Infusion 1000 Unit(s)/Hr (10 mL/Hr) IV Continuous <Continuous>  influenza   Vaccine 0.5 milliLiter(s) IntraMuscular once  insulin lispro (ADMELOG) corrective regimen sliding scale   SubCutaneous every 6 hours  insulin NPH human recombinant 6 Unit(s) SubCutaneous every 6 hours  ketamine Infusion. 0.25 mG/kG/Hr (1.67 mL/Hr) IV Continuous <Continuous>  multivitamin/minerals/iron Oral Solution (CENTRUM) 15 milliLiter(s) Oral daily  norepinephrine Infusion 0.01 MICROgram(s)/kG/Min (1.25 mL/Hr) IV Continuous <Continuous>  pantoprazole  Injectable 40 milliGRAM(s) IV Push two times a day  propofol Infusion 15 MICROgram(s)/kG/Min (6 mL/Hr) IV Continuous <Continuous>  senna Syrup 10 milliLiter(s) Oral at bedtime    MEDICATIONS  (PRN):  acetaminophen   Tablet .. 650 milliGRAM(s) Oral every 4 hours PRN Mild Pain (1 - 3)  ALBUTerol    90 MICROgram(s) HFA Inhaler 2 Puff(s) Inhalation every 6 hours PRN Shortness of Breath and/or Wheezing  polyethylene glycol 3350 17 Gram(s) Oral daily PRN Constipation      LABS:                        8.9    10.18 )-----------( 319      ( 23 Mar 2021 00:27 )             29.1     Hgb Trend: 8.9<--, 9.2<--, 9.1<--, 9.4<--, 8.9<--  0323    131<L>  |  98  |  12  ----------------------------<  246<H>  4.3   |  25  |  0.41<L>    Ca    7.8<L>      23 Mar 2021 00:27  Phos  2.4       Mg     1.9         TPro  6.0  /  Alb  1.8<L>  /  TBili  0.4  /  DBili  x   /  AST  37  /  ALT  32  /  AlkPhos  175<H>  23    LIVER FUNCTIONS - ( 23 Mar 2021 00:27 )  Alb: 1.8 g/dL / Pro: 6.0 g/dL / ALK PHOS: 175 U/L / ALT: 32 U/L / AST: 37 U/L / GGT: x           Creatinine Trend: 0.41<--, 0.51<--, 0.49<--, 0.45<--, 0.40<--, 0.43<--  PT/INR - ( 23 Mar 2021 00:27 )   PT: 15.2 sec;   INR: 1.28 ratio         PTT - ( 23 Mar 2021 00:27 )  PTT:77.9 sec  Urinalysis Basic - ( 21 Mar 2021 20:37 )    Color: Yellow / Appearance: Clear / S.033 / pH: x  Gluc: x / Ketone: Small  / Bili: Negative / Urobili: 4 mg/dL   Blood: x / Protein: 30 mg/dL / Nitrite: Negative   Leuk Esterase: Small / RBC: 7 /hpf / WBC 18 /HPF   Sq Epi: x / Non Sq Epi: 2 /hpf / Bacteria: Negative      Arterial Blood Gas:   @ 02:46  7.34/59/60/31/89/4.6  ABG lactate: --  Arterial Blood Gas:   @ 00:27  7.34/56/63/29/85/3.4  ABG lactate: --  Arterial Blood Gas:   @ 22:10  7.36/54/61/30/88/4.1  ABG lactate: --  Arterial Blood Gas:   @ 20:58  7.37/54/102/30/97/4.5  ABG lactate: --  Arterial Blood Gas:   @ 16:09  7.39/48/103/29/97/3.7  ABG lactate: --  Arterial Blood Gas:   @ 14:18  7.35/55/59/29/88/3.5  ABG lactate: --  Arterial Blood Gas:   @ 12:02  7.38/49/72/28/92/3.1  ABG lactate: --  Arterial Blood Gas:   @ 10:32  7.42/42/71//94/2.4  ABG lactate: --  Arterial Blood Gas:   @ 00:17  7.39/44/71//92/1.2  ABG lactate: --  Arterial Blood Gas:   @ 16:04  7.43/42/72//93/2.9  ABG lactate: --  Arterial Blood Gas:   @ 12:30  7.43/40/72//94/2.4  ABG lactate: --        MICROBIOLOGY:     RADIOLOGY:  [ ] Reviewed and interpreted by me    EKG:

## 2021-03-23 NOTE — PROGRESS NOTE ADULT - SUBJECTIVE AND OBJECTIVE BOX
TAMARA CHEW 60y [id number]    Patient is a 60y old  Male who presents with a chief complaint of hypoxia 2/2 COVID (23 Mar 2021 11:49)      Follow Up/CC:  ID following for COVID    Interval History/ROS: fever+, intubated, in ICU     Allergies    No Known Allergies    Intolerances        ANTIMICROBIALS:      MEDICATIONS  (STANDING):  chlorhexidine 0.12% Liquid 15 milliLiter(s) Oral Mucosa every 12 hours  cholecalciferol 2000 Unit(s) Oral daily  cisatracurium Infusion 3 MICROgram(s)/kG/Min (12 mL/Hr) IV Continuous <Continuous>  dextrose 50% Injectable 25 Gram(s) IV Push once  fentaNYL   Infusion... 0.5 MICROgram(s)/kG/Hr (1.67 mL/Hr) IV Continuous <Continuous>  glucagon  Injectable 1 milliGRAM(s) IntraMuscular once  glucagon  Injectable 1 milliGRAM(s) IntraMuscular once  heparin  Infusion 1000 Unit(s)/Hr (10 mL/Hr) IV Continuous <Continuous>  influenza   Vaccine 0.5 milliLiter(s) IntraMuscular once  insulin lispro (ADMELOG) corrective regimen sliding scale   SubCutaneous every 6 hours  insulin NPH human recombinant 6 Unit(s) SubCutaneous every 6 hours  ketamine Infusion. 0.25 mG/kG/Hr (1.67 mL/Hr) IV Continuous <Continuous>  multivitamin/minerals/iron Oral Solution (CENTRUM) 15 milliLiter(s) Oral daily  norepinephrine Infusion 0.01 MICROgram(s)/kG/Min (1.25 mL/Hr) IV Continuous <Continuous>  pantoprazole  Injectable 40 milliGRAM(s) IV Push two times a day  propofol Infusion 15 MICROgram(s)/kG/Min (6 mL/Hr) IV Continuous <Continuous>  senna Syrup 10 milliLiter(s) Oral at bedtime    MEDICATIONS  (PRN):  acetaminophen    Suspension .. 650 milliGRAM(s) Oral every 6 hours PRN Temp greater or equal to 38C (100.4F)  ALBUTerol    90 MICROgram(s) HFA Inhaler 2 Puff(s) Inhalation every 6 hours PRN Shortness of Breath and/or Wheezing  polyethylene glycol 3350 17 Gram(s) Oral daily PRN Constipation    Mode: AC/ CMV (Assist Control/ Continuous Mandatory Ventilation)  RR (machine): 32  TV (machine): 300  FiO2: 60  PEEP: 8  ITime: 1  MAP: 18  PIP: 38      Vital Signs Last 24 Hrs  T(C): 38.1 (23 Mar 2021 12:00), Max: 38.1 (23 Mar 2021 12:00)  T(F): 100.6 (23 Mar 2021 12:00), Max: 100.6 (23 Mar 2021 12:00)  HR: 86 (23 Mar 2021 13:45) (77 - 111)  BP: --  BP(mean): --  RR: 32 (23 Mar 2021 13:45) (9 - 32)  SpO2: 100% (23 Mar 2021 13:45) (88% - 100%)    CBC Full  -  ( 23 Mar 2021 00:27 )  WBC Count : 10.18 K/uL  RBC Count : 4.29 M/uL  Hemoglobin : 8.9 g/dL  Hematocrit : 29.1 %  Platelet Count - Automated : 319 K/uL  Mean Cell Volume : 67.8 fl  Mean Cell Hemoglobin : 20.7 pg  Mean Cell Hemoglobin Concentration : 30.6 gm/dL  Auto Neutrophil # : 7.96 K/uL  Auto Lymphocyte # : 1.03 K/uL  Auto Monocyte # : 0.52 K/uL  Auto Eosinophil # : 0.43 K/uL  Auto Basophil # : 0.03 K/uL  Auto Neutrophil % : 78.2 %  Auto Lymphocyte % : 10.1 %  Auto Monocyte % : 5.1 %  Auto Eosinophil % : 4.2 %  Auto Basophil % : 0.3 %    23    131<L>  |  98  |  12  ----------------------------<  246<H>  4.3   |  25  |  0.41<L>    Ca    7.8<L>      23 Mar 2021 00:27  Phos  2.4     03-23  Mg     1.9     23    TPro  6.0  /  Alb  1.8<L>  /  TBili  0.4  /  DBili  x   /  AST  37  /  ALT  32  /  AlkPhos  175<H>  0323    LIVER FUNCTIONS - ( 23 Mar 2021 00:27 )  Alb: 1.8 g/dL / Pro: 6.0 g/dL / ALK PHOS: 175 U/L / ALT: 32 U/L / AST: 37 U/L / GGT: x           Urinalysis Basic - ( 21 Mar 2021 20:37 )    Color: Yellow / Appearance: Clear / S.033 / pH: x  Gluc: x / Ketone: Small  / Bili: Negative / Urobili: 4 mg/dL   Blood: x / Protein: 30 mg/dL / Nitrite: Negative   Leuk Esterase: Small / RBC: 7 /hpf / WBC 18 /HPF   Sq Epi: x / Non Sq Epi: 2 /hpf / Bacteria: Negative    Procalcitonin, Serum: 0.30 ()  Procalcitonin, Serum: 0.34 ()  Procalcitonin, Serum: 0.42 ()    C-Reactive Protein, Serum: 23.84 ()  C-Reactive Protein, Serum: 6.96 ()  C-Reactive Protein, Serum: 9.01 ()    Ferritin, Serum: 689 ()  Ferritin, Serum: 593 ()  Ferritin, Serum: 791 ()      D-Dimer Assay, Quantitative: 934 ()  D-Dimer Assay, Quantitative: 695 ()  D-Dimer Assay, Quantitative: 621 ()  D-Dimer Assay, Quantitative: 848 ()  D-Dimer Assay, Quantitative: 1128 ()        MICROBIOLOGY:  .Sputum Sputum  21   Normal Respiratory Marilee absent  --    Rare polymorphonuclear leukocytes per low power field  Rare Squamous epithelial cells per low power field  No organisms seen per oil power field      .Blood Blood-Peripheral  21   No growth to date.  --  --      .Blood Blood-Venous  21   No Growth Final  --  --      .Blood Blood  21   No Growth Final  --  --      .Blood Blood-Peripheral  21   No Growth Final  --  --        RADIOLOGY    < from: Xray Abdomen 1 View PORTABLE -Routine (Xray Abdomen 1 View PORTABLE -Routine in AM.) (21 @ 08:49) >  Resolution of previously noted gastric gaseous distention.    < end of copied text >

## 2021-03-24 NOTE — PROGRESS NOTE ADULT - ASSESSMENT
Assessment  DMT2: 60y Male with newly diagnosed DM T2 with hyperglycemia admitted with COVID19, A1C is 12.4%, on NPH insulin and coverage, NPH dose increased by primary team this afternoon, dose not yet administered, blood sugars running high and not at target, no new hypoglycemic episodes, remains intubated on tube feeds.  COVID19: On medications, monitored, stable.  Overweight: No strict exercise routines, neither on low calorie diet.      Anderson Mcmanus MD  Cell: [phone]  Office: [phone]       Assessment  DMT2: 60y Male with newly diagnosed DM T2 with hyperglycemia admitted with COVID19, A1C is 12.4%, on NPH insulin and  coverage, NPH dose increased by primary team this afternoon, dose not yet administered, blood sugars running high and not at target, no new hypoglycemic episodes, remains intubated on tube feeds.  COVID19: On medications, monitored, stable.  Overweight: No strict exercise routines, neither on low calorie diet.      Anderson Mcmanus MD  Cell: [phone]  Office: [phone]

## 2021-03-24 NOTE — DIETITIAN NUTRITION RISK NOTIFICATION - TREATMENT: THE FOLLOWING DIET HAS BEEN RECOMMENDED
Diet, NPO with Tube Feed:   Tube Feeding Modality: Orogastric  Glucerna 1.2 Robby (GLUCERNARTH)  Total Volume for 24 Hours (mL): 960  Continuous  Starting Tube Feed Rate {mL per Hour}: 10  Increase Tube Feed Rate by (mL): 10     Every 4 hours  Until Goal Tube Feed Rate (mL per Hour): 40  Tube Feed Duration (in Hours): 24  Tube Feed Start Time: 10:30 (03-17-21 @ 10:29) [Active]      
Diet, NPO with Tube Feed:   Tube Feeding Modality: Nasogastric  Vital AF (VITALAFRTH)  Total Volume for 24 Hours (mL): 720  Continuous  Starting Tube Feed Rate {mL per Hour}: 10     Every 4 hours  Until Goal Tube Feed Rate (mL per Hour): 30  Tube Feed Duration (in Hours): 24  Tube Feed Start Time: 17:00  Supplement Feeding Modality:  Nasogastric  Probiotic Yogurt/Smoothie Cans or Servings Per Day:  2       Frequency:  Daily (03-21-21 @ 16:47) [Active]

## 2021-03-24 NOTE — PROGRESS NOTE ADULT - SUBJECTIVE AND OBJECTIVE BOX
Chief complaint  Patient is a 60y old  Male who presents with a chief complaint of hypoxia 2/2 COVID (24 Mar 2021 12:43)   Review of systems  Patient remains intubated, no hypoglycemic episodes.    Labs and Fingersticks  CAPILLARY BLOOD GLUCOSE      POCT Blood Glucose.: 205 mg/dL (24 Mar 2021 11:53)  POCT Blood Glucose.: 246 mg/dL (24 Mar 2021 04:45)  POCT Blood Glucose.: 231 mg/dL (23 Mar 2021 22:58)  POCT Blood Glucose.: 238 mg/dL (23 Mar 2021 17:46)      Medications  MEDICATIONS  (STANDING):  argatroban Infusion 1.8 MICROgram(s)/kG/Min (7.2 mL/Hr) IV Continuous <Continuous>  aspirin  chewable 162 milliGRAM(s) Enteral Tube daily  buDESOnide    Inhalation Suspension 0.5 milliGRAM(s) Inhalation every 12 hours  chlorhexidine 0.12% Liquid 15 milliLiter(s) Oral Mucosa every 12 hours  chlorhexidine 2% Cloths 1 Application(s) Topical <User Schedule>  cholecalciferol 2000 Unit(s) Oral daily  cisatracurium Infusion 3 MICROgram(s)/kG/Min (12 mL/Hr) IV Continuous <Continuous>  fentaNYL   Infusion... 2.5 MICROgram(s)/kG/Hr (8.34 mL/Hr) IV Continuous <Continuous>  influenza   Vaccine 0.5 milliLiter(s) IntraMuscular once  insulin lispro (ADMELOG) corrective regimen sliding scale   SubCutaneous every 6 hours  insulin NPH human recombinant 11 Unit(s) SubCutaneous every 6 hours  ketamine Infusion. 3 mG/kG/Hr (20 mL/Hr) IV Continuous <Continuous>  multivitamin/minerals/iron Oral Solution (CENTRUM) 15 milliLiter(s) Oral daily  norepinephrine Infusion 0.01 MICROgram(s)/kG/Min (1.25 mL/Hr) IV Continuous <Continuous>  pantoprazole  Injectable 40 milliGRAM(s) IV Push two times a day  petrolatum Ophthalmic Ointment 1 Application(s) Both EYES two times a day  polyethylene glycol 3350 17 Gram(s) Oral every 12 hours  propofol Infusion 50 MICROgram(s)/kG/Min (20 mL/Hr) IV Continuous <Continuous>  senna Syrup 10 milliLiter(s) Oral at bedtime      Physical Exam  General: Patient intubated  Vital Signs Last 12 Hrs  T(F): 99.9 (03-24-21 @ 12:00), Max: 100.6 (03-24-21 @ 04:00)  HR: 94 (03-24-21 @ 13:30) (87 - 94)  BP: --  BP(mean): --  RR: 37 (03-24-21 @ 13:30) (32 - 37)  SpO2: 97% (03-24-21 @ 13:30) (93% - 100%)           Chief complaint  Patient is a 60y old  Male who presents with a chief complaint of hypoxia 2/2 COVID (24 Mar 2021 12:43)   Review of systems  Patient remains intubated, no hypoglycemic episodes.    Labs and Fingersticks  CAPILLARY BLOOD GLUCOSE      POCT Blood Glucose.: 205 mg/dL (24 Mar 2021 11:53)  POCT Blood Glucose.: 246 mg/dL (24 Mar 2021 04:45)  POCT Blood Glucose.: 231 mg/dL (23 Mar 2021 22:58)  POCT Blood Glucose.: 238 mg/dL (23 Mar 2021 17:46)      Medications  MEDICATIONS  (STANDING):  argatroban Infusion 1.8 MICROgram(s)/kG/Min (7.2 mL/Hr) IV Continuous <Continuous>  aspirin  chewable 162 milliGRAM(s) Enteral Tube daily  buDESOnide    Inhalation Suspension 0.5 milliGRAM(s) Inhalation every 12 hours  chlorhexidine 0.12% Liquid 15 milliLiter(s) Oral Mucosa every 12 hours  chlorhexidine 2% Cloths 1 Application(s) Topical <User Schedule>  cholecalciferol 2000 Unit(s) Oral daily  cisatracurium Infusion 3 MICROgram(s)/kG/Min (12 mL/Hr) IV Continuous <Continuous>  fentaNYL   Infusion... 2.5 MICROgram(s)/kG/Hr (8.34 mL/Hr) IV Continuous <Continuous>  influenza   Vaccine 0.5 milliLiter(s) IntraMuscular once  insulin lispro (ADMELOG) corrective regimen sliding scale   SubCutaneous every 6 hours  insulin NPH human recombinant 11 Unit(s) SubCutaneous every 6 hours  ketamine Infusion. 3 mG/kG/Hr (20 mL/Hr) IV Continuous <Continuous>  multivitamin/minerals/iron Oral Solution (CENTRUM) 15 milliLiter(s) Oral daily  norepinephrine Infusion 0.01 MICROgram(s)/kG/Min (1.25 mL/Hr) IV Continuous <Continuous>  pantoprazole  Injectable 40 milliGRAM(s) IV Push two times a day  petrolatum Ophthalmic Ointment 1 Application(s) Both EYES two times a day  polyethylene glycol 3350 17 Gram(s) Oral every 12 hours  propofol Infusion 50 MICROgram(s)/kG/Min (20 mL/Hr) IV Continuous <Continuous>  senna Syrup 10 milliLiter(s) Oral at bedtime      Physical Exam  General: Patient intubated  Vital Signs Last 12 Hrs  T(F): 99.9 (03-24-21 @ 12:00), Max: 100.6 (03-24-21 @ 04:00)  HR: 94 (03-24-21 @ 13:30) (87 - 94)  BP: --  BP(mean): --  RR: 37 (03-24-21 @ 13:30) (32 - 37)  SpO2: 97% (03-24-21 @ 13:30) (93% - 100%)

## 2021-03-24 NOTE — PROGRESS NOTE ADULT - SUBJECTIVE AND OBJECTIVE BOX
Follow-up Pulm Progress Note    intubated/sedated      Medications:  MEDICATIONS  (STANDING):  argatroban Infusion 1.8 MICROgram(s)/kG/Min (7.2 mL/Hr) IV Continuous <Continuous>  aspirin  chewable 162 milliGRAM(s) Enteral Tube daily  buDESOnide    Inhalation Suspension 0.5 milliGRAM(s) Inhalation every 12 hours  chlorhexidine 0.12% Liquid 15 milliLiter(s) Oral Mucosa every 12 hours  chlorhexidine 2% Cloths 1 Application(s) Topical <User Schedule>  cholecalciferol 2000 Unit(s) Oral daily  cisatracurium Infusion 3 MICROgram(s)/kG/Min (12 mL/Hr) IV Continuous <Continuous>  fentaNYL   Infusion... 2.5 MICROgram(s)/kG/Hr (8.34 mL/Hr) IV Continuous <Continuous>  influenza   Vaccine 0.5 milliLiter(s) IntraMuscular once  insulin lispro (ADMELOG) corrective regimen sliding scale   SubCutaneous every 6 hours  insulin NPH human recombinant 11 Unit(s) SubCutaneous every 6 hours  ketamine Infusion. 3 mG/kG/Hr (20 mL/Hr) IV Continuous <Continuous>  multivitamin/minerals/iron Oral Solution (CENTRUM) 15 milliLiter(s) Oral daily  norepinephrine Infusion 0.01 MICROgram(s)/kG/Min (1.25 mL/Hr) IV Continuous <Continuous>  pantoprazole  Injectable 40 milliGRAM(s) IV Push two times a day  petrolatum Ophthalmic Ointment 1 Application(s) Both EYES two times a day  polyethylene glycol 3350 17 Gram(s) Oral every 12 hours  propofol Infusion 50 MICROgram(s)/kG/Min (20 mL/Hr) IV Continuous <Continuous>  senna Syrup 10 milliLiter(s) Oral at bedtime    MEDICATIONS  (PRN):  acetaminophen    Suspension .. 650 milliGRAM(s) Oral every 6 hours PRN Temp greater or equal to 38C (100.4F)      Mode: AC/ CMV (Assist Control/ Continuous Mandatory Ventilation)  RR (machine): 32  TV (machine): 300  FiO2: 60  PEEP: 8  ITime: 1  MAP: 17  PIP: 40      Vital Signs Last 24 Hrs  T(C): 37.7 (24 Mar 2021 12:00), Max: 38.2 (23 Mar 2021 16:00)  T(F): 99.9 (24 Mar 2021 12:00), Max: 100.8 (23 Mar 2021 16:00)  HR: 90 (24 Mar 2021 12:30) (80 - 91)  BP: --  BP(mean): --  RR: 35 (24 Mar 2021 12:30) (32 - 35)  SpO2: 96% (24 Mar 2021 12:30) (96% - 100%)    ABG - ( 24 Mar 2021 00:13 )  pH, Arterial: 7.36  pH, Blood: x     /  pCO2: 50    /  pO2: 96    / HCO3: 28    / Base Excess: 2.8   /  SaO2: 97                    03-23 @ 07:01  -  03-24 @ 07:00  --------------------------------------------------------  IN: 2626.6 mL / OUT: 995 mL / NET: 1631.6 mL          LABS:                        9.0    12.85 )-----------( 356      ( 24 Mar 2021 00:12 )             29.9     03-24    131<L>  |  96  |  20  ----------------------------<  219<H>  4.7   |  25  |  0.60    Ca    8.0<L>      24 Mar 2021 00:12  Phos  3.3     03-24  Mg     2.2     03-24    TPro  6.4  /  Alb  2.2<L>  /  TBili  0.5  /  DBili  x   /  AST  115<H>  /  ALT  82<H>  /  AlkPhos  242<H>  03-24      CARDIAC MARKERS ( 24 Mar 2021 00:12 )  x     / x     / 68 U/L / x     / x          CAPILLARY BLOOD GLUCOSE      POCT Blood Glucose.: 205 mg/dL (24 Mar 2021 11:53)    PT/INR - ( 24 Mar 2021 00:12 )   PT: 16.8 sec;   INR: 1.42 ratio         PTT - ( 24 Mar 2021 11:25 )  PTT:82.6 sec    Procalcitonin, Serum: 0.25 ng/mL (03-24-21 @ 00:12)  Procalcitonin, Serum: 0.30 ng/mL (03-22-21 @ 00:25)          CULTURES:     Culture - Blood (collected 03-21-21 @ 23:11)  Source: .Blood Blood-Peripheral  Preliminary Report (03-23-21 @ 01:01):    No growth to date.    Culture - Blood (collected 03-21-21 @ 23:11)  Source: .Blood Blood-Peripheral  Preliminary Report (03-23-21 @ 01:01):    No growth to date.    Culture - Blood (collected 03-16-21 @ 10:03)  Source: .Blood Blood-Peripheral  Final Report (03-21-21 @ 11:00):    No Growth Final    Culture - Blood (collected 03-16-21 @ 10:03)  Source: .Blood Blood-Venous  Final Report (03-21-21 @ 11:00):    No Growth Final      Physical Examination:  GEN: NAD  NEURO: BLOSSOM, intubated/sedated   PULM: Coarse bilaterally   CVS: S1, S2 heard  EXT: no edema     RADIOLOGY REVIEWED  CXR 3/17: b/l opacities R>L

## 2021-03-24 NOTE — PROGRESS NOTE ADULT - ASSESSMENT
Mr Olivares is a 60 year old man who presented with hypoxia after being diagnosed with COVID outpatient. He has completed a course of remdesevir, but is still febrile and hypoxic.   He does not have a clear superimposed bacterial infection, though bacterial pneumonia is possible.   CT chest on admission with PE and ground glass opacities.   Febrile and requiring high flow nasal cannula.     COVID-19 pneumonia, leukocytosis, resp failure  - Remdesevir completed  - s/p Zosyn  - Check sputum culture  - Not a candidate for tocilizumab in setting of possible infection   - Follow up cultures - bcx negative  - Monitor for fevers  - Trend WBCs  - vent per MICU   - consider CT C/A/P given fevers   - change lines if possible     Prognosis guarded    Rj Rouse  Attending Physician   Division of Infectious Disease  Pager #[phone]  Available on Microsoft Teams also  After 5pm/weekend or no response, call #[phone]

## 2021-03-24 NOTE — PROGRESS NOTE ADULT - SUBJECTIVE AND OBJECTIVE BOX
TAMARA CHEW 60y [id number]    Patient is a 60y old  Male who presents with a chief complaint of hypoxia 2/2 COVID (24 Mar 2021 14:02)      Follow Up/CC:  ID following for COVID    Interval History/ROS: intubated, fever+    Allergies    No Known Allergies    Intolerances        ANTIMICROBIALS:      MEDICATIONS  (STANDING):  argatroban Infusion 1.8 MICROgram(s)/kG/Min (7.2 mL/Hr) IV Continuous <Continuous>  aspirin  chewable 162 milliGRAM(s) Enteral Tube daily  buDESOnide    Inhalation Suspension 0.5 milliGRAM(s) Inhalation every 12 hours  chlorhexidine 0.12% Liquid 15 milliLiter(s) Oral Mucosa every 12 hours  chlorhexidine 2% Cloths 1 Application(s) Topical <User Schedule>  cholecalciferol 2000 Unit(s) Oral daily  cisatracurium Infusion 3 MICROgram(s)/kG/Min (12 mL/Hr) IV Continuous <Continuous>  fentaNYL   Infusion... 2.5 MICROgram(s)/kG/Hr (8.34 mL/Hr) IV Continuous <Continuous>  influenza   Vaccine 0.5 milliLiter(s) IntraMuscular once  insulin lispro (ADMELOG) corrective regimen sliding scale   SubCutaneous every 6 hours  insulin NPH human recombinant 11 Unit(s) SubCutaneous every 6 hours  ketamine Infusion. 3 mG/kG/Hr (20 mL/Hr) IV Continuous <Continuous>  multivitamin/minerals/iron Oral Solution (CENTRUM) 15 milliLiter(s) Oral daily  norepinephrine Infusion 0.01 MICROgram(s)/kG/Min (1.25 mL/Hr) IV Continuous <Continuous>  pantoprazole  Injectable 40 milliGRAM(s) IV Push two times a day  petrolatum Ophthalmic Ointment 1 Application(s) Both EYES two times a day  polyethylene glycol 3350 17 Gram(s) Oral every 12 hours  propofol Infusion 50 MICROgram(s)/kG/Min (20 mL/Hr) IV Continuous <Continuous>  senna Syrup 10 milliLiter(s) Oral at bedtime    MEDICATIONS  (PRN):  acetaminophen    Suspension .. 650 milliGRAM(s) Oral every 6 hours PRN Temp greater or equal to 38C (100.4F)        Vital Signs Last 24 Hrs  T(C): 37.7 (24 Mar 2021 12:00), Max: 38.2 (23 Mar 2021 16:00)  T(F): 99.9 (24 Mar 2021 12:00), Max: 100.8 (23 Mar 2021 16:00)  HR: 93 (24 Mar 2021 14:00) (80 - 97)  BP: --  BP(mean): --  RR: 32 (24 Mar 2021 14:00) (32 - 37)  SpO2: 96% (24 Mar 2021 14:00) (93% - 100%)    CBC Full  -  ( 24 Mar 2021 00:12 )  WBC Count : 12.85 K/uL  RBC Count : 4.45 M/uL  Hemoglobin : 9.0 g/dL  Hematocrit : 29.9 %  Platelet Count - Automated : 356 K/uL  Mean Cell Volume : 67.2 fl  Mean Cell Hemoglobin : 20.2 pg  Mean Cell Hemoglobin Concentration : 30.1 gm/dL  Auto Neutrophil # : x  Auto Lymphocyte # : x  Auto Monocyte # : x  Auto Eosinophil # : x  Auto Basophil # : x  Auto Neutrophil % : x  Auto Lymphocyte % : x  Auto Monocyte % : x  Auto Eosinophil % : x  Auto Basophil % : x    03-24    131<L>  |  96  |  20  ----------------------------<  219<H>  4.7   |  25  |  0.60    Ca    8.0<L>      24 Mar 2021 00:12  Phos  3.3     03-24  Mg     2.2     03-24    TPro  6.4  /  Alb  2.2<L>  /  TBili  0.5  /  DBili  x   /  AST  115<H>  /  ALT  82<H>  /  AlkPhos  242<H>  03-24    LIVER FUNCTIONS - ( 24 Mar 2021 00:12 )  Alb: 2.2 g/dL / Pro: 6.4 g/dL / ALK PHOS: 242 U/L / ALT: 82 U/L / AST: 115 U/L / GGT: x           Procalcitonin, Serum: 0.25 (03-24)  Procalcitonin, Serum: 0.30 (03-22)  Procalcitonin, Serum: 0.34 (03-21)  Procalcitonin, Serum: 0.42 (03-20)    C-Reactive Protein, Serum: 12.05 (03-24)  C-Reactive Protein, Serum: 23.84 (03-20)  C-Reactive Protein, Serum: 6.96 (03-18)  C-Reactive Protein, Serum: 9.01 (03-18)    Ferritin, Serum: 3091 (03-24)  Ferritin, Serum: 689 (03-20)  Ferritin, Serum: 593 (03-18)  Ferritin, Serum: 791 (03-18)      D-Dimer Assay, Quantitative: 643 (03-24)  D-Dimer Assay, Quantitative: 934 (03-22)  D-Dimer Assay, Quantitative: 695 (03-20)  D-Dimer Assay, Quantitative: 621 (03-19)  D-Dimer Assay, Quantitative: 848 (03-18)  D-Dimer Assay, Quantitative: 1128 (03-18)        MICROBIOLOGY:  .Sputum Sputum  03-22-21   Normal Respiratory Marilee absent  --    Rare polymorphonuclear leukocytes per low power field  Rare Squamous epithelial cells per low power field  No organisms seen per oil power field      .Blood Blood-Peripheral  03-21-21   No growth to date.  --  --      .Blood Blood-Venous  03-16-21   No Growth Final  --  --      .Blood Blood  03-13-21   No Growth Final  --  --      .Blood Blood-Peripheral  03-08-21   No Growth Final  --  --      RADIOLOGY    < from: Xray Abdomen 1 View PORTABLE -Routine (Xray Abdomen 1 View PORTABLE -Routine in AM.) (03.20.21 @ 08:49) >  Resolution of previously noted gastric gaseous distention.    < end of copied text >

## 2021-03-24 NOTE — DIETITIAN NUTRITION RISK NOTIFICATION - ADDITIONAL COMMENTS/DIETITIAN RECOMMENDATIONS
1. In setting of significant calories being provided by propofol & current fluid status, recommend changing EN regimen to Vital 1.5, initiate @10ml/hr and increase as medically feasible/tolerated to goal rate 35ml/hr x 24hrs + 2 No Carb Prosource daily. At goal to provide 840ml formula, 1380kcals, 87g protein, 642ml free H2O. Propofol at current rate to provide an additional 528 kcals/day. (whole regimen meets 28.6kcals/kg & 1.3g protein/kg based on dosing wt 66.7kg)  2. Continue Multivitamin & Vitamin D supplementation as ordered  3. Pt currently with abdominal distention - may consider addition of motility agent; defer to team - Monitor GI tolerance

## 2021-03-24 NOTE — DIETITIAN NUTRITION RISK NOTIFICATION - WEIGHT LOSS
Sudden weight loss during hospitalization or prior to admission
Sudden weight loss during hospitalization or prior to admission

## 2021-03-24 NOTE — CHART NOTE - NSCHARTNOTEFT_GEN_A_CORE
Nutrition Chart Note.  Pt seen for: Malnutrition Follow-up Note.    Source: electronic medical record, team    Chart reviewed, events noted. Per chart: 60M c no significant PMH (does not f/u with PCP) and a recent diagnosis of COVID on . Patient presented 3/08 with c/o fever, weakness, SOB, and was found to have worsening hypoxia requiring HFNC. S/p Remdesivir (3/8- 3/12) & Decadron (3/8 - 3/17). Course c/b newly diagnosed T2DM with a HbA1c 12.4 and a right lower lobe medial segmental pulmonary embolism on CTA (3/12). RRT 3/17 for increased WOB with an SpO2 70/80's while on HFNC and Nonrebreather mask. Pt intubated 3/17 and transferred to COVID ICU for further assessment and management.   - Sedated (prop, ketamine, fent) / Paralyzed (nimbex); Pressors (levo)    Diet, NPO with Tube Feed:   Tube Feeding Modality: Nasogastric  Vital AF (VITALAFRTH)  Total Volume for 24 Hours (mL): 720  Continuous  Starting Tube Feed Rate {mL per Hour}: 10     Every 4 hours  Until Goal Tube Feed Rate (mL per Hour): 30  Tube Feed Duration (in Hours): 24  Tube Feed Start Time: 17:00  Supplement Feeding Modality:  Nasogastric  Probiotic Yogurt/Smoothie Cans or Servings Per Day:  2       Frequency:  Daily (21 @ 16:47)    CURRENT EN ORDER PROVIDES: 864kcals, 54g protein  - EN provisions per flowsheets:    (3/24) 220ml, 31%of EN goal (so far) - currently infusing @20ml/hr    (3/23) 620ml, 86% of EN goal    (3/22) 900ml, 125% of EN goal - reached infusion rate of 40ml/hr    (3/21) 100ml, 14% of EN goal     (3/20) EN held    (3/19) 350ml, 49% of EN goal - held @ 13:00; was infusing at 40ml/hr    (3/18) 650ml, 90% of EN goal     (3/17) 160ml, 17% of EN goal   - Propofol ordered - Pt has received 528kcals via lipids in the last 24hrs; if infusion continues at current rate (20 mL/hr), will provide 528kcals/day   - Pt currently not ordered for x2 NoCarb ProSource (provides additional 60 kcal, 15g protein per packet)    Nutrition-Related Events:  - Bu NPH q6hrs & mod ISS q6h; Newly Dx uncontrolled DM, HbA1c 12.4%  - Micronutrient supplementation: Multivitamin/minerals, Vitamin D  - RD recommendations 3/19 for Vital AF @30ml/hr x 24hrs + 2 'No Carb Prosource'. At goal to provide 720ml formula, 984kcals, 84g protein, 584ml free H2O.   - Hyponatremic   - Lasix last given 3/23; net+    GI: Last BM 3/20, noted with abdominal distention - bowel regimen ordered (miralax, senna); Probiotic yogurt Smoothie ordered BID as of 3/21    Drug Dosing Weight  Weight (kg): 66.7 (08 Mar 2021 18:47)  BMI (kg/m2): 23.7 (08 Mar 2021 18:47)    Current Weight: No additional weights documented at this time  *Per RD initial assessment (3/11) Pt reports UBW is ~158. RD also noted "Pt's dosing wt is 146.7 lbs. Discussed difference in UBW with pt, reported he believes he weighs more than his dosing weight."    MEDICATIONS  (STANDING):  argatroban Infusion 1.8 MICROgram(s)/kG/Min (7.2 mL/Hr) IV Continuous <Continuous>  aspirin  chewable 162 milliGRAM(s) Enteral Tube daily  chlorhexidine 0.12% Liquid 15 milliLiter(s) Oral Mucosa every 12 hours  chlorhexidine 2% Cloths 1 Application(s) Topical <User Schedule>  cholecalciferol 2000 Unit(s) Oral daily  cisatracurium Infusion 3 MICROgram(s)/kG/Min (12 mL/Hr) IV Continuous <Continuous>  fentaNYL   Infusion... 2.5 MICROgram(s)/kG/Hr (8.34 mL/Hr) IV Continuous <Continuous>  influenza   Vaccine 0.5 milliLiter(s) IntraMuscular once  insulin lispro (ADMELOG) corrective regimen sliding scale   SubCutaneous every 6 hours  insulin NPH human recombinant 8 Unit(s) SubCutaneous every 6 hours  ketamine Infusion. 3 mG/kG/Hr (20 mL/Hr) IV Continuous <Continuous>  multivitamin/minerals/iron Oral Solution (CENTRUM) 15 milliLiter(s) Oral daily  norepinephrine Infusion 0.01 MICROgram(s)/kG/Min (1.25 mL/Hr) IV Continuous <Continuous>  pantoprazole  Injectable 40 milliGRAM(s) IV Push two times a day  petrolatum Ophthalmic Ointment 1 Application(s) Both EYES two times a day  polyethylene glycol 3350 17 Gram(s) Oral every 12 hours  propofol Infusion 50 MICROgram(s)/kG/Min (20 mL/Hr) IV Continuous <Continuous>  senna Syrup 10 milliLiter(s) Oral at bedtime    MEDICATIONS  (PRN):  acetaminophen    Suspension .. 650 milliGRAM(s) Oral every 6 hours PRN Temp greater or equal to 38C (100.4F)    03-24 Na131 mmol/L<L> Glu 219 mg/dL<H> K+ 4.7 mmol/L Cr  0.60 mg/dL BUN 20 mg/dL Phos 3.3 mg/dL Alb 2.2 g/dL<L> PAB n/a       CAPILLARY BLOOD GLUCOSE  POCT Blood Glucose.: 246 mg/dL (24 Mar 2021 04:45)  POCT Blood Glucose.: 231 mg/dL (23 Mar 2021 22:58)  POCT Blood Glucose.: 238 mg/dL (23 Mar 2021 17:46)      Skin: no pressure-related injuries per nursing flowsheets   Edema: 1+ generalized per nursing flowsheets     Estimated Needs: Recalculated - Based on dosing wt 66.7kg with consideration for intubation 3/17 & BMI <30  Energy (25-30 kcals/kg): 7087-7861  Protein (1.2-1.8 g pro/kg):   [place] Equation (REE): 1749 (26kcals/kg)    Previous Nutrition Diagnosis:   1. Altered Nutrition Related Labs   Nutrition Diagnosis is [X] ongoing - being addressed with EN and medically managed with insulin regimen per team     2. Mild Malnutrition in the setting of Acute Illness  Nutrition Diagnosis is advanced    New Nutrition Diagnosis: Moderate Malnutrition in setting of acute illness  Related to inadequate protein-energy intake in setting of complicated hospital course 2/2 COVID19  As evidenced by: meeting <75% estimated nutritional needs >7 days, mild edema, sudden weight loss PTA      Recommendations:  1. In setting of significant calories being provided by propofol & current fluid status, recommend changing EN regimen to Vital 1.5, initiate @10ml/hr and increase as medically feasible/tolerated to goal rate 35ml/hr x 24hrs + 2 No Carb Prosource daily. At goal to provide 840ml formula, 1380kcals, 87g protein, 642ml free H2O. Propofol at current rate to provide an additional 528 kcals/day. (whole regimen meets 28.6kcals/kg & 1.3g protein/kg based on dosing wt 66.7kg)  2. Continue Multivitamin & Vitamin D supplementation as ordered  3. Pt currently with abdominal distention - may consider addition of motility agent; defer to team - Monitor GI tolerance  4. Continue to trend trig levels while pt on propofol. RD to monitor propofol infusions and adjust EN as appropriate.  5. New malnutrition alert placed - Will follow-up according to protocol.      Monitoring and Evaluation:   Continue to monitor nutritional intake, tolerance to diet prescription, weights, labs, skin integrity  RD remains available upon request and will follow up per protocol.    Payal Foreman, MS, RD, CDN, [facility]  pager #[phone]

## 2021-03-24 NOTE — PROGRESS NOTE ADULT - SUBJECTIVE AND OBJECTIVE BOX
CHIEF COMPLAINT:  Patient is a 60y old  Male who presents with a chief complaint of hypoxia 2/2 COVID (23 Mar 2021 15:07)    HPI:  61 yo male w/no known pmh (does not f/u with PCP) presents to [facility] for cc fevers, weakness, fatigue, shortness of breath for 5 days. Tested positive for covid 3 days ago at an urgent care. Today presented again to urgent care and noted to be hypoxic and sent to the ER. Patient denies dysuria, diarrhea, constipation. Currently denies shortness of breath on high flow.     In the ER, patient was febrile to 101.5F, tachycardic to 120s, tachypneic to 40, hypoxic to 88% on 6L. Improved on high flow nasal cannula to 95%.  (08 Mar 2021 14:08)      Interval Events: no events overnight, heparin gtt switched to argatroban based on high suspicion for hypercoaguability based on aquiring RLLobe PE      REVIEW OF SYSTEMS: unable 2/2 sedated and intubated          OBJECTIVE:  ICU Vital Signs Last 24 Hrs  T(C): 37.7 (24 Mar 2021 12:00), Max: 38.2 (23 Mar 2021 16:00)  T(F): 99.9 (24 Mar 2021 12:00), Max: 100.8 (23 Mar 2021 16:00)  HR: 90 (24 Mar 2021 12:00) (80 - 92)  BP: --  BP(mean): --  ABP: 104/61 (24 Mar 2021 12:00) (76/44 - 128/67)  ABP(mean): 75 (24 Mar 2021 12:00) (55 - 86)  RR: 32 (24 Mar 2021 12:00) (32 - 32)  SpO2: 99% (24 Mar 2021 12:00) (96% - 100%)    Mode: AC/ CMV (Assist Control/ Continuous Mandatory Ventilation), RR (machine): 32, TV (machine): 300, FiO2: 60, PEEP: 8, ITime: 1, MAP: 17, PIP: 40    03-23 @ 07:01  -  03-24 @ 07:00  --------------------------------------------------------  IN: 2626.6 mL / OUT: 995 mL / NET: 1631.6 mL    03-24 @ 07:01  - 03-24 @ 12:04  --------------------------------------------------------  IN: 1481.9 mL / OUT: 230 mL / NET: 1251.9 mL      CAPILLARY BLOOD GLUCOSE      POCT Blood Glucose.: 205 mg/dL (24 Mar 2021 11:53)          PHYSICAL EXAM:  GENERAL: NAD,  HEENT:  Atraumatic, Normocephalic  EYES: PERRLA, conjunctiva and sclera clear  NECK: Supple, No JVD  CHEST/LUNG: diminished bilaterally; No wheezes, rales, or rhonchi  HEART: Regular rate and rhythm; No murmurs, rubs, or gallops  ABDOMEN: Soft, Nontender, Nondistended; Bowel sounds present  EXTREMITIES:  2+ Peripheral Pulses, No clubbing, cyanosis, or edema  PSYCH: unable as pt is sedated  NEUROLOGY: sedated  SKIN: No rashes or lesions        HOSPITAL MEDICATIONS:  MEDICATIONS  (STANDING):  argatroban Infusion 1.8 MICROgram(s)/kG/Min (7.2 mL/Hr) IV Continuous <Continuous>  aspirin  chewable 162 milliGRAM(s) Enteral Tube daily  chlorhexidine 0.12% Liquid 15 milliLiter(s) Oral Mucosa every 12 hours  chlorhexidine 2% Cloths 1 Application(s) Topical <User Schedule>  cholecalciferol 2000 Unit(s) Oral daily  cisatracurium Infusion 3 MICROgram(s)/kG/Min (12 mL/Hr) IV Continuous <Continuous>  fentaNYL   Infusion... 2.5 MICROgram(s)/kG/Hr (8.34 mL/Hr) IV Continuous <Continuous>  influenza   Vaccine 0.5 milliLiter(s) IntraMuscular once  insulin lispro (ADMELOG) corrective regimen sliding scale   SubCutaneous every 6 hours  insulin NPH human recombinant 8 Unit(s) SubCutaneous every 6 hours  ketamine Infusion. 3 mG/kG/Hr (20 mL/Hr) IV Continuous <Continuous>  multivitamin/minerals/iron Oral Solution (CENTRUM) 15 milliLiter(s) Oral daily  norepinephrine Infusion 0.01 MICROgram(s)/kG/Min (1.25 mL/Hr) IV Continuous <Continuous>  pantoprazole  Injectable 40 milliGRAM(s) IV Push two times a day  petrolatum Ophthalmic Ointment 1 Application(s) Both EYES two times a day  polyethylene glycol 3350 17 Gram(s) Oral every 12 hours  propofol Infusion 50 MICROgram(s)/kG/Min (20 mL/Hr) IV Continuous <Continuous>  senna Syrup 10 milliLiter(s) Oral at bedtime    MEDICATIONS  (PRN):  acetaminophen    Suspension .. 650 milliGRAM(s) Oral every 6 hours PRN Temp greater or equal to 38C (100.4F)      LABS:                        9.0    12.85 )-----------( 356      ( 24 Mar 2021 00:12 )             29.9     Hgb Trend: 9.0<--, 9.0<--, 8.9<--, 9.2<--, 9.1<--  03-24    131<L>  |  96  |  20  ----------------------------<  219<H>  4.7   |  25  |  0.60    Ca    8.0<L>      24 Mar 2021 00:12  Phos  3.3     03-24  Mg     2.2     03-24    TPro  6.4  /  Alb  2.2<L>  /  TBili  0.5  /  DBili  x   /  AST  115<H>  /  ALT  82<H>  /  AlkPhos  242<H>  03-24    LIVER FUNCTIONS - ( 24 Mar 2021 00:12 )  Alb: 2.2 g/dL / Pro: 6.4 g/dL / ALK PHOS: 242 U/L / ALT: 82 U/L / AST: 115 U/L / GGT: x           Creatinine Trend: 0.60<--, 0.62<--, 0.57<--, 0.41<--, 0.51<--, 0.49<--  PT/INR - ( 24 Mar 2021 00:12 )   PT: 16.8 sec;   INR: 1.42 ratio         PTT - ( 24 Mar 2021 11:25 )  PTT:82.6 sec    Arterial Blood Gas:  03-24 @ 00:13  7.36/50/96/28/97/2.8  ABG lactate: --  Arterial Blood Gas:  03-23 @ 11:10  7.36/50/73/28/94/2.2  ABG lactate: --  Arterial Blood Gas:  03-23 @ 02:46  7.34/59/60/31/89/4.6  ABG lactate: --  Arterial Blood Gas:  03-23 @ 00:27  7.34/56/63/29/85/3.4  ABG lactate: --  Arterial Blood Gas:  03-22 @ 22:10  7.36/54/61/30/88/4.1  ABG lactate: --  Arterial Blood Gas:  03-22 @ 20:58  7.37/54/102/30/97/4.5  ABG lactate: --  Arterial Blood Gas:  03-22 @ 16:09  7.39/48/103/29/97/3.7  ABG lactate: --  Arterial Blood Gas:  03-22 @ 14:18  7.35/55/59/29/88/3.5  ABG lactate: --        MICROBIOLOGY:     RADIOLOGY:  [ ] Reviewed and interpreted by me    EKG:

## 2021-03-24 NOTE — PROGRESS NOTE ADULT - ATTENDING COMMENTS
60M PMH DM2 who presents with acute hypoxemic respiratory failure due to COVID-19 pneumonia complicated by RLL segmental PE and ileus.    1. Neuro: continue fentanyl, ketamine, and propofol. TG level 261. Trial off cisatracurium unsuccessful, restarted now  2. CV: distributive shock, continue norepinephrine for goal MAP>65  3. Pulm: continue lung protective ventilation, on volume a/c 32/300/60%/8. Decrease FiO2 to 50% and decrease further as tolerates. Peak pressure 39, plateau 35. Add budesonide 0.5 mg bid  4. GI: tolerating vital tube feeds. Continue PPI. Bowel regimen with senna and miralax  5. Renal: stable kidney function and lytes. Decreased urine output due to intravascular volume depletion given increased pulse pressure with passive leg raising. Give 1 liter LR. Strict I/Os  6. ID: s/p remdesevir and dexamethasone. Blood and sputum cultures on 3/21 with no growth  7. Heme: a/c changed overnight to argatroban, started on aspirin 162 mg. Monitor leukocytosis  8. Endo: continue NPH 8 q6h + insulin coverage scale  9. Skin: RIJ TLC 3/17, R radial a-line 3/17  10. Dispo: full code, prognosis guarded, discussed with daughter Adele ([phone])  CC time spent: 35 min

## 2021-03-24 NOTE — PROGRESS NOTE ADULT - ASSESSMENT
A/P: 61 yo male with no known pmh presents with hypoxic respiratory failure due to covid pneumonia now c/b Rt lung segmental PE. On 3/17  RRT was called for Hypoxia with associated fever of 102 requiring intubation. The patient was initiated on Remdisivir and dexamethasone tx 3/8 and has now been completed. MICU course further complicated by illeus and intermittent hypoxia with lung noncompliance issues.         Neuro:  - Paralyzed on Nimbex, sedated on: Fentanyl , Propofol , Ketamine   -Will attempt to wean off Nimbex today    CV:  #Hypotension likely in setting of vasodilatory shock   - Remains on low dose Levo, attempt to wean, maintain MAP > 65    Resp:  #Hypoxic Respiratory Failure in setting of ARDS, Covid/PNA/ PE  -3/17 Intubated /32/8/50 p/f: 160 peak: 42 plateau: 35 DP26  - abg  7.36/50/96/28/60/97%   - Completed RDV, and Decadron course   - Vent management per ABG, pulm toileting   -Rt lung segmental PE treated with heparin gtt  fio2 lowered from 60 to 50  -add pulmicort to regimen     GI:  #s/p abd mildly distended  - TF tolerating  - c/w reglan   - c/w bowel regimen last BM 3/20--add miralax  - GI ppx: PPI    Renal:  #hyponatremia--stable   - Na 131, trend BMP,   - Scr stable, strict I/Os,   - Fluid responsive to b/l leg raising. 1 liter LR given.    Endocrine:  #Uncontrolled DM, A1c 12.4  NPH increased from 8 units to 11units q 6units   - Monitor BS,   - ISS moderate coverage     Heme:  #Hypercoagulable state in setting of Covid  - D-dimer uptrending, c/w trending q72h  - DVT ppx: Transitioned to Heparin gtt for RLL PE,   #PE: RLL seen on CT  - Hemodynamically stable, on heparin drip  - LE Duplex negative    ID:  # Mild Leukocytosis, stable  - - pt remains afebrile, lactate normal  - Zosyn for empiric completed 3/20  #Covvid-19  - Completed RDV/Decadron course   - Trending inflammatory markers q72h    Lines:  RIJ 3/17  Right radial 3/17         A/P: 61 yo male with no known pmh presents with hypoxic respiratory failure due to covid pneumonia now c/b Rt lung segmental PE. On 3/17  RRT was called for Hypoxia with associated fever of 102 requiring intubation. The patient was initiated on Remdisivir and dexamethasone tx 3/8 and has now been completed. MICU course further complicated by illeus and intermittent hypoxia with lung noncompliance issues.         Neuro:  - Paralyzed on Nimbex, sedated on: Fentanyl , Propofol , Ketamine   -Will attempt to wean off Nimbex today    CV:  #Hypotension likely in setting of vasodilatory shock   - Remains on low dose Levo, attempt to wean, maintain MAP > 65    Resp:  #Hypoxic Respiratory Failure in setting of ARDS, Covid/PNA/ PE  -3/17 Intubated /32/8/50 p/f: 160 peak: 42 plateau: 35 DP26  - abg  7.36/50/96/28/60/97%   - Completed RDV, and Decadron course   - Vent management per ABG, pulm toileting   -Rt lung segmental PE treated with heparin gtt  fio2 lowered from 60 to 50  -add pulmicort to regimen     GI:  #s/p abd mildly distended  - TF tolerating  - c/w reglan   - c/w bowel regimen last BM 3/20--add miralax  - GI ppx: PPI    Renal:  #hyponatremia--stable   - Na 131, trend BMP,   - Scr stable, strict I/Os,   - Fluid responsive to b/l leg raising. 1 liter LR given.    Endocrine:  #Uncontrolled DM, A1c 12.4  NPH increased from 8 units to 11units q 6units   - Monitor BS,   - ISS moderate coverage     Heme:  #Hypercoagulable state in setting of Covid  - D-dimer uptrending, c/w trending q72h  - DVT ppx: Transitioned to Heparin gtt for RLL PE, then to argatroban for high suspiscion of hypercoaguability,     #PE: RLL seen on CT  - Hemodynamically stable, on heparin drip  - LE Duplex negative    ID:  # Mild Leukocytosis, stable  - - pt remains afebrile, lactate normal  - Zosyn for empiric completed 3/20  #Covvid-19  - Completed RDV/Decadron course   - Trending inflammatory markers q72h    Lines:  RIJ 3/17  Right radial 3/17

## 2021-03-24 NOTE — DIETITIAN NUTRITION RISK NOTIFICATION - MALNUTRITION EVALUATION AS DEMONSTRATED BY (ADULTS > 20 YEARS OF AGE)
Weight loss.../Inadequate energy intake.../Fluid accumulation...
Weight loss.../Inadequate energy intake...

## 2021-03-25 NOTE — PROGRESS NOTE ADULT - PROBLEM SELECTOR PLAN 2
+COVID PCR  -CXR b/l LL opacities   -S/p Remdesivir x 5 days   -S/p Decadron 6mg qd x 10 days   -Not a candidate for Tocilizumab at this point   -Trend inflammatory markers  -S/p course abx as per ID for possible superimposed bacterial PNA.   -BC NGTD   -MRSA nasal swab negative  -Low grade temps, f/u cultures. Abx as per MICU/ID.

## 2021-03-25 NOTE — PROGRESS NOTE ADULT - SUBJECTIVE AND OBJECTIVE BOX
Follow-up Pulm Progress Note    intubated /sedated     Medications:  MEDICATIONS  (STANDING):  aspirin  chewable 162 milliGRAM(s) Enteral Tube daily  buDESOnide    Inhalation Suspension 0.5 milliGRAM(s) Inhalation every 12 hours  cefepime   IVPB      cefepime   IVPB 2000 milliGRAM(s) IV Intermittent every 8 hours  chlorhexidine 0.12% Liquid 15 milliLiter(s) Oral Mucosa every 12 hours  chlorhexidine 2% Cloths 1 Application(s) Topical <User Schedule>  cholecalciferol 2000 Unit(s) Oral daily  cisatracurium Infusion 3 MICROgram(s)/kG/Min (12 mL/Hr) IV Continuous <Continuous>  dexMEDEtomidine Infusion 0.5 MICROgram(s)/kG/Hr (8.34 mL/Hr) IV Continuous <Continuous>  fentaNYL   Infusion... 2.5 MICROgram(s)/kG/Hr (8.34 mL/Hr) IV Continuous <Continuous>  heparin  Infusion.  Unit(s)/Hr (12 mL/Hr) IV Continuous <Continuous>  influenza   Vaccine 0.5 milliLiter(s) IntraMuscular once  insulin lispro (ADMELOG) corrective regimen sliding scale   SubCutaneous every 6 hours  insulin NPH human recombinant 15 Unit(s) SubCutaneous every 6 hours  ketamine Infusion. 3 mG/kG/Hr (20 mL/Hr) IV Continuous <Continuous>  lactulose Syrup 20 Gram(s) Oral every 6 hours  multivitamin/minerals/iron Oral Solution (CENTRUM) 15 milliLiter(s) Oral daily  norepinephrine Infusion 0.01 MICROgram(s)/kG/Min (1.25 mL/Hr) IV Continuous <Continuous>  pantoprazole  Injectable 40 milliGRAM(s) IV Push two times a day  petrolatum Ophthalmic Ointment 1 Application(s) Both EYES two times a day  polyethylene glycol 3350 17 Gram(s) Oral every 12 hours  senna Syrup 10 milliLiter(s) Oral every 12 hours  vasopressin Infusion 0.04 Unit(s)/Min (2.4 mL/Hr) IV Continuous <Continuous>    MEDICATIONS  (PRN):  acetaminophen    Suspension .. 650 milliGRAM(s) Oral every 6 hours PRN Temp greater or equal to 38C (100.4F)  heparin   Injectable 5500 Unit(s) IV Push every 6 hours PRN For aPTT less than 40  heparin   Injectable 2500 Unit(s) IV Push every 6 hours PRN For aPTT between 40 - 57      Mode: AC/ CMV (Assist Control/ Continuous Mandatory Ventilation)  RR (machine): 32  TV (machine): 300  FiO2: 70  PEEP: 8  ITime: 1  MAP: 20  PIP: 38      Vital Signs Last 24 Hrs  T(C): 38.1 (25 Mar 2021 08:00), Max: 38.1 (25 Mar 2021 08:00)  T(F): 100.6 (25 Mar 2021 08:00), Max: 100.6 (25 Mar 2021 08:00)  HR: 90 (25 Mar 2021 13:30) (85 - 103)  BP: --  BP(mean): --  RR: 32 (25 Mar 2021 13:30) (32 - 32)  SpO2: 95% (25 Mar 2021 13:30) (89% - 100%)    ABG - ( 25 Mar 2021 11:49 )  pH, Arterial: 7.33  pH, Blood: x     /  pCO2: 47    /  pO2: 92    / HCO3: 24    / Base Excess: -1.5  /  SaO2: 96                    0324 @ 07:01  -  03-25 @ 07:00  --------------------------------------------------------  IN: 4410.4 mL / OUT: 955 mL / NET: 3455.4 mL          LABS:                        8.6    15.39 )-----------( 252      ( 25 Mar 2021 00:23 )             28.8     0325    135  |  95<L>  |  30<H>  ----------------------------<  407<H>  4.6   |  32<H>  |  0.61    Ca    10.5      25 Mar 2021 12:06  Phos  4.0       Mg     2.0         TPro  6.6  /  Alb  2.3<L>  /  TBili  0.7  /  DBili  x   /  AST  1148<H>  /  ALT  779<H>  /  AlkPhos  423<H>  25      CARDIAC MARKERS ( 25 Mar 2021 00:22 )  x     / x     / 68 U/L / x     / x      CARDIAC MARKERS ( 24 Mar 2021 00:12 )  x     / x     / 68 U/L / x     / x          CAPILLARY BLOOD GLUCOSE      POCT Blood Glucose.: 259 mg/dL (25 Mar 2021 04:59)    PT/INR - ( 25 Mar 2021 00:24 )   PT: 36.4 sec;   INR: 3.21 ratio         PTT - ( 25 Mar 2021 06:51 )  PTT:78.9 sec  Urinalysis Basic - ( 25 Mar 2021 12:59 )    Color: Colorless / Appearance: Slightly Turbid / S.010 / pH: x  Gluc: x / Ketone: Negative  / Bili: Negative / Urobili: Negative   Blood: x / Protein: Trace / Nitrite: Negative   Leuk Esterase: Negative / RBC: 144 /hpf / WBC 3 /HPF   Sq Epi: x / Non Sq Epi: 0 /hpf / Bacteria: Negative      Procalcitonin, Serum: 0.25 ng/mL (21 @ 00:12)              CULTURES:     Culture - Blood (collected 21 @ 21:24)  Source: .Blood Blood-Peripheral  Preliminary Report (21 @ 22:02):    No growth to date.    Culture - Blood (collected 21 @ 23:11)  Source: .Blood Blood-Peripheral  Preliminary Report (21 @ 01:01):    No growth to date.    Culture - Blood (collected 21 @ 23:11)  Source: .Blood Blood-Peripheral  Preliminary Report (21 @ 01:01):    No growth to date.    Culture - Blood (collected 21 @ 10:03)  Source: .Blood Blood-Peripheral  Final Report (21 @ 11:00):    No Growth Final    Culture - Blood (collected 21 @ 10:03)  Source: .Blood Blood-Venous  Final Report (21 @ 11:00):    No Growth Final          Physical Examination:  GEN: NAD  NEURO: BLOSSOM, intubated/sedated   PULM: Coarse bilaterally   CVS: S1, S2 heard  EXT: no edema     RADIOLOGY REVIEWED  CXR 3/17: b/l opacities R>L

## 2021-03-25 NOTE — PROGRESS NOTE ADULT - ATTENDING COMMENTS
60M PMH DM2 who presents with acute hypoxemic respiratory failure due to COVID-19 pneumonia complicated by RLL segmental PE and ileus. Now also with hyperkalemia.    POCUS: B lines anteriorly bilaterally with lumpy bumpy pleura, no pleural effusions. LV systolic function is normal, but RV is enlarged RV>LV, IVC 2.4 cm without variation, no significant pericardial effusion.    1. Neuro: continue fentanyl, ketamine, and dexmedetomidine. Propofol discontinued given . Continue neuromuscular blockade  2. CV: distributive shock, continue norepinephrine and add vasopressin for goal MAP>65. Diuresis with furosemide. No volume responsiveness with passive leg raising, s/p IVF resuscitation  3. Pulm: continue lung protective ventilation, on volume a/c 32/300/70%/8. Decrease FiO2 to 50% and decrease further as tolerates. Peak pressure 42, plateau 37. Repeat ABG with P:F ratio 190s, hold off on further proning at this time. Continue budesonide 0.5 mg bid  4. GI: change tube feeds to Nepro given hyperkalemia. Continue PPI. Bowel regimen with senna and miralax. Acute transaminitis of unclear etiology, likely due to congestive hepatopathy given RV enlargement with dilated IVC. Change argatroban to heparin, avoid hepatotoxins, close monitoring.  5. Renal: stable kidney function and adequate urine output, but now with hyperkalemia. Give insulin, D50, bicarb, furosemide. Change tube feeds to Nepro. Strict I/Os  6. ID: s/p remdesevir and dexamethasone. Blood and sputum cultures on 3/21 with no growth. Febrile again today with leukocytosis. Repeat pan-cultures. Empiric vancomycin x1 and cefepime until cultures return  7. Heme: change argatroban to heparin given acute transaminitis. Check TEG in AM. Continue aspirin 162 mg. Monitor leukocytosis  8. Endo: DM2, a1c 12.4, increase NPH to 16 q6h, continue insulin coverage scale  9. Skin: RIJ TLC 3/17, R radial a-line 3/17  10. Dispo: full code, prognosis guarded  CC time spent: 35 min 60M PMH DM2 who presents with acute hypoxemic respiratory failure due to COVID-19 pneumonia complicated by RLL segmental PE and ileus. Now also with hyperkalemia.    POCUS: B lines anteriorly bilaterally with lumpy bumpy pleura, no pleural effusions. LV systolic function is normal, but RV is enlarged RV>LV, IVC 2.4 cm without variation, no significant pericardial effusion.    1. Neuro: continue fentanyl, ketamine, and dexmedetomidine. Propofol discontinued given . Continue neuromuscular blockade  2. CV: distributive shock, continue norepinephrine and add vasopressin for goal MAP>65. Diuresis with furosemide. No volume responsiveness with passive leg raising, s/p IVF resuscitation  3. Pulm: continue lung protective ventilation, on volume a/c 32/300/70%/8. Decrease FiO2 to 50% and decrease further as tolerates. Peak pressure 42, plateau 37. Repeat ABG with P:F ratio 190s, hold off on further proning at this time. Continue budesonide 0.5 mg bid  4. GI: change tube feeds to Nepro given hyperkalemia. Continue PPI. Bowel regimen with senna and miralax. Acute transaminitis of unclear etiology, likely due to congestive hepatopathy given RV enlargement with dilated IVC. Change argatroban to heparin, avoid hepatotoxins, close monitoring.  5. Renal: stable kidney function and adequate urine output, but now with hyperkalemia. Give insulin, D50, bicarb, furosemide. Change tube feeds to Nepro. Strict I/Os  6. ID: s/p remdesevir and dexamethasone. Blood and sputum cultures on 3/21 with no growth. Febrile again today with leukocytosis. Repeat pan-cultures. Empiric vancomycin x1 and cefepime until cultures return  7. Heme: change argatroban to heparin given acute transaminitis. Check TEG in AM. Continue aspirin 162 mg. Monitor leukocytosis  8. Endo: DM2, a1c 12.4, increase NPH to 16 q6h, continue insulin coverage scale  9. Skin: RIJ TLC 3/17, R radial a-line 3/17  10. Dispo: full code, prognosis guarded, discussed with wife and daughter Adele ([phone])  CC time spent: 35 min

## 2021-03-25 NOTE — PROGRESS NOTE ADULT - ASSESSMENT
Assessment  DMT2: 60y Male with newly diagnosed DM T2 with hyperglycemia admitted with COVID19, A1C is 12.4%, on NPH insulin and coverage, patient received regular insulin overnight 2/2 hyperkalemia and first-dose increased NPH this afternoon, blood sugars running high and not at target, no hypoglycemic episodes, remains intubated on tube feeds.  COVID19: On medications, monitored, stable.  Overweight: No strict exercise routines, neither on low calorie diet.      Anderson Mcmanus MD  Cell: [phone]  Office: [phone]       Assessment  DMT2: 60y Male with newly diagnosed DM T2 with hyperglycemia admitted with COVID19, A1C is 12.4%, on NPH insulin and coverage, patient received regular insulin  overnight 2/2 hyperkalemia and first-dose increased NPH this afternoon, blood sugars running high and not at target, no hypoglycemic episodes, remains intubated on tube feeds.  COVID19: On medications, monitored, stable.  Overweight: No strict exercise routines, neither on low calorie diet.      Anderson Mcmanus MD  Cell: [phone]  Office: [phone]

## 2021-03-25 NOTE — PROGRESS NOTE ADULT - SUBJECTIVE AND OBJECTIVE BOX
Chief complaint  Patient is a 60y old  Male who presents with a chief complaint of hypoxia 2/2 COVID (25 Mar 2021 14:00)   Review of systems  Overnight events reviewed, patient remains intubated, no hypoglycemic episodes.    Labs and Fingersticks  CAPILLARY BLOOD GLUCOSE      POCT Blood Glucose.: 259 mg/dL (25 Mar 2021 04:59)  POCT Blood Glucose.: 279 mg/dL (25 Mar 2021 02:14)  POCT Blood Glucose.: 280 mg/dL (24 Mar 2021 22:51)  POCT Blood Glucose.: 276 mg/dL (24 Mar 2021 16:55)      Medications  MEDICATIONS  (STANDING):  aspirin  chewable 162 milliGRAM(s) Enteral Tube daily  buDESOnide    Inhalation Suspension 0.5 milliGRAM(s) Inhalation every 12 hours  cefepime   IVPB      cefepime   IVPB 2000 milliGRAM(s) IV Intermittent every 8 hours  chlorhexidine 0.12% Liquid 15 milliLiter(s) Oral Mucosa every 12 hours  chlorhexidine 2% Cloths 1 Application(s) Topical <User Schedule>  cholecalciferol 2000 Unit(s) Oral daily  cisatracurium Infusion 3 MICROgram(s)/kG/Min (12 mL/Hr) IV Continuous <Continuous>  dexMEDEtomidine Infusion 0.5 MICROgram(s)/kG/Hr (8.34 mL/Hr) IV Continuous <Continuous>  fentaNYL   Infusion... 2.5 MICROgram(s)/kG/Hr (8.34 mL/Hr) IV Continuous <Continuous>  heparin  Infusion.  Unit(s)/Hr (12 mL/Hr) IV Continuous <Continuous>  influenza   Vaccine 0.5 milliLiter(s) IntraMuscular once  insulin lispro (ADMELOG) corrective regimen sliding scale   SubCutaneous every 6 hours  insulin NPH human recombinant 15 Unit(s) SubCutaneous every 6 hours  ketamine Infusion. 3 mG/kG/Hr (20 mL/Hr) IV Continuous <Continuous>  lactulose Syrup 20 Gram(s) Oral every 6 hours  multivitamin/minerals/iron Oral Solution (CENTRUM) 15 milliLiter(s) Oral daily  norepinephrine Infusion 0.01 MICROgram(s)/kG/Min (1.25 mL/Hr) IV Continuous <Continuous>  pantoprazole  Injectable 40 milliGRAM(s) IV Push two times a day  petrolatum Ophthalmic Ointment 1 Application(s) Both EYES two times a day  polyethylene glycol 3350 17 Gram(s) Oral every 12 hours  senna Syrup 10 milliLiter(s) Oral every 12 hours  vasopressin Infusion 0.04 Unit(s)/Min (2.4 mL/Hr) IV Continuous <Continuous>      Physical Exam  General: Patient intubated  Vital Signs Last 12 Hrs  T(F): 100.6 (03-25-21 @ 08:00), Max: 100.6 (03-25-21 @ 08:00)  HR: 93 (03-25-21 @ 14:45) (85 - 103)  BP: --  BP(mean): --  RR: 32 (03-25-21 @ 14:45) (32 - 32)  SpO2: 95% (03-25-21 @ 14:45) (90% - 100%)       Chief complaint  Patient is a 60y old  Male who presents with a chief complaint of hypoxia 2/2 COVID (25 Mar 2021 14:00)   Review of systems  Overnight events reviewed, patient remains intubated, no hypoglycemic episodes.    Labs and Fingersticks  CAPILLARY BLOOD GLUCOSE      POCT Blood Glucose.: 259 mg/dL (25 Mar 2021 04:59)  POCT Blood Glucose.: 279 mg/dL (25 Mar 2021 02:14)  POCT Blood Glucose.: 280 mg/dL (24 Mar 2021 22:51)  POCT Blood Glucose.: 276 mg/dL (24 Mar 2021 16:55)      Medications  MEDICATIONS  (STANDING):  aspirin  chewable 162 milliGRAM(s) Enteral Tube daily  buDESOnide    Inhalation Suspension 0.5 milliGRAM(s) Inhalation every 12 hours  cefepime   IVPB      cefepime   IVPB 2000 milliGRAM(s) IV Intermittent every 8 hours  chlorhexidine 0.12% Liquid 15 milliLiter(s) Oral Mucosa every 12 hours  chlorhexidine 2% Cloths 1 Application(s) Topical <User Schedule>  cholecalciferol 2000 Unit(s) Oral daily  cisatracurium Infusion 3 MICROgram(s)/kG/Min (12 mL/Hr) IV Continuous <Continuous>  dexMEDEtomidine Infusion 0.5 MICROgram(s)/kG/Hr (8.34 mL/Hr) IV Continuous <Continuous>  fentaNYL   Infusion... 2.5 MICROgram(s)/kG/Hr (8.34 mL/Hr) IV Continuous <Continuous>  heparin  Infusion.  Unit(s)/Hr (12 mL/Hr) IV Continuous <Continuous>  influenza   Vaccine 0.5 milliLiter(s) IntraMuscular once  insulin lispro (ADMELOG) corrective regimen sliding scale   SubCutaneous every 6 hours  insulin NPH human recombinant 15 Unit(s) SubCutaneous every 6 hours  ketamine Infusion. 3 mG/kG/Hr (20 mL/Hr) IV Continuous <Continuous>  lactulose Syrup 20 Gram(s) Oral every 6 hours  multivitamin/minerals/iron Oral Solution (CENTRUM) 15 milliLiter(s) Oral daily  norepinephrine Infusion 0.01 MICROgram(s)/kG/Min (1.25 mL/Hr) IV Continuous <Continuous>  pantoprazole  Injectable 40 milliGRAM(s) IV Push two times a day  petrolatum Ophthalmic Ointment 1 Application(s) Both EYES two times a day  polyethylene glycol 3350 17 Gram(s) Oral every 12 hours  senna Syrup 10 milliLiter(s) Oral every 12 hours  vasopressin Infusion 0.04 Unit(s)/Min (2.4 mL/Hr) IV Continuous <Continuous>      Physical Exam  General: Patient intubated  Vital Signs Last 12 Hrs  T(F): 100.6 (03-25-21 @ 08:00), Max: 100.6 (03-25-21 @ 08:00)  HR: 93 (03-25-21 @ 14:45) (85 - 103)  BP: --  BP(mean): --  RR: 32 (03-25-21 @ 14:45) (32 - 32)  SpO2: 95% (03-25-21 @ 14:45) (90% - 100%)

## 2021-03-25 NOTE — PROGRESS NOTE ADULT - ASSESSMENT
ASSESSMENT/PLAN:  Patient is a 60y old  Male who presents with a chief complaint of hypoxia 2/2 COVID (24 Mar 2021 14:02)    HPI:  59 yo male w/no known pmh (does not f/u with PCP) presents to [facility] for cc fevers, weakness, fatigue, shortness of breath for 5 days. Tested positive for covid 3 days ago at an urgent care. Today presented again to urgent care and noted to be hypoxic and sent to the ER. Patient denies dysuria, diarrhea, constipation. Currently denies shortness of breath on high flow.     In the ER, patient was febrile to 101.5F, tachycardic to 120s, tachypneic to 40, hypoxic to 88% on 6L. Improved on high flow nasal cannula to 95%.  (08 Mar 2021 14:08)  3/5 COVID+ as outpt  3/8 admitted  3/8-3/12 pt received RDV  3/8-3/17 decadron  3/17 RRT on the floor>intubated    Neuro:  neuro checks per ICU protocol  re-attempt to wean nimbex to off      RESPIRATORY:  titrate FiO2 to keep O2 SAT>90  RLL PE>continue argatroban  continue pulmonicort    CARDIOVASCULAR:  shock(most likely vasodilatory) >will add vasopressin  keep MAP>65    RENAL:  strict I/o   hyponatremia, hyperkalemia>rep labs  trend BUN/Cr    GASTROINTESTINAL :  continue PPI  continue TF>change to nephro  LFTs rising>trend     INFECTIOUS DISEASE:  all bld & sputum cx (3/8, 3/21, 3/23) neg  WBC 15.39 (up)>trend  trend inflammatory markers    HEME:  SCDs  continue argatroban for RLL PE and hypercoag state    ENDO:  ISS keep glucose <180 and >110      ======================= DISPOSITION  =====================  [ x] Critical   [ ] Guarded    [ ] Stable    [ x] Maintain in ICU        Gloria Hurley PA-C  ICU [phone]       ASSESSMENT/PLAN:  Patient is a 60y old  Male who presents with a chief complaint of hypoxia 2/2 COVID (24 Mar 2021 14:02)    HPI:  59 yo male w/no known pmh (does not f/u with PCP) presents to [facility] for cc fevers, weakness, fatigue, shortness of breath for 5 days. Tested positive for covid 3 days ago at an urgent care. Today presented again to urgent care and noted to be hypoxic and sent to the ER. Patient denies dysuria, diarrhea, constipation. Currently denies shortness of breath on high flow.     In the ER, patient was febrile to 101.5F, tachycardic to 120s, tachypneic to 40, hypoxic to 88% on 6L. Improved on high flow nasal cannula to 95%.  (08 Mar 2021 14:08)  3/5 COVID+ as outpt  3/8 admitted  3/8-3/12 pt received RDV  3/8-3/17 decadron  3/17 RRT on the floor>intubated    Neuro:  neuro checks per ICU protocol    RESPIRATORY:  titrate FiO2 to keep O2 SAT>90  RLL PE>continue argatroban  continue pulmonicort  +/-proning    CARDIOVASCULAR:  shock(most likely vasodilatory) >will add vasopressin  keep MAP>65    RENAL:  strict I/o   hyponatremia, hyperkalemia>lasix 40mg IVPx1  trend BUN/Cr    GASTROINTESTINAL :  continue PPI  continue TF>change to nephro  LFTs rising>trend     INFECTIOUS DISEASE:  all bld & sputum cx (3/8, 3/21, 3/23) neg  WBC 15.39 (up)>trend  will recult today>begin empiric abx  trend inflammatory markers    HEME:  SCDs  in the setting of elevated LFTs>dc argatroban and start heparin gtt for RLL PE and hypercoag state    ENDO:  NPH 15units q6hr  ISS keep glucose <180 and >110      ======================= DISPOSITION  =====================  [ x] Critical   [ ] Guarded    [ ] Stable    [ x] Maintain in ICU        Gloria Hurley PA-C  ICU [phone]       ASSESSMENT/PLAN:  Patient is a 60y old  Male who presents with a chief complaint of hypoxia 2/2 COVID (24 Mar 2021 14:02)    HPI:  61 yo male w/no known pmh (does not f/u with PCP) presents to [facility] for cc fevers, weakness, fatigue, shortness of breath for 5 days. Tested positive for covid 3 days ago at an urgent care. Today presented again to urgent care and noted to be hypoxic and sent to the ER. Patient denies dysuria, diarrhea, constipation. Currently denies shortness of breath on high flow.     In the ER, patient was febrile to 101.5F, tachycardic to 120s, tachypneic to 40, hypoxic to 88% on 6L. Improved on high flow nasal cannula to 95%.  (08 Mar 2021 14:08)  3/5 COVID+ as outpt  3/8 admitted  3/8-3/12 pt received RDV  3/8-3/17 decadron  3/17 RRT on the floor>intubated    Neuro:  neuro checks per ICU protocol    RESPIRATORY:  titrate FiO2 to keep O2 SAT>90  RLL PE>will change to heparin gtt due to rising LFTs  continue pulmonicort  +/-proning    CARDIOVASCULAR:  shock(most likely vasodilatory) >will add vasopressin  keep MAP>65    RENAL:  strict I/o   hyponatremia, hyperkalemia>lasix 40mg IVPx1  trend BUN/Cr    GASTROINTESTINAL :  continue PPI  continue TF>change to nephro  LFTs rising>trend     INFECTIOUS DISEASE:  all bld & sputum cx (3/8, 3/21, 3/23) neg  WBC 15.39 (up)>trend  will recult today>begin empiric abx  trend inflammatory markers    HEME:  SCDs  in the setting of elevated LFTs>dc argatroban and start heparin gtt for RLL PE and hypercoag state    ENDO:  NPH 15units q6hr  ISS keep glucose <180 and >110      ======================= DISPOSITION  =====================  [ x] Critical   [ ] Guarded    [ ] Stable    [ x] Maintain in ICU        Gloria Hurley PA-C  ICU [phone]

## 2021-03-25 NOTE — PROGRESS NOTE ADULT - ASSESSMENT
Mr Olivares is a 60 year old man who presented with hypoxia after being diagnosed with COVID outpatient. He has completed a course of remdesevir, but is still febrile and hypoxic.   He does not have a clear superimposed bacterial infection, though bacterial pneumonia is possible.   CT chest on admission with PE and ground glass opacities.   Febrile and requiring high flow nasal cannula.     COVID-19 pneumonia, leukocytosis, resp failure  - Remdesevir completed  - s/p Zosyn  - cefepime restarted today   - Check sputum culture  - Not a candidate for tocilizumab in setting of possible infection   - Follow up cultures - bcx negative  - Monitor for fevers  - Trend WBCs  - vent per MICU   - consider CT C/A/P given fevers   - change lines if possible     Prognosis guarded    Rj Rouse  Attending Physician   Division of Infectious Disease  Pager #[phone]  Available on Microsoft Teams also  After 5pm/weekend or no response, call #[phone]

## 2021-03-25 NOTE — PROGRESS NOTE ADULT - SUBJECTIVE AND OBJECTIVE BOX
TAMARA CHEW 60y [id number]    Patient is a 60y old  Male who presents with a chief complaint of hypoxia 2/2 COVID (25 Mar 2021 15:30)      Follow Up/CC:  ID following for covid    Interval History/ROS: fever+, intubated     Allergies    No Known Allergies    Intolerances        ANTIMICROBIALS:  cefepime   IVPB    cefepime   IVPB 2000 every 8 hours      MEDICATIONS  (STANDING):  aspirin  chewable 162 milliGRAM(s) Enteral Tube daily  buDESOnide    Inhalation Suspension 0.5 milliGRAM(s) Inhalation every 12 hours  cefepime   IVPB      cefepime   IVPB 2000 milliGRAM(s) IV Intermittent every 8 hours  chlorhexidine 0.12% Liquid 15 milliLiter(s) Oral Mucosa every 12 hours  chlorhexidine 2% Cloths 1 Application(s) Topical <User Schedule>  cholecalciferol 2000 Unit(s) Oral daily  cisatracurium Infusion 3 MICROgram(s)/kG/Min (12 mL/Hr) IV Continuous <Continuous>  dexMEDEtomidine Infusion 0.5 MICROgram(s)/kG/Hr (8.34 mL/Hr) IV Continuous <Continuous>  fentaNYL   Infusion... 2.5 MICROgram(s)/kG/Hr (8.34 mL/Hr) IV Continuous <Continuous>  heparin  Infusion.  Unit(s)/Hr (12 mL/Hr) IV Continuous <Continuous>  influenza   Vaccine 0.5 milliLiter(s) IntraMuscular once  insulin lispro (ADMELOG) corrective regimen sliding scale   SubCutaneous every 6 hours  insulin NPH human recombinant 15 Unit(s) SubCutaneous every 6 hours  ketamine Infusion. 3 mG/kG/Hr (20 mL/Hr) IV Continuous <Continuous>  lactulose Syrup 20 Gram(s) Oral every 6 hours  multivitamin/minerals/iron Oral Solution (CENTRUM) 15 milliLiter(s) Oral daily  norepinephrine Infusion 0.01 MICROgram(s)/kG/Min (1.25 mL/Hr) IV Continuous <Continuous>  pantoprazole  Injectable 40 milliGRAM(s) IV Push two times a day  petrolatum Ophthalmic Ointment 1 Application(s) Both EYES two times a day  polyethylene glycol 3350 17 Gram(s) Oral every 12 hours  senna Syrup 10 milliLiter(s) Oral every 12 hours  vasopressin Infusion 0.04 Unit(s)/Min (2.4 mL/Hr) IV Continuous <Continuous>    MEDICATIONS  (PRN):  acetaminophen    Suspension .. 650 milliGRAM(s) Oral every 6 hours PRN Temp greater or equal to 38C (100.4F)  heparin   Injectable 5500 Unit(s) IV Push every 6 hours PRN For aPTT less than 40  heparin   Injectable 2500 Unit(s) IV Push every 6 hours PRN For aPTT between 40 - 57        Vital Signs Last 24 Hrs  T(C): 37.8 (25 Mar 2021 16:00), Max: 38.1 (25 Mar 2021 08:00)  T(F): 100 (25 Mar 2021 16:00), Max: 100.6 (25 Mar 2021 08:00)  HR: 92 (25 Mar 2021 16:00) (85 - 103)  BP: --  BP(mean): --  RR: 32 (25 Mar 2021 16:00) (32 - 32)  SpO2: 96% (25 Mar 2021 16:00) (89% - 100%)    CBC Full  -  ( 25 Mar 2021 00:23 )  WBC Count : 15.39 K/uL  RBC Count : 4.19 M/uL  Hemoglobin : 8.6 g/dL  Hematocrit : 28.8 %  Platelet Count - Automated : 252 K/uL  Mean Cell Volume : 68.7 fl  Mean Cell Hemoglobin : 20.5 pg  Mean Cell Hemoglobin Concentration : 29.9 gm/dL  Auto Neutrophil # : x  Auto Lymphocyte # : x  Auto Monocyte # : x  Auto Eosinophil # : x  Auto Basophil # : x  Auto Neutrophil % : x  Auto Lymphocyte % : x  Auto Monocyte % : x  Auto Eosinophil % : x  Auto Basophil % : x        135  |  95<L>  |  30<H>  ----------------------------<  407<H>  4.6   |  32<H>  |  0.61    Ca    10.5      25 Mar 2021 12:06  Phos  4.0       Mg     2.0         TPro  6.6  /  Alb  2.3<L>  /  TBili  0.7  /  DBili  x   /  AST  1148<H>  /  ALT  779<H>  /  AlkPhos  423<H>      LIVER FUNCTIONS - ( 25 Mar 2021 09:36 )  Alb: 2.3 g/dL / Pro: 6.6 g/dL / ALK PHOS: 423 U/L / ALT: 779 U/L / AST: 1148 U/L / GGT: x           Urinalysis Basic - ( 25 Mar 2021 12:59 )    Color: Colorless / Appearance: Slightly Turbid / S.010 / pH: x  Gluc: x / Ketone: Negative  / Bili: Negative / Urobili: Negative   Blood: x / Protein: Trace / Nitrite: Negative   Leuk Esterase: Negative / RBC: 144 /hpf / WBC 3 /HPF   Sq Epi: x / Non Sq Epi: 0 /hpf / Bacteria: Negative        MICROBIOLOGY:  .Blood Blood-Peripheral  21   No growth to date.  --  --      .Sputum Sputum  21   Normal Respiratory Marilee absent  --    Rare polymorphonuclear leukocytes per low power field  Rare Squamous epithelial cells per low power field  No organisms seen per oil power field      .Blood Blood-Peripheral  21   No growth to date.  --  --      .Blood Blood-Venous  21   No Growth Final  --  --      .Blood Blood  21   No Growth Final  --  --      .Blood Blood-Peripheral  21   No Growth Final  --  --      RADIOLOGY    < from: US Abdomen Doppler (21 @ 13:29) >  Patent hepatic vasculature with normal directionality.    Hepatic steatosis.    Sludge within the gallbladder with nonspecific mild gallbladder wall thickening to 4 mm.    < end of copied text >

## 2021-03-25 NOTE — PROGRESS NOTE ADULT - PROBLEM SELECTOR PLAN 1
Will continue NPH 15u q6 and Admelog correction scale coverage for now.  If blood sugars remain elevated >200, suggest to increase to NPH 20u q6. Consider starting patient on IV insulin until blood sugars stabilize.  Will continue monitoring FS and FU.

## 2021-03-25 NOTE — PROGRESS NOTE ADULT - SUBJECTIVE AND OBJECTIVE BOX
TAMARA CHEW  MRN-[id number]  Patient is a 60y old  Male who presents with a chief complaint of hypoxia 2/2 COVID (24 Mar 2021 14:02)    HPI:  61 yo male w/no known pmh (does not f/u with PCP) presents to [facility] for cc fevers, weakness, fatigue, shortness of breath for 5 days. Tested positive for covid 3 days ago at an urgent care. Today presented again to urgent care and noted to be hypoxic and sent to the ER. Patient denies dysuria, diarrhea, constipation. Currently denies shortness of breath on high flow.     In the ER, patient was febrile to 101.5F, tachycardic to 120s, tachypneic to 40, hypoxic to 88% on 6L. Improved on high flow nasal cannula to 95%.  (08 Mar 2021 14:08)  3/5 COVID+ as outpt  3/8 admitted  3/8-3/12 pt received RDV  3/8-3/17 decadron  3/17 RRT on the floor>intubated    GTTS: nimbex/fentanyl/precedex/ketamine/levophed/argatroban    24 HOUR EVENTS: the pt became hyperkalemic ( K 5.7) overnight> the pt was given insulin 10units & 1 gm CaCl>rep labs pending  LFTs rising    REVIEW OF SYSTEMS:  unable to access    ICU Vital Signs Last 24 Hrs  T(C): 38.1 (25 Mar 2021 08:00), Max: 38.1 (25 Mar 2021 08:00)  T(F): 100.6 (25 Mar 2021 08:00), Max: 100.6 (25 Mar 2021 08:00)  HR: 87 (25 Mar 2021 08:52) (85 - 97)  BP: --  BP(mean): --  ABP: 97/58 (25 Mar 2021 08:45) (78/50 - 144/80)  ABP(mean): 71 (25 Mar 2021 08:45) (61 - 100)  RR: 32 (25 Mar 2021 08:45) (32 - 37)  SpO2: 100% (25 Mar 2021 08:52) (89% - 100%)    Mode: AC/ CMV (Assist Control/ Continuous Mandatory Ventilation), RR (machine): 32, TV (machine): 300, FiO2: 70, PEEP: 8, ITime: 1  I&O's Summary    24 Mar 2021 07:01  -  25 Mar 2021 07:00  --------------------------------------------------------  IN: 4410.4 mL / OUT: 955 mL / NET: 3455.4 mL      CAPILLARY BLOOD GLUCOSE      POCT Blood Glucose.: 259 mg/dL (25 Mar 2021 04:59)        ============================ LABS =========================                        8.6    15.39 )-----------( 252      ( 25 Mar 2021 00:23 )             28.8     03-25    125<L>  |  92<L>  |  27<H>  ----------------------------<  303<H>  5.7<H>   |  20<L>  |  0.59    Ca    8.4      25 Mar 2021 00:22  Phos  4.0     03-25  Mg     2.0     03-25    TPro  6.6  /  Alb  2.1<L>  /  TBili  0.9  /  DBili  x   /  AST  917<H>  /  ALT  568<H>  /  AlkPhos  402<H>  03-25    LIVER FUNCTIONS - ( 25 Mar 2021 00:22 )  Alb: 2.1 g/dL / Pro: 6.6 g/dL / ALK PHOS: 402 U/L / ALT: 568 U/L / AST: 917 U/L / GGT: x           PT/INR - ( 25 Mar 2021 00:24 )   PT: 36.4 sec;   INR: 3.21 ratio         PTT - ( 25 Mar 2021 06:51 )  PTT:78.9 sec  ABG - ( 25 Mar 2021 00:27 )  pH, Arterial: 7.28  pH, Blood: x     /  pCO2: 50    /  pO2: 67    / HCO3: 23    / Base Excess: -3.1  /  SaO2: 88          buDESOnide    Inhalation Suspension 0.5 milliGRAM(s) Inhalation every 12 hours    buDESOnide    Inhalation Suspension 0.5 milliGRAM(s) Inhalation every 12 hours      PHYSICIAL EXAM:    GENERAL: intubated, sedated, paralyzed  HEENT:  NC/AT  EYES: , conjunctiva and sclera clear  NECK: Supple, No JVD  CHEST/LUNG: diminished bilaterally; No wheezes, rales, or rhonchi  HEART: Regular rate and rhythm; No murmurs, rubs, or gallops  ABDOMEN: Soft, Nontender, Nondistended; Bowel sounds present  EXTREMITIES:  2+ Peripheral Pulses, No clubbing, cyanosis, or edema  PSYCH: unable as pt is sedated  NEUROLOGY: sedated  SKIN: No rashes or lesions

## 2021-03-26 NOTE — PROGRESS NOTE ADULT - SUBJECTIVE AND OBJECTIVE BOX
HPI:  61 yo male w/no known pmh (does not f/u with PCP) presents to [facility] for cc fevers, weakness, fatigue, shortness of breath for 5 days. Tested positive for covid 3 days ago at an urgent care. Today presented again to urgent care and noted to be hypoxic and sent to the ER. Patient denies dysuria, diarrhea, constipation. Currently denies shortness of breath on high flow.     In the ER, patient was febrile to 101.5F, tachycardic to 120s, tachypneic to 40, hypoxic to 88% on 6L. Improved on high flow nasal cannula to 95%.  (08 Mar 2021 14:08)      24 hr events: Patient noted to have hematuria overnight as well as melena X2. PTT at that time supratherapeutic, held for 2 hours and restarted. PTT now in therapeutic state but patient with continued hematuria and melena. Heparin gtt held now at this time. Blood cultures with prelim E.Coli, patient currently on Cefepime. Patient previously without BM for several days but noted to have BM x 2 overnight.       ## Labs:  CBC:                        8.2    12.35 )-----------( 175      ( 26 Mar 2021 05:53 )             26.6     Chem:  03-25    130<L>  |  97  |  34<H>  ----------------------------<  310<H>  5.1   |  23  |  0.58    Ca    8.4      25 Mar 2021 21:57  Phos  2.9     03-25  Mg     1.9     03-25    TPro  6.4  /  Alb  2.1<L>  /  TBili  0.9  /  DBili  x   /  AST  1190<H>  /  ALT  1020<H>  /  AlkPhos  404<H>  03-25    Coags:  PT/INR - ( 25 Mar 2021 21:57 )   PT: 21.9 sec;   INR: 1.88 ratio         PTT - ( 26 Mar 2021 05:53 )  PTT:86.7 sec  culture blood:  --  03-25 @ 16:31            culture sputum:     Growth in aerobic bottle: Gram Negative Rods  Growth in anaerobic bottle: Gram Negative Rods  ***Blood Panel PCR results on this specimen are available  approximately 3 hours after the Gram stain result.***  Gram stain, PCR, and/or culture results may not always  correspond due to difference in methodologies.  ************************************************************  This PCR assay was performed by multiplex PCR. This  Assay tests for 66 bacterial and resistance gene targets.  Please refer to the [facility] test directory  at [email or web address] for details.           culture urine:  -- 03-25 @ 16:31        ## Imaging:    ## Medications:  cefepime   IVPB      cefepime   IVPB 2000 milliGRAM(s) IV Intermittent every 8 hours    furosemide   Injectable 40 milliGRAM(s) IV Push once  norepinephrine Infusion 0.01 MICROgram(s)/kG/Min IV Continuous <Continuous>    buDESOnide    Inhalation Suspension 0.5 milliGRAM(s) Inhalation every 12 hours    insulin lispro (ADMELOG) corrective regimen sliding scale   SubCutaneous every 6 hours  insulin NPH human recombinant 15 Unit(s) SubCutaneous every 6 hours  vasopressin Infusion 0.04 Unit(s)/Min IV Continuous <Continuous>    aspirin  chewable 162 milliGRAM(s) Enteral Tube daily  heparin  Infusion. 800 Unit(s)/Hr IV Continuous <Continuous>    lactulose Syrup 20 Gram(s) Oral every 6 hours  pantoprazole  Injectable 40 milliGRAM(s) IV Push two times a day  polyethylene glycol 3350 17 Gram(s) Oral every 12 hours  senna Syrup 10 milliLiter(s) Oral every 12 hours    acetaminophen    Suspension .. 650 milliGRAM(s) Oral every 6 hours PRN  cisatracurium Infusion 3 MICROgram(s)/kG/Min IV Continuous <Continuous>  dexMEDEtomidine Infusion 0.5 MICROgram(s)/kG/Hr IV Continuous <Continuous>  fentaNYL   Infusion... 2.5 MICROgram(s)/kG/Hr IV Continuous <Continuous>  ketamine Infusion. 3 mG/kG/Hr IV Continuous <Continuous>      ## Vitals:  T(C): 36.5 (03-26-21 @ 08:00), Max: 37.8 (03-25-21 @ 16:00)  HR: 82 (03-26-21 @ 09:05) (81 - 103)  RR: 32 (03-26-21 @ 09:00) (32 - 32)  SpO2: 93% (03-26-21 @ 09:05) (90% - 100%)  Vent: Mode: AC/ CMV (Assist Control/ Continuous Mandatory Ventilation), RR (machine): 32, RR (patient): 32, TV (machine): 300, FiO2: 55, PEEP: 8, PIP: 46  ABG: ABG - ( 25 Mar 2021 17:02 )  pH, Arterial: 7.39  pH, Blood: x     /  pCO2: 47    /  pO2: 83    / HCO3: 28    / Base Excess: 3.2   /  SaO2: 96        03-25 @ 07:01  -  03-26 @ 07:00  --------------------------------------------------------  IN: 2575.7 mL / OUT: 2100 mL / NET: 475.7 mL    03-26 @ 07:01  -  03-26 @ 10:26  --------------------------------------------------------  IN: 191.8 mL / OUT: 145 mL / NET: 46.8 mL      ## P/E:  Gen: lying comfortably in bed in no apparent distress  Mouth: Moist membranes  Neck: Supple, No JVD  Lungs: CTA bilateral   Heart: RRR S1S2  Abd: +distension  Ext: Warm and perfused   Neuro: Sedated and paralyzed     CENTRAL LINE: [X ] YES [ ] NO   ELIZONDO: [X ] YES [ ] NO      A-LINE:  [X ] YES [ ] NO  LOCATION:       CODE STATUS: [X ] full code  [ ] DNR  [ ] DNI  [ ] [facility]  Goals of care discussion: [ ] yes

## 2021-03-26 NOTE — CHART NOTE - NSCHARTNOTEFT_GEN_A_CORE
Nutrition Chart Note.  Pt seen for: Malnutrition Follow-up Note.    Source: EMR, team    Chart reviewed, events noted. 60M c no significant PMH (does not f/u with PCP) and a recent diagnosis of COVID on . Patient presented 3/08 with c/o fever, weakness, SOB, and was found to have worsening hypoxia requiring HFNC. S/p Remdesivir (3/8- 3/12) & Decadron (3/8 - 3/17). Course c/b newly diagnosed T2DM with a HbA1c 12.4 and a right lower lobe medial segmental pulmonary embolism on CTA (3/12). RRT 3/17 for increased WOB with an SpO2 70/80's while on HFNC and Nonrebreather mask. Pt intubated 3/17 and transferred to COVID ICU for further assessment and management.   - Sedated (precedex, ketamine, fent) / Paralyzed (nimbex); Pressors (levo & vaso)    Diet, NPO with Tube Feed:   Tube Feeding Modality: Nasogastric  Nepro with Carb Steady (NEPRORTH)  Total Volume for 24 Hours (mL): 1080  Continuous  Starting Tube Feed Rate {mL per Hour}: 10     Every 4 hours  Until Goal Tube Feed Rate (mL per Hour): 45  Tube Feed Duration (in Hours): 24  Tube Feed Start Time: 17:00  Supplement Feeding Modality:  Nasogastric  Probiotic Yogurt/Smoothie Cans or Servings Per Day:  2       Frequency:  Daily (21 @ 09:00)      CURRENT EN ORDER PROVIDES: 1944kcals, 88g protein, 785ml free H2O  - EN provisions per flowsheets:    (3/26) 420ml, 39% of EN goal (so far)    (3/25) 120ml, 11% of EN goal of Nepro              260ml, 36% of EN goal of Vital AF (goal rate 30ml/hr x 24hrs)      Nutrition-Related Events:  - Pt became hyperkalemic -- TF changed to Nepro 3/25  - 3/25 distended>ABD US: sludge in GB, mild thickening of GB wall 4mm, AXR: ilues, can't R/O Lg bowel obstruction  - BG: 15u NPH q6hrs & ISS; Newly Dx uncontrolled DM, HbA1c 12.4%  - Micronutrient supplementation: Multivitamin/minerals, Vitamin D3  - RD recommendations 3/24 modify EN regimen from Vital AF -> Vital 1.5 (35ml/hr x 24hrs + 2 Prosource) in setting of fluid status & propofol  - Hyponatremic   - Lasix last given 3/25  - Propofol infusions stopped 3/25    GI: Last BM 3/26, noted with abdominal distention & hypoactive bowel sounds - bowel regimen ordered (miralax, senna); lactulose ordered as of 3/25  - Probiotic yogurt Smoothie ordered BID - noted pt receiving abx course at this time    Current Weight: No additional weights documented at this time; Will continue to monitor/trend weight status   Weight in K.7 (-)  - Per RD initial assessment (3/11) Pt reports UBW is ~158. RD also noted "Pt's dosing wt is 146.7 lbs. Discussed difference in UBW with pt, reported he believes he weighs more than his dosing weight."    Drug Dosing Weight  Weight (kg): 66.7 (08 Mar 2021 18:47)  BMI (kg/m2): 23.7 (08 Mar 2021 18:47)    MEDICATIONS  (STANDING):  aspirin  chewable 162 milliGRAM(s) Enteral Tube daily  buDESOnide    Inhalation Suspension 0.5 milliGRAM(s) Inhalation every 12 hours  cefepime   IVPB      cefepime   IVPB 2000 milliGRAM(s) IV Intermittent every 8 hours  chlorhexidine 0.12% Liquid 15 milliLiter(s) Oral Mucosa every 12 hours  chlorhexidine 2% Cloths 1 Application(s) Topical <User Schedule>  cholecalciferol 2000 Unit(s) Oral daily  cisatracurium Infusion 3 MICROgram(s)/kG/Min (12 mL/Hr) IV Continuous <Continuous>  dexMEDEtomidine Infusion 0.5 MICROgram(s)/kG/Hr (8.34 mL/Hr) IV Continuous <Continuous>  fentaNYL   Infusion... 2.5 MICROgram(s)/kG/Hr (8.34 mL/Hr) IV Continuous <Continuous>  heparin  Infusion. 800 Unit(s)/Hr (8 mL/Hr) IV Continuous <Continuous>  influenza   Vaccine 0.5 milliLiter(s) IntraMuscular once  insulin lispro (ADMELOG) corrective regimen sliding scale   SubCutaneous every 6 hours  insulin NPH human recombinant 15 Unit(s) SubCutaneous every 6 hours  ketamine Infusion. 3 mG/kG/Hr (20 mL/Hr) IV Continuous <Continuous>  lactulose Syrup 20 Gram(s) Oral every 6 hours  multivitamin/minerals/iron Oral Solution (CENTRUM) 15 milliLiter(s) Oral daily  norepinephrine Infusion 0.01 MICROgram(s)/kG/Min (1.25 mL/Hr) IV Continuous <Continuous>  pantoprazole  Injectable 40 milliGRAM(s) IV Push two times a day  petrolatum Ophthalmic Ointment 1 Application(s) Both EYES two times a day  polyethylene glycol 3350 17 Gram(s) Oral every 12 hours  senna Syrup 10 milliLiter(s) Oral every 12 hours  vasopressin Infusion 0.04 Unit(s)/Min (2.4 mL/Hr) IV Continuous <Continuous>    MEDICATIONS  (PRN):  acetaminophen    Suspension .. 650 milliGRAM(s) Oral every 6 hours PRN Temp greater or equal to 38C (100.4F)      03-25 Na130 mmol/L<L> Glu 310 mg/dL<H> K+ 5.1 mmol/L Cr  0.58 mg/dL BUN 34 mg/dL<H> Phos 2.9 mg/dL Alb 2.1 g/dL<L> PAB n/a       CAPILLARY BLOOD GLUCOSE  POCT Blood Glucose.: 262 mg/dL (26 Mar 2021 05:08)  POCT Blood Glucose.: 302 mg/dL (25 Mar 2021 23:04)      Skin: L cheek skin tear; no pressure injuries noted  Edema: 1+ generalized (3/25)    Estimated Needs: Recalculated - Based on dosing wt 66.7kg with consideration for intubation 3/17 & BMI <30  Energy (25-30 kcals/kg): 3482-3902  Protein (1.2-1.8 g pro/kg):   [place] Equation (REE): 1749 (26kcals/kg)    Previous Nutrition Diagnosis:   1. Altered Nutrition Related Labs   Nutrition Diagnosis is [X] ongoing - being addressed with EN and medically managed with insulin regimen per team     2. Moderate Malnutrition in the setting of Acute Illness  Nutrition Diagnosis is [X] ongoing - being addressed with EN    New Nutrition Diagnosis: N/a      Recommendations:  1. Recommend if pt with ileus hold feeds until resolved. As medically feasible/tolerated recommend initiating Vital 1.5 @ 10ml/hr and increasing as tolerated to goal rate of 55ml/hr x 24hrs. At goal rate provides 1320ml formula, 1980kcals, 89g protein, 1009ml free H2O. (meets 29.6kcals/kg & 1.3g protein/kg based on dosing wt 66.7kg)  2. K currently WNL; if persistently elevated may consider nutrient binder  3. Continue Multivitamin & Vitamin D supplementation as ordered  4. Pt currently with abdominal distention - may consider addition of motility agent; defer to team - Monitor GI tolerance  5. RD to remain available to adjust EN formulary, volume/rate PRN.      Monitoring and Evaluation:   Continue to monitor nutritional intake, tolerance to diet prescription, weights, labs, skin integrity  RD remains available upon request and will follow up per protocol.    Payal Foreman, MS, RD, CDN, [facility]  pager #[phone]

## 2021-03-26 NOTE — PROGRESS NOTE ADULT - SUBJECTIVE AND OBJECTIVE BOX
TAMARA CHEW 60y [id number]    Patient is a 60y old  Male who presents with a chief complaint of hypoxia 2/2 COVID (26 Mar 2021 12:40)      Follow Up/CC:  ID following for covid    Interval History/ROS: low grade temps+. intubated     Allergies    No Known Allergies    Intolerances        ANTIMICROBIALS:  cefepime   IVPB    cefepime   IVPB 2000 every 8 hours      MEDICATIONS  (STANDING):  aspirin  chewable 162 milliGRAM(s) Enteral Tube daily  buDESOnide    Inhalation Suspension 0.5 milliGRAM(s) Inhalation every 12 hours  cefepime   IVPB      cefepime   IVPB 2000 milliGRAM(s) IV Intermittent every 8 hours  chlorhexidine 0.12% Liquid 15 milliLiter(s) Oral Mucosa every 12 hours  chlorhexidine 2% Cloths 1 Application(s) Topical <User Schedule>  cholecalciferol 2000 Unit(s) Oral daily  cisatracurium Infusion 3 MICROgram(s)/kG/Min (12 mL/Hr) IV Continuous <Continuous>  dexMEDEtomidine Infusion 0.5 MICROgram(s)/kG/Hr (8.34 mL/Hr) IV Continuous <Continuous>  fentaNYL   Infusion... 2.5 MICROgram(s)/kG/Hr (8.34 mL/Hr) IV Continuous <Continuous>  heparin  Infusion. 800 Unit(s)/Hr (8 mL/Hr) IV Continuous <Continuous>  influenza   Vaccine 0.5 milliLiter(s) IntraMuscular once  insulin lispro (ADMELOG) corrective regimen sliding scale   SubCutaneous every 6 hours  insulin NPH human recombinant 15 Unit(s) SubCutaneous every 6 hours  ketamine Infusion. 3 mG/kG/Hr (20 mL/Hr) IV Continuous <Continuous>  lactulose Syrup 20 Gram(s) Oral every 6 hours  multivitamin/minerals/iron Oral Solution (CENTRUM) 15 milliLiter(s) Oral daily  norepinephrine Infusion 0.01 MICROgram(s)/kG/Min (1.25 mL/Hr) IV Continuous <Continuous>  pantoprazole  Injectable 40 milliGRAM(s) IV Push two times a day  petrolatum Ophthalmic Ointment 1 Application(s) Both EYES two times a day  polyethylene glycol 3350 17 Gram(s) Oral every 12 hours  senna Syrup 10 milliLiter(s) Oral every 12 hours  vasopressin Infusion 0.04 Unit(s)/Min (2.4 mL/Hr) IV Continuous <Continuous>    MEDICATIONS  (PRN):  acetaminophen    Suspension .. 650 milliGRAM(s) Oral every 6 hours PRN Temp greater or equal to 38C (100.4F)        Vital Signs Last 24 Hrs  T(C): 36.6 (26 Mar 2021 12:00), Max: 37.8 (25 Mar 2021 16:00)  T(F): 97.9 (26 Mar 2021 12:00), Max: 100 (25 Mar 2021 16:00)  HR: 90 (26 Mar 2021 12:00) (81 - 98)  BP: --  BP(mean): --  RR: 30 (26 Mar 2021 12:00) (30 - 32)  SpO2: 95% (26 Mar 2021 12:00) (90% - 100%)    CBC Full  -  ( 26 Mar 2021 11:45 )  WBC Count : 11.61 K/uL  RBC Count : 3.89 M/uL  Hemoglobin : 8.0 g/dL  Hematocrit : 26.7 %  Platelet Count - Automated : 159 K/uL  Mean Cell Volume : 68.6 fl  Mean Cell Hemoglobin : 20.6 pg  Mean Cell Hemoglobin Concentration : 30.0 gm/dL  Auto Neutrophil # : x  Auto Lymphocyte # : x  Auto Monocyte # : x  Auto Eosinophil # : x  Auto Basophil # : x  Auto Neutrophil % : x  Auto Lymphocyte % : x  Auto Monocyte % : x  Auto Eosinophil % : x  Auto Basophil % : x        130<L>  |  97  |  34<H>  ----------------------------<  310<H>  5.1   |  23  |  0.58    Ca    8.4      25 Mar 2021 21:57  Phos  2.9     -  Mg     1.9         TPro  6.4  /  Alb  2.1<L>  /  TBili  0.9  /  DBili  x   /  AST  1190<H>  /  ALT  1020<H>  /  AlkPhos  404<H>      LIVER FUNCTIONS - ( 25 Mar 2021 21:57 )  Alb: 2.1 g/dL / Pro: 6.4 g/dL / ALK PHOS: 404 U/L / ALT: 1020 U/L / AST: 1190 U/L / GGT: x           Urinalysis Basic - ( 25 Mar 2021 12:59 )    Color: Colorless / Appearance: Slightly Turbid / S.010 / pH: x  Gluc: x / Ketone: Negative  / Bili: Negative / Urobili: Negative   Blood: x / Protein: Trace / Nitrite: Negative   Leuk Esterase: Negative / RBC: 144 /hpf / WBC 3 /HPF   Sq Epi: x / Non Sq Epi: 0 /hpf / Bacteria: Negative        MICROBIOLOGY:  .Sputum Sputum  21 --  --    Few polymorphonuclear leukocytes per low power field  Rare Squamous epithelial cells per low power field  Rare Gram positive cocci in pairs per oil power field  Rare Gram Variable Rods per oil power field      .Blood Blood-Peripheral  21   Growth in aerobic bottle: Gram Negative Rods  Growth in anaerobic bottle: Gram Negative Rods  ***Blood Panel PCR results on this specimen are available  approximately 3 hours after the Gram stain result.***  Gram stain, PCR, and/or culture results may not always  correspond due to difference in methodologies.  ************************************************************  This PCR assay was performed by multiplex PCR. This  Assay tests for 66 bacterial and resistance gene targets.  Please refer to the [facility] [email or web address] test directory  at [email or web address] for details.  --  Blood Culture PCR      .Blood Blood-Peripheral  21   No growth to date.  --  --      .Sputum Sputum  21   Normal Respiratory Marilee absent  --    Rare polymorphonuclear leukocytes per low power field  Rare Squamous epithelial cells per low power field  No organisms seen per oil power field      .Blood Blood-Peripheral  21   No growth to date.  --  --      .Blood Blood-Venous  21   No Growth Final  --  --      .Blood Blood  21   No Growth Final  --  --      .Blood Blood-Peripheral  21   No Growth Final  --  --        RADIOLOGY    < from: US Abdomen Doppler (21 @ 13:29) >  Patent hepatic vasculature with normal directionality.    Hepatic steatosis.    Sludge within the gallbladder with nonspecific mild gallbladder wall thickening to 4 mm.    < end of copied text >

## 2021-03-26 NOTE — PROGRESS NOTE ADULT - PROBLEM SELECTOR PLAN 1
Suggest to increase NPH to 20u q6 and Admelog correction scale coverage for now. Will continue monitoring FS and FU.

## 2021-03-26 NOTE — PROGRESS NOTE ADULT - ASSESSMENT
Mr Olivares is a 60 year old man who presented with hypoxia after being diagnosed with COVID outpatient. He has completed a course of remdesevir, but is still febrile and hypoxic.   He does not have a clear superimposed bacterial infection, though bacterial pneumonia is possible.   CT chest on admission with PE and ground glass opacities.   Febrile and requiring high flow nasal cannula.     COVID-19 pneumonia, leukocytosis, resp failure, E. coli bacteremia  - Remdesevir completed  - s/p Zosyn  - cont cefepime   - Check sputum culture  - Not a candidate for tocilizumab in setting of possible infection   - Follow up cultures - bcx negative  - Monitor for fevers  - Trend WBCs  - vent per MICU   - consider CT C/A/P given fevers   - change lines if possible   - E.coli bacteremia - due to ?PNA    Prognosis guarded    Rj Rouse  Attending Physician   Division of Infectious Disease  Pager #[phone]  Available on [employer] Teams also  After 5pm/weekend or no response, call #[phone]    Please call the ID service [phone] with questions or concerns over the weekend.

## 2021-03-26 NOTE — CONSULT NOTE ADULT - SUBJECTIVE AND OBJECTIVE BOX
Chief Complaint:  Patient is a 60y old  Male who presents with a chief complaint of hypoxia 2/2 COVID (26 Mar 2021 12:40)      HPI: 60M w/ now recently dx DM2 p/w acute hypoxemic respiratory failure due to COVID-19 pneumonia complicated by RLL segmental PE and ileus. GI now consulted for GI bleeding.     Per primary team, pt w/ 2 episodes of melena overnight s/p CBC showing Hb 8 --> 8.2 --> 8.0. Previously on heparin gtt for RLL PE. Currently remains intubated in ICU and cannot provide Hx.     Currently sedated on propofol, fentanyl, precedex, ketamine + paralyzed on nimbex. Weaned off levo, on vaso 0.04. Being tx w/ cefepime for E coli bacteremia.     Allergies:  No Known Allergies      Home Medications:    Hospital Medications:  acetaminophen    Suspension .. 650 milliGRAM(s) Oral every 6 hours PRN  aspirin  chewable 162 milliGRAM(s) Enteral Tube daily  buDESOnide    Inhalation Suspension 0.5 milliGRAM(s) Inhalation every 12 hours  cefepime   IVPB      cefepime   IVPB 2000 milliGRAM(s) IV Intermittent every 8 hours  chlorhexidine 0.12% Liquid 15 milliLiter(s) Oral Mucosa every 12 hours  chlorhexidine 2% Cloths 1 Application(s) Topical <User Schedule>  cholecalciferol 2000 Unit(s) Oral daily  cisatracurium Infusion 3 MICROgram(s)/kG/Min IV Continuous <Continuous>  dexMEDEtomidine Infusion 0.5 MICROgram(s)/kG/Hr IV Continuous <Continuous>  fentaNYL   Infusion... 2.5 MICROgram(s)/kG/Hr IV Continuous <Continuous>  heparin  Infusion. 800 Unit(s)/Hr IV Continuous <Continuous>  influenza   Vaccine 0.5 milliLiter(s) IntraMuscular once  insulin lispro (ADMELOG) corrective regimen sliding scale   SubCutaneous every 6 hours  insulin NPH human recombinant 15 Unit(s) SubCutaneous every 6 hours  ketamine Infusion. 3 mG/kG/Hr IV Continuous <Continuous>  lactulose Syrup 20 Gram(s) Oral every 6 hours  multivitamin/minerals/iron Oral Solution (CENTRUM) 15 milliLiter(s) Oral daily  norepinephrine Infusion 0.01 MICROgram(s)/kG/Min IV Continuous <Continuous>  pantoprazole  Injectable 40 milliGRAM(s) IV Push two times a day  petrolatum Ophthalmic Ointment 1 Application(s) Both EYES two times a day  polyethylene glycol 3350 17 Gram(s) Oral every 12 hours  senna Syrup 10 milliLiter(s) Oral every 12 hours  vasopressin Infusion 0.04 Unit(s)/Min IV Continuous <Continuous>      PMHX/PSHX:  No pertinent past medical history    No significant past surgical history    No significant past surgical history        Family history:  No pertinent family history in first degree relatives        Denies family history of colon cancer/polyps, stomach cancer/polyps, pancreatic cancer/masses, liver cancer/disease, ovarian cancer and endometrial cancer.    Social History:     Tob: Denies  EtOH: Denies  Illicit Drugs: Denies    ROS:     General:  No wt loss, fevers, chills, night sweats, fatigue  Eyes:  Good vision, no reported pain  ENT:  No sore throat, pain, runny nose, dysphagia  CV:  No pain, palpitations, hypo/hypertension  Pulm:  No dyspnea, cough, tachypnea, wheezing  GI:  see above  :  No pain, bleeding, incontinence, nocturia  Muscle:  No pain, weakness  Neuro:  No weakness, tingling, memory problems  Psych:  No fatigue, insomnia, mood problems, depression  Endocrine:  No polyuria, polydipsia, cold/heat intolerance  Heme:  No petechiae, ecchymosis, easy bruisability  Skin:  No rash, tattoos, scars, edema    PHYSICAL EXAM:     GENERAL:  No acute distress, +intubated and sedated  HEENT:  Normocephalic/atraumatic, no scleral icterus  CHEST: +mech vented BS  HEART:  Regular rate and rhythm, no murmurs/rubs/gallops  ABDOMEN:  Soft, +distended, normoactive bowel sounds  RECTAL: +dark red blood  EXTREMITIES: No cyanosis, clubbing, or edema  SKIN:  No rash/warm/dry  NEURO:  Alert and oriented x 0      Vital Signs:  Vital Signs Last 24 Hrs  T(C): 36.6 (26 Mar 2021 12:00), Max: 37.8 (25 Mar 2021 16:00)  T(F): 97.9 (26 Mar 2021 12:00), Max: 100 (25 Mar 2021 16:00)  HR: 90 (26 Mar 2021 12:00) (81 - 98)  BP: --  BP(mean): --  RR: 30 (26 Mar 2021 12:00) (30 - 32)  SpO2: 95% (26 Mar 2021 12:00) (90% - 100%)  Daily     Daily     LABS:                        8.0    11.61 )-----------( 159      ( 26 Mar 2021 11:45 )             26.7     Mean Cell Volume: 68.6 fl (21 @ 11:45)        130<L>  |  97  |  34<H>  ----------------------------<  310<H>  5.1   |  23  |  0.58    Ca    8.4      25 Mar 2021 21:57  Phos  2.9       Mg     1.9         TPro  6.4  /  Alb  2.1<L>  /  TBili  0.9  /  DBili  x   /  AST  1190<H>  /  ALT  1020<H>  /  AlkPhos  404<H>      LIVER FUNCTIONS - ( 25 Mar 2021 21:57 )  Alb: 2.1 g/dL / Pro: 6.4 g/dL / ALK PHOS: 404 U/L / ALT: 1020 U/L / AST: 1190 U/L / GGT: x           PT/INR - ( 25 Mar 2021 21:57 )   PT: 21.9 sec;   INR: 1.88 ratio         PTT - ( 26 Mar 2021 11:45 )  PTT:41.6 sec  Urinalysis Basic - ( 25 Mar 2021 12:59 )    Color: Colorless / Appearance: Slightly Turbid / S.010 / pH: x  Gluc: x / Ketone: Negative  / Bili: Negative / Urobili: Negative   Blood: x / Protein: Trace / Nitrite: Negative   Leuk Esterase: Negative / RBC: 144 /hpf / WBC 3 /HPF   Sq Epi: x / Non Sq Epi: 0 /hpf / Bacteria: Negative                              8.0    11.61 )-----------( 159      ( 26 Mar 2021 11:45 )             26.7                         8.2    12.35 )-----------( 175      ( 26 Mar 2021 05:53 )             26.6                         8.0    11.67 )-----------( 174      ( 25 Mar 2021 21:57 )             26.1                         7.7    12.01 )-----------( 185      ( 25 Mar 2021 17:19 )             25.7                         8.6    15.39 )-----------( 252      ( 25 Mar 2021 00:23 )             28.8       Imaging:    < from: Xray Abdomen 1 View (21 @ 11:30) >  EXAM:  XR ABDOMEN 1 VIEW                            PROCEDURE DATE:  2021            INTERPRETATION:  CLINICAL INFORMATION: Abdominal distention    TECHNIQUE: Single view abdominal radiograph.    COMPARISON: Abdominal x-ray 3/20/2021    FINDINGS:    Borderline gaseous distention of the small bowel. Gaseous distention of the stomach.  Relative paucity of bowel gas/distention of the large bowel.  There is no free air visualized.  The visualized osseous structures are unremarkable for age.  Rectal temperature probe is present.    IMPRESSION:    Borderline gaseous distention of the stomach and small bowel with relative paucity of large bowel distention.  Findings can be seen in setting of ileus. Small bowel obstruction cannot be ruled out. Recommend repeat abdominal imaging as clinically warranted.    < end of copied text >         Chief Complaint:  Patient is a 60y old  Male who presents with a chief complaint of hypoxia 2/2 COVID (26 Mar 2021 12:40)      HPI: 60M w/ now recently dx DM2 p/w acute hypoxemic respiratory failure due to COVID-19 pneumonia complicated by RLL segmental PE and ileus. GI now consulted for GI bleeding.     Per primary team, pt w/ 2 episodes of melena overnight s/p CBC showing Hb 8 --> 8.2 --> 8.0. Previously on heparin gtt for RLL PE. Currently remains intubated in ICU and cannot provide Hx.     Currently sedated on propofol, fentanyl, precedex, ketamine + paralyzed on nimbex. Weaned off levo, on vaso 0.04. Being tx w/ cefepime for E coli bacteremia.     Allergies:  No Known Allergies      Home Medications:    Hospital Medications:  acetaminophen    Suspension .. 650 milliGRAM(s) Oral every 6 hours PRN  aspirin  chewable 162 milliGRAM(s) Enteral Tube daily  buDESOnide    Inhalation Suspension 0.5 milliGRAM(s) Inhalation every 12 hours  cefepime   IVPB      cefepime   IVPB 2000 milliGRAM(s) IV Intermittent every 8 hours  chlorhexidine 0.12% Liquid 15 milliLiter(s) Oral Mucosa every 12 hours  chlorhexidine 2% Cloths 1 Application(s) Topical <User Schedule>  cholecalciferol 2000 Unit(s) Oral daily  cisatracurium Infusion 3 MICROgram(s)/kG/Min IV Continuous <Continuous>  dexMEDEtomidine Infusion 0.5 MICROgram(s)/kG/Hr IV Continuous <Continuous>  fentaNYL   Infusion... 2.5 MICROgram(s)/kG/Hr IV Continuous <Continuous>  heparin  Infusion. 800 Unit(s)/Hr IV Continuous <Continuous>  influenza   Vaccine 0.5 milliLiter(s) IntraMuscular once  insulin lispro (ADMELOG) corrective regimen sliding scale   SubCutaneous every 6 hours  insulin NPH human recombinant 15 Unit(s) SubCutaneous every 6 hours  ketamine Infusion. 3 mG/kG/Hr IV Continuous <Continuous>  lactulose Syrup 20 Gram(s) Oral every 6 hours  multivitamin/minerals/iron Oral Solution (CENTRUM) 15 milliLiter(s) Oral daily  norepinephrine Infusion 0.01 MICROgram(s)/kG/Min IV Continuous <Continuous>  pantoprazole  Injectable 40 milliGRAM(s) IV Push two times a day  petrolatum Ophthalmic Ointment 1 Application(s) Both EYES two times a day  polyethylene glycol 3350 17 Gram(s) Oral every 12 hours  senna Syrup 10 milliLiter(s) Oral every 12 hours  vasopressin Infusion 0.04 Unit(s)/Min IV Continuous <Continuous>      PMHX/PSHX:  No pertinent past medical history    No significant past surgical history    No significant past surgical history        Family history:  No pertinent family history in first degree relatives        Denies family history of colon cancer/polyps, stomach cancer/polyps, pancreatic cancer/masses, liver cancer/disease, ovarian cancer and endometrial cancer.    Social History:     Tob: Denies  EtOH: Denies  Illicit Drugs: Denies    ROS:     Patient unable to provide    PHYSICAL EXAM:     GENERAL:  No acute distress, +intubated and sedated  HEENT:  Normocephalic/atraumatic, no scleral icterus  CHEST: +mech vented BS  HEART:  Regular rate and rhythm, no murmurs/rubs/gallops  ABDOMEN:  Soft, +distended, normoactive bowel sounds  RECTAL: +dark red blood  EXTREMITIES: No cyanosis, clubbing, or edema  SKIN:  No rash/warm/dry  NEURO:  Alert and oriented x 0      Vital Signs:  Vital Signs Last 24 Hrs  T(C): 36.6 (26 Mar 2021 12:00), Max: 37.8 (25 Mar 2021 16:00)  T(F): 97.9 (26 Mar 2021 12:00), Max: 100 (25 Mar 2021 16:00)  HR: 90 (26 Mar 2021 12:00) (81 - 98)  BP: --  BP(mean): --  RR: 30 (26 Mar 2021 12:00) (30 - 32)  SpO2: 95% (26 Mar 2021 12:00) (90% - 100%)  Daily     Daily     LABS:                        8.0    11.61 )-----------( 159      ( 26 Mar 2021 11:45 )             26.7     Mean Cell Volume: 68.6 fl (21 @ 11:45)        130<L>  |  97  |  34<H>  ----------------------------<  310<H>  5.1   |  23  |  0.58    Ca    8.4      25 Mar 2021 21:57  Phos  2.9       Mg     1.9         TPro  6.4  /  Alb  2.1<L>  /  TBili  0.9  /  DBili  x   /  AST  1190<H>  /  ALT  1020<H>  /  AlkPhos  404<H>      LIVER FUNCTIONS - ( 25 Mar 2021 21:57 )  Alb: 2.1 g/dL / Pro: 6.4 g/dL / ALK PHOS: 404 U/L / ALT: 1020 U/L / AST: 1190 U/L / GGT: x           PT/INR - ( 25 Mar 2021 21:57 )   PT: 21.9 sec;   INR: 1.88 ratio         PTT - ( 26 Mar 2021 11:45 )  PTT:41.6 sec  Urinalysis Basic - ( 25 Mar 2021 12:59 )    Color: Colorless / Appearance: Slightly Turbid / S.010 / pH: x  Gluc: x / Ketone: Negative  / Bili: Negative / Urobili: Negative   Blood: x / Protein: Trace / Nitrite: Negative   Leuk Esterase: Negative / RBC: 144 /hpf / WBC 3 /HPF   Sq Epi: x / Non Sq Epi: 0 /hpf / Bacteria: Negative                              8.0    11.61 )-----------( 159      ( 26 Mar 2021 11:45 )             26.7                         8.2    12.35 )-----------( 175      ( 26 Mar 2021 05:53 )             26.6                         8.0    11.67 )-----------( 174      ( 25 Mar 2021 21:57 )             26.1                         7.7    12.01 )-----------( 185      ( 25 Mar 2021 17:19 )             25.7                         8.6    15.39 )-----------( 252      ( 25 Mar 2021 00:23 )             28.8       Imaging:    < from: Xray Abdomen 1 View (21 @ 11:30) >  EXAM:  XR ABDOMEN 1 VIEW                            PROCEDURE DATE:  2021            INTERPRETATION:  CLINICAL INFORMATION: Abdominal distention    TECHNIQUE: Single view abdominal radiograph.    COMPARISON: Abdominal x-ray 3/20/2021    FINDINGS:    Borderline gaseous distention of the small bowel. Gaseous distention of the stomach.  Relative paucity of bowel gas/distention of the large bowel.  There is no free air visualized.  The visualized osseous structures are unremarkable for age.  Rectal temperature probe is present.    IMPRESSION:    Borderline gaseous distention of the stomach and small bowel with relative paucity of large bowel distention.  Findings can be seen in setting of ileus. Small bowel obstruction cannot be ruled out. Recommend repeat abdominal imaging as clinically warranted.    < end of copied text >

## 2021-03-26 NOTE — PROGRESS NOTE ADULT - ASSESSMENT
ASSESSMENT/PLAN:  Patient is a 60y old  Male who presents with a chief complaint of hypoxia 2/2 COVID (24 Mar 2021 14:02)    HPI:  61 yo male w/no known pmh (does not f/u with PCP) presents to [facility] for cc fevers, weakness, fatigue, shortness of breath for 5 days. Tested positive for covid 3 days ago at an urgent care. Today presented again to urgent care and noted to be hypoxic and sent to the ER. Patient denies dysuria, diarrhea, constipation. Currently denies shortness of breath on high flow. Intubated for worsening respiratory failure and transferred to the ICU for further management.       Neuro:  - Sedated on Propofol, Fentanyl, Precedex, Ketamine  - Paralyzed on Nimbex  - Continue to titrate off of nimbex as tolerated as FIO2 requirements low    RESPIRATORY:  - PRVC 30/300/50/8. Continue to titrate FIO2 as tolerated. Maintain O2 sat >90  - RLL PE diagnosed on admission, treated with heparin gtt    CARDIOVASCULAR:  - Continues to require vasopressors. Currently on Levophed .035 and Vaso .04  - Titrate pressors as tolerated  - Maintain MAP >65    RENAL:  - Renal function remains stable with good UOP   - Continue to monitor renal indices, strict I/Os   - Net (+) several liters for total stay. Will attempt to diurese today  - Lasix 40mg X1, Assess UOP, low threshold to continue diuresis    GASTROINTESTINAL :  - Previously with constipation, noted to have BM x 2 overnight since escalation in bowel regimen   - Now with melena X 2. Heparin gtt currently on hold'  - LFTs continue to be elevated, continuing to monitor  - GI consulted, follow up recommendations   - Continue with TF @ goal as patient currently tolerating  - Increase PPI BID dosing    INFECTIOUS DISEASE:  - Most recent blood cultures now with gram (-) rods (E.coli). Cultures prior were negative  - Patient on Cefepime empirically. Follow up sensitivities and adjust accordingly, ID following  - Continue to monitor fever trend and WBC count, trend inflammatory markers     HEME:  - On heparin gtt for RLL PE and hypercoagulable state, but now with episodes of melena and hematuria  - Patient with anemia with Hbg of 8.2. Will HOLD heparin gtt at this time  - Monitor CBC closely, Follow up type and screen. Will obtain blood transfusion consent   - Follow up GI evaluation. PPI dosing increased to BID    ENDO:  NPH 15units q6hr  SS Q6  ISS keep glucose <180 and >110      ======================= DISPOSITION  =====================  [ x] Critical   [ ] Guarded    [ ] Stable    [ x] Maintain in ICU

## 2021-03-26 NOTE — CONSULT NOTE ADULT - ASSESSMENT
60M w/ now recently dx DM2 p/w acute hypoxemic respiratory failure due to COVID-19 pneumonia complicated by RLL segmental PE and ileus. GI now consulted for GI bleeding.  60M w/ now recently dx DM2 p/w acute hypoxemic respiratory failure due to COVID-19 pneumonia complicated by RLL segmental PE and ileus. GI now consulted for GI bleeding.     IMPRESSION  #Hematochezia: pt w/ Hb stable in 8s w/ dark red blood on rectal exam, in the setting of supratherapeutic heparin gtt. Pressor requirements decreasing. DDx likely LGIB > UGIB, sources include diverticula vs polyps vs malignancy vs angioectasias vs PUD in setting of recent intubation vs esophagitis.   #AHRF 2/2 COVID PNA: intubated on vent   #Ileus   #RLL segmental PE      RECOMMENDATIONS:   - Trend CBC, transfuse Hb < 7  - Active T&S  - Trial on PPI 40mg BID in case this is 2/2 UGIB   - Will defer endoscopic intervention at this time as pt clinically HD stable   - If Hb drop, significant GI bleeding on exam, will reconsider decision for endoscopy   - Monitor electrolytes, replete lytes to prevent ileus   - GI will continue to follow       Thank you for involving us in the care of this patient, please reach out if any further questions.     Douglas Barlow MD  Gastroenterology Fellow, PGY4    Available on Microsoft Teams  [phone] ([facility])  [phone] ([facility])  Please contact on call fellow weekdays after 5pm-7am and weekends: [phone]     60M w/ now recently dx DM2 p/w acute hypoxemic respiratory failure due to COVID-19 pneumonia complicated by RLL segmental PE and ileus. GI now consulted for GI bleeding.     IMPRESSION  #Hematochezia: pt w/ Hb stable in 8s w/ dark red blood on rectal exam, in the setting of supratherapeutic heparin gtt. Pressor requirements decreasing. DDx likely LGIB > UGIB, sources include diverticula vs ischemic colitis, vs polyps vs malignancy vs angioectasias vs PUD in setting of recent intubation vs esophagitis.   #AHRF 2/2 COVID PNA: intubated on vent   #Ileus   #Elevated liver enzymes  #RLL segmental PE      RECOMMENDATIONS:   - Trend CBC, transfuse Hb < 7  - Active T&S  - Trial on PPI 40mg BID in case this is 2/2 UGIB   - Will defer endoscopic intervention at this time as pt clinically HD stable   - Keep heparin gtt within therapeutic PTT range  - If Hb drop, significant GI bleeding on exam, will reconsider decision for endoscopy   - Monitor electrolytes, replete lytes to prevent ileus   - Check LDH, viral hepatitis panel  - GI will continue to follow       Thank you for involving us in the care of this patient, please reach out if any further questions.     Douglas Barlow MD  Gastroenterology Fellow, PGY4    Available on Microsoft Teams  [phone] ([facility])  [phone] ([facility])  Please contact on call fellow weekdays after 5pm-7am and weekends: [phone]     60M w/ now recently dx DM2 p/w acute hypoxemic respiratory failure due to COVID-19 pneumonia complicated by RLL segmental PE and ileus. GI now consulted for GI bleeding.     IMPRESSION  #Hematochezia: pt w/ Hb stable in 8s w/ dark red blood on rectal exam, in the setting of supratherapeutic heparin gtt. Pressor requirements decreasing. DDx likely LGIB > UGIB, sources include diverticula vs ischemic colitis, vs polyps vs malignancy vs angioectasias vs PUD in setting of recent intubation vs esophagitis.   #AHRF 2/2 COVID PNA: intubated on vent   #Ileus   #Elevated liver enzymes  #RLL segmental PE      RECOMMENDATIONS:   - Trend CBC, transfuse Hb < 7  - Active T&S  - Trial on PPI 40mg BID in case this is 2/2 UGIB   - Will defer endoscopic intervention at this time as pt clinically HD stable   - Keep heparin gtt within therapeutic PTT range  - If Hb drop, significant GI bleeding on exam, will reconsider decision for endoscopy   - Monitor electrolytes, replete lytes to prevent ileus   - Check LDH, viral hepatitis panel  - Trend liver enzymes  - GI will continue to follow       Thank you for involving us in the care of this patient, please reach out if any further questions.     Douglas Barlow MD  Gastroenterology Fellow, PGY4    Available on Microsoft Teams  [phone] ([facility])  [phone] ([facility]  Please contact on call fellow weekdays after 5pm-7am and weekends: [phone]

## 2021-03-26 NOTE — PROGRESS NOTE ADULT - ASSESSMENT
Assessment  DMT2: 60y Male with newly diagnosed DM T2 with hyperglycemia admitted with COVID19, A1C is 12.4%, on NPH insulin and coverage, increased NPH dose yesterday, blood sugars still running consistently high and not at target, no hypoglycemic episodes, remains intubated, tube feeds at goal rate.  COVID19: On medications, monitored, stable.  Overweight: No strict exercise routines, neither on low calorie diet.      Anderson Mcmanus MD  Cell: [phone]  Office: [phone]       Assessment  DMT2: 60y Male with newly diagnosed DM T2 with hyperglycemia admitted with COVID19, A1C is 12.4%, on NPH insulin and coverage, increased NPH dose yesterday, blood sugars still running  consistently high and not at target, no hypoglycemic episodes, remains intubated, tube feeds at goal rate.  COVID19: On medications, monitored, stable.  Overweight: No strict exercise routines, neither on low calorie diet.      Anderson Mcmanus MD  Cell: [phone]  Office: [phone]

## 2021-03-26 NOTE — PROGRESS NOTE ADULT - ATTENDING COMMENTS
Agree with above.  COVID-19 pneumonia with ARDS  Episode of melanic stool, PTT 160s, so heparin gtt held. Hgb stable  Was on AC for RLL PE, but appears to be segmental  Remains intubated day 9: 32/300/50%/+8 plat 25  GNR on cefepime awaiting ID/SS  Hyperkalemia with hyponatremia (130) and net positive 9L. Diurese with loop diuretic  Paralyzed on Nimbex with propofol / fentanyl / Precedex    I have personally provided 45 minutes of critical care time.

## 2021-03-26 NOTE — PROGRESS NOTE ADULT - SUBJECTIVE AND OBJECTIVE BOX
Chief complaint  Patient is a 60y old  Male who presents with a chief complaint of hypoxia 2/2 COVID (26 Mar 2021 10:25)   Review of systems  Patient intubated, no hypoglycemic episodes.    Labs and Fingersticks  CAPILLARY BLOOD GLUCOSE      POCT Blood Glucose.: 262 mg/dL (26 Mar 2021 05:08)  POCT Blood Glucose.: 3  Medications  MEDICATIONS  (STANDING):  aspirin  chewable 162 milliGRAM(s) Enteral Tube daily  buDESOnide    Inhalation Suspension 0.5 milliGRAM(s) Inhalation every 12 hours  cefepime   IVPB      cefepime   IVPB 2000 milliGRAM(s) IV Intermittent every 8 hours  chlorhexidine 0.12% Liquid 15 milliLiter(s) Oral Mucosa every 12 hours  chlorhexidine 2% Cloths 1 Application(s) Topical <User Schedule>  cholecalciferol 2000 Unit(s) Oral daily  cisatracurium Infusion 3 MICROgram(s)/kG/Min (12 mL/Hr) IV Continuous <Continuous>  dexMEDEtomidine Infusion 0.5 MICROgram(s)/kG/Hr (8.34 mL/Hr) IV Continuous <Continuous>  fentaNYL   Infusion... 2.5 MICROgram(s)/kG/Hr (8.34 mL/Hr) IV Continuous <Continuous>  heparin  Infusion. 800 Unit(s)/Hr (8 mL/Hr) IV Continuous <Continuous>  influenza   Vaccine 0.5 milliLiter(s) IntraMuscular once  insulin lispro (ADMELOG) corrective regimen sliding scale   SubCutaneous every 6 hours  insulin NPH human recombinant 15 Unit(s) SubCutaneous every 6 hours  ketamine Infusion. 3 mG/kG/Hr (20 mL/Hr) IV Continuous <Continuous>  lactulose Syrup 20 Gram(s) Oral every 6 hours  multivitamin/minerals/iron Oral Solution (CENTRUM) 15 milliLiter(s) Oral daily  norepinephrine Infusion 0.01 MICROgram(s)/kG/Min (1.25 mL/Hr) IV Continuous <Continuous>  pantoprazole  Injectable 40 milliGRAM(s) IV Push two times a day  petrolatum Ophthalmic Ointment 1 Application(s) Both EYES two times a day  polyethylene glycol 3350 17 Gram(s) Oral every 12 hours  senna Syrup 10 milliLiter(s) Oral every 12 hours  vasopressin Infusion 0.04 Unit(s)/Min (2.4 mL/Hr) IV Continuous <Continuous>      Physical Exam  Culture - Sputum (collected 03-25-21 @ 17:09)  Source: .Sputum Sputum  Gram Stain (03-25-21 @ 20:18):    Few polymorphonuclear leukocytes per low power field    Rare Squamous epithelial cells per low power field    Rare Gram positive cocci in pairs per oil power field    Rare Gram Variable Rods per oil power field    Culture - Blood (collected 03-25-21 @ 16:31)  Source: .Blood Blood-Peripheral  Gram Stain (03-26-21 @ 02:22):    Growth in aerobic bottle: Gram Negative Rods    Growth in anaerobic bottle: Gram Negative Rods  Preliminary Report (03-26-21 @ 02:22):    Growth in aerobic bottle: Gram Negative Rods    Growth in anaerobic bottle: Gram Negative Rods    Culture - Blood (collected 03-25-21 @ 16:31)  Source: .Blood Blood-Peripheral  Gram Stain (03-26-21 @ 02:57):    Growth in aerobic bottle: Gram Negative Rods    Growth in anaerobic bottle: Gram Negative Rods  Preliminary Report (03-26-21 @ 02:57):    Growth in aerobic bottle: Gram Negative Rods    Growth in anaerobic bottle: Gram Negative Rods    ***Blood Panel PCR results on this specimen are available    approximately 3 hours after the Gram stain result.***    Gram stain, PCR, and/or culture results may not always    correspond due to difference in methodologies.    ************************************************************    This PCR assay was performed by multiplex PCR. This    Assay tests for 66 bacterial and resistance gene targets.    Please refer to the [facility] "[employer]" test directory    at [email or web address] for details.  Organism: Blood Culture PCR (03-26-21 @ 03:19)  Organism: Blood Culture PCR (03-26-21 @ 03:19)      -  Escherichia coli: Detec      Method Type: PCR      General: Patient intubated  Vital Signs Last 12 Hrs  T(F): 97.7 (03-26-21 @ 08:00), Max: 99 (03-26-21 @ 03:00)  HR: 82 (03-26-21 @ 09:05) (81 - 90)  BP: --  BP(mean): --  RR: 32 (03-26-21 @ 09:00) (32 - 32)  SpO2: 93% (03-26-21 @ 09:05) (91% - 98%)         Chief complaint  Patient is a 60y old  Male who presents with a chief complaint of hypoxia 2/2 COVID (26 Mar 2021 10:25)   Review of systems  Patient intubated, no hypoglycemic episodes.    Labs and Fingersticks  CAPILLARY BLOOD GLUCOSE      POCT Blood Glucose.: 262 mg/dL (26 Mar 2021 05:08)  POCT Blood Glucose.: 3  Medications  MEDICATIONS  (STANDING):  aspirin  chewable 162 milliGRAM(s) Enteral Tube daily  buDESOnide    Inhalation Suspension 0.5 milliGRAM(s) Inhalation every 12 hours  cefepime   IVPB      cefepime   IVPB 2000 milliGRAM(s) IV Intermittent every 8 hours  chlorhexidine 0.12% Liquid 15 milliLiter(s) Oral Mucosa every 12 hours  chlorhexidine 2% Cloths 1 Application(s) Topical <User Schedule>  cholecalciferol 2000 Unit(s) Oral daily  cisatracurium Infusion 3 MICROgram(s)/kG/Min (12 mL/Hr) IV Continuous <Continuous>  dexMEDEtomidine Infusion 0.5 MICROgram(s)/kG/Hr (8.34 mL/Hr) IV Continuous <Continuous>  fentaNYL   Infusion... 2.5 MICROgram(s)/kG/Hr (8.34 mL/Hr) IV Continuous <Continuous>  heparin  Infusion. 800 Unit(s)/Hr (8 mL/Hr) IV Continuous <Continuous>  influenza   Vaccine 0.5 milliLiter(s) IntraMuscular once  insulin lispro (ADMELOG) corrective regimen sliding scale   SubCutaneous every 6 hours  insulin NPH human recombinant 15 Unit(s) SubCutaneous every 6 hours  ketamine Infusion. 3 mG/kG/Hr (20 mL/Hr) IV Continuous <Continuous>  lactulose Syrup 20 Gram(s) Oral every 6 hours  multivitamin/minerals/iron Oral Solution (CENTRUM) 15 milliLiter(s) Oral daily  norepinephrine Infusion 0.01 MICROgram(s)/kG/Min (1.25 mL/Hr) IV Continuous <Continuous>  pantoprazole  Injectable 40 milliGRAM(s) IV Push two times a day  petrolatum Ophthalmic Ointment 1 Application(s) Both EYES two times a day  polyethylene glycol 3350 17 Gram(s) Oral every 12 hours  senna Syrup 10 milliLiter(s) Oral every 12 hours  vasopressin Infusion 0.04 Unit(s)/Min (2.4 mL/Hr) IV Continuous <Continuous>      Physical Exam  Culture - Sputum (collected 03-25-21 @ 17:09)  Source: .Sputum Sputum  Gram Stain (03-25-21 @ 20:18):    Few polymorphonuclear leukocytes per low power field    Rare Squamous epithelial cells per low power field    Rare Gram positive cocci in pairs per oil power field    Rare Gram Variable Rods per oil power field    Culture - Blood (collected 03-25-21 @ 16:31)  Source: .Blood Blood-Peripheral  Gram Stain (03-26-21 @ 02:22):    Growth in aerobic bottle: Gram Negative Rods    Growth in anaerobic bottle: Gram Negative Rods  Preliminary Report (03-26-21 @ 02:22):    Growth in aerobic bottle: Gram Negative Rods    Growth in anaerobic bottle: Gram Negative Rods    Culture - Blood (collected 03-25-21 @ 16:31)  Source: .Blood Blood-Peripheral  Gram Stain (03-26-21 @ 02:57):    Growth in aerobic bottle: Gram Negative Rods    Growth in anaerobic bottle: Gram Negative Rods  Preliminary Report (03-26-21 @ 02:57):    Growth in aerobic bottle: Gram Negative Rods    Growth in anaerobic bottle: Gram Negative Rods    ***Blood Panel PCR results on this specimen are available    approximately 3 hours after the Gram stain result.***    Gram stain, PCR, and/or culture results may not always    correspond due to difference in methodologies.    ************************************************************    This PCR assay was performed by multiplex PCR. This    Assay tests for 66 bacterial and resistance gene targets.    Please refer to the [facility] test directory    at [email or web address] for details.  Organism: Blood Culture PCR (03-26-21 @ 03:19)  Organism: Blood Culture PCR (03-26-21 @ 03:19)      -  Escherichia coli: Detec      Method Type: PCR      General: Patient intubated  Vital Signs Last 12 Hrs  T(F): 97.7 (03-26-21 @ 08:00), Max: 99 (03-26-21 @ 03:00)  HR: 82 (03-26-21 @ 09:05) (81 - 90)  BP: --  BP(mean): --  RR: 32 (03-26-21 @ 09:00) (32 - 32)  SpO2: 93% (03-26-21 @ 09:05) (91% - 98%)

## 2021-03-27 NOTE — PROGRESS NOTE ADULT - PROBLEM SELECTOR PLAN 1
Suggest to continue NPH to 20u q6 and Admelog correction scale coverage for now. Will continue monitoring FS and FU. Suggest to continue NPH to 25u q6 and Admelog correction scale coverage for now. Will continue monitoring FS and FU.

## 2021-03-27 NOTE — PROGRESS NOTE ADULT - ASSESSMENT
ASSESSMENT/PLAN:  Patient is a 60y old  Male who presents with a chief complaint of hypoxia 2/2 COVID (24 Mar 2021 14:02)    HPI:  61 yo male w/no known pmh (does not f/u with PCP) presents to [facility] for cc fevers, weakness, fatigue, shortness of breath for 5 days. Tested positive for covid 3 days ago at an urgent care. Today presented again to urgent care and noted to be hypoxic and sent to the ER. Patient denies dysuria, diarrhea, constipation. Currently denies shortness of breath on high flow. Intubated for worsening respiratory failure and transferred to the ICU for further management.       Neuro:  - Sedated on Propofol, Fentanyl, Precedex, Ketamine  - Paralyzed on Nimbex  - Continue to titrate off of nimbex as tolerated as FIO2 requirements low    RESPIRATORY:  - PRVC 30/300/50/8. PEEP decreased to 7. Continue to titrate FIO2 as tolerated. Maintain O2 sat >90  - RLL PE diagnosed on admission, treated with heparin gtt    CARDIOVASCULAR:  - Continues to require vasopressors. Currently on Levophed .01 and Vaso .04  - Titrate pressors as tolerated  - Maintain MAP >65    RENAL:  - Renal function remains stable with good UOP   - Continue to monitor renal indices, strict I/Os   - Net (+) several liters for total stay. s/p Lasix 40 x1 yesterday  - diuresis as needed    GASTROINTESTINAL :  - Previously with constipation, noted to have BM 3/26 since escalation in bowel regimen   -S/P  melena X 2 on 3/26. Serial cbcs stable  - LFTs continue to be elevated, continuing to monitor  - GI consulted, ------> No acute intervention and continue to monitor CBC  - TF was restarted today but abd seems very distended this am. TF held again will repeat Abd. xray today  -Continue PPI BID dosing    INFECTIOUS DISEASE:  - Most recent blood cultures now with gram (-) rods (E.coli). Cultures prior were negative  - Patient on Cefepime empirically. Follow up sensitivities and adjust accordingly, ID following  - Repeat blood cultures sent today 3/27 for clearance    HEME:  - On heparin gtt for RLL PE and hypercoagulable state, which was restarted today  - Patient with anemia with Hbg of 8.0. Will Continue to trend cbc Q12  - Monitor CBC closely, Keep type and screen active. Will obtain blood transfusion consent   - GI evaluation----->. PPI dosing increased to BID    ENDO:  Increase NPH 25units q6hr  SS Q6  ISS keep glucose <180 and >110      ======================= DISPOSITION  =====================  [ x] Critical   [ ] Guarded    [ ] Stable    [ x] Maintain in ICU   ASSESSMENT/PLAN:  Patient is a 60y old  Male who presents with a chief complaint of hypoxia 2/2 COVID (24 Mar 2021 14:02)    HPI:  59 yo male w/no known pmh (does not f/u with PCP) presents to [facility] for cc fevers, weakness, fatigue, shortness of breath for 5 days. Tested positive for covid 3 days ago at an urgent care. Today presented again to urgent care and noted to be hypoxic and sent to the ER. Patient denies dysuria, diarrhea, constipation. Currently denies shortness of breath on high flow. Intubated for worsening respiratory failure and transferred to the ICU for further management.     Neuro:  - Sedated on Propofol, Fentanyl, Precedex, Ketamine  - Paralyzed on Nimbex  - Continue to titrate off of nimbex as tolerated as FIO2 requirements low    RESPIRATORY:  - PRVC 30/300/50/8. PEEP decreased to 7. Continue to titrate FIO2 as tolerated. Maintain O2 sat >90  - RLL PE diagnosed on admission, treated with heparin gtt    CARDIOVASCULAR:  - Continues to require vasopressors. Currently on Levophed .01 and Vaso .04  - Titrate pressors as tolerated  - Maintain MAP >65    RENAL:  - Renal function remains stable with good UOP   - Continue to monitor renal indices, strict I/Os   - Net (+) several liters for total stay. s/p Lasix 40 x1 yesterday  - diuresis as needed    GASTROINTESTINAL :  - Previously with constipation, noted to have BM 3/26 since escalation in bowel regimen   -S/P  melena X 2 on 3/26. Serial cbcs stable  - LFTs continue to be elevated, continuing to monitor  - GI consulted, ------> No acute intervention and continue to monitor CBC  - TF was restarted today but abd seems very distended this am. TF held again will repeat -       Abd. xray today, shows multiple loops of dilated small bowel----> Abd. CT scan ordered  -Continue PPI BID dosing    INFECTIOUS DISEASE:  - Most recent blood cultures now with gram (-) rods (E.coli). Cultures prior were negative  - Patient on Cefepime Ecoli bacteremia. Follow up sensitivities and adjust accordingly, ID following  - Repeat blood cultures sent today 3/27 for clearance    HEME:  - On heparin gtt for RLL PE and hypercoagulable state, which was restarted today  - Patient with anemia with Hbg of 8.0. Will Continue to trend cbc Q12  - Monitor CBC closely, Keep type and screen active. Will obtain blood transfusion consent   - GI evaluation----->. PPI dosing increased to BID    ENDO:  Increase NPH 25units q6hr  SS Q6  ISS keep glucose <180 and >110  ======================= DISPOSITION  =====================  [ x] Critical   [ ] Guarded    [ ] Stable    [ x] Maintain in ICU   ASSESSMENT/PLAN:  Patient is a 60y old  Male who presents with a chief complaint of hypoxia 2/2 COVID (24 Mar 2021 14:02)    HPI:  59 yo male w/no known pmh (does not f/u with PCP) presents to [facility] for cc fevers, weakness, fatigue, shortness of breath for 5 days. Tested positive for covid 3 days ago at an urgent care. Today presented again to urgent care and noted to be hypoxic and sent to the ER. Patient denies dysuria, diarrhea, constipation. Currently denies shortness of breath on high flow. Intubated for worsening respiratory failure and transferred to the ICU for further management.     Neuro:  - Sedated on Propofol, Fentanyl, Precedex, Ketamine  - Paralyzed on Nimbex  - Continue to titrate off of nimbex as tolerated as FIO2 requirements low    RESPIRATORY:  - VAC 30/300/50/8. PEEP decreased to 7. Continue to titrate FIO2 as tolerated. Maintain O2 sat >90  - RLL PE diagnosed on admission, treated with heparin gtt    CARDIOVASCULAR:  - Continues to require vasopressors. Currently on Levophed .01 and Vaso .04  - Titrate pressors as tolerated  - Maintain MAP >65    RENAL:  - Renal function remains stable with good UOP   - Continue to monitor renal indices, strict I/Os   - Net (+) several liters for total stay. s/p Lasix 40 x1 yesterday  - diuresis as needed    GASTROINTESTINAL :  - Previously with constipation, noted to have BM 3/26 since escalation in bowel regimen   -S/P  melena X 2 on 3/26. Serial cbcs stable  - LFTs continue to be elevated, continuing to monitor  - GI consulted, ------> No acute intervention and continue to monitor CBC  - TF was restarted today but abd seems very distended this am. TF held again will repeat -       Abd. xray today, shows multiple loops of dilated small bowel----> Abd. CT scan ordered  -Continue PPI BID dosing    INFECTIOUS DISEASE:  - Most recent blood cultures now with gram (-) rods (E.coli). Cultures prior were negative  - Patient on Cefepime Ecoli bacteremia. Follow up sensitivities and adjust accordingly, ID following  - Repeat blood cultures sent today 3/27 for clearance    HEME:  - On heparin gtt for RLL PE and hypercoagulable state, which was restarted today  - Patient with anemia with Hbg of 8.0. Will Continue to trend cbc Q12  - Monitor CBC closely, Keep type and screen active. Will obtain blood transfusion consent   - GI evaluation----->. PPI dosing increased to BID    ENDO:  Increase NPH 25units q6hr  SS Q6  ISS keep glucose <180 and >110  ======================= DISPOSITION  =====================  [ x] Critical   [ ] Guarded    [ ] Stable    [ x] Maintain in ICU

## 2021-03-27 NOTE — PROGRESS NOTE ADULT - SUBJECTIVE AND OBJECTIVE BOX
CHIEF COMPLAINT:  Patient is a 60y old  Male who presents with a chief complaint of hypoxia 2/2 COVID (26 Mar 2021 14:20)    HPI:  59 yo male w/no known pmh (does not f/u with PCP) presents to [facility] for cc fevers, weakness, fatigue, shortness of breath for 5 days. Tested positive for covid 3 days ago at an urgent care. Today presented again to urgent care and noted to be hypoxic and sent to the ER. Patient denies dysuria, diarrhea, constipation. Currently denies shortness of breath on high flow.     In the ER, patient was febrile to 101.5F, tachycardic to 120s, tachypneic to 40, hypoxic to 88% on 6L. Improved on high flow nasal cannula to 95%.  (08 Mar 2021 14:08)      Interval Events:      REVIEW OF SYSTEMS:          OBJECTIVE:  ICU Vital Signs Last 24 Hrs  T(C): 37.3 (27 Mar 2021 07:00), Max: 38.3 (26 Mar 2021 17:00)  T(F): 99.1 (27 Mar 2021 07:00), Max: 100.9 (26 Mar 2021 17:00)  HR: 92 (27 Mar 2021 08:15) (82 - 123)  BP: --  BP(mean): --  ABP: 133/66 (27 Mar 2021 08:15) (87/52 - 170/80)  ABP(mean): 86 (27 Mar 2021 08:15) (63 - 115)  RR: 30 (27 Mar 2021 08:15) (30 - 32)  SpO2: 95% (27 Mar 2021 08:15) (90% - 100%)    Mode: AC/ CMV (Assist Control/ Continuous Mandatory Ventilation), RR (machine): 30, TV (machine): 300, FiO2: 50, PEEP: 8, ITime: 0.75, MAP: 20, PIP: 41     @ 07:01  -   @ 07:00  --------------------------------------------------------  IN: 2636.1 mL / OUT: 2670 mL / NET: -33.9 mL     @ 07: @ 08:59  --------------------------------------------------------  IN: 71 mL / OUT: 75 mL / NET: -4 mL      CAPILLARY BLOOD GLUCOSE      POCT Blood Glucose.: 224 mg/dL (27 Mar 2021 05:09)          PHYSICAL EXAM:          HOSPITAL MEDICATIONS:  MEDICATIONS  (STANDING):  aspirin  chewable 162 milliGRAM(s) Enteral Tube daily  budesonide 160 MICROgram(s)/formoterol 4.5 MICROgram(s) Inhaler 2 Puff(s) Inhalation two times a day  cefepime   IVPB      cefepime   IVPB 2000 milliGRAM(s) IV Intermittent every 8 hours  chlorhexidine 0.12% Liquid 15 milliLiter(s) Oral Mucosa every 12 hours  chlorhexidine 2% Cloths 1 Application(s) Topical <User Schedule>  cholecalciferol 2000 Unit(s) Oral daily  cisatracurium Infusion 3 MICROgram(s)/kG/Min (12 mL/Hr) IV Continuous <Continuous>  dexMEDEtomidine Infusion 0.5 MICROgram(s)/kG/Hr (8.34 mL/Hr) IV Continuous <Continuous>  fentaNYL   Infusion... 2.5 MICROgram(s)/kG/Hr (8.34 mL/Hr) IV Continuous <Continuous>  heparin  Infusion. 800 Unit(s)/Hr (8 mL/Hr) IV Continuous <Continuous>  influenza   Vaccine 0.5 milliLiter(s) IntraMuscular once  insulin glargine Injectable (LANTUS) 15 Unit(s) SubCutaneous at bedtime  insulin lispro (ADMELOG) corrective regimen sliding scale   SubCutaneous every 6 hours  insulin NPH human recombinant 20 Unit(s) SubCutaneous every 6 hours  ketamine Infusion. 3 mG/kG/Hr (20 mL/Hr) IV Continuous <Continuous>  lactulose Syrup 20 Gram(s) Oral every 6 hours  multivitamin/minerals/iron Oral Solution (CENTRUM) 15 milliLiter(s) Oral daily  naloxegol 12.5 milliGRAM(s) Oral daily  norepinephrine Infusion 0.01 MICROgram(s)/kG/Min (1.25 mL/Hr) IV Continuous <Continuous>  pantoprazole  Injectable 40 milliGRAM(s) IV Push two times a day  petrolatum Ophthalmic Ointment 1 Application(s) Both EYES two times a day  polyethylene glycol 3350 17 Gram(s) Oral every 12 hours  senna Syrup 10 milliLiter(s) Oral every 12 hours  vasopressin Infusion 0.04 Unit(s)/Min (2.4 mL/Hr) IV Continuous <Continuous>    MEDICATIONS  (PRN):  acetaminophen    Suspension .. 650 milliGRAM(s) Oral every 6 hours PRN Temp greater or equal to 38C (100.4F)      LABS:                        8.0    13.70 )-----------( 188      ( 27 Mar 2021 06:20 )             26.6     Hgb Trend: 8.0<--, 8.2<--, 8.2<--, 8.0<--, 8.2<--  03-    132<L>  |  96  |  34<H>  ----------------------------<  300<H>  4.8   |  26  |  0.53    Ca    8.7      27 Mar 2021 00:14  Phos  2.7       Mg     1.9         TPro  6.6  /  Alb  2.4<L>  /  TBili  0.7  /  DBili  x   /  AST  542<H>  /  ALT  834<H>  /  AlkPhos  354<H>      LIVER FUNCTIONS - ( 27 Mar 2021 00:14 )  Alb: 2.4 g/dL / Pro: 6.6 g/dL / ALK PHOS: 354 U/L / ALT: 834 U/L / AST: 542 U/L / GGT: x           Creatinine Trend: 0.53<--, 0.57<--, 0.58<--, 0.66<--, 0.61<--, 0.59<--  PT/INR - ( 27 Mar 2021 00:14 )   PT: 17.0 sec;   INR: 1.44 ratio         PTT - ( 27 Mar 2021 00:14 )  PTT:34.7 sec  Urinalysis Basic - ( 25 Mar 2021 12:59 )    Color: Colorless / Appearance: Slightly Turbid / S.010 / pH: x  Gluc: x / Ketone: Negative  / Bili: Negative / Urobili: Negative   Blood: x / Protein: Trace / Nitrite: Negative   Leuk Esterase: Negative / RBC: 144 /hpf / WBC 3 /HPF   Sq Epi: x / Non Sq Epi: 0 /hpf / Bacteria: Negative      Arterial Blood Gas:   @ 00:22  7.35/53/88/28/96/2.6  ABG lactate: --  Arterial Blood Gas:   @ 11:43  7.36/49/75/27/92/1.4  ABG lactate: --  Arterial Blood Gas:   @ 17:02  7.39/47/83/28/96/3.2  ABG lactate: --  Arterial Blood Gas:   @ 11:49  7.33/47/92/24/96/-1.5  ABG lactate: --        MICROBIOLOGY:     RADIOLOGY:  [ ] Reviewed and interpreted by me    EKG:       CHIEF COMPLAINT:  Patient is a 60y old  Male who presents with a chief complaint of hypoxia 2/2 COVID (26 Mar 2021 14:20)    HPI:  59 yo male w/no known pmh (does not f/u with PCP) presents to [facility] for cc fevers, weakness, fatigue, shortness of breath for 5 days. Tested positive for covid 3 days ago at an urgent care. Today presented again to urgent care and noted to be hypoxic and sent to the ER. Patient denies dysuria, diarrhea, constipation. Currently denies shortness of breath on high flow.     In the ER, patient was febrile to 101.5F, tachycardic to 120s, tachypneic to 40, hypoxic to 88% on 6L. Improved on high flow nasal cannula to 95%.  (08 Mar 2021 14:08)      Interval Events: TFeeds restarted.      REVIEW OF SYSTEMS: unable pt sedated and paralyzed          OBJECTIVE:  ICU Vital Signs Last 24 Hrs  T(C): 37.3 (27 Mar 2021 07:00), Max: 38.3 (26 Mar 2021 17:00)  T(F): 99.1 (27 Mar 2021 07:00), Max: 100.9 (26 Mar 2021 17:00)  HR: 92 (27 Mar 2021 08:15) (82 - 123)  BP: --  BP(mean): --  ABP: 133/66 (27 Mar 2021 08:15) (87/52 - 170/80)  ABP(mean): 86 (27 Mar 2021 08:15) (63 - 115)  RR: 30 (27 Mar 2021 08:15) (30 - 32)  SpO2: 95% (27 Mar 2021 08:15) (90% - 100%)    Mode: AC/ CMV (Assist Control/ Continuous Mandatory Ventilation), RR (machine): 30, TV (machine): 300, FiO2: 50, PEEP: 8, ITime: 0.75, MAP: 20, PIP: 41     @ 07:01  -   @ 07:00  --------------------------------------------------------  IN: 2636.1 mL / OUT: 2670 mL / NET: -33.9 mL     @ 07:01   @ 08:59  --------------------------------------------------------  IN: 71 mL / OUT: 75 mL / NET: -4 mL      CAPILLARY BLOOD GLUCOSE      POCT Blood Glucose.: 224 mg/dL (27 Mar 2021 05:09)          PHYSICAL EXAM:  GENERAL: NAD,  HEENT:  Atraumatic, Normocephalic  EYES: PERRLA, conjunctiva and sclera clear  NECK: Supple, No JVD  CHEST/LUNG: diminished bilaterally; No wheezes, rales, or rhonchi  HEART: Regular rate and rhythm; No murmurs, rubs, or gallops  ABDOMEN: Soft, Nontender, Nondistended; Bowel sounds present  EXTREMITIES:  2+ Peripheral Pulses, No clubbing, cyanosis, or edema  PSYCH: unable as pt is sedated  NEUROLOGY: sedated  SKIN: No rashes or lesions        HOSPITAL MEDICATIONS:  MEDICATIONS  (STANDING):  aspirin  chewable 162 milliGRAM(s) Enteral Tube daily  budesonide 160 MICROgram(s)/formoterol 4.5 MICROgram(s) Inhaler 2 Puff(s) Inhalation two times a day  cefepime   IVPB      cefepime   IVPB 2000 milliGRAM(s) IV Intermittent every 8 hours  chlorhexidine 0.12% Liquid 15 milliLiter(s) Oral Mucosa every 12 hours  chlorhexidine 2% Cloths 1 Application(s) Topical <User Schedule>  cholecalciferol 2000 Unit(s) Oral daily  cisatracurium Infusion 3 MICROgram(s)/kG/Min (12 mL/Hr) IV Continuous <Continuous>  dexMEDEtomidine Infusion 0.5 MICROgram(s)/kG/Hr (8.34 mL/Hr) IV Continuous <Continuous>  fentaNYL   Infusion... 2.5 MICROgram(s)/kG/Hr (8.34 mL/Hr) IV Continuous <Continuous>  heparin  Infusion. 800 Unit(s)/Hr (8 mL/Hr) IV Continuous <Continuous>  influenza   Vaccine 0.5 milliLiter(s) IntraMuscular once  insulin glargine Injectable (LANTUS) 15 Unit(s) SubCutaneous at bedtime  insulin lispro (ADMELOG) corrective regimen sliding scale   SubCutaneous every 6 hours  insulin NPH human recombinant 20 Unit(s) SubCutaneous every 6 hours  ketamine Infusion. 3 mG/kG/Hr (20 mL/Hr) IV Continuous <Continuous>  lactulose Syrup 20 Gram(s) Oral every 6 hours  multivitamin/minerals/iron Oral Solution (CENTRUM) 15 milliLiter(s) Oral daily  naloxegol 12.5 milliGRAM(s) Oral daily  norepinephrine Infusion 0.01 MICROgram(s)/kG/Min (1.25 mL/Hr) IV Continuous <Continuous>  pantoprazole  Injectable 40 milliGRAM(s) IV Push two times a day  petrolatum Ophthalmic Ointment 1 Application(s) Both EYES two times a day  polyethylene glycol 3350 17 Gram(s) Oral every 12 hours  senna Syrup 10 milliLiter(s) Oral every 12 hours  vasopressin Infusion 0.04 Unit(s)/Min (2.4 mL/Hr) IV Continuous <Continuous>    MEDICATIONS  (PRN):  acetaminophen    Suspension .. 650 milliGRAM(s) Oral every 6 hours PRN Temp greater or equal to 38C (100.4F)      LABS:                        8.0    13.70 )-----------( 188      ( 27 Mar 2021 06:20 )             26.6     Hgb Trend: 8.0<--, 8.2<--, 8.2<--, 8.0<--, 8.2<--      132<L>  |  96  |  34<H>  ----------------------------<  300<H>  4.8   |  26  |  0.53    Ca    8.7      27 Mar 2021 00:14  Phos  2.7       Mg     1.9         TPro  6.6  /  Alb  2.4<L>  /  TBili  0.7  /  DBili  x   /  AST  542<H>  /  ALT  834<H>  /  AlkPhos  354<H>      LIVER FUNCTIONS - ( 27 Mar 2021 00:14 )  Alb: 2.4 g/dL / Pro: 6.6 g/dL / ALK PHOS: 354 U/L / ALT: 834 U/L / AST: 542 U/L / GGT: x           Creatinine Trend: 0.53<--, 0.57<--, 0.58<--, 0.66<--, 0.61<--, 0.59<--  PT/INR - ( 27 Mar 2021 00:14 )   PT: 17.0 sec;   INR: 1.44 ratio         PTT - ( 27 Mar 2021 00:14 )  PTT:34.7 sec  Urinalysis Basic - ( 25 Mar 2021 12:59 )    Color: Colorless / Appearance: Slightly Turbid / S.010 / pH: x  Gluc: x / Ketone: Negative  / Bili: Negative / Urobili: Negative   Blood: x / Protein: Trace / Nitrite: Negative   Leuk Esterase: Negative / RBC: 144 /hpf / WBC 3 /HPF   Sq Epi: x / Non Sq Epi: 0 /hpf / Bacteria: Negative      Arterial Blood Gas:   @ 00:22  7.35/53/88/28/96/2.6  ABG lactate: --  Arterial Blood Gas:   @ 11:43  7.36/49/75/27/92/1.4  ABG lactate: --  Arterial Blood Gas:   @ 17:02  7.39/47/83/28/96/3.2  ABG lactate: --  Arterial Blood Gas:   @ 11:49  7.33/47/92/24/96/-1.5  ABG lactate: --        MICROBIOLOGY:     RADIOLOGY:  [ ] Reviewed and interpreted by me    EKG:

## 2021-03-27 NOTE — PROGRESS NOTE ADULT - SUBJECTIVE AND OBJECTIVE BOX
Patient is a 60y old  Male who presents with a chief complaint of hypoxia 2/2 COVID (26 Mar 2021 12:40)    Follow Up/CC:  ID following for covid, bacteremia    Interval History/ROS:  ROS unable as he is intubated, sedated.  low grade fever    Allergies  No Known Allergies    ANTIMICROBIALS:    cefepime   IVPB 2000 every 8 hours    MEDICATIONS  (STANDING):  aspirin  chewable 162 daily  budesonide 160 MICROgram(s)/formoterol 4.5 MICROgram(s) Inhaler 2 two times a day  cisatracurium Infusion 3 <Continuous>  dexMEDEtomidine Infusion 0.5 <Continuous>  fentaNYL   Infusion... 2.5 <Continuous>  heparin  Infusion. 800 <Continuous>  insulin lispro (ADMELOG) corrective regimen sliding scale  every 6 hours  insulin NPH human recombinant 25 every 6 hours  ketamine Infusion. 3 <Continuous>  lactulose Syrup 20 every 6 hours  naloxegol 12.5 daily  norepinephrine Infusion 0.01 <Continuous>  pantoprazole  Injectable 40 two times a day  polyethylene glycol 3350 17 every 12 hours  senna Syrup 10 every 12 hours  vasopressin Infusion 0.04 <Continuous>    Vital Signs Last 24 Hrs  T(F): 99.1 (03-27-21 @ 07:00), Max: 100.9 (03-26-21 @ 17:00)  HR: 106 (03-27-21 @ 09:30)  RR: 30 (03-27-21 @ 09:30)  SpO2: 94% (03-27-21 @ 09:30) (91% - 100%)  Wt(kg): --    PHYSICAL EXAM: limited, CT scan in room taking pictures  Constitutional: vented  HEAD/EYES: eyes closed  ENT:  intubated  Cardiovascular:   tachy  Respiratory:  tachypneic on vent  GI:  soft, non-tender, normal bowel sounds  Musculoskeletal:  limited  Neurologic: sedated  Skin:  no obvious  Psychiatric: not able to assess, sedated                        8.0    13.70 )-----------( 188      ( 27 Mar 2021 06:20 )             26.6 03-27    132  |  96  |  34  ----------------------------<  300  4.8   |  26  |  0.53  Ca    8.7      27 Mar 2021 00:14Phos  2.7     03-27Mg     1.9     03-27  TPro  6.6  /  Alb  2.4  /  TBili  0.7  /  DBili  x   /  AST  542  /  ALT  834  /  AlkPhos  354  03-27    Procalcitonin, Serum: 0.45 (03-27)  Procalcitonin, Serum: 0.25 (03-24)  Procalcitonin, Serum: 0.30 (03-22)  Procalcitonin, Serum: 0.34 (03-21)    C-Reactive Protein, Serum: 128 (03-27)  C-Reactive Protein, Serum: 12.05 (03-24)    Ferritin, Serum: 3119 (03-27)  Ferritin, Serum: 3091 (03-24)  Ferritin, Serum: 689 (03-20)    D-Dimer Assay, Quantitative: 2725 (03-27)  D-Dimer Assay, Quantitative: 643 (03-24)  D-Dimer Assay, Quantitative: 934 (03-22)    Lactate Dehydrogenase, Serum: 508 (03-27)  Lactate Dehydrogenase, Serum: 333 (03-24)    MICROBIOLOGY:  Culture - Sputum (collected 03-25-21 @ 17:09)  Source: .Sputum Sputum  Gram Stain (03-25-21 @ 20:18):    Few polymorphonuclear leukocytes per low power field    Rare Squamous epithelial cells per low power field    Rare Gram positive cocci in pairs per oil power field    Rare Gram Variable Rods per oil power field  Preliminary Report (03-26-21 @ 16:50):    Normal Respiratory Marilee present    Culture - Blood (collected 03-25-21 @ 16:31)  Source: .Blood Blood-Peripheral  Gram Stain (03-26-21 @ 02:22):    Growth in aerobic bottle: Gram Negative Rods    Growth in anaerobic bottle: Gram Negative Rods  Preliminary Report (03-26-21 @ 20:21):    Growth in aerobic and anaerobic bottles: Escherichia coli    See previous culture [id number]    Culture - Blood (collected 03-25-21 @ 16:31)  Source: .Blood Blood-Peripheral  Gram Stain (03-26-21 @ 02:57):    Growth in aerobic bottle: Gram Negative Rods    Growth in anaerobic bottle: Gram Negative Rods  Preliminary Report (03-26-21 @ 20:17):    Growth in aerobic bottle: Escherichia coli    Growth in anaerobic bottle: Gram Negative Rods  Organism: Blood Culture PCR (03-26-21 @ 03:19)  Organism: Blood Culture PCR (03-26-21 @ 03:19)      -  Escherichia coli: Detec      Method Type: PCR    Culture - Blood (collected 03-23-21 @ 21:24)  Source: .Blood Blood-Peripheral  Preliminary Report (03-24-21 @ 22:02):    No growth to date.    Culture - Sputum (collected 03-22-21 @ 00:34)  Source: .Sputum Sputum  Gram Stain (03-22-21 @ 02:27):    Rare polymorphonuclear leukocytes per low power field    Rare Squamous epithelial cells per low power field    No organisms seen per oil power field  Final Report (03-23-21 @ 20:34):    Normal Respiratory Marilee absent    Culture - Blood (collected 03-21-21 @ 23:11)  Source: .Blood Blood-Peripheral  Final Report (03-27-21 @ 01:01):    No Growth Final    Culture - Blood (collected 03-21-21 @ 23:11)  Source: .Blood Blood-Peripheral  Final Report (03-27-21 @ 01:01):    No Growth Final    RADIOLOGY  US Abdomen Doppler (03.25.21 @ 13:29) >  IMPRESSION:  Patent hepatic vasculature with normal directionality.  Hepatic steatosis.  Sludge within the gallbladder with nonspecific mild gallbladder wall thickening to 4 mm.    CT Angio Chest w/ IV Cont (03.12.21 @ 15:19) >  IMPRESSION:  1. Right lower lobe medial segmental pulmonary embolism.  2. Diffuse bilateral groundglass opacities, septal thickening, bronchiectasis, representative of lung injury.    TTE with Doppler (w/Cont) (03.16.21 @ 19:55) >  EF (Visual Estimate): 55-60 %  Conclusions:  1. Aortic valve not well visualized; probably normal.  Minimal aortic regurgitation.  2. Endocardial visualization enhanced with intravenous injection of Ultrasonic Enhancing Agent (Definity).Estimated EF of 55-60%. Absence of EKG gating limits assessment of RWMA.  3. Mild diastolic dysfunction (Stage I).  4. The right ventricle is not well visualized; grossly    VA Duplex Lower Ext Vein Scan, Bilat (03.12.21 @ 15:50) >  IMPRESSION:  No evidence of deep venous thrombosis in the visualized bilateral lower extremities.

## 2021-03-27 NOTE — PROGRESS NOTE ADULT - SUBJECTIVE AND OBJECTIVE BOX
Chief complaint  Patient is a 60y old  Male who presents with a chief complaint of hypoxia 2/2 COVID (27 Mar 2021 08:58)   Review of systems  Patient in bed, appears comfortable.    Labs and Fingersticks  CAPILLARY BLOOD GLUCOSE      POCT Blood Glucose.: 224 mg/dL (27 Mar 2021 05:09)  POCT Blood Glucose.: 393 mg/dL (26 Mar 2021 17:37)      Anion Gap, Serum: 10 (03-27 @ 00:14)  Anion Gap, Serum: 10 (03-26 @ 18:08)  Anion Gap, Serum: 10 (03-25 @ 21:57)  Anion Gap, Serum: 11 (03-25 @ 17:22)  Anion Gap, Serum: 8 (03-25 @ 12:06)      Calcium, Total Serum: 8.7 (03-27 @ 00:14)  Calcium, Total Serum: 8.6 (03-26 @ 18:08)  Calcium, Total Serum: 8.4 (03-25 @ 21:57)  Calcium, Total Serum: 8.9 (03-25 @ 17:22)  Calcium, Total Serum: 10.5 (03-25 @ 12:06)  Albumin, Serum: 2.4 <L> (03-27 @ 00:14)  Albumin, Serum: 2.4 <L> (03-26 @ 18:08)  Albumin, Serum: 2.1 <L> (03-25 @ 21:57)  Albumin, Serum: 2.2 <L> (03-25 @ 17:22)    Alanine Aminotransferase (ALT/SGPT): 834 <H> (03-27 @ 00:14)  Alanine Aminotransferase (ALT/SGPT): 858 <H> (03-26 @ 18:08)  Alanine Aminotransferase (ALT/SGPT): 1020 <H> (03-25 @ 21:57)  Alanine Aminotransferase (ALT/SGPT): 951 <H> (03-25 @ 17:22)  Alkaline Phosphatase, Serum: 354 <H> (03-27 @ 00:14)  Alkaline Phosphatase, Serum: 354 <H> (03-26 @ 18:08)  Alkaline Phosphatase, Serum: 404 <H> (03-25 @ 21:57)  Alkaline Phosphatase, Serum: 408 <H> (03-25 @ 17:22)  Aspartate Aminotransferase (AST/SGOT): 542 <H> (03-27 @ 00:14)  Aspartate Aminotransferase (AST/SGOT): 709 <H> (03-26 @ 18:08)  Aspartate Aminotransferase (AST/SGOT): 1190 <H> (03-25 @ 21:57)  Aspartate Aminotransferase (AST/SGOT): 1337 <H> (03-25 @ 17:22)        03-27    132<L>  |  96  |  34<H>  ----------------------------<  300<H>  4.8   |  26  |  0.53    Ca    8.7      27 Mar 2021 00:14  Phos  2.7     03-27  Mg     1.9     03-27    TPro  6.6  /  Alb  2.4<L>  /  TBili  0.7  /  DBili  x   /  AST  542<H>  /  ALT  834<H>  /  AlkPhos  354<H>  03-27                        8.0    13.70 )-----------( 188      ( 27 Mar 2021 06:20 )             26.6     Medications  MEDICATIONS  (STANDING):  aspirin  chewable 162 milliGRAM(s) Enteral Tube daily  budesonide 160 MICROgram(s)/formoterol 4.5 MICROgram(s) Inhaler 2 Puff(s) Inhalation two times a day  cefepime   IVPB      cefepime   IVPB 2000 milliGRAM(s) IV Intermittent every 8 hours  chlorhexidine 0.12% Liquid 15 milliLiter(s) Oral Mucosa every 12 hours  chlorhexidine 2% Cloths 1 Application(s) Topical <User Schedule>  cholecalciferol 2000 Unit(s) Oral daily  cisatracurium Infusion 3 MICROgram(s)/kG/Min (12 mL/Hr) IV Continuous <Continuous>  dexMEDEtomidine Infusion 0.5 MICROgram(s)/kG/Hr (8.34 mL/Hr) IV Continuous <Continuous>  fentaNYL   Infusion... 2.5 MICROgram(s)/kG/Hr (8.34 mL/Hr) IV Continuous <Continuous>  heparin  Infusion. 800 Unit(s)/Hr (8 mL/Hr) IV Continuous <Continuous>  influenza   Vaccine 0.5 milliLiter(s) IntraMuscular once  insulin lispro (ADMELOG) corrective regimen sliding scale   SubCutaneous every 6 hours  insulin NPH human recombinant 25 Unit(s) SubCutaneous every 6 hours  ketamine Infusion. 3 mG/kG/Hr (20 mL/Hr) IV Continuous <Continuous>  lactulose Syrup 20 Gram(s) Oral every 6 hours  multivitamin/minerals/iron Oral Solution (CENTRUM) 15 milliLiter(s) Oral daily  naloxegol 12.5 milliGRAM(s) Oral daily  norepinephrine Infusion 0.01 MICROgram(s)/kG/Min (1.25 mL/Hr) IV Continuous <Continuous>  pantoprazole  Injectable 40 milliGRAM(s) IV Push two times a day  petrolatum Ophthalmic Ointment 1 Application(s) Both EYES two times a day  polyethylene glycol 3350 17 Gram(s) Oral every 12 hours  senna Syrup 10 milliLiter(s) Oral every 12 hours  vasopressin Infusion 0.04 Unit(s)/Min (2.4 mL/Hr) IV Continuous <Continuous>      Physical Exam  General: Patient comfortable in bed  Vital Signs Last 12 Hrs  T(F): 99.1 (03-27-21 @ 07:00), Max: 100 (03-26-21 @ 23:00)  HR: 106 (03-27-21 @ 09:30) (92 - 113)  BP: --  BP(mean): --  RR: 30 (03-27-21 @ 09:30) (30 - 30)  SpO2: 94% (03-27-21 @ 09:30) (93% - 98%)  Neck: No palpable thyroid nodules.  CVS: S1S2, No murmurs  Respiratory: No wheezing, no crepitations  GI: Abdomen soft, bowel sounds positive  Musculoskeletal:  edema lower extremities.     Diagnostics

## 2021-03-27 NOTE — PROGRESS NOTE ADULT - ASSESSMENT
Assessment  DMT2: 60y Male with newly diagnosed DM T2 with hyperglycemia admitted with COVID19, A1C is 12.4%, on NPH insulin and coverage, increased NPH dose yesterday, blood sugars improving but not at target, no hypoglycemic episodes, remains intubated, tube feeds at goal rate.  COVID19: On medications, monitored, stable.  Overweight: No strict exercise routines, neither on low calorie diet.      Anderson Mcmanus MD  Cell: [phone]  Office: [phone]

## 2021-03-27 NOTE — PROCEDURE NOTE - ADDITIONAL PROCEDURE DETAILS
Post-procedure x-ray showed line in the left subclavian. Upon review of the x-ray, line removed and pressure held at insertion site.

## 2021-03-27 NOTE — PROGRESS NOTE ADULT - ASSESSMENT
60M with hypoxia after being diagnosed with COVID-19 as an outpatient. He has completed a course of remdesevir, but is still febrile and hypoxic. E coli bacteremia of unclear source.  RUQ sonogram negative for esperanza.      COVID-19 pneumonia, leukocytosis, resp failure, E. coli bacteremia    COVDI-19  - Remdesevir completed  - s/p Zosyn  - Not a candidate for tocilizumab in setting of possible infection     Fever  - cefepime okay for now  - f/u repeat BC  - change lines if possible     E coli bacteremia  - continue cefepime  - obtain CT A/P    Prognosis guarded    Please call the ID service [phone] with questions or concerns over the weekend

## 2021-03-27 NOTE — PROGRESS NOTE ADULT - ATTENDING COMMENTS
Patient seen and examined. Patient critically ill. Patient is a 60M newly diagnosed DM2 who presents with respiratory failure in the setting of COVID-19 and pulmonary embolism. His course has been complicated by E. coli bacteremia.    1. Acute Hypoxemic Respiratory Failure with ARDS due to COVID-19. Continue lung protective ventilation. Maintain O2 sat > 90%. Wean ventilator as tolerated. Patient currently on PEEP 8 and FiO2 50%. P/F improved  - Pulmonary embolism - will attempt to restart AC today  - Attempt to decrease paralytics as able - when tried previously patient had desaturation events  2. Shock - due to sepsis and vasoplegia. Continue vasopressors to maintain MAP > 65.  3. Infectious Disease - COVID-19 - now s/p Dexamethasone.  - E. coli bacteremia - continue Cefepime and check repeat cultures. Check 2D echo.   4. Gastrointestinal - oropharyngeal dysphagia - continue tube feeds as able and monitor for BM. Abdomen distended with ? ileus. Will check AXR and hold feeds.  - Transaminitis - likely due to shock and critical illness. Currently improving. Check hepatitis labs.  - Episode of melena prior - currently with stable CBC. Continue to monitor as AC to be restarted.  5. Renal function stable and patient making good urine.  6. Hyperglycemia - will adjust NPH    Patient is critically ill requiring frequent bedside visits with therapy change. Long term prognosis guarded.

## 2021-03-28 NOTE — CONSULT NOTE ADULT - SUBJECTIVE AND OBJECTIVE BOX
GENERAL SURGERY CONSULT NOTE    Consulting surgical team: Acute Care Surgery [id number]  Consulting attending: Dr. Mckeon    HPI:  61 yo male w/no known pmh (does not f/u with PCP) presents to [facility] for cc fevers, weakness, fatigue, shortness of breath for 5 days. Tested positive for covid 3 days ago at an urgent care. Today presented again to urgent care and noted to be hypoxic and sent to the ER. Patient denies dysuria, diarrhea, constipation. Currently denies shortness of breath on high flow.     In the ER, patient was febrile to 101.5F, tachycardic to 120s, tachypneic to 40, hypoxic to 88% on 6L. Improved on high flow nasal cannula to 95%.  (08 Mar 2021 14:08)    Patient admitted to MICU with COVID+ pneumonia, RLL PE on heparin gtt, Gram negative bacteremia on Ancef. Patient with several episodes of melena two days ago, Hgb/Hct stable. Now with two days of obstipation and constipation, high residuals from tube feeds, and one day of abdominal distension. CT A/P obtained today per MICU which demonstrated SBO. Just prior to going to CT patient had a large bowel movement. After returning from CT patient had two more bowel movements, and passed significant flatus per MICU RN.     PAST MEDICAL HISTORY:  No pertinent past medical history        PAST SURGICAL HISTORY:  No significant past surgical history    No significant past surgical history        MEDICATIONS:  bisacodyl Suppository 10 milliGRAM(s) Rectal daily  budesonide 160 MICROgram(s)/formoterol 4.5 MICROgram(s) Inhaler 2 Puff(s) Inhalation two times a day  ceFAZolin   IVPB      ceFAZolin   IVPB 2000 milliGRAM(s) IV Intermittent every 8 hours  chlorhexidine 0.12% Liquid 15 milliLiter(s) Oral Mucosa every 12 hours  chlorhexidine 2% Cloths 1 Application(s) Topical <User Schedule>  cholecalciferol 2000 Unit(s) Oral daily  cisatracurium Infusion 3 MICROgram(s)/kG/Min IV Continuous <Continuous>  dexMEDEtomidine Infusion 0.5 MICROgram(s)/kG/Hr IV Continuous <Continuous>  fentaNYL   Infusion... 2.5 MICROgram(s)/kG/Hr IV Continuous <Continuous>  heparin  Infusion. 800 Unit(s)/Hr IV Continuous <Continuous>  influenza   Vaccine 0.5 milliLiter(s) IntraMuscular once  insulin lispro (ADMELOG) corrective regimen sliding scale   SubCutaneous every 6 hours  ketamine Infusion. 3 mG/kG/Hr IV Continuous <Continuous>  metoclopramide Injectable 10 milliGRAM(s) IV Push every 8 hours  norepinephrine Infusion 0.01 MICROgram(s)/kG/Min IV Continuous <Continuous>  petrolatum Ophthalmic Ointment 1 Application(s) Both EYES two times a day      ALLERGIES:  No Known Allergies      VITALS & I/Os:  Vital Signs Last 24 Hrs  T(C): 38.3 (28 Mar 2021 16:00), Max: 38.8 (27 Mar 2021 23:00)  T(F): 100.9 (28 Mar 2021 16:00), Max: 101.8 (27 Mar 2021 23:00)  HR: 97 (28 Mar 2021 17:00) (81 - 149)  BP: --  BP(mean): --  RR: 30 (28 Mar 2021 17:00) (28 - 34)  SpO2: 91% (28 Mar 2021 17:00) (90% - 100%)  Mode: AC/ CMV (Assist Control/ Continuous Mandatory Ventilation)  RR (machine): 30  TV (machine): 300  FiO2: 50  PEEP: 7  ITime: 0.8  MAP: 18  PIP: 34    I&O's Summary    27 Mar 2021 07:01  -  28 Mar 2021 07:00  --------------------------------------------------------  IN: 2383.6 mL / OUT: 1815 mL / NET: 568.6 mL    28 Mar 2021 07:01  -  28 Mar 2021 18:03  --------------------------------------------------------  IN: 730.3 mL / OUT: 660 mL / NET: 70.3 mL        PHYSICAL EXAM:  General: sedated, intubated  Neuro: sedated, paralyzed  HEENT: NCAT, EOMI, anicteric, mucosa moist  Respiratory: intubated, mechanically ventilated  CVS: regular rate, on levophed  Abdomen: softly distended      LABS:                        8.0    14.57 )-----------( 199      ( 28 Mar 2021 00:11 )             26.7     03-28    137  |  101  |  22  ----------------------------<  187<H>  4.0   |  27  |  0.39<L>    Ca    8.7      28 Mar 2021 00:11  Phos  2.2     03-28  Mg     1.9     03-28    TPro  6.1  /  Alb  2.4<L>  /  TBili  0.7  /  DBili  x   /  AST  181<H>  /  ALT  534<H>  /  AlkPhos  300<H>  03-28    Lactate:    PT/INR - ( 28 Mar 2021 00:11 )   PT: 17.1 sec;   INR: 1.45 ratio         PTT - ( 28 Mar 2021 16:33 )  PTT:130.3 sec  ABG - ( 28 Mar 2021 00:06 )  pH, Arterial: 7.44  pH, Blood: x     /  pCO2: 44    /  pO2: 79    / HCO3: 29    / Base Excess: 4.9   /  SaO2: 95                CARDIAC MARKERS ( 27 Mar 2021 00:14 )  x     / x     / 30 U/L / x     / x                IMAGING:    < from: CT Abdomen and Pelvis w/ IV Cont (03.28.21 @ 15:40) >  FINDINGS:  LOWER CHEST: Peripheral consolidations and groundglass densities in the visualized portions of the bilateral lungs    LIVER: Within normal limits.  BILE DUCTS: Normal caliber.  GALLBLADDER: Within normal limits.  SPLEEN: Within normal limits.  PANCREAS: Within normal limits.  ADRENALS: Within normal limits.  KIDNEYS/URETERS: Subcentimeter hypodensities bilaterally, too small to characterize.    BLADDER: Decompressed by Coates catheter.  REPRODUCTIVE ORGANS: Normal prostate.    BOWEL: Enteric tube coiled within stomach and terminates in the gastric fundus. Majority of the small bowel is distended with transition point in the distal ileum (3:85). The small bowel distal to this is decompressed. Appendix is normal. The colon is filled with stool and gas.  PERITONEUM: No ascites.  VESSELS: Calcified atherosclerosis.  RETROPERITONEUM/LYMPH NODES: No lymphadenopathy.  ABDOMINAL WALL: Anasarca.  BONES: Lumbar degenerative disc disease.    IMPRESSION:  Small bowel obstruction with transition point in the distal ileum.    COVID-19 pneumonia.    Anasarca.    < end of copied text >

## 2021-03-28 NOTE — PROGRESS NOTE ADULT - ATTENDING COMMENTS
Patient seen and examined. Patient critically ill. Patient is a 60M newly diagnosed DM2 who presents with respiratory failure in the setting of COVID-19 and pulmonary embolism. His course has been complicated by E. coli bacteremia.    1. Acute Hypoxemic Respiratory Failure with ARDS due to COVID-19. Continue lung protective ventilation. Maintain O2 sat > 90%. Wean ventilator as tolerated. Patient currently on PEEP 8 and FiO2 50%. Monitor P/F. Patient intubated on 3/17.  - Pulmonary embolism - will continue AC   - Attempt to decrease paralytics as able - when tried previously patient had desaturation events  2. Shock - due to sepsis and vasoplegia. Continue vasopressors to maintain MAP > 65.  3. Infectious Disease - COVID-19 - now s/p Dexamethasone.  - E. coli bacteremia - continue antibiotics and followup repeat cultures. ID followup.  - TLC changed this AM  4. Gastrointestinal - oropharyngeal dysphagia - tube feeds held in setting of abdominal distension. Patient pending a CT abdomen/pelvis with IV contrast to evaluate further as well as for potential source of E. coli bacteremia.  - Transaminitis - likely due to shock and critical illness. Currently improving. Hepatitis labs negative.  - Episode of melena prior - currently with stable CBC. No further episodes while on AC  5. Renal function stable and patient making good urine.  6. Hyperglycemia - continue NPH and monitor fingersticks with goal FS < 180. A1c 12.4.     Patient is critically ill requiring frequent bedside visits with therapy change. Long term prognosis guarded.  Case and plan discussed with COVID-ICU team

## 2021-03-28 NOTE — PROGRESS NOTE ADULT - SUBJECTIVE AND OBJECTIVE BOX
CHIEF COMPLAINT:  Patient is a 60y old  Male who presents with a chief complaint of hypoxia 2/2 COVID (27 Mar 2021 10:14)    HPI:  59 yo male w/no known pmh (does not f/u with PCP) presents to [facility] for cc fevers, weakness, fatigue, shortness of breath for 5 days. Tested positive for covid 3 days ago at an urgent care. Today presented again to urgent care and noted to be hypoxic and sent to the ER. Patient denies dysuria, diarrhea, constipation. Currently denies shortness of breath on high flow.     In the ER, patient was febrile to 101.5F, tachycardic to 120s, tachypneic to 40, hypoxic to 88% on 6L. Improved on high flow nasal cannula to 95%.  (08 Mar 2021 14:08)      Interval Events: Febrile and tachycardic  all night abx changed to cefazolin, back on levo, had big bowel movement overnight, ct abd cancelled      REVIEW OF SYSTEMS: unable as patient is sedated          OBJECTIVE:  ICU Vital Signs Last 24 Hrs  T(C): 38.7 (28 Mar 2021 03:00), Max: 38.8 (27 Mar 2021 23:00)  T(F): 101.7 (28 Mar 2021 03:00), Max: 101.8 (27 Mar 2021 23:00)  HR: 99 (28 Mar 2021 06:15) (84 - 126)  BP: --  BP(mean): --  ABP: 109/59 (28 Mar 2021 06:15) (89/48 - 159/77)  ABP(mean): 73 (28 Mar 2021 06:15) (61 - 103)  RR: 30 (28 Mar 2021 06:15) (30 - 34)  SpO2: 97% (28 Mar 2021 06:15) (93% - 100%)    Mode: AC/ CMV (Assist Control/ Continuous Mandatory Ventilation), RR (machine): 30, TV (machine): 300, FiO2: 50, PEEP: 7, ITime: 1, MAP: 20, PIP: 38    03-26 @ 07:01  -  03-27 @ 07:00  --------------------------------------------------------  IN: 2636.1 mL / OUT: 2670 mL / NET: -33.9 mL    03-27 @ 07:01  - 03-28 @ 06:25  --------------------------------------------------------  IN: 2317.8 mL / OUT: 1815 mL / NET: 502.8 mL      CAPILLARY BLOOD GLUCOSE      POCT Blood Glucose.: 138 mg/dL (28 Mar 2021 04:52)          PHYSICAL EXAM:  GENERAL: NAD,  HEENT:  Atraumatic, Normocephalic  EYES: PERRLA, conjunctiva and sclera clear  NECK: Supple, No JVD  CHEST/LUNG: diminished bilaterally; No wheezes, rales, or rhonchi  HEART: Regular rate and rhythm; No murmurs, rubs, or gallops  ABDOMEN: Soft, Nontender, Nondistended; Bowel sounds present  EXTREMITIES:  2+ Peripheral Pulses, No clubbing, cyanosis, or edema  PSYCH: unable as pt is sedated  NEUROLOGY: sedated  SKIN: No rashes or lesions        HOSPITAL MEDICATIONS:  MEDICATIONS  (STANDING):  aspirin  chewable 162 milliGRAM(s) Enteral Tube daily  bisacodyl Suppository 10 milliGRAM(s) Rectal daily  budesonide 160 MICROgram(s)/formoterol 4.5 MICROgram(s) Inhaler 2 Puff(s) Inhalation two times a day  ceFAZolin   IVPB      ceFAZolin   IVPB 2000 milliGRAM(s) IV Intermittent every 8 hours  chlorhexidine 0.12% Liquid 15 milliLiter(s) Oral Mucosa every 12 hours  chlorhexidine 2% Cloths 1 Application(s) Topical <User Schedule>  cholecalciferol 2000 Unit(s) Oral daily  cisatracurium Infusion 3 MICROgram(s)/kG/Min (12 mL/Hr) IV Continuous <Continuous>  dexMEDEtomidine Infusion 0.5 MICROgram(s)/kG/Hr (8.34 mL/Hr) IV Continuous <Continuous>  erythromycin     enteric coated 250 milliGRAM(s) Oral every 8 hours  fentaNYL   Infusion... 2.5 MICROgram(s)/kG/Hr (8.34 mL/Hr) IV Continuous <Continuous>  heparin  Infusion. 800 Unit(s)/Hr (8 mL/Hr) IV Continuous <Continuous>  influenza   Vaccine 0.5 milliLiter(s) IntraMuscular once  insulin lispro (ADMELOG) corrective regimen sliding scale   SubCutaneous every 6 hours  insulin NPH human recombinant 25 Unit(s) SubCutaneous every 6 hours  ketamine Infusion. 3 mG/kG/Hr (20 mL/Hr) IV Continuous <Continuous>  lactulose Syrup 20 Gram(s) Oral every 6 hours  metoclopramide Injectable 10 milliGRAM(s) IV Push every 8 hours  multivitamin/minerals/iron Oral Solution (CENTRUM) 15 milliLiter(s) Oral daily  naloxegol 12.5 milliGRAM(s) Oral daily  norepinephrine Infusion 0.01 MICROgram(s)/kG/Min (1.25 mL/Hr) IV Continuous <Continuous>  pantoprazole  Injectable 40 milliGRAM(s) IV Push two times a day  petrolatum Ophthalmic Ointment 1 Application(s) Both EYES two times a day  polyethylene glycol 3350 17 Gram(s) Oral every 12 hours  senna Syrup 10 milliLiter(s) Oral every 12 hours  vasopressin Infusion 0.04 Unit(s)/Min (2.4 mL/Hr) IV Continuous <Continuous>    MEDICATIONS  (PRN):  acetaminophen    Suspension .. 650 milliGRAM(s) Oral every 6 hours PRN Temp greater or equal to 38C (100.4F)      LABS:                        8.0    14.57 )-----------( 199      ( 28 Mar 2021 00:11 )             26.7     Hgb Trend: 8.0<--, 7.9<--, 8.0<--, 8.2<--, 8.2<--  03-28    137  |  101  |  22  ----------------------------<  187<H>  4.0   |  27  |  0.39<L>    Ca    8.7      28 Mar 2021 00:11  Phos  2.2     03-28  Mg     1.9     03-28    TPro  6.1  /  Alb  2.4<L>  /  TBili  0.7  /  DBili  x   /  AST  181<H>  /  ALT  534<H>  /  AlkPhos  300<H>  03-28    LIVER FUNCTIONS - ( 28 Mar 2021 00:11 )  Alb: 2.4 g/dL / Pro: 6.1 g/dL / ALK PHOS: 300 U/L / ALT: 534 U/L / AST: 181 U/L / GGT: x           Creatinine Trend: 0.39<--, 0.53<--, 0.57<--, 0.58<--, 0.66<--, 0.61<--  PT/INR - ( 28 Mar 2021 00:11 )   PT: 17.1 sec;   INR: 1.45 ratio         PTT - ( 28 Mar 2021 00:11 )  PTT:31.0 sec    Arterial Blood Gas:  03-28 @ 00:06  7.44/44/79/29/95/4.9  ABG lactate: --  Arterial Blood Gas:  03-27 @ 00:22  7.35/53/88/28/96/2.6  ABG lactate: --  Arterial Blood Gas:  03-26 @ 11:43  7.36/49/75/27/92/1.4  ABG lactate: --        MICROBIOLOGY:     RADIOLOGY:  [ ] Reviewed and interpreted by me    EKG:

## 2021-03-28 NOTE — PROGRESS NOTE ADULT - SUBJECTIVE AND OBJECTIVE BOX
Chief complaint  Patient is a 60y old  Male who presents with a chief complaint of hypoxia 2/2 COVID (28 Mar 2021 18:02)   Review of systems  Patient in bed, appears comfortable.    Labs and Fingersticks  CAPILLARY BLOOD GLUCOSE      POCT Blood Glucose.: 135 mg/dL (28 Mar 2021 17:09)  POCT Blood Glucose.: 75 mg/dL (28 Mar 2021 12:07)  POCT Blood Glucose.: 138 mg/dL (28 Mar 2021 04:52)  POCT Blood Glucose.: 161 mg/dL (28 Mar 2021 03:41)      Anion Gap, Serum: 9 (03-28 @ 00:11)  Anion Gap, Serum: 10 (03-27 @ 00:14)      Calcium, Total Serum: 8.7 (03-28 @ 00:11)  Calcium, Total Serum: 8.7 (03-27 @ 00:14)  Albumin, Serum: 2.4 <L> (03-28 @ 00:11)  Albumin, Serum: 2.4 <L> (03-27 @ 00:14)    Alanine Aminotransferase (ALT/SGPT): 534 <H> (03-28 @ 00:11)  Alanine Aminotransferase (ALT/SGPT): 834 <H> (03-27 @ 00:14)  Alkaline Phosphatase, Serum: 300 <H> (03-28 @ 00:11)  Alkaline Phosphatase, Serum: 354 <H> (03-27 @ 00:14)  Aspartate Aminotransferase (AST/SGOT): 181 <H> (03-28 @ 00:11)  Aspartate Aminotransferase (AST/SGOT): 542 <H> (03-27 @ 00:14)        03-28    137  |  101  |  22  ----------------------------<  187<H>  4.0   |  27  |  0.39<L>    Ca    8.7      28 Mar 2021 00:11  Phos  2.2     03-28  Mg     1.9     03-28    TPro  6.1  /  Alb  2.4<L>  /  TBili  0.7  /  DBili  x   /  AST  181<H>  /  ALT  534<H>  /  AlkPhos  300<H>  03-28                        8.0    14.57 )-----------( 199      ( 28 Mar 2021 00:11 )             26.7     Medications  MEDICATIONS  (STANDING):  bisacodyl Suppository 10 milliGRAM(s) Rectal daily  budesonide 160 MICROgram(s)/formoterol 4.5 MICROgram(s) Inhaler 2 Puff(s) Inhalation two times a day  ceFAZolin   IVPB      ceFAZolin   IVPB 2000 milliGRAM(s) IV Intermittent every 8 hours  chlorhexidine 0.12% Liquid 15 milliLiter(s) Oral Mucosa every 12 hours  chlorhexidine 2% Cloths 1 Application(s) Topical <User Schedule>  cholecalciferol 2000 Unit(s) Oral daily  cisatracurium Infusion 3 MICROgram(s)/kG/Min (12 mL/Hr) IV Continuous <Continuous>  dexMEDEtomidine Infusion 0.5 MICROgram(s)/kG/Hr (8.34 mL/Hr) IV Continuous <Continuous>  fentaNYL   Infusion... 2.5 MICROgram(s)/kG/Hr (8.34 mL/Hr) IV Continuous <Continuous>  heparin  Infusion. 800 Unit(s)/Hr (8 mL/Hr) IV Continuous <Continuous>  influenza   Vaccine 0.5 milliLiter(s) IntraMuscular once  insulin lispro (ADMELOG) corrective regimen sliding scale   SubCutaneous every 6 hours  ketamine Infusion. 3 mG/kG/Hr (20 mL/Hr) IV Continuous <Continuous>  metoclopramide Injectable 10 milliGRAM(s) IV Push every 8 hours  norepinephrine Infusion 0.01 MICROgram(s)/kG/Min (1.25 mL/Hr) IV Continuous <Continuous>  petrolatum Ophthalmic Ointment 1 Application(s) Both EYES two times a day      Physical Exam  General: Patient comfortable in bed  Vital Signs Last 12 Hrs  T(F): 100.4 (03-28-21 @ 18:00), Max: 101.5 (03-28-21 @ 08:00)  HR: 89 (03-28-21 @ 18:45) (81 - 149)  BP: --  BP(mean): --  RR: 30 (03-28-21 @ 18:45) (28 - 32)  SpO2: 98% (03-28-21 @ 18:45) (90% - 100%)  Neck: No palpable thyroid nodules.  CVS: S1S2, No murmurs  Respiratory: No wheezing, no crepitations  GI: Abdomen soft, bowel sounds positive  Musculoskeletal:  edema lower extremities.     Diagnostics    Free Thyroxine, Serum: AM Sched. Collection: 29-Mar-2021 06:00 (03-28 @ 16:46)  Thyroid Stimulating Hormone, Serum: AM Sched. Collection: 29-Mar-2021 06:00 (03-28 @ 16:46)

## 2021-03-28 NOTE — PROGRESS NOTE ADULT - ASSESSMENT
ASSESSMENT/PLAN:  Patient is a 60y old  Male who presents with a chief complaint of hypoxia 2/2 COVID (24 Mar 2021 14:02)    HPI:  61 yo male w/no known pmh (does not f/u with PCP) presents to [facility] for cc fevers, weakness, fatigue, shortness of breath for 5 days. Tested positive for covid 3 days ago at an urgent care. Today presented again to urgent care and noted to be hypoxic and sent to the ER. Patient denies dysuria, diarrhea, constipation. Currently denies shortness of breath on high flow. Intubated for worsening respiratory failure and transferred to the ICU for further management.     Neuro:  - Sedated on Propofol, Fentanyl, Precedex, Ketamine  - Paralyzed on Nimbex  - Continue to titrate off of nimbex as tolerated as FIO2 requirements low    RESPIRATORY:  - PRVC 30/300/50/8. PEEP decreased to 7. Continue to titrate FIO2 as tolerated. Maintain O2 sat >90  - RLL PE diagnosed on admission, treated with heparin gtt    CARDIOVASCULAR:  - Continues to require vasopressors. Currently on Levophed  - Titrate pressors as tolerated  - Maintain MAP >65    RENAL:  - Renal function remains stable with good UOP   - Continue to monitor renal indices, strict I/Os   - diuresis as needed    GASTROINTESTINAL :  - Previously with constipation, noted to have BMs overnight  -S/P  melena X 2 on 3/26. Serial cbcs stable GI consulted, ------> No acute intervention and continue to monitor CBC  - LFTs continue to be elevated, continuing to monitor  - TF was restarted overnight  -CT abd/pelvis cancelled overnight  -Continue PPI BID dosing    INFECTIOUS DISEASE:  - Most recent blood cultures now with gram (-) rods (E.coli). Cultures prior were negative  - Patient on Cefazolin Ecoli bacteremia.  ID following  - Repeat blood cultures sent  3/27 for clearance f/u results    HEME:  - On heparin gtt for RLL PE and hypercoagulable state, which was restarted today  - Patient with anemia with Hbg of 8.0. Will Continue to trend cbc Q12  - Monitor CBC closely, Keep type and screen active.   - GI evaluation----->. PPI dosing increased to BID    ENDO:  Increase NPH 25units q6hr  SS Q6  ISS keep glucose <180 and >110  ======================= DISPOSITION  =====================  [ x] Critical   [ ] Guarded    [ ] Stable    [ x] Maintain in ICU   ASSESSMENT/PLAN:  Patient is a 60y old  Male who presents with a chief complaint of hypoxia 2/2 COVID (24 Mar 2021 14:02)    HPI:  61 yo male w/no known pmh (does not f/u with PCP) presents to [facility] for cc fevers, weakness, fatigue, shortness of breath for 5 days. Tested positive for covid 3 days ago at an urgent care. Today presented again to urgent care and noted to be hypoxic and sent to the ER. Patient denies dysuria, diarrhea, constipation. Currently denies shortness of breath on high flow. Intubated for worsening respiratory failure and transferred to the ICU for further management.     Neuro:  - Sedated on Propofol, Fentanyl, Precedex, Ketamine  - Paralyzed on Nimbex  - Continue to titrate off of nimbex as tolerated as FIO2 requirements low    RESPIRATORY:  - VAC 30/300/50/8. PEEP decreased to 7. Continue to titrate FIO2 as tolerated. Maintain O2 sat >90  - RLL PE diagnosed on admission, treated with heparin gtt    CARDIOVASCULAR:  - Continues to require vasopressors. Currently on Levophed  - Titrate pressors as tolerated  - Maintain MAP >65    RENAL:  - Renal function remains stable with good UOP   - Continue to monitor renal indices, strict I/Os   - diuresis as needed    GASTROINTESTINAL :  - Previously with constipation, noted to have BMs overnight  -S/P  melena X 2 on 3/26. Serial cbcs stable GI consulted, ------> No acute intervention and continue to monitor CBC  - LFTs continue to be elevated, continuing to monitor  - TF was restarted overnight  -CT abd/pelvis cancelled overnight  -Continue PPI BID dosing    INFECTIOUS DISEASE:  - Most recent blood cultures now with gram (-) rods (E.coli). Cultures prior were negative  - Patient on Cefazolin Ecoli bacteremia.  ID following  - Repeat blood cultures sent  3/27 for clearance f/u results    HEME:  - On heparin gtt for RLL PE and hypercoagulable state, which was restarted today  - Patient with anemia with Hbg of 8.0. Will Continue to trend cbc Q12  - Monitor CBC closely, Keep type and screen active.   - GI evaluation----->. PPI dosing increased to BID    ENDO:  Increase NPH 25units q6hr  SS Q6  ISS keep glucose <180 and >110  ======================= DISPOSITION  =====================  [ x] Critical   [ ] Guarded    [ ] Stable    [ x] Maintain in ICU

## 2021-03-28 NOTE — CONSULT NOTE ADULT - ASSESSMENT
60M admitted to MICU with COVID+ pneumonia, RLL PE on heparin gtt, Gram negative bacteremia on Ancef. Remains intubated, ventilated, on levophed, now with ileus vs SBO, now passing flatus and BM since CT showing SBO.    - Continue NPO, NGT to LCWS  - Please obtain abdominal xray in AM  - Rest of care per MICU  - No acute surgical intervention at this time.    Discussed with Dr. Mckeon.    Inez Delarosa, PGY2  Acute Care Surgery [id number]

## 2021-03-28 NOTE — PROGRESS NOTE ADULT - PROBLEM SELECTOR PLAN 1
Will continue current insulin regimen for now. Will continue monitoring  blood sugars, will Follow up.

## 2021-03-28 NOTE — PROGRESS NOTE ADULT - ASSESSMENT
Assessment  DMT2: 60y Male with newly diagnosed DM T2 with hyperglycemia admitted with COVID19, A1C is 12.4%, on insulin, NPO,  blood sugars improving no hypoglycemic episodes.  COVID19: On medications, monitored, stable.  Overweight: No strict exercise routines, neither on low calorie diet.      Anderson Mcmanus MD  Cell: [phone]  Office: [phone]

## 2021-03-29 NOTE — PROGRESS NOTE ADULT - ASSESSMENT
ASSESSMENT/PLAN:  Patient is a 60y old  Male who presents with a chief complaint of hypoxia 2/2 COVID (24 Mar 2021 14:02)    HPI:  59 yo male w/no known pmh (does not f/u with PCP) presents to [facility] for cc fevers, weakness, fatigue, shortness of breath for 5 days. Tested positive for covid 3 days ago at an urgent care. Today presented again to urgent care and noted to be hypoxic and sent to the ER. Patient denies dysuria, diarrhea, constipation. Currently denies shortness of breath on high flow. Intubated for worsening respiratory failure and transferred to the ICU for further management.     Neuro:  - Sedated Fentanyl, Ketamine  - 3/29 AM Symptomatic Sinus Alex to 30s -> d/c Precedex: resolved    - Paralyzed on Nimbex  - Continue to titrate off of nimbex as tolerated as FIO2 requirements low    RESPIRATORY:  - PRVC 30/300/50/7. Continue to titrate FiO2 as tolerated. Maintain O2 sat >90  - RLL PE diagnosed on admission, treated with heparin gtt    CARDIOVASCULAR:  -  Currently off Levophed  - Maintain MAP >65  - TTE 3/16 EF 55-60%. Mild diastolic dysfunction     RENAL:  - D5 1/2NS @50 mL/hr while NPO   - Renal function remains stable with good UOP   - Continue to monitor renal indices, strict I/Os     GASTROINTESTINAL:  #SBO  - CT ABD/Pelv 3/28: SBO w/ transition point in distal ileum   - Gen surg consulted: **NPO w/ OGT to LCWS, f/u AM Abd XR 3/29. No surgical intervention @ this time     #Melena  -S/P  melena X 2 on 3/26. Serial cbcs stable GI consulted, ------> No acute intervention and H+H stable    #Transaminitis, resolving   - LFTs continue to be elevated, continuing to monitor    INFECTIOUS DISEASE:  - 3/25 blood cultures now with gram (-) rods (E.coli). Cultures prior were negative  - Patient on Cefazolin Ecoli bacteremia.  ID following  - Repeat blood cultures sent  3/27 prelim negative    HEME:  - ** Anemia: Hgb 7.0 s/p 1U PRBC 3/29 AM -> f/u CBC   - On heparin gtt for RLL PE and hypercoagulable state,   - GI evaluation----->. PPI dosing increased to BID for UGIB ppx    ENDO:  - In setting of NPO maintain on mod ISS keep glucose < 180  - Previoulsy on high dose NPH 25U q6h when on TFs    ======================= DISPOSITION  =====================  [ x] Critical   [ ] Guarded    [ ] Stable    [ x] Maintain in ICU

## 2021-03-29 NOTE — PROGRESS NOTE ADULT - ASSESSMENT
Assessment  DMT2: 60y Male with newly diagnosed DM T2 with hyperglycemia admitted with COVID19, A1C is 12.4%, on low dose insulin now, patient remains NPO,  blood sugars are overall within acceptable range, no hypoglycemic episodes.  COVID19: On medications, monitored, stable.  Overweight: No strict exercise routines, neither on low calorie diet.      Anderson Mcmanus MD  Cell: [phone]  Office: [phone]

## 2021-03-29 NOTE — PROGRESS NOTE ADULT - ATTENDING COMMENTS
Pt is a 61 yo M with no known PMHx presented on 3/8 to [facility] 2 to fevers, weakness, fatigue, shortness of breath for 5 days. Tested positive for Covid-19 on 3/5. Pt was seen at urgent care and noted to be hypoxic and sent to the ER.  Admitting dx: acute hypoxemic resp failure 2 to COVID 19. Hospital course complicated by PE and superimposed bacterial PNA. RRT on 3/17 2 to worsening hypoxemia;  pt Intubated and transferred to the ICU.    Resp: If tolerates off Nimbex; SBT / Also may begin to discuss trach with pt's family  ID:  CVS:  Heme: s/p 1 unit PRBC/ Adjust Heparin drip to PTT  FEN:  GI: Pt with ileus; currently NPO and f/u as per Surg  Neuro: Trial of weaning off Nimbex Pt is a 59 yo M with no known PMHx presented on 3/8 to [facility] 2 to fevers, weakness, fatigue, shortness of breath for 5 days. Tested positive for Covid-19 on 3/5. Pt was seen at urgent care and noted to be hypoxic and sent to the ER.  Admitting dx: acute hypoxemic resp failure 2 to COVID 19. Hospital course complicated by PE and superimposed bacterial PNA. RRT on 3/17 2 to worsening hypoxemia; pt Intubated and transferred to the ICU.    Resp: If tolerates off Nimbex; SBT / Also may begin to discuss trach with pt's family  ID: s/p E. coli septic shock (currently off pressors)/ Unasyn as per ID f/u  CVS: Off pressors  Heme: s/p 1 unit PRBC/ Adjust Heparin drip to PTT  FEN: NPO/ Cont IVF  Endo: Follow FS  GI: Pt with ileus; currently NPO and f/u as per Surg  Neuro: Trial of weaning off Nimbex    CCT: 45 min not in conjunction with the NP

## 2021-03-29 NOTE — PROGRESS NOTE ADULT - SUBJECTIVE AND OBJECTIVE BOX
TAMARA CHEW 60y [id number]    Patient is a 60y old  Male who presents with a chief complaint of hypoxia 2/2 COVID (29 Mar 2021 13:52)      Follow Up/CC:  ID following for bacteremia    Interval History/ROS: fever+, intubated, in MICU     Allergies    No Known Allergies    Intolerances        ANTIMICROBIALS:  ampicillin/sulbactam  IVPB 3 once  ampicillin/sulbactam  IVPB 3 every 6 hours  ampicillin/sulbactam  IVPB        MEDICATIONS  (STANDING):  ampicillin/sulbactam  IVPB 3 Gram(s) IV Intermittent once  ampicillin/sulbactam  IVPB 3 Gram(s) IV Intermittent every 6 hours  ampicillin/sulbactam  IVPB      budesonide 160 MICROgram(s)/formoterol 4.5 MICROgram(s) Inhaler 2 Puff(s) Inhalation two times a day  chlorhexidine 0.12% Liquid 15 milliLiter(s) Oral Mucosa every 12 hours  chlorhexidine 2% Cloths 1 Application(s) Topical <User Schedule>  cholecalciferol 2000 Unit(s) Oral daily  cisatracurium Infusion 3 MICROgram(s)/kG/Min (12 mL/Hr) IV Continuous <Continuous>  dexMEDEtomidine Infusion 0.012 MICROgram(s)/kG/Hr (0.2 mL/Hr) IV Continuous <Continuous>  dextrose 5% + sodium chloride 0.45%. 1000 milliLiter(s) (50 mL/Hr) IV Continuous <Continuous>  fentaNYL   Infusion... 2.5 MICROgram(s)/kG/Hr (8.34 mL/Hr) IV Continuous <Continuous>  heparin  Infusion. 800 Unit(s)/Hr (8 mL/Hr) IV Continuous <Continuous>  influenza   Vaccine 0.5 milliLiter(s) IntraMuscular once  insulin lispro (ADMELOG) corrective regimen sliding scale   SubCutaneous every 6 hours  ketamine Infusion. 3 mG/kG/Hr (20 mL/Hr) IV Continuous <Continuous>  metoclopramide Injectable 10 milliGRAM(s) IV Push every 8 hours  midazolam Infusion 0.02 mG/kG/Hr (1.33 mL/Hr) IV Continuous <Continuous>  norepinephrine Infusion 0.01 MICROgram(s)/kG/Min (1.25 mL/Hr) IV Continuous <Continuous>  pantoprazole  Injectable 40 milliGRAM(s) IV Push every 12 hours  petrolatum Ophthalmic Ointment 1 Application(s) Both EYES two times a day  phenylephrine    Infusion 0.1 MICROgram(s)/kG/Min (2.5 mL/Hr) IV Continuous <Continuous>    MEDICATIONS  (PRN):        Vital Signs Last 24 Hrs  T(C): 37.7 (29 Mar 2021 12:00), Max: 38.3 (28 Mar 2021 16:00)  T(F): 99.9 (29 Mar 2021 12:00), Max: 100.9 (28 Mar 2021 16:00)  HR: 77 (29 Mar 2021 14:00) (52 - 149)  BP: --  BP(mean): --  RR: 30 (29 Mar 2021 14:00) (28 - 38)  SpO2: 95% (29 Mar 2021 14:00) (90% - 100%)    CBC Full  -  ( 29 Mar 2021 11:42 )  WBC Count : 8.22 K/uL  RBC Count : 3.94 M/uL  Hemoglobin : 8.6 g/dL  Hematocrit : 28.6 %  Platelet Count - Automated : 204 K/uL  Mean Cell Volume : 72.6 fl  Mean Cell Hemoglobin : 21.8 pg  Mean Cell Hemoglobin Concentration : 30.1 gm/dL  Auto Neutrophil # : x  Auto Lymphocyte # : x  Auto Monocyte # : x  Auto Eosinophil # : x  Auto Basophil # : x  Auto Neutrophil % : x  Auto Lymphocyte % : x  Auto Monocyte % : x  Auto Eosinophil % : x  Auto Basophil % : x    03-29    140  |  103  |  18  ----------------------------<  211<H>  3.6   |  27  |  <0.30<L>    Ca    7.9<L>      29 Mar 2021 02:44  Phos  2.7     03-29  Mg     1.9     03-29    TPro  5.5<L>  /  Alb  2.1<L>  /  TBili  0.5  /  DBili  x   /  AST  83<H>  /  ALT  242<H>  /  AlkPhos  208<H>  03-29    LIVER FUNCTIONS - ( 29 Mar 2021 02:44 )  Alb: 2.1 g/dL / Pro: 5.5 g/dL / ALK PHOS: 208 U/L / ALT: 242 U/L / AST: 83 U/L / GGT: x               MICROBIOLOGY:  .Blood Blood-Peripheral  03-27-21   No growth to date.  --  --      .Sputum Sputum  03-25-21   Normal Respiratory Marilee present  --    Few polymorphonuclear leukocytes per low power field  Rare Squamous epithelial cells per low power field  Rare Gram positive cocci in pairs per oil power field  Rare Gram Variable Rods per oil power field      .Blood Blood-Peripheral  03-25-21   Growth in aerobic and anaerobic bottles: Escherichia coli  ***Blood Panel PCR results on this specimen are available  approximately 3 hours after the Gram stain result.***  Gram stain, PCR, and/or culture results may not always  correspond due to difference in methodologies.  ************************************************************  This PCR assay was performed by multiplex PCR. This  Assay tests for 66 bacterial and resistance gene targets.  Please refer to the [facility] test directory  at [email or web address] for details.  --  Blood Culture PCR  Escherichia coli      .Blood Blood-Peripheral  03-23-21   No Growth Final  --  --      .Sputum Sputum  03-22-21   Normal Respiratory Marilee absent  --    Rare polymorphonuclear leukocytes per low power field  Rare Squamous epithelial cells per low power field  No organisms seen per oil power field      .Blood Blood-Peripheral  03-21-21   No Growth Final  --  --      .Blood Blood-Venous  03-16-21   No Growth Final  --  --      .Blood Blood  03-13-21   No Growth Final  --  --      .Blood Blood-Peripheral  03-08-21   No Growth Final  --  --        RADIOLOGY    < from: Xray Kidney Ureter Bladder (03.29.21 @ 05:41) >  Nonspecific gas. Multiple loops of small bowel appear to be distended and changes compatible with small bowel obstruction. Follow-up study should be obtained. If clinically indicated CT scan should be obtained as well.    < from: CT Abdomen and Pelvis w/ IV Cont (03.28.21 @ 15:40) >  Small bowel obstruction with transition point in the distal ileum.    COVID-19 pneumonia.    Anasarca.    < end of copied text >

## 2021-03-29 NOTE — PROGRESS NOTE ADULT - SUBJECTIVE AND OBJECTIVE BOX
CHIEF COMPLAINT:  Patient is a 60y old  Male who presents with a chief complaint of hypoxia 2/2 COVID (28 Mar 2021 19:40)    HPI:  61 yo male w/no known pmh (does not f/u with PCP) presents to [facility] for cc fevers, weakness, fatigue, shortness of breath for 5 days. Tested positive for covid 3 days ago at an urgent care. Today presented again to urgent care and noted to be hypoxic and sent to the ER. Patient denies dysuria, diarrhea, constipation. Currently denies shortness of breath on high flow.     In the ER, patient was febrile to 101.5F, tachycardic to 120s, tachypneic to 40, hypoxic to 88% on 6L. Improved on high flow nasal cannula to 95%.  (08 Mar 2021 14:08)      Interval Events: precedex gtt off 2/2 bradycardia to 30. Received 1 unit of PRBC for HGB 7.0      REVIEW OF SYSTEMS: unable 2/2 sedation          OBJECTIVE:  ICU Vital Signs Last 24 Hrs  T(C): 36.2 (29 Mar 2021 06:45), Max: 38.3 (28 Mar 2021 16:00)  T(F): 97.2 (29 Mar 2021 06:45), Max: 100.9 (28 Mar 2021 16:00)  HR: 80 (29 Mar 2021 06:45) (52 - 149)  BP: --  BP(mean): --  ABP: 182/77 (29 Mar 2021 06:45) (75/40 - 189/85)  ABP(mean): 111 (29 Mar 2021 06:45) (52 - 123)  RR: 37 (29 Mar 2021 06:45) (28 - 38)  SpO2: 94% (29 Mar 2021 06:45) (90% - 100%)    Mode: AC/ CMV (Assist Control/ Continuous Mandatory Ventilation), RR (machine): 30, TV (machine): 300, FiO2: 50, PEEP: 7, ITime: 0.8, MAP: 18, PIP: 34    03-28 @ 07:01  -  03-29 @ 07:00  --------------------------------------------------------  IN: 2190.9 mL / OUT: 2180 mL / NET: 10.9 mL    03-29 @ 07:01  -  03-29 @ 08:03  --------------------------------------------------------  IN: 0 mL / OUT: 135 mL / NET: -135 mL      CAPILLARY BLOOD GLUCOSE      POCT Blood Glucose.: 182 mg/dL (29 Mar 2021 04:52)          PHYSICAL EXAM:  GENERAL: NAD,  HEENT:  Atraumatic, Normocephalic  EYES: PERRLA, conjunctiva and sclera clear  NECK: Supple, No JVD  CHEST/LUNG: diminished bilaterally; No wheezes, rales, or rhonchi  HEART: Regular rate and rhythm; No murmurs, rubs, or gallops  ABDOMEN: Soft, Nontender, Nondistended; Bowel sounds present  EXTREMITIES:  2+ Peripheral Pulses, No clubbing, cyanosis, or edema  PSYCH: unable as pt is sedated  NEUROLOGY: sedated  SKIN: No rashes or lesions        HOSPITAL MEDICATIONS:  MEDICATIONS  (STANDING):  budesonide 160 MICROgram(s)/formoterol 4.5 MICROgram(s) Inhaler 2 Puff(s) Inhalation two times a day  ceFAZolin   IVPB      ceFAZolin   IVPB 2000 milliGRAM(s) IV Intermittent every 8 hours  chlorhexidine 0.12% Liquid 15 milliLiter(s) Oral Mucosa every 12 hours  chlorhexidine 2% Cloths 1 Application(s) Topical <User Schedule>  cholecalciferol 2000 Unit(s) Oral daily  cisatracurium Infusion 3 MICROgram(s)/kG/Min (12 mL/Hr) IV Continuous <Continuous>  dextrose 5% + sodium chloride 0.45%. 1000 milliLiter(s) (75 mL/Hr) IV Continuous <Continuous>  fentaNYL   Infusion... 2.5 MICROgram(s)/kG/Hr (8.34 mL/Hr) IV Continuous <Continuous>  heparin  Infusion. 800 Unit(s)/Hr (8 mL/Hr) IV Continuous <Continuous>  influenza   Vaccine 0.5 milliLiter(s) IntraMuscular once  insulin lispro (ADMELOG) corrective regimen sliding scale   SubCutaneous every 6 hours  ketamine Infusion. 3 mG/kG/Hr (20 mL/Hr) IV Continuous <Continuous>  metoclopramide Injectable 10 milliGRAM(s) IV Push every 8 hours  midazolam Infusion 0.02 mG/kG/Hr (1.33 mL/Hr) IV Continuous <Continuous>  norepinephrine Infusion 0.01 MICROgram(s)/kG/Min (1.25 mL/Hr) IV Continuous <Continuous>  pantoprazole  Injectable 40 milliGRAM(s) IV Push every 12 hours  petrolatum Ophthalmic Ointment 1 Application(s) Both EYES two times a day    MEDICATIONS  (PRN):      LABS:                        7.0    9.84  )-----------( 186      ( 29 Mar 2021 02:42 )             23.8     Hgb Trend: 7.0<--, 8.0<--, 7.9<--, 8.0<--, 8.2<--  03-29    140  |  103  |  18  ----------------------------<  211<H>  3.6   |  27  |  <0.30<L>    Ca    7.9<L>      29 Mar 2021 02:44  Phos  2.7     03-29  Mg     1.9     03-29    TPro  5.5<L>  /  Alb  2.1<L>  /  TBili  0.5  /  DBili  x   /  AST  83<H>  /  ALT  242<H>  /  AlkPhos  208<H>  03-29    LIVER FUNCTIONS - ( 29 Mar 2021 02:44 )  Alb: 2.1 g/dL / Pro: 5.5 g/dL / ALK PHOS: 208 U/L / ALT: 242 U/L / AST: 83 U/L / GGT: x           Creatinine Trend: <0.30<--, 0.39<--, 0.53<--, 0.57<--, 0.58<--, 0.66<--  PT/INR - ( 28 Mar 2021 00:11 )   PT: 17.1 sec;   INR: 1.45 ratio         PTT - ( 29 Mar 2021 02:42 )  PTT:110.4 sec    Arterial Blood Gas:  03-29 @ 02:34  7.45/42/78/29/96/5.0  ABG lactate: --  Arterial Blood Gas:  03-28 @ 00:06  7.44/44/79/29/95/4.9  ABG lactate: --        MICROBIOLOGY:     RADIOLOGY:  [ ] Reviewed and interpreted by me    EKG:

## 2021-03-29 NOTE — PROGRESS NOTE ADULT - ASSESSMENT
Mr Olivares is a 60 year old man who presented with hypoxia after being diagnosed with COVID outpatient. He has completed a course of remdesevir, but is still febrile and hypoxic.   He does not have a clear superimposed bacterial infection, though bacterial pneumonia is possible.   CT chest on admission with PE and ground glass opacities.   Febrile and requiring high flow nasal cannula.     COVID-19 pneumonia, leukocytosis, resp failure, E. coli bacteremia  - Remdesevir completed  - dc ancef  - unasyn 3 gm iv q6 to cover E.coli and GI tract better   - Not a candidate for tocilizumab in setting of possible infection   - Follow up cultures - repeat bcx negative  - Monitor for fevers  - Trend WBCs  - vent per MICU   - change lines if possible   - E.coli bacteremia - likely from GI source  - SBO per surgery    Prognosis guarded    Rj Rouse  Attending Physician   Division of Infectious Disease  Pager #[phone]  Available on Microsoft Teams also  After 5pm/weekend or no response, call #[phone]      D/w MICU NP Taylor)

## 2021-03-29 NOTE — PROGRESS NOTE ADULT - SUBJECTIVE AND OBJECTIVE BOX
Chief complaint  Patient is a 60y old  Male who presents with a chief complaint of hypoxia 2/2 COVID (29 Mar 2021 08:02)   Review of systems  Patient in bed, appears comfortable.    Labs and Fingersticks  CAPILLARY BLOOD GLUCOSE    POCT Blood Glucose.: 182 mg/dL (29 Mar 2021 04:52)  POCT Blood Glucose.: 194 mg/dL (29 Mar 2021 04:51)  POCT Blood Glucose.: 196 mg/dL (29 Mar 2021 00:50)  POCT Blood Glucose.: 73 mg/dL (28 Mar 2021 23:50)  POCT Blood Glucose.: 135 mg/dL (28 Mar 2021 17:09)      Anion Gap, Serum: 10 (03-29 @ 02:44)  Anion Gap, Serum: 9 (03-28 @ 00:11)      Calcium, Total Serum: 7.9 <L> (03-29 @ 02:44)  Calcium, Total Serum: 8.7 (03-28 @ 00:11)  Albumin, Serum: 2.1 <L> (03-29 @ 02:44)  Albumin, Serum: 2.4 <L> (03-28 @ 00:11)    Alanine Aminotransferase (ALT/SGPT): 242 <H> (03-29 @ 02:44)  Alanine Aminotransferase (ALT/SGPT): 534 <H> (03-28 @ 00:11)  Alkaline Phosphatase, Serum: 208 <H> (03-29 @ 02:44)  Alkaline Phosphatase, Serum: 300 <H> (03-28 @ 00:11)  Aspartate Aminotransferase (AST/SGOT): 83 <H> (03-29 @ 02:44)  Aspartate Aminotransferase (AST/SGOT): 181 <H> (03-28 @ 00:11)        03-29    140  |  103  |  18  ----------------------------<  211<H>  3.6   |  27  |  <0.30<L>    Ca    7.9<L>      29 Mar 2021 02:44  Phos  2.7     03-29  Mg     1.9     03-29    TPro  5.5<L>  /  Alb  2.1<L>  /  TBili  0.5  /  DBili  x   /  AST  83<H>  /  ALT  242<H>  /  AlkPhos  208<H>  03-29                        8.6    8.22  )-----------( 204      ( 29 Mar 2021 11:42 )             28.6     Medications  MEDICATIONS  (STANDING):  ampicillin/sulbactam  IVPB 3 Gram(s) IV Intermittent once  ampicillin/sulbactam  IVPB 3 Gram(s) IV Intermittent every 6 hours  ampicillin/sulbactam  IVPB      budesonide 160 MICROgram(s)/formoterol 4.5 MICROgram(s) Inhaler 2 Puff(s) Inhalation two times a day  chlorhexidine 0.12% Liquid 15 milliLiter(s) Oral Mucosa every 12 hours  chlorhexidine 2% Cloths 1 Application(s) Topical <User Schedule>  cholecalciferol 2000 Unit(s) Oral daily  cisatracurium Infusion 3 MICROgram(s)/kG/Min (12 mL/Hr) IV Continuous <Continuous>  dexMEDEtomidine Infusion 0.012 MICROgram(s)/kG/Hr (0.2 mL/Hr) IV Continuous <Continuous>  dextrose 5% + sodium chloride 0.45%. 1000 milliLiter(s) (50 mL/Hr) IV Continuous <Continuous>  fentaNYL   Infusion... 2.5 MICROgram(s)/kG/Hr (8.34 mL/Hr) IV Continuous <Continuous>  heparin  Infusion. 800 Unit(s)/Hr (8 mL/Hr) IV Continuous <Continuous>  influenza   Vaccine 0.5 milliLiter(s) IntraMuscular once  insulin lispro (ADMELOG) corrective regimen sliding scale   SubCutaneous every 6 hours  ketamine Infusion. 3 mG/kG/Hr (20 mL/Hr) IV Continuous <Continuous>  metoclopramide Injectable 10 milliGRAM(s) IV Push every 8 hours  midazolam Infusion 0.02 mG/kG/Hr (1.33 mL/Hr) IV Continuous <Continuous>  norepinephrine Infusion 0.01 MICROgram(s)/kG/Min (1.25 mL/Hr) IV Continuous <Continuous>  pantoprazole  Injectable 40 milliGRAM(s) IV Push every 12 hours  petrolatum Ophthalmic Ointment 1 Application(s) Both EYES two times a day  phenylephrine    Infusion 0.1 MICROgram(s)/kG/Min (2.5 mL/Hr) IV Continuous <Continuous>      Physical Exam  General: Patient comfortable in bed  Vital Signs Last 12 Hrs  T(F): 99.9 (03-29-21 @ 12:00), Max: 100.4 (03-29-21 @ 10:00)  HR: 77 (03-29-21 @ 13:30) (52 - 125)  BP: --  BP(mean): --  RR: 30 (03-29-21 @ 13:30) (30 - 38)  SpO2: 96% (03-29-21 @ 13:30) (92% - 99%)  Neck: No palpable thyroid nodules.  CVS: S1S2, No murmurs  Respiratory: No wheezing, no crepitations  GI: Abdomen soft, bowel sounds positive  Musculoskeletal:  edema lower extremities.     Diagnostics    Free Thyroxine, Serum: AM Sched. Collection: 29-Mar-2021 06:00 (03-28 @ 16:46)  Thyroid Stimulating Hormone, Serum: AM Sched. Collection: 29-Mar-2021 06:00 (03-28 @ 16:46)

## 2021-03-30 NOTE — PROVIDER CONTACT NOTE (CHANGE IN STATUS NOTIFICATION) - RECOMMENDATIONS
Nurse and respiratory therapist recommended the pt to be on full support overnight to give his lungs a break.

## 2021-03-30 NOTE — PROGRESS NOTE ADULT - ASSESSMENT
Assessment  DMT2: 60y Male with newly diagnosed DM T2 with hyperglycemia admitted with COVID19, A1C is 12.4%, had hypoglycemia yesterday while NPO, now on insulin coverage only, blood sugars improved and trending within acceptable range, no new hypoglycemias, restarting trickle feeds per primary team.  COVID19: On medications, monitored, stable.  Overweight: No strict exercise routines, neither on low calorie diet.      Anderson Mcmanus MD  Cell: [phone]  Office: [phone]       Assessment  DMT2: 60y Male with newly diagnosed DM T2 with hyperglycemia admitted with COVID19, A1C is 12.4%,  had hypoglycemia yesterday while NPO, now on insulin coverage only, blood sugars improved and trending within acceptable range, no new hypoglycemias, restarting trickle feeds per primary team.  COVID19: On medications, monitored, stable.  Overweight: No strict exercise routines, neither on low calorie diet.      Anderson Mcmanus MD  Cell: [phone]  Office: [phone]

## 2021-03-30 NOTE — PROGRESS NOTE ADULT - SUBJECTIVE AND OBJECTIVE BOX
Chief complaint  Patient is a 60y old  Male who presents with a chief complaint of hypoxia 2/2 COVID (30 Mar 2021 12:21)   Review of systems  Patient remains intubated, had hypoglycemia yesterday.    Labs and Fingersticks  CAPILLARY BLOOD GLUCOSE      POCT Blood Glucose.: 149 mg/dL (30 Mar 2021 11:51)  POCT Blood Glucose.: 131 mg/dL (30 Mar 2021 06:37)  POCT Blood Glucose.: 111 mg/dL (30 Mar 2021 00:44)  POCT Blood Glucose.: 136 mg/dL (29 Mar 2021 20:13)  POCT Blood Glucose.: 80 mg/dL (29 Mar 2021 18:44)  POCT Blood Glucose.: 85 mg/dL (29 Mar 2021 17:08)  POCT Blood Glucose.: 65 mg/dL (29 Mar 2021 17:06)    Medications  MEDICATIONS  (STANDING):  ampicillin/sulbactam  IVPB 3 Gram(s) IV Intermittent every 6 hours  ampicillin/sulbactam  IVPB      budesonide 160 MICROgram(s)/formoterol 4.5 MICROgram(s) Inhaler 2 Puff(s) Inhalation two times a day  chlorhexidine 0.12% Liquid 15 milliLiter(s) Oral Mucosa every 12 hours  chlorhexidine 2% Cloths 1 Application(s) Topical <User Schedule>  cholecalciferol 2000 Unit(s) Oral daily  dexMEDEtomidine Infusion 0.012 MICROgram(s)/kG/Hr (0.2 mL/Hr) IV Continuous <Continuous>  dextrose 10%. 1000 milliLiter(s) (50 mL/Hr) IV Continuous <Continuous>  fentaNYL   Infusion... 2.5 MICROgram(s)/kG/Hr (8.34 mL/Hr) IV Continuous <Continuous>  heparin  Infusion. 800 Unit(s)/Hr (8 mL/Hr) IV Continuous <Continuous>  influenza   Vaccine 0.5 milliLiter(s) IntraMuscular once  insulin lispro (ADMELOG) corrective regimen sliding scale   SubCutaneous every 6 hours  ketamine Infusion. 3 mG/kG/Hr (20 mL/Hr) IV Continuous <Continuous>  methadone    Tablet 40 milliGRAM(s) Oral every 8 hours  metoclopramide Injectable 10 milliGRAM(s) IV Push every 8 hours  midazolam Infusion 0.02 mG/kG/Hr (1.33 mL/Hr) IV Continuous <Continuous>  pantoprazole  Injectable 40 milliGRAM(s) IV Push every 12 hours  petrolatum Ophthalmic Ointment 1 Application(s) Both EYES two times a day  phenylephrine    Infusion 0.1 MICROgram(s)/kG/Min (2.5 mL/Hr) IV Continuous <Continuous>      Physical Exam  General: Patient intubated  Vital Signs Last 12 Hrs  T(F): 97.9 (03-30-21 @ 12:00), Max: 99.9 (03-30-21 @ 04:00)  HR: 78 (03-30-21 @ 13:45) (78 - 97)  BP: --  BP(mean): --  RR: 30 (03-30-21 @ 13:45) (29 - 43)  SpO2: 96% (03-30-21 @ 13:45) (83% - 100%)       Chief complaint  Patient is a 60y old  Male who presents with a chief complaint of hypoxia 2/2 COVID (30 Mar 2021 12:21)   Review of systems  Patient remains intubated, had hypoglycemia yesterday.    Labs and Fingersticks  CAPILLARY BLOOD GLUCOSE      POCT Blood Glucose.: 149 mg/dL (30 Mar 2021 11:51)  POCT Blood Glucose.: 131 mg/dL (30 Mar 2021 06:37)  POCT Blood Glucose.: 111 mg/dL (30 Mar 2021 00:44)  POCT Blood Glucose.: 136 mg/dL (29 Mar 2021 20:13)  POCT Blood Glucose.: 80 mg/dL (29 Mar 2021 18:44)  POCT Blood Glucose.: 85 mg/dL (29 Mar 2021 17:08)  POCT Blood Glucose.: 65 mg/dL (29 Mar 2021 17:06)    Medications  MEDICATIONS  (STANDING):  ampicillin/sulbactam  IVPB 3 Gram(s) IV Intermittent every 6 hours  ampicillin/sulbactam  IVPB      budesonide 160 MICROgram(s)/formoterol 4.5 MICROgram(s) Inhaler 2 Puff(s) Inhalation two times a day  chlorhexidine 0.12% Liquid 15 milliLiter(s) Oral Mucosa every 12 hours  chlorhexidine 2% Cloths 1 Application(s) Topical <User Schedule>  cholecalciferol 2000 Unit(s) Oral daily  dexMEDEtomidine Infusion 0.012 MICROgram(s)/kG/Hr (0.2 mL/Hr) IV Continuous <Continuous>  dextrose 10%. 1000 milliLiter(s) (50 mL/Hr) IV Continuous <Continuous>  fentaNYL   Infusion... 2.5 MICROgram(s)/kG/Hr (8.34 mL/Hr) IV Continuous <Continuous>  heparin  Infusion. 800 Unit(s)/Hr (8 mL/Hr) IV Continuous <Continuous>  influenza   Vaccine 0.5 milliLiter(s) IntraMuscular once  insulin lispro (ADMELOG) corrective regimen sliding scale   SubCutaneous every 6 hours  ketamine Infusion. 3 mG/kG/Hr (20 mL/Hr) IV Continuous <Continuous>  methadone    Tablet 40 milliGRAM(s) Oral every 8 hours  metoclopramide Injectable 10 milliGRAM(s) IV Push every 8 hours  midazolam Infusion 0.02 mG/kG/Hr (1.33 mL/Hr) IV Continuous <Continuous>  pantoprazole  Injectable 40 milliGRAM(s) IV Push every 12 hours  petrolatum Ophthalmic Ointment 1 Application(s) Both EYES two times a day  phenylephrine    Infusion 0.1 MICROgram(s)/kG/Min (2.5 mL/Hr) IV Continuous <Continuous>      Physical Exam  General: Patient intubated  Vital Signs Last 12 Hrs  T(F): 97.9 (03-30-21 @ 12:00), Max: 99.9 (03-30-21 @ 04:00)  HR: 78 (03-30-21 @ 13:45) (78 - 97)  BP: --  BP(mean): --  RR: 30 (03-30-21 @ 13:45) (29 - 43)  SpO2: 96% (03-30-21 @ 13:45) (83% - 100%)

## 2021-03-30 NOTE — PROVIDER CONTACT NOTE (CHANGE IN STATUS NOTIFICATION) - SITUATION
Pt has been on CPAP via ventilator for majority of the day shift and his breathing rate is currently between 40-50bpm. Pt is also only pulling <250ml of tidal volume on the vent. Pt has been on CPAP via ventilator for majority of the day shift and his breathing rate is currently between 40-50bpm. Pt is also only pulling <250ml of tidal volume on the vent. Pt's O2 sat via pulse ox is 92-94% on CPAP and 95-95% on full support.

## 2021-03-30 NOTE — PROGRESS NOTE ADULT - ASSESSMENT
ASSESSMENT/PLAN:  Patient is a 60y old  Male who presents with a chief complaint of hypoxia 2/2 COVID (24 Mar 2021 14:02)    HPI:  61 yo male w/no known pmh (does not f/u with PCP) presents to [facility] for cc fevers, weakness, fatigue, shortness of breath for 5 days. Tested positive for covid 3 days ago at an urgent care. Today presented again to urgent care and noted to be hypoxic and sent to the ER. Patient denies dysuria, diarrhea, constipation. Currently denies shortness of breath on high flow. Intubated for worsening respiratory failure and transferred to the ICU for further management.     Neuro:  - Sedated Fentanyl, Ketamine  - 3/29 AM Symptomatic Sinus Alex to 30s -> d/c Precedex: resolved    - Paralyzed on Nimbex  - Continue to titrate off of nimbex as tolerated as FIO2 requirements low    RESPIRATORY:  - PRVC 30/300/50/7. Continue to titrate FiO2 as tolerated. Maintain O2 sat >90  - RLL PE diagnosed on admission, treated with heparin gtt    CARDIOVASCULAR:  -  Currently off Levophed  - Maintain MAP >65  - TTE 3/16 EF 55-60%. Mild diastolic dysfunction     RENAL:  - D5 1/2NS @50 mL/hr while NPO   - Renal function remains stable with good UOP   - Continue to monitor renal indices, strict I/Os     GASTROINTESTINAL:  #SBO  - CT ABD/Pelv 3/28: SBO w/ transition point in distal ileum   - Gen surg consulted: **NPO w/ OGT to LCWS, f/u AM Abd XR 3/29. No surgical intervention @ this time     #Melena  -S/P  melena X 2 on 3/26. Serial cbcs stable GI consulted, ------> No acute intervention and H+H stable    #Transaminitis, resolving   - LFTs continue to be elevated, continuing to monitor    INFECTIOUS DISEASE:  - 3/25 blood cultures now with gram (-) rods (E.coli). Cultures prior were negative  - Patient on Cefazolin Ecoli bacteremia.  ID following  - Repeat blood cultures sent  3/27 prelim negative    HEME:  - ** Anemia: Hgb 7.0 s/p 1U PRBC 3/29 AM -> f/u CBC   - On heparin gtt for RLL PE and hypercoagulable state,   - GI evaluation----->. PPI dosing increased to BID for UGIB ppx    ENDO:  - In setting of NPO maintain on mod ISS keep glucose < 180  - Previoulsy on high dose NPH 25U q6h when on TFs    ======================= DISPOSITION  =====================  [ x] Critical   [ ] Guarded    [ ] Stable    [ x] Maintain in ICU   ASSESSMENT/PLAN:  Patient is a 60y old  Male who presents with a chief complaint of hypoxia 2/2 COVID (24 Mar 2021 14:02)    HPI:  59 yo male w/no known pmh (does not f/u with PCP) presents to [facility] for cc fevers, weakness, fatigue, shortness of breath for 5 days. Tested positive for covid 3 days ago at an urgent care. Today presented again to urgent care and noted to be hypoxic and sent to the ER. Patient denies dysuria, diarrhea, constipation. Currently denies shortness of breath on high flow. Intubated for worsening respiratory failure and transferred to the ICU for further management.     NEURO:  - Nimbex dc'd on 3/29  - Sedated with Fentanyl, Ketamine, Versed, and Precedex  - 3/29 AM Symptomatic Sinus Alex to 30s -> d/c Precedex: resolved. Precedex subsequently restarted without issue.  - Continue to titrate off of nimbex as tolerated as FIO2 requirements low    RESPIRATORY:  #Covid-19 ARDS  - PRVC 30/300/50/7. Continue to titrate FiO2 as tolerated. Maintain O2 sat >90  - RLL PE diagnosed on admission, treated with heparin gtt    CARDIOVASCULAR:  #Distributive shock likely secondary to Covid-19 and E. coli bacteremia  - Levophed transitioned to phenylephrine ovenight in setting of tachycardia  - Maintain MAP >65  - TTE 3/16 EF 55-60%. Mild diastolic dysfunction     RENAL:  - Renal function remains stable with good UOP   - Continue to monitor renal indices, strict I/Os. Replete electrolytes as needed.    GASTROINTESTINAL:  #SBO  - CT ABD/Pelv 3/28: SBO w/ transition point in distal ileum   - Gen surg consulted: **NPO w/ OGT to LCWS, f/u AM Abd XR 3/29. No surgical intervention @ this time.  - Pt with multiple BMs since 3/28    #Melena - Resolved  - S/P  melena X 2 on 3/26.  - GI consulted - No acute intervention. H+H stable  - Continue PPI    #Transaminitis, resolving   - LFTs continue to downtrend. Continue to monitor. Hold hepatic-offending agents.    HEME:  - Continue to monitor H&H  - Therapeutic on heparin gtt for RLL PE and hypercoagulable state,    ENDO:  #T2DM -  A1C 12.4  #Hypoglycemia in setting of NPO status d/t SBO  - Continue D10 gtt.  - Reassess insulin need when TFs are restarted.    INFECTIOUS DISEASE:  #Covid-19  - Received remdesivir and decadron  - Not a candidate for toci in setting of bacterial infection    #Gram-negative bacteremia, Now with Gram-positive bacteremia  - 3/25 BCx 2/2 with E.coli  - 3/27 BCx 2/2 NGTD  - 3/29 BCx 2/2 with GPC  - 3/13 MRSA swab negative.  - Continue Unasyn per ID.    ======================= DISPOSITION  =====================  [ x] Critical   [ ] Guarded    [ ] Stable    [ x] Maintain in ICU

## 2021-03-30 NOTE — PROGRESS NOTE ADULT - PROBLEM SELECTOR PLAN 1
Suggest to continue coverage scale for now. Will continue monitoring FS and FU. If increasing tube feed rate and blood sugars start trending up, recommend to start on low-dose NPH.

## 2021-03-30 NOTE — CHART NOTE - NSCHARTNOTEFT_GEN_A_CORE
Nutrition Follow Up Note   Patient seen for: nutrition follow up on COVID ICU     Source: medical record, communication with team. Pt remains intubated.     Chart reviewed, events noted. Noted Nimbex DC on 3/29; pt also bradycardia, Precedex discontinued and bradycardia resolved. Pt continues to be sedated on many agents. Noted pt found to have SBO and was placed on OGT to LWS until earlier today.     Diet Order: Diet, NPO with Tube Feed:   Tube Feeding Modality: Nasogastric  Vital AF (VITALAFRTH)  Total Volume for 24 Hours (mL): 240  Continuous  Starting Tube Feed Rate {mL per Hour}: 10  Until Goal Tube Feed Rate (mL per Hour): 10  Tube Feed Duration (in Hours): 24  Tube Feed Start Time: 10:30 (03-30-21 @ 10:28)    CURRENT EN ORDER PROVIDES: 288cal, 18 Gm Prot; currently on trickle feeds  EN provision: noted feeds had been held since 3/27 at 1AM, had residuals and then found to have SBO; prior to that pt had been on Nepro at 45ml/hr x24 hours    Nutrition Events:   -Feeds reordered earlier today  -Noted potassium levels WNL at this time  -Pt c recently diagnosed DM, being followed by Endocrine, on Correctional sliding scale insulin at this time, noted POCT glucose have been well controlled at this time   -Noted melena resolved and pt c good urine output at this time     GI:   -Noted pt now c BMs, last one was yesterday     Anthropometric Measurements:   Height (cm): 167.6 (03-08-21 @ 18:47)  Weight (kg): 66.7 (03-08-21 @ 18:47)  BMI (kg/m2): 23.7 (03-08-21 @ 18:47)      Medications: MEDICATIONS  (STANDING):  ampicillin/sulbactam  IVPB 3 Gram(s) IV Intermittent every 6 hours  ampicillin/sulbactam  IVPB      budesonide 160 MICROgram(s)/formoterol 4.5 MICROgram(s) Inhaler 2 Puff(s) Inhalation two times a day  chlorhexidine 0.12% Liquid 15 milliLiter(s) Oral Mucosa every 12 hours  chlorhexidine 2% Cloths 1 Application(s) Topical <User Schedule>  cholecalciferol 2000 Unit(s) Oral daily  dexMEDEtomidine Infusion 0.012 MICROgram(s)/kG/Hr (0.2 mL/Hr) IV Continuous <Continuous>  dextrose 10%. 1000 milliLiter(s) (50 mL/Hr) IV Continuous <Continuous>  fentaNYL   Infusion... 2.5 MICROgram(s)/kG/Hr (8.34 mL/Hr) IV Continuous <Continuous>  heparin  Infusion. 800 Unit(s)/Hr (8 mL/Hr) IV Continuous <Continuous>  influenza   Vaccine 0.5 milliLiter(s) IntraMuscular once  insulin lispro (ADMELOG) corrective regimen sliding scale   SubCutaneous every 6 hours  ketamine Infusion. 3 mG/kG/Hr (20 mL/Hr) IV Continuous <Continuous>  metoclopramide Injectable 10 milliGRAM(s) IV Push every 8 hours  midazolam Infusion 0.02 mG/kG/Hr (1.33 mL/Hr) IV Continuous <Continuous>  pantoprazole  Injectable 40 milliGRAM(s) IV Push every 12 hours  petrolatum Ophthalmic Ointment 1 Application(s) Both EYES two times a day  phenylephrine    Infusion 0.1 MICROgram(s)/kG/Min (2.5 mL/Hr) IV Continuous <Continuous>    MEDICATIONS  (PRN):    Labs: 03-30 @ 01:10: Sodium 138, Potassium 3.6, Calcium 7.3<L>, Magnesium 2.0, Phosphorus 2.1<L>, BUN 13, Creatinine 0.36<L>, Glucose 117<H>, Alk Phos 168<H>, ALT/SGPT 141<H>, AST/SGOT 43<H>, Albumin 1.9<L>, Prealbumin --, Total Bilirubin 0.4, Hemoglobin 8.2<L>, Hematocrit 27.5<L>, Ferritin --, C-Reactive Protein --, Creatine Kinase <<27>  03-29 @ 11:42: Sodium --, Potassium --, Calcium --, Magnesium --, Phosphorus --, BUN --, Creatinine --, Glucose --, Alk Phos --, ALT/SGPT --, AST/SGOT --, Albumin --, Prealbumin --, Total Bilirubin --, Hemoglobin 8.6<L>, Hematocrit 28.6<L>, Ferritin --, C-Reactive Protein --, Creatine Kinase <<27>      Triglycerides, Serum: 388 mg/dL (03-25-21 @ 00:22)  Triglycerides, Serum: 261 mg/dL (03-23-21 @ 15:44)    POCT Blood Glucose.: 131 mg/dL (03-30-21 @ 06:37)  POCT Blood Glucose.: 111 mg/dL (03-30-21 @ 00:44)  POCT Blood Glucose.: 136 mg/dL (03-29-21 @ 20:13)  POCT Blood Glucose.: 80 mg/dL (03-29-21 @ 18:44)  POCT Blood Glucose.: 85 mg/dL (03-29-21 @ 17:08)  POCT Blood Glucose.: 65 mg/dL (03-29-21 @ 17:06)    Skin: no pressure injuries   Edema: +2 generalized edema     Estimated Needs: Recalculated - Based on dosing wt 66.7kg with consideration for intubation 3/17 & BMI <30  Energy (25-30 leigh ann/kg): 7005-9333  Protein (1.2-1.8 Gm pro/kg):    [place] State Equation (REE): 1713 leigh ann (~26cal/kg)    Previous Nutrition Diagnosis:   1. altered nutrition related lab values continues, addressed by team at this time  2. moderate malnutrition continues, care plan in progress, to be addressed c advancement of tube feeds     New Nutrition Diagnosis:  none at this time     Recommended Interventions:   1. Recommend if/when medically feasible to increase Vital AF to goal rate of 60ml/hr x24 hours (1440ml total volume, 1728cal, 108 Gm Prot; ~26cal/kg and 1.6 Gm Prot/kg based on wt of 66.7kg).   2. Continue to monitor electrolytes.   3. RD remains available for further nutritional interventions as needed.     Monitoring and Evaluation:   Continue to monitor nutrition provision and tolerance, weights, labs, skin integrity.   RD remains available upon request and will follow up per protocol.  Jennifer He MS RD CDN [facility] #[phone]

## 2021-03-30 NOTE — PROGRESS NOTE ADULT - SUBJECTIVE AND OBJECTIVE BOX
CHIEF COMPLAINT:  Patient is a 60y old  Male who presents with a chief complaint of hypoxia 2/2 COVID (28 Mar 2021 19:40)    HPI:  59 yo male w/no known pmh (does not f/u with PCP) presents to [facility] for cc fevers, weakness, fatigue, shortness of breath for 5 days. Tested positive for covid 3 days ago at an urgent care. Today presented again to urgent care and noted to be hypoxic and sent to the ER. Patient denies dysuria, diarrhea, constipation. Currently denies shortness of breath on high flow.     In the ER, patient was febrile to 101.5F, tachycardic to 120s, tachypneic to 40, hypoxic to 88% on 6L. Improved on high flow nasal cannula to 95%.  (08 Mar 2021 14:08)      Interval Events: precedex gtt off 2/2 bradycardia to 30. Received 1 unit of PRBC for HGB 7.0      REVIEW OF SYSTEMS: unable 2/2 sedation      MEDICATIONS  (STANDING):  ampicillin/sulbactam  IVPB 3 Gram(s) IV Intermittent every 6 hours  ampicillin/sulbactam  IVPB      budesonide 160 MICROgram(s)/formoterol 4.5 MICROgram(s) Inhaler 2 Puff(s) Inhalation two times a day  chlorhexidine 0.12% Liquid 15 milliLiter(s) Oral Mucosa every 12 hours  chlorhexidine 2% Cloths 1 Application(s) Topical <User Schedule>  cholecalciferol 2000 Unit(s) Oral daily  dexMEDEtomidine Infusion 0.012 MICROgram(s)/kG/Hr (0.2 mL/Hr) IV Continuous <Continuous>  dextrose 10%. 1000 milliLiter(s) (50 mL/Hr) IV Continuous <Continuous>  fentaNYL   Infusion... 2.5 MICROgram(s)/kG/Hr (8.34 mL/Hr) IV Continuous <Continuous>  heparin  Infusion. 800 Unit(s)/Hr (8 mL/Hr) IV Continuous <Continuous>  influenza   Vaccine 0.5 milliLiter(s) IntraMuscular once  insulin lispro (ADMELOG) corrective regimen sliding scale   SubCutaneous every 6 hours  ketamine Infusion. 3 mG/kG/Hr (20 mL/Hr) IV Continuous <Continuous>  metoclopramide Injectable 10 milliGRAM(s) IV Push every 8 hours  midazolam Infusion 0.02 mG/kG/Hr (1.33 mL/Hr) IV Continuous <Continuous>  pantoprazole  Injectable 40 milliGRAM(s) IV Push every 12 hours  petrolatum Ophthalmic Ointment 1 Application(s) Both EYES two times a day  phenylephrine    Infusion 0.1 MICROgram(s)/kG/Min (2.5 mL/Hr) IV Continuous <Continuous>        OBJECTIVE:    Vital Signs Last 24 Hrs  T(C): 37.7 (30 Mar 2021 04:00), Max: 38 (29 Mar 2021 10:00)  T(F): 99.9 (30 Mar 2021 04:00), Max: 100.4 (29 Mar 2021 10:00)  HR: 83 (30 Mar 2021 06:45) (75 - 125)  RR: 33 (30 Mar 2021 06:45) (29 - 39)  SpO2: 92% (30 Mar 2021 06:45) (83% - 100%)    I&O's Summary    29 Mar 2021 07:01  -  30 Mar 2021 07:00  --------------------------------------------------------  IN: 3641.4 mL / OUT: 1510 mL / NET: 2131.4 mL        PHYSICAL EXAM:  GENERAL: NAD,  HEENT:  Atraumatic, Normocephalic  EYES: PERRLA, conjunctiva and sclera clear  NECK: Supple, No JVD  CHEST/LUNG: diminished bilaterally; No wheezes, rales, or rhonchi  HEART: Regular rate and rhythm; No murmurs, rubs, or gallops  ABDOMEN: Soft, Nontender, Nondistended; Bowel sounds present  EXTREMITIES:  2+ Peripheral Pulses, No clubbing, cyanosis, or edema  PSYCH: unable as pt is sedated  NEUROLOGY: sedated  SKIN: No rashes or lesions        LABS:                        8.2    6.82  )-----------( 197      ( 30 Mar 2021 01:10 )             27.5     03-30    138  |  105  |  13  ----------------------------<  117<H>  3.6   |  25  |  0.36<L>    Ca    7.3<L>      30 Mar 2021 01:10  Phos  2.1     03-30  Mg     2.0     03-30    TPro  5.3<L>  /  Alb  1.9<L>  /  TBili  0.4  /  DBili  x   /  AST  43<H>  /  ALT  141<H>  /  AlkPhos  168<H>  03-30    PT/INR - ( 30 Mar 2021 01:10 )   PT: 17.7 sec;   INR: 1.50 ratio    PTT - ( 30 Mar 2021 01:10 )  PTT:60.2 sec    ABG - ( 30 Mar 2021 01:06 )  pH, Arterial: 7.46  pH, Blood: x     /  pCO2: 40    /  pO2: 68    / HCO3: 28    / Base Excess: 3.9   /  SaO2: 93     (FiO2 40%)    MICROBIOLOGY: Reviewed         CHIEF COMPLAINT:  Patient is a 60y old  Male who presents with a chief complaint of hypoxia 2/2 COVID (28 Mar 2021 19:40)    HPI:  59 yo male w/no known pmh (does not f/u with PCP) presents to [facility] for cc fevers, weakness, fatigue, shortness of breath for 5 days. Tested positive for covid 3 days ago at an urgent care. Today presented again to urgent care and noted to be hypoxic and sent to the ER. Patient denies dysuria, diarrhea, constipation. Currently denies shortness of breath on high flow. In the ER, patient was febrile to 101.5F, tachycardic to 120s, tachypneic to 40, hypoxic to 88% on 6L. Improved on high flow nasal cannula to 95%.  (08 Mar 2021 14:08)      Interval Events:       REVIEW OF SYSTEMS: unable 2/2 sedation      MEDICATIONS  (STANDING):  ampicillin/sulbactam  IVPB 3 Gram(s) IV Intermittent every 6 hours  ampicillin/sulbactam  IVPB      budesonide 160 MICROgram(s)/formoterol 4.5 MICROgram(s) Inhaler 2 Puff(s) Inhalation two times a day  chlorhexidine 0.12% Liquid 15 milliLiter(s) Oral Mucosa every 12 hours  chlorhexidine 2% Cloths 1 Application(s) Topical <User Schedule>  cholecalciferol 2000 Unit(s) Oral daily  dexMEDEtomidine Infusion 0.012 MICROgram(s)/kG/Hr (0.2 mL/Hr) IV Continuous <Continuous>  dextrose 10%. 1000 milliLiter(s) (50 mL/Hr) IV Continuous <Continuous>  fentaNYL   Infusion... 2.5 MICROgram(s)/kG/Hr (8.34 mL/Hr) IV Continuous <Continuous>  heparin  Infusion. 800 Unit(s)/Hr (8 mL/Hr) IV Continuous <Continuous>  influenza   Vaccine 0.5 milliLiter(s) IntraMuscular once  insulin lispro (ADMELOG) corrective regimen sliding scale   SubCutaneous every 6 hours  ketamine Infusion. 3 mG/kG/Hr (20 mL/Hr) IV Continuous <Continuous>  metoclopramide Injectable 10 milliGRAM(s) IV Push every 8 hours  midazolam Infusion 0.02 mG/kG/Hr (1.33 mL/Hr) IV Continuous <Continuous>  pantoprazole  Injectable 40 milliGRAM(s) IV Push every 12 hours  petrolatum Ophthalmic Ointment 1 Application(s) Both EYES two times a day  phenylephrine    Infusion 0.1 MICROgram(s)/kG/Min (2.5 mL/Hr) IV Continuous <Continuous>        OBJECTIVE:    Vital Signs Last 24 Hrs  T(C): 37.7 (30 Mar 2021 04:00), Max: 38 (29 Mar 2021 10:00)  T(F): 99.9 (30 Mar 2021 04:00), Max: 100.4 (29 Mar 2021 10:00)  HR: 83 (30 Mar 2021 06:45) (75 - 125)  RR: 33 (30 Mar 2021 06:45) (29 - 39)  SpO2: 92% (30 Mar 2021 06:45) (83% - 100%)    I&O's Summary    29 Mar 2021 07:01  -  30 Mar 2021 07:00  --------------------------------------------------------  IN: 3641.4 mL / OUT: 1510 mL / NET: 2131.4 mL        PHYSICAL EXAM:  GENERAL: NAD,  HEENT:  Atraumatic, Normocephalic  EYES: PERRLA, conjunctiva and sclera clear  NECK: Supple, No JVD  CHEST/LUNG: diminished bilaterally; No wheezes, rales, or rhonchi  HEART: Regular rate and rhythm; No murmurs, rubs, or gallops  ABDOMEN: Soft, Nontender, Nondistended; Bowel sounds present  EXTREMITIES:  2+ Peripheral Pulses, No clubbing, cyanosis, or edema  PSYCH: unable as pt is sedated  NEUROLOGY: sedated  SKIN: No rashes or lesions        LABS:                        8.2    6.82  )-----------( 197      ( 30 Mar 2021 01:10 )             27.5     03-30    138  |  105  |  13  ----------------------------<  117<H>  3.6   |  25  |  0.36<L>    Ca    7.3<L>      30 Mar 2021 01:10  Phos  2.1     03-30  Mg     2.0     03-30    TPro  5.3<L>  /  Alb  1.9<L>  /  TBili  0.4  /  DBili  x   /  AST  43<H>  /  ALT  141<H>  /  AlkPhos  168<H>  03-30    PT/INR - ( 30 Mar 2021 01:10 )   PT: 17.7 sec;   INR: 1.50 ratio    PTT - ( 30 Mar 2021 01:10 )  PTT:60.2 sec    ABG - ( 30 Mar 2021 01:06 )  pH, Arterial: 7.46  pH, Blood: x     /  pCO2: 40    /  pO2: 68    / HCO3: 28    / Base Excess: 3.9   /  SaO2: 93     (FiO2 40%)    MICROBIOLOGY: Reviewed         CHIEF COMPLAINT:  Patient is a 60y old  Male who presents with a chief complaint of hypoxia 2/2 COVID (28 Mar 2021 19:40)    HPI:  59 yo male w/no known pmh (does not f/u with PCP) presents to [facility] for cc fevers, weakness, fatigue, shortness of breath for 5 days. Tested positive for covid 3 days ago at an urgent care. Today presented again to urgent care and noted to be hypoxic and sent to the ER. Patient denies dysuria, diarrhea, constipation. Currently denies shortness of breath on high flow. In the ER, patient was febrile to 101.5F, tachycardic to 120s, tachypneic to 40, hypoxic to 88% on 6L. Improved on high flow nasal cannula to 95%.  (08 Mar 2021 14:08)      Interval Events: Restarted trickle feeds. Plan for trach this week.      REVIEW OF SYSTEMS: unable 2/2 sedation      MEDICATIONS  (STANDING):  ampicillin/sulbactam  IVPB 3 Gram(s) IV Intermittent every 6 hours  ampicillin/sulbactam  IVPB      budesonide 160 MICROgram(s)/formoterol 4.5 MICROgram(s) Inhaler 2 Puff(s) Inhalation two times a day  chlorhexidine 0.12% Liquid 15 milliLiter(s) Oral Mucosa every 12 hours  chlorhexidine 2% Cloths 1 Application(s) Topical <User Schedule>  cholecalciferol 2000 Unit(s) Oral daily  dexMEDEtomidine Infusion 0.012 MICROgram(s)/kG/Hr (0.2 mL/Hr) IV Continuous <Continuous>  dextrose 10%. 1000 milliLiter(s) (50 mL/Hr) IV Continuous <Continuous>  fentaNYL   Infusion... 2.5 MICROgram(s)/kG/Hr (8.34 mL/Hr) IV Continuous <Continuous>  heparin  Infusion. 800 Unit(s)/Hr (8 mL/Hr) IV Continuous <Continuous>  influenza   Vaccine 0.5 milliLiter(s) IntraMuscular once  insulin lispro (ADMELOG) corrective regimen sliding scale   SubCutaneous every 6 hours  ketamine Infusion. 3 mG/kG/Hr (20 mL/Hr) IV Continuous <Continuous>  metoclopramide Injectable 10 milliGRAM(s) IV Push every 8 hours  midazolam Infusion 0.02 mG/kG/Hr (1.33 mL/Hr) IV Continuous <Continuous>  pantoprazole  Injectable 40 milliGRAM(s) IV Push every 12 hours  petrolatum Ophthalmic Ointment 1 Application(s) Both EYES two times a day  phenylephrine    Infusion 0.1 MICROgram(s)/kG/Min (2.5 mL/Hr) IV Continuous <Continuous>        OBJECTIVE:    Vital Signs Last 24 Hrs  T(C): 37.7 (30 Mar 2021 04:00), Max: 38 (29 Mar 2021 10:00)  T(F): 99.9 (30 Mar 2021 04:00), Max: 100.4 (29 Mar 2021 10:00)  HR: 83 (30 Mar 2021 06:45) (75 - 125)  RR: 33 (30 Mar 2021 06:45) (29 - 39)  SpO2: 92% (30 Mar 2021 06:45) (83% - 100%)    I&O's Summary    29 Mar 2021 07:01  -  30 Mar 2021 07:00  --------------------------------------------------------  IN: 3641.4 mL / OUT: 1510 mL / NET: 2131.4 mL        PHYSICAL EXAM:  GENERAL: NAD,  HEENT:  Atraumatic, Normocephalic  EYES: PERRLA, conjunctiva and sclera clear  NECK: Supple, No JVD  CHEST/LUNG: diminished bilaterally; No wheezes, rales, or rhonchi  HEART: Regular rate and rhythm; No murmurs, rubs, or gallops  ABDOMEN: Soft, Nontender, Nondistended; Bowel sounds present  EXTREMITIES:  2+ Peripheral Pulses, No clubbing, cyanosis, or edema  PSYCH: unable as pt is sedated  NEUROLOGY: sedated  SKIN: No rashes or lesions        LABS:                        8.2    6.82  )-----------( 197      ( 30 Mar 2021 01:10 )             27.5     03-30    138  |  105  |  13  ----------------------------<  117<H>  3.6   |  25  |  0.36<L>    Ca    7.3<L>      30 Mar 2021 01:10  Phos  2.1     03-30  Mg     2.0     03-30    TPro  5.3<L>  /  Alb  1.9<L>  /  TBili  0.4  /  DBili  x   /  AST  43<H>  /  ALT  141<H>  /  AlkPhos  168<H>  03-30    PT/INR - ( 30 Mar 2021 01:10 )   PT: 17.7 sec;   INR: 1.50 ratio    PTT - ( 30 Mar 2021 01:10 )  PTT:60.2 sec    ABG - ( 30 Mar 2021 01:06 )  pH, Arterial: 7.46  pH, Blood: x     /  pCO2: 40    /  pO2: 68    / HCO3: 28    / Base Excess: 3.9   /  SaO2: 93     (FiO2 40%)    MICROBIOLOGY: Reviewed

## 2021-03-30 NOTE — PROGRESS NOTE ADULT - SUBJECTIVE AND OBJECTIVE BOX
TAMARA CHEW 60y [id number]    Patient is a 60y old  Male who presents with a chief complaint of hypoxia 2/2 COVID (30 Mar 2021 07:05)      Follow Up/CC:  ID following for bacteremia    Interval History/ROS: no fever, intubated     Allergies    No Known Allergies    Intolerances        ANTIMICROBIALS:  ampicillin/sulbactam  IVPB 3 every 6 hours  ampicillin/sulbactam  IVPB        MEDICATIONS  (STANDING):  ampicillin/sulbactam  IVPB 3 Gram(s) IV Intermittent every 6 hours  ampicillin/sulbactam  IVPB      budesonide 160 MICROgram(s)/formoterol 4.5 MICROgram(s) Inhaler 2 Puff(s) Inhalation two times a day  chlorhexidine 0.12% Liquid 15 milliLiter(s) Oral Mucosa every 12 hours  chlorhexidine 2% Cloths 1 Application(s) Topical <User Schedule>  cholecalciferol 2000 Unit(s) Oral daily  dexMEDEtomidine Infusion 0.012 MICROgram(s)/kG/Hr (0.2 mL/Hr) IV Continuous <Continuous>  dextrose 10%. 1000 milliLiter(s) (50 mL/Hr) IV Continuous <Continuous>  fentaNYL    Injectable 100 MICROGram(s) IV Push once  fentaNYL   Infusion... 2.5 MICROgram(s)/kG/Hr (8.34 mL/Hr) IV Continuous <Continuous>  heparin  Infusion. 800 Unit(s)/Hr (8 mL/Hr) IV Continuous <Continuous>  influenza   Vaccine 0.5 milliLiter(s) IntraMuscular once  insulin lispro (ADMELOG) corrective regimen sliding scale   SubCutaneous every 6 hours  ketamine Infusion. 3 mG/kG/Hr (20 mL/Hr) IV Continuous <Continuous>  metoclopramide Injectable 10 milliGRAM(s) IV Push every 8 hours  midazolam Infusion 0.02 mG/kG/Hr (1.33 mL/Hr) IV Continuous <Continuous>  pantoprazole  Injectable 40 milliGRAM(s) IV Push every 12 hours  petrolatum Ophthalmic Ointment 1 Application(s) Both EYES two times a day  phenylephrine    Infusion 0.1 MICROgram(s)/kG/Min (2.5 mL/Hr) IV Continuous <Continuous>    MEDICATIONS  (PRN):        Vital Signs Last 24 Hrs  T(C): 36.6 (30 Mar 2021 12:00), Max: 37.7 (30 Mar 2021 04:00)  T(F): 97.9 (30 Mar 2021 12:00), Max: 99.9 (30 Mar 2021 04:00)  HR: 78 (30 Mar 2021 13:45) (77 - 97)  BP: --  BP(mean): --  RR: 30 (30 Mar 2021 13:45) (29 - 43)  SpO2: 96% (30 Mar 2021 13:45) (83% - 100%)    CBC Full  -  ( 30 Mar 2021 01:10 )  WBC Count : 6.82 K/uL  RBC Count : 3.78 M/uL  Hemoglobin : 8.2 g/dL  Hematocrit : 27.5 %  Platelet Count - Automated : 197 K/uL  Mean Cell Volume : 72.8 fl  Mean Cell Hemoglobin : 21.7 pg  Mean Cell Hemoglobin Concentration : 29.8 gm/dL  Auto Neutrophil # : 4.60 K/uL  Auto Lymphocyte # : 1.19 K/uL  Auto Monocyte # : 0.45 K/uL  Auto Eosinophil # : 0.47 K/uL  Auto Basophil # : 0.03 K/uL  Auto Neutrophil % : 67.5 %  Auto Lymphocyte % : 17.4 %  Auto Monocyte % : 6.6 %  Auto Eosinophil % : 6.9 %  Auto Basophil % : 0.4 %    03-30    138  |  105  |  13  ----------------------------<  117<H>  3.6   |  25  |  0.36<L>    Ca    7.3<L>      30 Mar 2021 01:10  Phos  2.1     03-30  Mg     2.0     03-30    TPro  5.3<L>  /  Alb  1.9<L>  /  TBili  0.4  /  DBili  x   /  AST  43<H>  /  ALT  141<H>  /  AlkPhos  168<H>  03-30    LIVER FUNCTIONS - ( 30 Mar 2021 01:10 )  Alb: 1.9 g/dL / Pro: 5.3 g/dL / ALK PHOS: 168 U/L / ALT: 141 U/L / AST: 43 U/L / GGT: x               MICROBIOLOGY:  .Blood Blood-Peripheral  03-29-21   Growth in aerobic and anaerobic bottles: Gram Positive Cocci in Pairs and  Chains  --  Blood Culture PCR      .Blood Blood-Peripheral  03-27-21   Growth in anaerobic bottle: Gram Negative Rods  --    Growth in anaerobic bottle: Gram Negative Rods      .Sputum Sputum  03-25-21   Normal Respiratory Marilee present  --    Few polymorphonuclear leukocytes per low power field  Rare Squamous epithelial cells per low power field  Rare Gram positive cocci in pairs per oil power field  Rare Gram Variable Rods per oil power field      .Blood Blood-Peripheral  03-25-21   Growth in aerobic and anaerobic bottles: Escherichia coli  ***Blood Panel PCR results on this specimen are available  approximately 3 hours after the Gram stain result.***  Gram stain, PCR, and/or culture results may not always  correspond due to difference in methodologies.  ************************************************************  This PCR assay was performed by multiplex PCR. This  Assay tests for 66 bacterial and resistance gene targets.  Please refer to the "[employer] test directory  at [email or web address] for details.  --  Blood Culture PCR  Escherichia coli      .Blood Blood-Peripheral  03-23-21   No Growth Final  --  --      .Sputum Sputum  03-22-21   Normal Respiratory Marilee absent  --    Rare polymorphonuclear leukocytes per low power field  Rare Squamous epithelial cells per low power field  No organisms seen per oil power field      .Blood Blood-Peripheral  03-21-21   No Growth Final  --  --      .Blood Blood-Venous  03-16-21   No Growth Final  --  --      .Blood Blood  03-13-21   No Growth Final  --  --      .Blood Blood-Peripheral  03-08-21   No Growth Final  --  --        RADIOLOGY    < from: Xray Kidney Ureter Bladder (03.29.21 @ 05:41) >  Nonspecific gas. Multiple loops of small bowel appear to be distended and changes compatible with small bowel obstruction. Follow-up study should be obtained. If clinically indicated CT scan should be obtained as well.    < end of copied text >

## 2021-03-30 NOTE — PROVIDER CONTACT NOTE (CHANGE IN STATUS NOTIFICATION) - ACTION/TREATMENT ORDERED:
Camila Taylor, the covering provider, stated "it's a myth to put a pt back on full support overnight" and "his lungs are not tired."  Brandon ordered 100mcg of fentanyl IVP.

## 2021-03-30 NOTE — PROGRESS NOTE ADULT - ASSESSMENT
Mr Olivares is a 60 year old man who presented with hypoxia after being diagnosed with COVID outpatient. He has completed a course of remdesevir, but is still febrile and hypoxic.   He does not have a clear superimposed bacterial infection, though bacterial pneumonia is possible.   CT chest on admission with PE and ground glass opacities.   Febrile and requiring high flow nasal cannula.     COVID-19 pneumonia, leukocytosis, resp failure, E. coli/enterococcal bacteremia  - Remdesevir completed  - dc ancef  - unasyn 3 gm iv q6 to cover E.coli and GI tract better   - Not a candidate for tocilizumab in setting of possible infection   - Follow up cultures - repeat bcx positive  - Monitor for fevers  - Trend WBCs  - vent per MICU   - change lines if possible   - polymicrobial bacteremia - likely from GI source - consider repeat imaging  - SBO per surgery  - check repeat bcx  - f/u sensitivities     Prognosis guarded    Rj Rouse  Attending Physician   Division of Infectious Disease  Pager #[phone]  Available on Microsoft Teams also  After 5pm/weekend or no response, call #[phone]

## 2021-03-30 NOTE — PROVIDER CONTACT NOTE (CHANGE IN STATUS NOTIFICATION) - ASSESSMENT
Pt has been on CPAP via ventilator for majority of the day shift and his breathing rate is currently between 40-50bpm. Pt is also only pulling <250ml of tidal volume on the vent. Respiratory Therapist, Agusto Carty, placed patient briefly on full support on ventilator to compare his respiratory rate on CPAP. Pt's respiratory rate was 30-34bpm on full support (ventilator was set at 30bpm) while 40-50bpm on CPAP. Pt has been on CPAP via ventilator for majority of the day shift and his breathing rate is currently between 40-50bpm. Pt is also only pulling <250ml of tidal volume on the vent. Pt's O2 sat via pulse ox is 92-94% on CPAP and 95-95% on full support. Respiratory Therapist, Agusto Carty, placed patient briefly on full support on ventilator to compare his respiratory rate on CPAP. Pt's respiratory rate was 30-34bpm on full support (ventilator was set at 30bpm) while 40-50bpm on CPAP.

## 2021-03-30 NOTE — PROVIDER CONTACT NOTE (CHANGE IN STATUS NOTIFICATION) - BACKGROUND
Pt in 5ICU s/p worsening on SOB and respiratory distress requiring intubation on 3/17 and multiple sessions of proning and re-supining. Pt has been paralyzed on nimbex (which has been discontinued) for vent synchrony.

## 2021-03-30 NOTE — PROGRESS NOTE ADULT - ATTENDING COMMENTS
Pt is a 61 yo M with no known PMHx presented on 3/8 to [facility] to fevers, weakness, fatigue, shortness of breath for 5 days. Tested positive for Covid-19 on 3/5. Pt was seen at urgent care and noted to be hypoxic and sent to the ER.  Admitting dx: acute hypoxemic resp failure 2 to COVID 19. Hospital course complicated by PE and superimposed bacterial PNA. RRT on 3/17 2 to worsening hypoxemia; pt Intubated and transferred to the ICU.    Resp: Schedule trach   ID:  Unasyn for E. coli (E. coli septic shock) as per ID f/u  CVS:  Levophed restarted and then changed to Lm 2 to tachycardia  Heme: s/p 1 unit PRBC/ Adjust Heparin drip to PTT  FEN: Start trickle feeds/ Cont IVF for now/ Replace Phos; pt hypophosphatemic   Endo: Follow FS  GI: Will start trickle/ Surg f/u prn  Neuro: Off Nimbex/ Decrease sedation as tolerated    CCT: 40 min not in conjunction with the PA

## 2021-03-31 NOTE — PROVIDER CONTACT NOTE (OTHER) - REASON
FSBS
Discontinuation of Isolation for COVID patient
hyperglycemia
Pt O2 sat 78% on HFNC
Pt temp of 102.0

## 2021-03-31 NOTE — CHART NOTE - NSCHARTNOTEFT_GEN_A_CORE
Patient seen and examined. Patient critically ill. Patient is a 60M newly diagnosed DM2 who presents with respiratory failure in the setting of COVID-19 and pulmonary embolism. His course has been complicated by bacteremia and SBO. This evening patient had an episode of desaturation requiring a slight increase in oxygen supplementation. Patient also noted to have increased abdominal distension. He previously had an SBO which was managed conservatively and has been having BM and therefore feeds were restarted.    1. Acute Hypoxemic Respiratory Failure - continue mechanical ventilation. Increase FiO2 as needed to maintain O2 sat > 90%. Increased abdominal distension also likely not helping with ventilation. Will use gentle diuresis to assist as well. Repeat ABG in a few hours and adjust ventilator as able.  - Continue Heparin gtt for RLL PE  2. Abdominal distension - prior SBO now with increased abdominal distension. Will check abdominal US and hold feeds. Patient may require NGT decompression again. If small bowel dilated will discuss further with surgery regarding treatment options.  - Continue sedatives but will try and minimize opiods and avoid further paralytics as able.  3. Shock state - patient remains in shock requiring vasopressors to maintain MAP > 65  4. Polymicrobial bacteremia - patient initially with E. coli bacteremia and now with Enterococcus bacteremia from 3/29. Followup repeat cultures. Antibiotics switched to Unasyn earlier today    I have provided 40 minutes of critical care time independent of procedures and/or teaching services. Patient is critically ill requiring frequent bedside visits with therapy change.

## 2021-03-31 NOTE — PROGRESS NOTE ADULT - SUBJECTIVE AND OBJECTIVE BOX
Admit Date: 03-08-21  Length of Stay: 23d    60yMale    Reason for admission to ICU:  Patient is in acute hypoxic respiratory distress requiring intubation and sedation. Imaging c/w ARDS. Admitted to MICU for further observation and management.    59 yo male w/no known pmh (does not f/u with PCP) presents to [facility] for cc fevers, weakness, fatigue, shortness of breath for 5 days. Tested positive for covid 3 days ago at an urgent care. Today presented again to urgent care and noted to be hypoxic and sent to the ER. Patient denies dysuria, diarrhea, constipation. Currently denies shortness of breath on high flow. Intubated for worsening respiratory failure and transferred to the ICU for further management.     INTERVAL HPI/OVERNIGHT EVENTS:  - Tachypneic and TV <250 on CPAP via vent       MEDICATIONS  (STANDING):  ampicillin/sulbactam  IVPB 3 Gram(s) IV Intermittent every 6 hours  ampicillin/sulbactam  IVPB      chlorhexidine 0.12% Liquid 15 milliLiter(s) Oral Mucosa every 12 hours  chlorhexidine 2% Cloths 1 Application(s) Topical <User Schedule>  cholecalciferol 2000 Unit(s) Oral daily  dexMEDEtomidine Infusion 0.012 MICROgram(s)/kG/Hr (0.2 mL/Hr) IV Continuous <Continuous>  heparin  Infusion. 800 Unit(s)/Hr (8 mL/Hr) IV Continuous <Continuous>  influenza   Vaccine 0.5 milliLiter(s) IntraMuscular once  insulin lispro (ADMELOG) corrective regimen sliding scale   SubCutaneous every 6 hours  ketamine Infusion. 2.999 mG/kG/Hr (20 mL/Hr) IV Continuous <Continuous>  methadone    Tablet 40 milliGRAM(s) Oral every 8 hours  metoclopramide Injectable 10 milliGRAM(s) IV Push every 8 hours  midazolam Infusion 0.02 mG/kG/Hr (1.33 mL/Hr) IV Continuous <Continuous>  pantoprazole  Injectable 40 milliGRAM(s) IV Push every 12 hours  petrolatum Ophthalmic Ointment 1 Application(s) Both EYES two times a day  phenylephrine    Infusion 0.1 MICROgram(s)/kG/Min (2.5 mL/Hr) IV Continuous <Continuous>    MEDICATIONS  (PRN):      Vitals:  Vital Signs Last 24 Hrs  T(C): 36 (31 Mar 2021 03:00), Max: 36.6 (30 Mar 2021 12:00)  T(F): 96.8 (31 Mar 2021 03:00), Max: 97.9 (30 Mar 2021 12:00)  HR: 80 (31 Mar 2021 07:00) (70 - 97)  BP: --  BP(mean): --  RR: 31 (31 Mar 2021 07:00) (30 - 50)  SpO2: 95% (31 Mar 2021 07:00) (79% - 100%)    Vitals (Invasive):  ABP: 105/58 (03-31-21 @ 07:00) (73/46 - 153/72)  CVP(mm Hg): --  CVP(cm H2O): --  CO: --  CI: --  PA: --  PA(mean): --  PCWP: --  LA: --  RA: --  SVR: --  SVRI: --  PVR: --  PVRI: --    I&O's Summary    30 Mar 2021 07:01  -  31 Mar 2021 07:00  --------------------------------------------------------  IN: 2798.6 mL / OUT: 2300 mL / NET: 498.6 mL        Physical Exam:  HEENT:     + NCAT  + EOMI  - Conjunctival edema   - Icterus   - Thrush   - ETT  - NGT/OGT  Neck:         + FROM    + JVD     - Nodes     - Masses    + Mid-line trachea   - Tracheostomy  Chest:         - Sternal click  - Sternal drainage  - Chest tubes  - SubQ emphysema  Lungs:          + CTA   - Rhonchi    - Rales    - Wheezing     - Decreased BS   - Dullness R L  Cardiac:       + S1 + S2    + RRR   - Irregular   - S3  - S4    - Murmurs   - Rub   - Hamman’s sign   Abdomen:    + BS     + Soft    + Non-tender     - Distended    - Organomegaly  - PEG  Extremities:   - Cyanosis U/L   - Clubbing  U/L  - LE/UE Edema   + Capillary refill    + Pulses   Neuro:        - Awake   -  Alert   - Confused   - Lethargic   + Sedated   + Paralyzed  - Generalized weakness  Skin:        - Rashes    - Erythema   + Normal incisions   + IV sites intact  - Sacral decubitus    Labs:  COVID:  COVID-19 PCR: Detected (03-08-21 @ 11:57)                            8.0    6.92  )-----------( 217      ( 30 Mar 2021 23:50 )             27.2     03-30    142  |  111<H>  |  9   ----------------------------<  98  3.9   |  24  |  0.35<L>    Ca    6.9<L>      30 Mar 2021 23:50  Phos  2.9     03-30  Mg     2.0     03-30    TPro  5.2<L>  /  Alb  1.7<L>  /  TBili  0.4  /  DBili  x   /  AST  31  /  ALT  97<H>  /  AlkPhos  165<H>  03-30    PT/INR - ( 30 Mar 2021 23:50 )   PT: 17.4 sec;   INR: 1.48 ratio         PTT - ( 30 Mar 2021 23:50 )  PTT:74.3 sec      Culture - Blood (collected 03-29-21 @ 15:35)  Source: .Blood Blood-Peripheral  Gram Stain (03-30-21 @ 06:31):    Growth in anaerobic bottle: Gram Positive Cocci in Pairs and Chains    Growth in aerobic bottle: Gram Positive Cocci in Pairs and Chains  Preliminary Report (03-30-21 @ 06:32):    Growth in anaerobic bottle: Gram Positive Cocci in Pairs and Chains    Growth in aerobic bottle: Gram Positive Cocci in Pairs and Chains    ***Blood Panel PCR results on this specimen are available    approximately 3 hours after the Gram stain result.***    Gram stain, PCR, and/or culture results may not always    correspond due to difference in methodologies.    ************************************************************    This PCR assay was performed by multiplex PCR. This    Assay tests for 66 bacterial and resistance gene targets.    Please refer to the [facility] [employer] test directory    at [email or web address] for details.  Organism: Blood Culture PCR (03-30-21 @ 06:42)  Organism: Blood Culture PCR (03-30-21 @ 06:42)      -  Enterococcus species: Detec      Method Type: PCR      COVID related labs:  31 Mar 2021 05:22  D-dimer:  x  Calcitonin:  x  CRP:  x  LDH:  x  Lactate,Blood:  x  CK:  x  Troponin I:  x  Troponin T:  x  Troponin HS:  x  Ferritin, Serum: 1122<H>  BNP:  x      Blood Gases:  (ARTERIAL):  03-30-21 @ 23:48  pH 7.38 / pCO2 64 / pO2 76 / HCO3 37  Total CO2 39  FiO2 40  Oxygen Saturation 94  Total Hemoglobin, Calculated 8.6  Hematocrit, Calculated 27  Oxygen Content 11  Blood Gas Arterial - Calcium, Ionized 1.03  Blood Gas Arterial - Chloride 113  Blood Gas Arterial - Glucose 100  Blood Gas Arterial - Potassium 3.6  Blood Gas Arterial - Sodium 140  Blood Gas Arterial - Creatinine --  Base Excess, Arterial 10.3    (VENOUS):      Radiology:  CT chest results consistent with multifocal bilateral ground glass opacities  ID consulted, follow up recommendations    Plan:  - Hydroxychloroquine 800 mG Q24H x 1 then 400 mG Q24H x 4 doses    Daily Labs:  CBC/CMP/Mg/Phos/CRP/PT, PTT & INR/D-Dimer/LDH/Ferritin/CK/Troponins    Every 3 day labs:   ESR/Tcell subset/D-dimer/LDH/Ferritin/CK/Troponins/Coags  COVID isolation protocol    Pulmonary:  Sepsis 2/2 COVID+  On arrival meeting /4 SIRS criteria ( ) with pulmonary source  WBC with % neutrophils, % lymphocytes, lactate  Status Post:  Vancomycin + Piperacillin / Tazobactam in ED, L NS bolus  Hold further antibiotics at present    Follow Ups:  T-Cell Subset  Urinalysis  Urine Cultures  Blood Cultures  Obtain HIV consent for testing when able    ARDS:  as above  Current vent settings:  Mode: AC/ CMV (Assist Control/ Continuous Mandatory Ventilation)  RR (machine): 30  TV (machine): 300  FiO2: 40  PEEP: 10.8  ITime: 0.7  MAP: 17  PIP: 35    Low threshold to prone patient in order to improve oxygenation and secretion drainage  Continue to closely monitor patient    Cardiovascular:  Hypotension  Patient intubated and requiring pressor support in the setting of sedation  Norepinephrine gtt, Fentanyl gtt, Propofol gtt  Goal MAP >65    Neurology:  Sedation  Patient currently on Fentanyl gtt and Propofol gtt  RASS goal -4    Gastrointestinal:  Prophylaxis  - Pantoprazole 40mg IV daily while intubated    Genitourinary:  Decreased urine output  Coates in place  Continue to monitor strict I&O  Follow Up: Urinalysis, Creatinine    ID:  Sepsis 2/2 COVID+  as above  Follow up: Urinalysis,Blood Culture, Urine Culture  Consulted ID, follow recommandations    Diet: NPO  Electrolytes: Replete K<4, Mg<2    Code: FULL CODE    Disposition: Patient requires continued monitoring in MICU Admit Date: 03-08-21  Length of Stay: 23d    60yMale    Reason for admission to ICU:  Patient is in acute hypoxic respiratory distress requiring intubation and sedation. Imaging c/w ARDS. Admitted to MICU for further observation and management.    61 yo male w/no known pmh (does not f/u with PCP) presents to [facility] for cc fevers, weakness, fatigue, shortness of breath for 5 days. Tested positive for covid 3 days ago at an urgent care. Today presented again to urgent care and noted to be hypoxic and sent to the ER. Patient denies dysuria, diarrhea, constipation. Currently denies shortness of breath on high flow. Intubated for worsening respiratory failure and transferred to the ICU for further management.     INTERVAL HPI/OVERNIGHT EVENTS:  - Tachypneic and TV <250 on CPAP via vent   - Volume positive, Lasix 20 with good UOP      MEDICATIONS  (STANDING):  ampicillin/sulbactam  IVPB 3 Gram(s) IV Intermittent every 6 hours  ampicillin/sulbactam  IVPB      chlorhexidine 0.12% Liquid 15 milliLiter(s) Oral Mucosa every 12 hours  chlorhexidine 2% Cloths 1 Application(s) Topical <User Schedule>  cholecalciferol 2000 Unit(s) Oral daily  dexMEDEtomidine Infusion 0.012 MICROgram(s)/kG/Hr (0.2 mL/Hr) IV Continuous <Continuous>  heparin  Infusion. 800 Unit(s)/Hr (8 mL/Hr) IV Continuous <Continuous>  influenza   Vaccine 0.5 milliLiter(s) IntraMuscular once  insulin lispro (ADMELOG) corrective regimen sliding scale   SubCutaneous every 6 hours  ketamine Infusion. 2.999 mG/kG/Hr (20 mL/Hr) IV Continuous <Continuous>  methadone    Tablet 40 milliGRAM(s) Oral every 8 hours  metoclopramide Injectable 10 milliGRAM(s) IV Push every 8 hours  midazolam Infusion 0.02 mG/kG/Hr (1.33 mL/Hr) IV Continuous <Continuous>  pantoprazole  Injectable 40 milliGRAM(s) IV Push every 12 hours  petrolatum Ophthalmic Ointment 1 Application(s) Both EYES two times a day  phenylephrine    Infusion 0.1 MICROgram(s)/kG/Min (2.5 mL/Hr) IV Continuous <Continuous>    MEDICATIONS  (PRN):      Vitals:  Vital Signs Last 24 Hrs  T(C): 36 (31 Mar 2021 03:00), Max: 36.6 (30 Mar 2021 12:00)  T(F): 96.8 (31 Mar 2021 03:00), Max: 97.9 (30 Mar 2021 12:00)  HR: 80 (31 Mar 2021 07:00) (70 - 97)  BP: --  BP(mean): --  RR: 31 (31 Mar 2021 07:00) (30 - 50)  SpO2: 95% (31 Mar 2021 07:00) (79% - 100%)    Vitals (Invasive):  ABP: 105/58 (03-31-21 @ 07:00) (73/46 - 153/72)    I&O's Summary    30 Mar 2021 07:01  -  31 Mar 2021 07:00  --------------------------------------------------------  IN: 2798.6 mL / OUT: 2300 mL / NET: 498.6 mL        Physical Exam:  GENERAL: NAD, sedated   HEENT:  Atraumatic, Normocephalic  EYES: PERRLA, conjunctiva and sclera clear  NECK: Supple, No JVD  CHEST/LUNG: diminished bilaterally; No wheezes, rales, or rhonchi, intubated   HEART: Regular rate and rhythm; No murmurs, rubs, or gallops  ABDOMEN: Soft, Nontender, Nondistended; Bowel sounds present  EXTREMITIES:  2+ Peripheral Pulses, No clubbing, cyanosis, or edema  PSYCH: unable as pt is sedated  NEUROLOGY: sedated  SKIN: No rashes or lesions    Labs:  COVID:  COVID-19 PCR: Detected (03-08-21 @ 11:57)                            8.0    6.92  )-----------( 217      ( 30 Mar 2021 23:50 )             27.2     03-30    142  |  111<H>  |  9   ----------------------------<  98  3.9   |  24  |  0.35<L>    Ca    6.9<L>      30 Mar 2021 23:50  Phos  2.9     03-30  Mg     2.0     03-30    TPro  5.2<L>  /  Alb  1.7<L>  /  TBili  0.4  /  DBili  x   /  AST  31  /  ALT  97<H>  /  AlkPhos  165<H>  03-30    PT/INR - ( 30 Mar 2021 23:50 )   PT: 17.4 sec;   INR: 1.48 ratio         PTT - ( 30 Mar 2021 23:50 )  PTT:74.3 sec      Culture - Blood (collected 03-29-21 @ 15:35)  Source: .Blood Blood-Peripheral  Gram Stain (03-30-21 @ 06:31):    Growth in anaerobic bottle: Gram Positive Cocci in Pairs and Chains    Growth in aerobic bottle: Gram Positive Cocci in Pairs and Chains  Preliminary Report (03-30-21 @ 06:32):    Growth in anaerobic bottle: Gram Positive Cocci in Pairs and Chains    Growth in aerobic bottle: Gram Positive Cocci in Pairs and Chains    ***Blood Panel PCR results on this specimen are available    approximately 3 hours after the Gram stain result.***    Gram stain, PCR, and/or culture results may not always    correspond due to difference in methodologies.    ************************************************************    This PCR assay was performed by multiplex PCR. This    Assay tests for 66 bacterial and resistance gene targets.    Please refer to the [facility] [employer] test directory    at [email or web address] for details.  Organism: Blood Culture PCR (03-30-21 @ 06:42)  Organism: Blood Culture PCR (03-30-21 @ 06:42)      -  Enterococcus species: Detec      Method Type: PCR      COVID related labs:  31 Mar 2021 05:22  D-dimer:  x  Calcitonin:  x  CRP:  x  LDH:  x  Lactate,Blood:  x  CK:  x  Troponin I:  x  Troponin T:  x  Troponin HS:  x  Ferritin, Serum: 1122<H>  BNP:  x      Blood Gases:  (ARTERIAL):  03-30-21 @ 23:48  pH 7.38 / pCO2 64 / pO2 76 / HCO3 37  Total CO2 39  FiO2 40  Oxygen Saturation 94  Total Hemoglobin, Calculated 8.6  Hematocrit, Calculated 27  Oxygen Content 11  Blood Gas Arterial - Calcium, Ionized 1.03  Blood Gas Arterial - Chloride 113  Blood Gas Arterial - Glucose 100  Blood Gas Arterial - Potassium 3.6  Blood Gas Arterial - Sodium 140  Blood Gas Arterial - Creatinine --  Base Excess, Arterial 10.3    (VENOUS):      Radiology:  CT chest results consistent with multifocal bilateral ground glass opacities  ID consulted, follow up recommendations    Plan:  - Hydroxychloroquine 800 mG Q24H x 1 then 400 mG Q24H x 4 doses    Daily Labs:  CBC/CMP/Mg/Phos/CRP/PT, PTT & INR/D-Dimer/LDH/Ferritin/CK/Troponins    Every 3 day labs:   ESR/Tcell subset/D-dimer/LDH/Ferritin/CK/Troponins/Coags  COVID isolation protocol    Pulmonary:  Sepsis 2/2 COVID+  On arrival meeting /4 SIRS criteria ( ) with pulmonary source  WBC with % neutrophils, % lymphocytes, lactate  Status Post:  Vancomycin + Piperacillin / Tazobactam in ED, L NS bolus  Hold further antibiotics at present    Follow Ups:  T-Cell Subset  Urinalysis  Urine Cultures  Blood Cultures  Obtain HIV consent for testing when able    ARDS:  as above  Current vent settings:  Mode: AC/ CMV (Assist Control/ Continuous Mandatory Ventilation)  RR (machine): 30  TV (machine): 300  FiO2: 40  PEEP: 10.8  ITime: 0.7  MAP: 17  PIP: 35      Code: FULL CODE    Disposition: Patient requires continued monitoring in MICU Admit Date: 03-08-21  Length of Stay: 23d    60yMale    Reason for admission to ICU:  Patient is in acute hypoxic respiratory distress requiring intubation and sedation. Imaging c/w ARDS. Admitted to MICU for further observation and management.    61 yo male w/no known pmh (does not f/u with PCP) presents to [facility] for cc fevers, weakness, fatigue, shortness of breath for 5 days. Tested positive for covid 3 days ago at an urgent care. Today presented again to urgent care and noted to be hypoxic and sent to the ER. Patient denies dysuria, diarrhea, constipation. Currently denies shortness of breath on high flow. Intubated for worsening respiratory failure and transferred to the ICU for further management.     INTERVAL HPI/OVERNIGHT EVENTS:  - Tachypneic and TV <250 on CPAP via vent   - Volume positive, Lasix 20 with good UOP      MEDICATIONS  (STANDING):  ampicillin/sulbactam  IVPB 3 Gram(s) IV Intermittent every 6 hours  ampicillin/sulbactam  IVPB      chlorhexidine 0.12% Liquid 15 milliLiter(s) Oral Mucosa every 12 hours  chlorhexidine 2% Cloths 1 Application(s) Topical <User Schedule>  cholecalciferol 2000 Unit(s) Oral daily  dexMEDEtomidine Infusion 0.012 MICROgram(s)/kG/Hr (0.2 mL/Hr) IV Continuous <Continuous>  heparin  Infusion. 800 Unit(s)/Hr (8 mL/Hr) IV Continuous <Continuous>  influenza   Vaccine 0.5 milliLiter(s) IntraMuscular once  insulin lispro (ADMELOG) corrective regimen sliding scale   SubCutaneous every 6 hours  ketamine Infusion. 2.999 mG/kG/Hr (20 mL/Hr) IV Continuous <Continuous>  methadone    Tablet 40 milliGRAM(s) Oral every 8 hours  metoclopramide Injectable 10 milliGRAM(s) IV Push every 8 hours  midazolam Infusion 0.02 mG/kG/Hr (1.33 mL/Hr) IV Continuous <Continuous>  pantoprazole  Injectable 40 milliGRAM(s) IV Push every 12 hours  petrolatum Ophthalmic Ointment 1 Application(s) Both EYES two times a day  phenylephrine    Infusion 0.1 MICROgram(s)/kG/Min (2.5 mL/Hr) IV Continuous <Continuous>    MEDICATIONS  (PRN):      Vitals:  Vital Signs Last 24 Hrs  T(C): 36 (31 Mar 2021 03:00), Max: 36.6 (30 Mar 2021 12:00)  T(F): 96.8 (31 Mar 2021 03:00), Max: 97.9 (30 Mar 2021 12:00)  HR: 80 (31 Mar 2021 07:00) (70 - 97)  BP: --  BP(mean): --  RR: 31 (31 Mar 2021 07:00) (30 - 50)  SpO2: 95% (31 Mar 2021 07:00) (79% - 100%)    Vitals (Invasive):  ABP: 105/58 (03-31-21 @ 07:00) (73/46 - 153/72)    I&O's Summary    30 Mar 2021 07:01  -  31 Mar 2021 07:00  --------------------------------------------------------  IN: 2798.6 mL / OUT: 2300 mL / NET: 498.6 mL        Physical Exam:  GENERAL: NAD, sedated   HEENT:  Atraumatic, Normocephalic  EYES: PERRLA, conjunctiva and sclera clear  NECK: Supple, No JVD  CHEST/LUNG: diminished bilaterally; No wheezes, rales, or rhonchi, intubated   HEART: Regular rate and rhythm; No murmurs, rubs, or gallops  ABDOMEN: Soft, Nontender, Nondistended; Bowel sounds present  EXTREMITIES:  2+ Peripheral Pulses, No clubbing, cyanosis, or edema  PSYCH: unable as pt is sedated  NEUROLOGY: sedated  SKIN: No rashes or lesions    Labs:  COVID:  COVID-19 PCR: Detected (03-08-21 @ 11:57)                            8.0    6.92  )-----------( 217      ( 30 Mar 2021 23:50 )             27.2     03-30    142  |  111<H>  |  9   ----------------------------<  98  3.9   |  24  |  0.35<L>    Ca    6.9<L>      30 Mar 2021 23:50  Phos  2.9     03-30  Mg     2.0     03-30    TPro  5.2<L>  /  Alb  1.7<L>  /  TBili  0.4  /  DBili  x   /  AST  31  /  ALT  97<H>  /  AlkPhos  165<H>  03-30    PT/INR - ( 30 Mar 2021 23:50 )   PT: 17.4 sec;   INR: 1.48 ratio         PTT - ( 30 Mar 2021 23:50 )  PTT:74.3 sec      Culture - Blood (collected 03-29-21 @ 15:35)  Source: .Blood Blood-Peripheral  Gram Stain (03-30-21 @ 06:31):    Growth in anaerobic bottle: Gram Positive Cocci in Pairs and Chains    Growth in aerobic bottle: Gram Positive Cocci in Pairs and Chains  Preliminary Report (03-30-21 @ 06:32):    Growth in anaerobic bottle: Gram Positive Cocci in Pairs and Chains    Growth in aerobic bottle: Gram Positive Cocci in Pairs and Chains    ***Blood Panel PCR results on this specimen are available    approximately 3 hours after the Gram stain result.***    Gram stain, PCR, and/or culture results may not always    correspond due to difference in methodologies.    ************************************************************    This PCR assay was performed by multiplex PCR. This    Assay tests for 66 bacterial and resistance gene targets.    Please refer to the [facility] [employer] test directory    at [email or web address] for details.  Organism: Blood Culture PCR (03-30-21 @ 06:42)  Organism: Blood Culture PCR (03-30-21 @ 06:42)      -  Enterococcus species: Detec      Method Type: PCR      COVID related labs:  31 Mar 2021 05:22  D-dimer:  x  Calcitonin:  x  CRP:  x  LDH:  x  Lactate,Blood:  x  CK:  x  Troponin I:  x  Troponin T:  x  Troponin HS:  x  Ferritin, Serum: 1122<H>  BNP:  x      Blood Gases:  (ARTERIAL):  03-30-21 @ 23:48  pH 7.38 / pCO2 64 / pO2 76 / HCO3 37  Total CO2 39  FiO2 40  Oxygen Saturation 94  Total Hemoglobin, Calculated 8.6  Hematocrit, Calculated 27  Oxygen Content 11  Blood Gas Arterial - Calcium, Ionized 1.03  Blood Gas Arterial - Chloride 113  Blood Gas Arterial - Glucose 100  Blood Gas Arterial - Potassium 3.6  Blood Gas Arterial - Sodium 140  Blood Gas Arterial - Creatinine --  Base Excess, Arterial 10.3        Code: FULL CODE    Disposition: Patient requires continued monitoring in MICU

## 2021-03-31 NOTE — PROGRESS NOTE ADULT - ASSESSMENT
61 yo male w/no known pmh (does not f/u with PCP) presents to [facility] for cc fevers, weakness, fatigue, shortness of breath for 5 days. Tested positive for covid 3 days ago at an urgent care. Today presented again to urgent care and noted to be hypoxic and sent to the ER. Patient denies dysuria, diarrhea, constipation. Currently denies shortness of breath on high flow. Intubated for worsening respiratory failure and transferred to the ICU for further management.     NEURO:  - Nimbex dc'd on 3/29  - Sedated Ketamine, Versed, and Precedex  - Methadone PO  - D/c Fentanyl on 3/30  - 3/29 AM Symptomatic Sinus Alex to 30s -> d/c Precedex: resolved. Precedex subsequently restarted without issue.    RESPIRATORY:  #Covid-19 ARDS  - PRVC 30/300/40/8. Continue to titrate FiO2 as tolerated. Maintain O2 sat >90  - RLL PE diagnosed on admission, treated with heparin gtt    CARDIOVASCULAR:  #Distributive shock likely secondary to Covid-19 and E. coli bacteremia  - Levophed transitioned to phenylephrine ovenight in setting of tachycardia  - Maintain MAP >65  - TTE 3/16 EF 55-60%. Mild diastolic dysfunction     RENAL:  - Renal function remains stable with good UOP  - Continue to monitor renal indices, strict I/Os. Replete electrolytes as needed.    GASTROINTESTINAL:  #SBO  - CT ABD/Pelv 3/28: SBO w/ transition point in distal ileum   - Gen surg consulted: **NPO w/ OGT to LCWS, f/u AM Abd XR 3/29. No surgical intervention @ this time.  - Pt with multiple BMs since 3/28  - Reglan     #Melena - Resolved  - S/P  melena X 2 on 3/26.  - GI consulted - No acute intervention. H+H stable  - Continue PPI    #Transaminitis, resolving   - LFTs continue to downtrend. Continue to monitor. Hold hepatic-offending agents.    HEME:  - Continue to monitor H&H  - Therapeutic on heparin gtt for RLL PE and hypercoagulable state,    ENDO:  #T2DM -  A1C 12.4  #Hypoglycemia in setting of NPO status d/t SBO  - Continue D10 gtt.  - Reassess insulin need when TFs are restarted.    INFECTIOUS DISEASE:  #Covid-19  - Received remdesivir and decadron  - Not a candidate for toci in setting of bacterial infection    #Gram-negative bacteremia, Now with Gram-positive bacteremia  - 3/25 BCx 2/2 with E.coli  - 3/27 BCx 2/2 NGTD  - 3/29 BCx 2/2 with GPC  - 3/13 MRSA swab negative.  - Continue Unasyn per ID.     59 yo male w/no known pmh (does not f/u with PCP) presents to [facility] for cc fevers, weakness, fatigue, shortness of breath for 5 days. Tested positive for covid 3 days ago at an urgent care. Today presented again to urgent care and noted to be hypoxic and sent to the ER. Patient denies dysuria, diarrhea, constipation. Currently denies shortness of breath on high flow. Intubated for worsening respiratory failure and transferred to the ICU for further management.     NEURO:  - Nimbex dc'd on 3/29  - Sedated Ketamine, Versed, and Precedex  - Methadone PO  - D/c Fentanyl on 3/30  - 3/29 AM Symptomatic Sinus Alex to 30s -> d/c Precedex: resolved. Precedex subsequently restarted without issue.    RESPIRATORY:  #Covid-19 ARDS  - PRVC 30/300/40/8. Continue to titrate FiO2 as tolerated. Maintain O2 sat >90  - RLL PE diagnosed on admission, treated with heparin gtt    CARDIOVASCULAR:  #Distributive shock likely secondary to Covid-19 and E. coli bacteremia  - Levophed transitioned to phenylephrine ovenight in setting of tachycardia  - Maintain MAP >65  - TTE 3/16 EF 55-60%. Mild diastolic dysfunction     RENAL:  - Renal function remains stable with good UOP  - Continue to monitor renal indices, strict I/Os. Replete electrolytes as needed.    GASTROINTESTINAL:  #SBO  - CT ABD/Pelv 3/28: SBO w/ transition point in distal ileum   - Gen surg consulted: **NPO w/ OGT to LCWS, f/u AM Abd XR 3/29. No surgical intervention @ this time.  - Pt with multiple BMs since 3/28  - Advance feeds to goal   - Reglan d/c'ed on 3/31    #Melena - Resolved  - S/P  melena X 2 on 3/26.  - GI consulted - No acute intervention. H+H stable  - Continue PPI    #Transaminitis, resolving   - LFTs continue to downtrend. Continue to monitor. Hold hepatic-offending agents.    HEME:  - Continue to monitor H&H  - Therapeutic on heparin gtt for RLL PE and hypercoagulable state,    ENDO:  #T2DM -  A1C 12.4  - C/w ISS/FS  - Reassess insulin need when TFs are restarted.    INFECTIOUS DISEASE:  #Covid-19  - Received remdesivir and decadron  - Not a candidate for toci in setting of bacterial infection    #Gram-negative bacteremia, Now with Gram-positive bacteremia  - 3/25 BCx 2/2 with E.coli  - 3/27 BCx 2/2 NGTD  - 3/29 BCx 2/2 with GPC  - 3/13 MRSA swab negative.  - Continue Unasyn per ID.     61 yo male w/no known pmh (does not f/u with PCP) presents to [facility] for cc fevers, weakness, fatigue, shortness of breath for 5 days. Tested positive for covid 3 days ago at an urgent care. Today presented again to urgent care and noted to be hypoxic and sent to the ER. Patient denies dysuria, diarrhea, constipation. Currently denies shortness of breath on high flow. Intubated for worsening respiratory failure and transferred to the ICU for further management.     NEURO:  - Nimbex dc'd on 3/29  - Sedated Ketamine, Versed, and Precedex  - Methadone PO  - D/c Fentanyl on 3/30  - 3/29 AM Symptomatic Sinus Alex to 30s -> d/c Precedex: resolved. Precedex subsequently restarted without issue.    RESPIRATORY:  #Covid-19 ARDS  - PRVC 30/300/40/8. Continue to titrate FiO2 as tolerated. Maintain O2 sat >90  - RLL PE diagnosed on admission, treated with heparin gtt  - Discussion with family regarding trach    CARDIOVASCULAR:  #Distributive shock likely secondary to Covid-19 and E. coli bacteremia  - Levophed transitioned to phenylephrine ovenight in setting of tachycardia  - Maintain MAP >65  - TTE 3/16 EF 55-60%. Mild diastolic dysfunction     RENAL:  - Renal function remains stable with good UOP  - Continue to monitor renal indices, strict I/Os. Replete electrolytes as needed.    GASTROINTESTINAL:  #SBO  - CT ABD/Pelv 3/28: SBO w/ transition point in distal ileum   - Gen surg consulted: **NPO w/ OGT to LCWS, f/u AM Abd XR 3/29. No surgical intervention @ this time.  - Pt with multiple BMs since 3/28  - Advance feeds to goal   - Reglan d/c'ed on 3/31    #Melena - Resolved  - S/P  melena X 2 on 3/26.  - GI consulted - No acute intervention. H+H stable  - Continue PPI    #Transaminitis, resolving   - LFTs continue to downtrend. Continue to monitor. Hold hepatic-offending agents.    HEME:  - Continue to monitor H&H  - Therapeutic on heparin gtt for RLL PE and hypercoagulable state,    ENDO:  #T2DM -  A1C 12.4  - C/w ISS/FS  - Reassess insulin need when TFs are restarted.    INFECTIOUS DISEASE:  #Covid-19  - Received remdesivir and decadron  - Not a candidate for toci in setting of bacterial infection    #Gram-negative bacteremia, Now with Gram-positive bacteremia  - 3/25 BCx 2/2 with E.coli  - 3/27 BCx 2/2 NGTD  - 3/29 BCx 2/2 with GPC  - 3/13 MRSA swab negative.  - Continue Unasyn per ID.     59 yo male w/no known pmh (does not f/u with PCP) presents to [facility] for cc fevers, weakness, fatigue, shortness of breath for 5 days. Tested positive for covid 3 days ago at an urgent care. Today presented again to urgent care and noted to be hypoxic and sent to the ER. Patient denies dysuria, diarrhea, constipation. Currently denies shortness of breath on high flow. Intubated for worsening respiratory failure and transferred to the ICU for further management.     NEURO:  - Nimbex dc'd on 3/29  - Sedated Ketamine, Versed, and Precedex  - Methadone PO  - D/c Fentanyl on 3/30  - 3/29 AM Symptomatic Sinus Alex to 30s -> d/c Precedex: resolved. Precedex subsequently restarted without issue.    RESPIRATORY:  #Covid-19 ARDS  - PRVC 30/300/40/8. Continue to titrate FiO2 as tolerated. Maintain O2 sat >90  - RLL PE diagnosed on admission, treated with heparin gtt  - Discussion with family regarding trach    CARDIOVASCULAR:  #Distributive shock likely secondary to Covid-19 and E. coli bacteremia  - Levophed transitioned to phenylephrine ovenight in setting of tachycardia  - Maintain MAP >65  - TTE 3/16 EF 55-60%. Mild diastolic dysfunction     RENAL:  - Renal function remains stable with good UOP  - Continue to monitor renal indices, strict I/Os. Replete electrolytes as needed.    GASTROINTESTINAL:  #SBO  - CT ABD/Pelv 3/28: SBO w/ transition point in distal ileum   - Gen surg consulted: **NPO w/ OGT to LCWS, f/u AM Abd XR 3/29. No surgical intervention @ this time.  - Pt with multiple BMs since 3/28  - Advance feeds to goal of 60ml/hr  - Reglan d/c'ed on 3/31    #Melena - Resolved  - S/P  melena X 2 on 3/26.  - GI consulted - No acute intervention. H+H stable  - Continue PPI    #Transaminitis, resolving   - LFTs continue to downtrend. Continue to monitor. Hold hepatic-offending agents.    HEME:  - Continue to monitor H&H  - Therapeutic on heparin gtt for RLL PE and hypercoagulable state,    ENDO:  #T2DM -  A1C 12.4  - C/w ISS/FS  - Reassess insulin need when TFs are restarted.    INFECTIOUS DISEASE:  #Covid-19  - Received remdesivir and decadron  - Not a candidate for toci in setting of bacterial infection    #Gram-negative bacteremia, Now with Gram-positive bacteremia  - 3/25 BCx 2/2 with E.coli  - 3/27 BCx 2/2 NGTD  - 3/29 BCx 2/2 with GPC  - 3/13 MRSA swab negative.  - Continue Unasyn per ID.

## 2021-03-31 NOTE — PROGRESS NOTE ADULT - ASSESSMENT
Assessment  DMT2: 60y Male with newly diagnosed DM T2 with hyperglycemia admitted with COVID19, A1C is 12.4%, on insulin coverage only, blood sugars trending within lower/acceptable range, no new hypoglycemias, tube feeds were on hold, now restarting TFs per primary team.  COVID19: On medications, monitored, stable.  Overweight: No strict exercise routines, neither on low calorie diet.      Anderson Mcmanus MD  Cell: [phone]  Office: [phone]       Assessment  DMT2: 60y Male with newly diagnosed DM T2 with hyperglycemia admitted with COVID19, A1C is 12.4%, on  insulin coverage only, blood sugars trending within lower/acceptable range, no new hypoglycemias, tube feeds were on hold, now restarting TFs per primary team.  COVID19: On medications, monitored, stable.  Overweight: No strict exercise routines, neither on low calorie diet.      Anderson Mcmanus MD  Cell: [phone]  Office: [phone]

## 2021-03-31 NOTE — PROGRESS NOTE ADULT - SUBJECTIVE AND OBJECTIVE BOX
Chief complaint  Patient is a 60y old  Male who presents with a chief complaint of hypoxia 2/2 COVID (31 Mar 2021 15:16)   Review of systems  Patient intubated, no hypoglycemic episodes.    Labs and Fingersticks  CAPILLARY BLOOD GLUCOSE      POCT Blood Glucose.: 94 mg/dL (31 Mar 2021 11:32)  POCT Blood Glucose.: 86 mg/dL (31 Mar 2021 05:14)  POCT Blood Glucose.: 99 mg/dL (30 Mar 2021 23:22)  POCT Blood Glucose.: 155 mg/dL (30 Mar 2021 17:26)    Medications  MEDICATIONS  (STANDING):  ampicillin/sulbactam  IVPB 3 Gram(s) IV Intermittent every 6 hours  ampicillin/sulbactam  IVPB      chlorhexidine 0.12% Liquid 15 milliLiter(s) Oral Mucosa every 12 hours  chlorhexidine 2% Cloths 1 Application(s) Topical <User Schedule>  cholecalciferol 2000 Unit(s) Oral daily  dexMEDEtomidine Infusion 0.012 MICROgram(s)/kG/Hr (0.2 mL/Hr) IV Continuous <Continuous>  heparin  Infusion. 800 Unit(s)/Hr (8 mL/Hr) IV Continuous <Continuous>  influenza   Vaccine 0.5 milliLiter(s) IntraMuscular once  insulin lispro (ADMELOG) corrective regimen sliding scale   SubCutaneous every 6 hours  ketamine Infusion. 2.999 mG/kG/Hr (20 mL/Hr) IV Continuous <Continuous>  methadone    Tablet 20 milliGRAM(s) Oral every 8 hours  midazolam Infusion 0.02 mG/kG/Hr (1.33 mL/Hr) IV Continuous <Continuous>  pantoprazole  Injectable 40 milliGRAM(s) IV Push every 12 hours  petrolatum Ophthalmic Ointment 1 Application(s) Both EYES two times a day  phenylephrine    Infusion 0.1 MICROgram(s)/kG/Min (2.5 mL/Hr) IV Continuous <Continuous>      Physical Exam  General: Patient intubated  Vital Signs Last 12 Hrs  T(F): 98.1 (03-31-21 @ 12:00), Max: 98.1 (03-31-21 @ 08:00)  HR: 81 (03-31-21 @ 12:30) (71 - 82)  BP: --  BP(mean): --  RR: 30 (03-31-21 @ 12:30) (30 - 42)  SpO2: 94% (03-31-21 @ 12:30) (91% - 96%)       Chief complaint  Patient is a 60y old  Male who presents with a chief complaint of hypoxia 2/2 COVID (31 Mar 2021 15:16)   Review of systems  Patient intubated, no hypoglycemic episodes.    Labs and Fingersticks  CAPILLARY BLOOD GLUCOSE    POCT Blood Glucose.: 94 mg/dL (31 Mar 2021 11:32)  POCT Blood Glucose.: 86 mg/dL (31 Mar 2021 05:14)  POCT Blood Glucose.: 99 mg/dL (30 Mar 2021 23:22)  POCT Blood Glucose.: 155 mg/dL (30 Mar 2021 17:26)    Medications  MEDICATIONS  (STANDING):  ampicillin/sulbactam  IVPB 3 Gram(s) IV Intermittent every 6 hours  ampicillin/sulbactam  IVPB      chlorhexidine 0.12% Liquid 15 milliLiter(s) Oral Mucosa every 12 hours  chlorhexidine 2% Cloths 1 Application(s) Topical <User Schedule>  cholecalciferol 2000 Unit(s) Oral daily  dexMEDEtomidine Infusion 0.012 MICROgram(s)/kG/Hr (0.2 mL/Hr) IV Continuous <Continuous>  heparin  Infusion. 800 Unit(s)/Hr (8 mL/Hr) IV Continuous <Continuous>  influenza   Vaccine 0.5 milliLiter(s) IntraMuscular once  insulin lispro (ADMELOG) corrective regimen sliding scale   SubCutaneous every 6 hours  ketamine Infusion. 2.999 mG/kG/Hr (20 mL/Hr) IV Continuous <Continuous>  methadone    Tablet 20 milliGRAM(s) Oral every 8 hours  midazolam Infusion 0.02 mG/kG/Hr (1.33 mL/Hr) IV Continuous <Continuous>  pantoprazole  Injectable 40 milliGRAM(s) IV Push every 12 hours  petrolatum Ophthalmic Ointment 1 Application(s) Both EYES two times a day  phenylephrine    Infusion 0.1 MICROgram(s)/kG/Min (2.5 mL/Hr) IV Continuous <Continuous>      Physical Exam  General: Patient intubated  Vital Signs Last 12 Hrs  T(F): 98.1 (03-31-21 @ 12:00), Max: 98.1 (03-31-21 @ 08:00)  HR: 81 (03-31-21 @ 12:30) (71 - 82)  BP: --  BP(mean): --  RR: 30 (03-31-21 @ 12:30) (30 - 42)  SpO2: 94% (03-31-21 @ 12:30) (91% - 96%)

## 2021-03-31 NOTE — PROVIDER CONTACT NOTE (OTHER) - SITUATION
Patient diagnosed with Covid >21 days ago. Afebrile. No further isolation required.
Pt O2 sat 78% on HFNC 40L Fio2 90% at rest. Pt placed prone. O2 sat 92% when proned.
Pt had temp of 102.0
FSBS 74
as above.

## 2021-03-31 NOTE — PROGRESS NOTE ADULT - SUBJECTIVE AND OBJECTIVE BOX
TAMARA CHEW 60y [id number]    Patient is a 60y old  Male who presents with a chief complaint of hypoxia 2/2 COVID (31 Mar 2021 15:16)      Follow Up/CC:  ID following for bacteremia    Interval History/ROS: intubated, no fever    Allergies    No Known Allergies    Intolerances        ANTIMICROBIALS:  ampicillin/sulbactam  IVPB 3 every 6 hours  ampicillin/sulbactam  IVPB        MEDICATIONS  (STANDING):  ampicillin/sulbactam  IVPB 3 Gram(s) IV Intermittent every 6 hours  ampicillin/sulbactam  IVPB      chlorhexidine 0.12% Liquid 15 milliLiter(s) Oral Mucosa every 12 hours  chlorhexidine 2% Cloths 1 Application(s) Topical <User Schedule>  cholecalciferol 2000 Unit(s) Oral daily  dexMEDEtomidine Infusion 0.012 MICROgram(s)/kG/Hr (0.2 mL/Hr) IV Continuous <Continuous>  heparin  Infusion. 800 Unit(s)/Hr (8 mL/Hr) IV Continuous <Continuous>  influenza   Vaccine 0.5 milliLiter(s) IntraMuscular once  insulin lispro (ADMELOG) corrective regimen sliding scale   SubCutaneous every 6 hours  ketamine Infusion. 2.999 mG/kG/Hr (20 mL/Hr) IV Continuous <Continuous>  methadone    Tablet 20 milliGRAM(s) Oral every 8 hours  midazolam Infusion 0.02 mG/kG/Hr (1.33 mL/Hr) IV Continuous <Continuous>  pantoprazole  Injectable 40 milliGRAM(s) IV Push every 12 hours  petrolatum Ophthalmic Ointment 1 Application(s) Both EYES two times a day  phenylephrine    Infusion 0.1 MICROgram(s)/kG/Min (2.5 mL/Hr) IV Continuous <Continuous>    MEDICATIONS  (PRN):        Vital Signs Last 24 Hrs  T(C): 36.7 (31 Mar 2021 12:00), Max: 36.7 (31 Mar 2021 08:00)  T(F): 98.1 (31 Mar 2021 12:00), Max: 98.1 (31 Mar 2021 08:00)  HR: 81 (31 Mar 2021 12:30) (70 - 82)  BP: --  BP(mean): --  RR: 30 (31 Mar 2021 12:30) (30 - 50)  SpO2: 94% (31 Mar 2021 12:30) (79% - 96%)    CBC Full  -  ( 30 Mar 2021 23:50 )  WBC Count : 6.92 K/uL  RBC Count : 3.70 M/uL  Hemoglobin : 8.0 g/dL  Hematocrit : 27.2 %  Platelet Count - Automated : 217 K/uL  Mean Cell Volume : 73.5 fl  Mean Cell Hemoglobin : 21.6 pg  Mean Cell Hemoglobin Concentration : 29.4 gm/dL  Auto Neutrophil # : x  Auto Lymphocyte # : x  Auto Monocyte # : x  Auto Eosinophil # : x  Auto Basophil # : x  Auto Neutrophil % : x  Auto Lymphocyte % : x  Auto Monocyte % : x  Auto Eosinophil % : x  Auto Basophil % : x    03-30    142  |  111<H>  |  9   ----------------------------<  98  3.9   |  24  |  0.35<L>    Ca    6.9<L>      30 Mar 2021 23:50  Phos  2.9     03-30  Mg     2.0     03-30    TPro  5.2<L>  /  Alb  1.7<L>  /  TBili  0.4  /  DBili  x   /  AST  31  /  ALT  97<H>  /  AlkPhos  165<H>  03-30    LIVER FUNCTIONS - ( 30 Mar 2021 23:50 )  Alb: 1.7 g/dL / Pro: 5.2 g/dL / ALK PHOS: 165 U/L / ALT: 97 U/L / AST: 31 U/L / GGT: x               MICROBIOLOGY:  .Blood Blood-Peripheral  03-29-21   Growth in aerobic and anaerobic bottles: Enterococcus raffinosus  See previous culture [id number]  --  Blood Culture PCR      .Blood Blood-Peripheral  03-27-21   Growth in anaerobic bottle: Bacteroides thetaiotamcron group  --    Growth in anaerobic bottle: Gram Negative Rods      .Sputum Sputum  03-25-21   Normal Respiratory Marilee present  --    Few polymorphonuclear leukocytes per low power field  Rare Squamous epithelial cells per low power field  Rare Gram positive cocci in pairs per oil power field  Rare Gram Variable Rods per oil power field      .Blood Blood-Peripheral  03-25-21   Growth in aerobic and anaerobic bottles: Escherichia coli  ***Blood Panel PCR results on this specimen are available  approximately 3 hours after the Gram stain result.***  Gram stain, PCR, and/or culture results may not always  correspond due to difference in methodologies.  ************************************************************  This PCR assay was performed by multiplex PCR. This  Assay tests for 66 bacterial and resistance gene targets.  Please refer to the [facility] test directory  at [email or web address] for details.  --  Blood Culture PCR  Escherichia coli      .Blood Blood-Peripheral  03-23-21   No Growth Final  --  --      .Sputum Sputum  03-22-21   Normal Respiratory Marilee absent  --    Rare polymorphonuclear leukocytes per low power field  Rare Squamous epithelial cells per low power field  No organisms seen per oil power field      .Blood Blood-Peripheral  03-21-21   No Growth Final  --  --      .Blood Blood-Venous  03-16-21   No Growth Final  --  --      .Blood Blood  03-13-21   No Growth Final  --  --      .Blood Blood-Peripheral  03-08-21   No Growth Final  --  --        RADIOLOGY    < from: Xray Kidney Ureter Bladder (03.29.21 @ 05:41) >  Nonspecific gas. Multiple loops of small bowel appear to be distended and changes compatible with small bowel obstruction. Follow-up study should be obtained. If clinically indicated CT scan should be obtained as well.    < end of copied text >

## 2021-03-31 NOTE — PROGRESS NOTE ADULT - ASSESSMENT
Mr Olivares is a 60 year old man who presented with hypoxia after being diagnosed with COVID outpatient. He has completed a course of remdesevir, but is still febrile and hypoxic.   He does not have a clear superimposed bacterial infection, though bacterial pneumonia is possible.   CT chest on admission with PE and ground glass opacities.   Febrile and requiring high flow nasal cannula.     COVID-19 pneumonia, leukocytosis, resp failure, E. coli/enterococcal bacteremia  - Remdesevir completed  - dc ancef  - unasyn 3 gm iv q6 to cover E.coli and GI tract better   - Not a candidate for tocilizumab in setting of possible infection   - Follow up cultures - repeat bcx positive  - Monitor for fevers  - Trend WBCs  - vent per MICU   - change lines if possible   - polymicrobial bacteremia - likely from GI source - consider repeat abd imaging  - SBO per surgery  - check repeat bcx  - f/u sensitivities     Prognosis guarded    Rj Rouse  Attending Physician   Division of Infectious Disease  Pager #[phone]  Available on Microsoft Teams also  After 5pm/weekend or no response, call #[phone]

## 2021-03-31 NOTE — PROGRESS NOTE ADULT - ASSESSMENT
61 y/o M with no significant PMH. Presents with c/o fevers, weakness, fatigue, SOB for 5 days. Tested positive for COVID 3 days prior to admission at an urgent care. Day of admission presented again to urgent care and noted to be hypoxic and sent to the ER and placed on HFNC. CXR with b/l LL opacities. Course c/b RLL medial segmental pulmonary embolism and worsening hypoxic respiratory failure - s/p intubation 3/17. Hospital course c/b SBO, polymicrobial bacteremia

## 2021-03-31 NOTE — PROGRESS NOTE ADULT - ATTENDING COMMENTS
Pt is a 59 yo M with no known PMHx presented on 3/8 to [facility] 2 to fevers, weakness, fatigue, shortness of breath for 5 days. Tested positive for Covid-19 on 3/5. Pt was seen at urgent care and noted to be hypoxic and sent to the ER.  Admitting dx: acute hypoxemic resp failure 2 to COVID 19. Hospital course complicated by PE and superimposed bacterial PNA. RRT on 3/17 2 to worsening hypoxemia; pt Intubated and transferred to the ICU.    Resp: Schedule trach   ID:  Unasyn for E. coli (E. coli septic shock) and now with Enterococcus sepsis; f/u as per ID   CVS:  Phenylephrine to maintain MAP >65  Heme: s/p 1 unit PRBC/ Adjust Heparin drip to PTT  FEN: Increase enteral feeds  Endo: Follow FS  GI: Surg f/u prn  Neuro: Off Nimbex/ Was started on Methadone and decrease sedation as tolerated

## 2021-03-31 NOTE — PROGRESS NOTE ADULT - SUBJECTIVE AND OBJECTIVE BOX
Follow-up Pulm Progress Note    Intubated/sedated    Medications:  MEDICATIONS  (STANDING):  ampicillin/sulbactam  IVPB 3 Gram(s) IV Intermittent every 6 hours  ampicillin/sulbactam  IVPB      chlorhexidine 0.12% Liquid 15 milliLiter(s) Oral Mucosa every 12 hours  chlorhexidine 2% Cloths 1 Application(s) Topical <User Schedule>  cholecalciferol 2000 Unit(s) Oral daily  dexMEDEtomidine Infusion 0.012 MICROgram(s)/kG/Hr (0.2 mL/Hr) IV Continuous <Continuous>  heparin  Infusion. 800 Unit(s)/Hr (8 mL/Hr) IV Continuous <Continuous>  influenza   Vaccine 0.5 milliLiter(s) IntraMuscular once  insulin lispro (ADMELOG) corrective regimen sliding scale   SubCutaneous every 6 hours  ketamine Infusion. 2.999 mG/kG/Hr (20 mL/Hr) IV Continuous <Continuous>  methadone    Tablet 20 milliGRAM(s) Oral every 8 hours  midazolam Infusion 0.02 mG/kG/Hr (1.33 mL/Hr) IV Continuous <Continuous>  pantoprazole  Injectable 40 milliGRAM(s) IV Push every 12 hours  petrolatum Ophthalmic Ointment 1 Application(s) Both EYES two times a day  phenylephrine    Infusion 0.1 MICROgram(s)/kG/Min (2.5 mL/Hr) IV Continuous <Continuous>    MEDICATIONS  (PRN):      Mode: AC/ CMV (Assist Control/ Continuous Mandatory Ventilation)  RR (machine): 30  TV (machine): 300  FiO2: 40  PEEP: 8  ITime: 0.7  MAP: 17  PC: 25  PIP: 35      Vital Signs Last 24 Hrs  T(C): 36.7 (31 Mar 2021 12:00), Max: 36.7 (31 Mar 2021 08:00)  T(F): 98.1 (31 Mar 2021 12:00), Max: 98.1 (31 Mar 2021 08:00)  HR: 81 (31 Mar 2021 12:30) (70 - 82)  BP: --  BP(mean): --  RR: 30 (31 Mar 2021 12:30) (30 - 50)  SpO2: 94% (31 Mar 2021 12:30) (79% - 96%) on 40%    ABG - ( 30 Mar 2021 23:48 )  pH, Arterial: 7.38  pH, Blood: x     /  pCO2: 64    /  pO2: 76    / HCO3: 37    / Base Excess: 10.3  /  SaO2: 94                    03-30 @ 07:01  -  03-31 @ 07:00  --------------------------------------------------------  IN: 2798.6 mL / OUT: 2300 mL / NET: 498.6 mL          LABS:                        8.0    6.92  )-----------( 217      ( 30 Mar 2021 23:50 )             27.2     03-30    142  |  111<H>  |  9   ----------------------------<  98  3.9   |  24  |  0.35<L>    Ca    6.9<L>      30 Mar 2021 23:50  Phos  2.9     03-30  Mg     2.0     03-30    TPro  5.2<L>  /  Alb  1.7<L>  /  TBili  0.4  /  DBili  x   /  AST  31  /  ALT  97<H>  /  AlkPhos  165<H>  03-30      CARDIAC MARKERS ( 30 Mar 2021 23:50 )  x     / x     / 37 U/L / x     / x          CAPILLARY BLOOD GLUCOSE      POCT Blood Glucose.: 94 mg/dL (31 Mar 2021 11:32)    PT/INR - ( 30 Mar 2021 23:50 )   PT: 17.4 sec;   INR: 1.48 ratio         PTT - ( 30 Mar 2021 23:50 )  PTT:74.3 sec    Procalcitonin, Serum: 0.41 ng/mL (03-30-21 @ 23:50)                  CULTURES: (if applicable)  Culture Results:   Growth in aerobic and anaerobic bottles: Enterococcus raffinosus  See previous culture [id number] (03-29 @ 15:35)  Culture Results:   Growth in aerobic and anaerobic bottles: Enterococcus raffinosus  ***Blood Panel PCR results on this specimen are available  approximately 3 hours after the Gram stain result.***  Gram stain, PCR, and/or culture results may not always  correspond due to difference in methodologies.  ************************************************************  This PCR assay was performed by multiplex PCR. This  Assay tests for 66 bacterial and resistance gene targets.  Please refer to the [facility] directory  at [email or web address] for details. (03-29 @ 15:35)  Culture Results:   Growth in anaerobic bottle: Bacteroides thetaiotamcron group (03-27 @ 13:08)  Culture Results:   No growth to date. (03-27 @ 13:08)  Culture Results:   Normal Respiratory Marilee present (03-25 @ 17:09)  Culture Results:   Growth in aerobic and anaerobic bottles: Escherichia coli  ***Blood Panel PCR results on this specimen are available  approximately 3 hours after the Gram stain result.***  Gram stain, PCR, and/or culture results may not always  correspond due to difference in methodologies.  ************************************************************  This PCR assay was performed by multiplex PCR. This  Assay tests for 66 bacterial and resistance gene targets.  Please refer to the [facility] test directory  at [email or web address] for details. (03-25 @ 16:31)  Culture Results:   Growth in aerobic and anaerobic bottles: Escherichia coli  See previous culture [id number] (03-25 @ 16:31)    Most recent blood culture -- 03-29 @ 15:35   Blood Culture PCR Blood Culture PCR .Blood Blood-Peripheral 03-29 @ 15:35  Most recent blood culture -- 03-27 @ 13:08   -- -- .Blood Blood-Peripheral 03-27 @ 13:08    Blood culture 03-29 @ 15:35  --    Growth in aerobic and anaerobic bottles: Gram Positive Cocci in Pairs and  Chains  PCR  Blood Culture PCR  Blood Culture PCR    Urine culture    -->  Blood culture 03-27 @ 13:08  --    Growth in anaerobic bottle: Gram Negative Rods  --  --  --    Urine culture    -->      Physical Examination:  PULM: Coarse bilaterally   CVS: S1, S2 heard    RADIOLOGY REVIEWED  CT A/P (lower chest): Bilateral GGO/consolidations

## 2021-04-01 NOTE — PROGRESS NOTE ADULT - PROBLEM SELECTOR PLAN 1
Suggest to continue coverage scale for now. Will continue monitoring FS and FU. If tube feeds are restarted and blood sugars start trending up, recommend to start on low-dose NPH.

## 2021-04-01 NOTE — PROGRESS NOTE ADULT - PROBLEM SELECTOR PLAN 2
+COVID PCR  -CXR b/l LL opacities   -S/p Remdesivir x 5 days   -S/p Decadron 6mg qd x 10 days   -Trend inflammatory markers  -S/p course abx as per ID for possible superimposed bacterial PNA.

## 2021-04-01 NOTE — PROGRESS NOTE ADULT - SUBJECTIVE AND OBJECTIVE BOX
Chief complaint  Patient is a 60y old  Male who presents with a chief complaint of hypoxia 2/2 COVID (01 Apr 2021 15:19)   Review of systems  Patient intubated, no new hypoglycemic episodes.    Labs and Fingersticks  CAPILLARY BLOOD GLUCOSE      POCT Blood Glucose.: 110 mg/dL (01 Apr 2021 14:05)  POCT Blood Glucose.: 112 mg/dL (01 Apr 2021 10:14)  POCT Blood Glucose.: 93 mg/dL (31 Mar 2021 16:56)      Medications  MEDICATIONS  (STANDING):  ampicillin/sulbactam  IVPB 3 Gram(s) IV Intermittent every 6 hours  ampicillin/sulbactam  IVPB      chlorhexidine 0.12% Liquid 15 milliLiter(s) Oral Mucosa every 12 hours  chlorhexidine 2% Cloths 1 Application(s) Topical <User Schedule>  cholecalciferol 2000 Unit(s) Oral daily  dexMEDEtomidine Infusion 0.012 MICROgram(s)/kG/Hr (0.2 mL/Hr) IV Continuous <Continuous>  heparin  Infusion. 800 Unit(s)/Hr (8 mL/Hr) IV Continuous <Continuous>  influenza   Vaccine 0.5 milliLiter(s) IntraMuscular once  insulin lispro (ADMELOG) corrective regimen sliding scale   SubCutaneous every 6 hours  ketamine Infusion. 2.999 mG/kG/Hr (20 mL/Hr) IV Continuous <Continuous>  methadone    Tablet 20 milliGRAM(s) Oral every 8 hours  metoclopramide Injectable 10 milliGRAM(s) IV Push every 8 hours  midazolam Infusion 0.02 mG/kG/Hr (1.33 mL/Hr) IV Continuous <Continuous>  pantoprazole  Injectable 40 milliGRAM(s) IV Push every 12 hours  petrolatum Ophthalmic Ointment 1 Application(s) Both EYES two times a day  phenylephrine    Infusion 0.1 MICROgram(s)/kG/Min (2.5 mL/Hr) IV Continuous <Continuous>    General: Patient intubated  Vital Signs Last 12 Hrs  T(F): 98.2 (04-01-21 @ 12:00), Max: 99.1 (04-01-21 @ 04:15)  HR: 82 (04-01-21 @ 14:00) (82 - 93)  BP: --  BP(mean): --  RR: 34 (04-01-21 @ 14:00) (30 - 39)  SpO2: 92% (04-01-21 @ 14:00) (90% - 97%)   Chief complaint  Patient is a 60y old  Male who presents with a chief complaint of hypoxia 2/2 COVID (01 Apr 2021 15:19)   Review of systems  Patient intubated, no new hypoglycemic episodes.    Labs and Fingersticks  CAPILLARY BLOOD GLUCOSE      POCT Blood Glucose.: 110 mg/dL (01 Apr 2021 14:05)  POCT Blood Glucose.: 112 mg/dL (01 Apr 2021 10:14)  POCT Blood Glucose.: 93 mg/dL (31 Mar 2021 16:56)      Medications  MEDICATIONS  (STANDING):  ampicillin/sulbactam  IVPB 3 Gram(s) IV Intermittent every 6 hours  ampicillin/sulbactam  IVPB      chlorhexidine 0.12% Liquid 15 milliLiter(s) Oral Mucosa every 12 hours  chlorhexidine 2% Cloths 1 Application(s) Topical <User Schedule>  cholecalciferol 2000 Unit(s) Oral daily  dexMEDEtomidine Infusion 0.012 MICROgram(s)/kG/Hr (0.2 mL/Hr) IV Continuous <Continuous>  heparin  Infusion. 800 Unit(s)/Hr (8 mL/Hr) IV Continuous <Continuous>  influenza   Vaccine 0.5 milliLiter(s) IntraMuscular once  insulin lispro (ADMELOG) corrective regimen sliding scale   SubCutaneous every 6 hours  ketamine Infusion. 2.999 mG/kG/Hr (20 mL/Hr) IV Continuous <Continuous>  methadone    Tablet 20 milliGRAM(s) Oral every 8 hours  metoclopramide Injectable 10 milliGRAM(s) IV Push every 8 hours  midazolam Infusion 0.02 mG/kG/Hr (1.33 mL/Hr) IV Continuous <Continuous>  pantoprazole  Injectable 40 milliGRAM(s) IV Push every 12 hours  petrolatum Ophthalmic Ointment 1 Application(s) Both EYES two times a day  phenylephrine    Infusion 0.1 MICROgram(s)/kG/Min (2.5 mL/Hr) IV Continuous <Continuous>    General: Patient intubated  Vital Signs Last 12 Hrs  T(F): 98.2 (04-01-21 @ 12:00), Max: 99.1 (04-01-21 @ 04:15)  HR: 82 (04-01-21 @ 14:00) (82 - 93)  BP: --  BP(mean): --  RR: 34 (04-01-21 @ 14:00) (30 - 39)  SpO2: 92% (04-01-21 @ 14:00) (90% - 97%)

## 2021-04-01 NOTE — PROGRESS NOTE ADULT - SUBJECTIVE AND OBJECTIVE BOX
Interval:   - Surgery reconsulted for abdominal distention, high NGT output, gastric dilatation evident on KUB  - last BM yesterday    Objective:  Physical Exam:  Gen: critical  Neuro: sedated on ketamine, midazolam, and precedex  Resp: intubated  Cardio: rrr, hypotensive on Lm  GI: NGT productive of gastric content  Abd: soft, minimally distended, nontender  : johnson    Vital Signs Last 24 Hrs  T(C): 37.1 (01 Apr 2021 00:00), Max: 37.1 (31 Mar 2021 19:00)  T(F): 98.8 (01 Apr 2021 00:00), Max: 98.8 (31 Mar 2021 19:00)  HR: 89 (01 Apr 2021 01:30) (71 - 90)  BP: --  BP(mean): --  RR: 30 (01 Apr 2021 01:30) (30 - 42)  SpO2: 96% (01 Apr 2021 01:30) (81% - 97%)    I&O's Detail    30 Mar 2021 07:01  -  31 Mar 2021 07:00  --------------------------------------------------------  IN:    Dexmedetomidine: 550 mL    dextrose 10%: 400 mL    Enteral Tube Flush: 60 mL    FentaNYL: 40.2 mL    Heparin Infusion: 192 mL    IV PiggyBack: 200 mL    Jevity: 180 mL    Ketamine: 220 mL    Ketamine: 340.4 mL    Midazolam: 216 mL    Phenylephrine: 400 mL  Total IN: 2798.6 mL    OUT:    Indwelling Catheter - Urethral (mL): 2300 mL    Nepro with Carb Steady: 0 mL  Total OUT: 2300 mL    Total NET: 498.6 mL      31 Mar 2021 07:01  -  01 Apr 2021 02:02  --------------------------------------------------------  IN:    Dexmedetomidine: 475 mL    Heparin Infusion: 144 mL    Jevity: 290 mL    Ketamine: 335 mL    Midazolam: 152 mL    Phenylephrine: 446 mL  Total IN: 1842 mL    OUT:    Indwelling Catheter - Urethral (mL): 1470 mL  Total OUT: 1470 mL    Total NET: 372 mL      MEDICATIONS  (STANDING):  ampicillin/sulbactam  IVPB 3 Gram(s) IV Intermittent every 6 hours  ampicillin/sulbactam  IVPB      chlorhexidine 0.12% Liquid 15 milliLiter(s) Oral Mucosa every 12 hours  chlorhexidine 2% Cloths 1 Application(s) Topical <User Schedule>  cholecalciferol 2000 Unit(s) Oral daily  dexMEDEtomidine Infusion 0.012 MICROgram(s)/kG/Hr (0.2 mL/Hr) IV Continuous <Continuous>  heparin  Infusion. 800 Unit(s)/Hr (8 mL/Hr) IV Continuous <Continuous>  influenza   Vaccine 0.5 milliLiter(s) IntraMuscular once  insulin lispro (ADMELOG) corrective regimen sliding scale   SubCutaneous every 6 hours  ketamine Infusion. 2.999 mG/kG/Hr (20 mL/Hr) IV Continuous <Continuous>  methadone    Tablet 20 milliGRAM(s) Oral every 8 hours  midazolam Infusion 0.02 mG/kG/Hr (1.33 mL/Hr) IV Continuous <Continuous>  pantoprazole  Injectable 40 milliGRAM(s) IV Push every 12 hours  petrolatum Ophthalmic Ointment 1 Application(s) Both EYES two times a day  phenylephrine    Infusion 0.1 MICROgram(s)/kG/Min (2.5 mL/Hr) IV Continuous <Continuous>    MEDICATIONS  (PRN):      LABS:                        8.4    6.79  )-----------( 293      ( 01 Apr 2021 01:09 )             29.5     03-30    142  |  111<H>  |  9   ----------------------------<  98  3.9   |  24  |  0.35<L>    Ca    6.9<L>      30 Mar 2021 23:50  Phos  2.9     03-30  Mg     2.0     03-30    TPro  5.2<L>  /  Alb  1.7<L>  /  TBili  0.4  /  DBili  x   /  AST  31  /  ALT  97<H>  /  AlkPhos  165<H>  03-30    PT/INR - ( 01 Apr 2021 01:09 )   PT: 17.4 sec;   INR: 1.48 ratio         PTT - ( 01 Apr 2021 01:09 )  PTT:71.8 sec  LIVER FUNCTIONS - ( 30 Mar 2021 23:50 )  Alb: 1.7 g/dL / Pro: 5.2 g/dL / ALK PHOS: 165 U/L / ALT: 97 U/L / AST: 31 U/L / GGT: x             ABO Interpretation: O (03-31-21 @ 05:24)

## 2021-04-01 NOTE — PROGRESS NOTE ADULT - ASSESSMENT
A/P: 60M admitted to MICU with COVID+ pneumonia, RLL PE on heparin gtt, Gram negative bacteremia on Ancef. Surgery previously consulted for SBO evident on imaging, but patient had RBF. Patient remains intubated for respiratory failure. Surgery reconsulted for distention and large gastric bubble on AXR.     - Continue NPO, NGT to LCWS  - Please obtain abdominal xray in AM  - trend lactate  - will reexamine in the morning   - Rest of care per MICU    ACS/Trauma [id number] A/P: 60M admitted to MICU with COVID+ pneumonia, RLL PE on heparin gtt, Gram negative bacteremia on Ancef. Surgery previously consulted for SBO evident on imaging, but patient had RBF. Patient remains intubated for respiratory failure. Surgery reconsulted for distention and large gastric bubble on AXR, exam not concerning for any acute surgical illness.    - Continue NPO, NGT to LCWS  - Please obtain abdominal xray in AM  - may trend lactate if worsening abdominal exam   - will reexamine in the morning   - Rest of care per MICU    ACS/Trauma [id number]

## 2021-04-01 NOTE — PROGRESS NOTE ADULT - ASSESSMENT
61 y/o M with no significant PMH. Presents with c/o fevers, weakness, fatigue, SOB for 5 days. Tested positive for COVID 3 days prior to admission at an urgent care. Day of admission presented again to urgent care and noted to be hypoxic and sent to the ER and placed on HFNC. CXR with b/l LL opacities. Course c/b RLL medial segmental pulmonary embolism and worsening hypoxic respiratory failure - s/p intubation 3/17. Hospital course c/b SBO, polymicrobial bacteremia. Now with ileus

## 2021-04-01 NOTE — PROGRESS NOTE ADULT - ASSESSMENT
Mr Olivares is a 60 year old man who presented with hypoxia after being diagnosed with COVID outpatient. He has completed a course of remdesevir, but is still febrile and hypoxic.   He does not have a clear superimposed bacterial infection, though bacterial pneumonia is possible.   CT chest on admission with PE and ground glass opacities.   Febrile and requiring high flow nasal cannula.     COVID-19 pneumonia, leukocytosis, resp failure, E. coli/enterococcal bacteremia  - Remdesevir completed  - unasyn 3 gm iv q6 to cover E.coli and GI tract better   - Not a candidate for tocilizumab in setting of possible infection   - Follow up cultures - repeat bcx positive  - Monitor for fevers  - Trend WBCs  - vent per MICU   - change lines if possible   - polymicrobial bacteremia - likely from GI source - consider repeat CT abd   - SBO per surgery  - check repeat bcx  - f/u sensitivities - d/w micro - sensitivities will be ready tomorrow     Prognosis guarded    Rj Rouse  Attending Physician   Division of Infectious Disease  Pager #[phone]  Available on Microsoft Teams also  After 5pm/weekend or no response, call #[phone]

## 2021-04-01 NOTE — PROGRESS NOTE ADULT - ASSESSMENT
Assessment  DMT2: 60y Male with newly diagnosed DM T2 with hyperglycemia admitted with COVID19, A1C is 12.4%, on insulin coverage only, blood sugars trending within acceptable range, no new hypoglycemias, tube feeds discontinued 2/2 abdominal distension, currently NPO.  COVID19: On medications, monitored, stable.  Overweight: No strict exercise routines, neither on low calorie diet.      Anderson Mcmanus MD  Cell: [phone]  Office: [phone]       Assessment  DMT2: 60y Male with newly diagnosed DM T2 with hyperglycemia  admitted with COVID19, A1C is 12.4%, on insulin coverage only, blood sugars trending within acceptable range, no new hypoglycemias, tube feeds discontinued 2/2 abdominal distension, currently NPO.  COVID19: On medications, monitored, stable.  Overweight: No strict exercise routines, neither on low calorie diet.      Anderson Mcmanus MD  Cell: [phone]  Office: [phone]

## 2021-04-01 NOTE — PROGRESS NOTE ADULT - SUBJECTIVE AND OBJECTIVE BOX
Follow-up Pulm Progress Note    Intubated/sedated    Medications:  MEDICATIONS  (STANDING):  ampicillin/sulbactam  IVPB 3 Gram(s) IV Intermittent every 6 hours  ampicillin/sulbactam  IVPB      chlorhexidine 0.12% Liquid 15 milliLiter(s) Oral Mucosa every 12 hours  chlorhexidine 2% Cloths 1 Application(s) Topical <User Schedule>  cholecalciferol 2000 Unit(s) Oral daily  dexMEDEtomidine Infusion 0.012 MICROgram(s)/kG/Hr (0.2 mL/Hr) IV Continuous <Continuous>  heparin  Infusion. 800 Unit(s)/Hr (8 mL/Hr) IV Continuous <Continuous>  influenza   Vaccine 0.5 milliLiter(s) IntraMuscular once  insulin lispro (ADMELOG) corrective regimen sliding scale   SubCutaneous every 6 hours  ketamine Infusion. 2.999 mG/kG/Hr (20 mL/Hr) IV Continuous <Continuous>  methadone    Tablet 20 milliGRAM(s) Oral every 8 hours  metoclopramide Injectable 10 milliGRAM(s) IV Push every 8 hours  midazolam Infusion 0.02 mG/kG/Hr (1.33 mL/Hr) IV Continuous <Continuous>  pantoprazole  Injectable 40 milliGRAM(s) IV Push every 12 hours  petrolatum Ophthalmic Ointment 1 Application(s) Both EYES two times a day  phenylephrine    Infusion 0.1 MICROgram(s)/kG/Min (2.5 mL/Hr) IV Continuous <Continuous>    MEDICATIONS  (PRN):      Mode: AC/ CMV (Assist Control/ Continuous Mandatory Ventilation)  RR (machine): 30  TV (machine): 250  FiO2: 45  PEEP: 8  ITime: 0.7  MAP: 19  PC: 25  PIP: 36      Vital Signs Last 24 Hrs  T(C): 36.8 (01 Apr 2021 16:00), Max: 37.3 (01 Apr 2021 04:15)  T(F): 98.2 (01 Apr 2021 16:00), Max: 99.1 (01 Apr 2021 04:15)  HR: 83 (01 Apr 2021 17:00) (82 - 93)  BP: --  BP(mean): --  RR: 35 (01 Apr 2021 17:00) (30 - 39)  SpO2: 91% (01 Apr 2021 17:00) (88% - 97%) on 45%    ABG - ( 01 Apr 2021 04:56 )  pH, Arterial: 7.37  pH, Blood: x     /  pCO2: 44    /  pO2: 73    / HCO3: 25    / Base Excess: -.1   /  SaO2: 93                    03-31 @ 07:01  -  04-01 @ 07:00  --------------------------------------------------------  IN: 2336.7 mL / OUT: 1995 mL / NET: 341.7 mL          LABS:                        8.4    6.79  )-----------( 293      ( 01 Apr 2021 01:09 )             29.5     04-01    142  |  108  |  9   ----------------------------<  114<H>  3.8   |  21<L>  |  0.43<L>    Ca    7.6<L>      01 Apr 2021 01:09  Phos  3.3     04-01  Mg     1.8     04-01    TPro  5.7<L>  /  Alb  1.7<L>  /  TBili  0.4  /  DBili  x   /  AST  38  /  ALT  71<H>  /  AlkPhos  211<H>  04-01      CARDIAC MARKERS ( 30 Mar 2021 23:50 )  x     / x     / 37 U/L / x     / x          CAPILLARY BLOOD GLUCOSE      POCT Blood Glucose.: 110 mg/dL (01 Apr 2021 14:05)    PT/INR - ( 01 Apr 2021 01:09 )   PT: 17.4 sec;   INR: 1.48 ratio         PTT - ( 01 Apr 2021 01:09 )  PTT:71.8 sec    Procalcitonin, Serum: 0.41 ng/mL (03-30-21 @ 23:50)                  CULTURES: (if applicable)  Culture Results:   Growth in aerobic bottle: Gram Positive Cocci in Pairs and Chains (03-31 @ 22:50)  Culture Results:   Growth in aerobic and anaerobic bottles: Enterococcus raffinosus  See previous culture [id number] (03-29 @ 15:35)  Culture Results:   Growth in aerobic and anaerobic bottles: Enterococcus raffinosus  Susceptibility to follow.  ***Blood Panel PCR results on this specimen are available  approximately 3 hours after the Gram stain result.***  Gram stain, PCR, and/or culture results may not always  correspond due to difference in methodologies.  ************************************************************  This PCR assay was performed by multiplex PCR. This  Assay tests for 66 bacterial and resistance gene targets.  Please refer to the [facility] test directory  at [email or web address] for details. (03-29 @ 15:35)  Culture Results:   Growth in anaerobic bottle: Bacteroides thetaiotamcron group (03-27 @ 13:08)  Culture Results:   No Growth Final (03-27 @ 13:08)    Most recent blood culture -- 03-31 @ 22:50   -- -- .Blood Blood-Peripheral 03-31 @ 22:50  Most recent blood culture -- 03-29 @ 15:35   Blood Culture PCR Blood Culture PCR .Blood Blood-Peripheral 03-29 @ 15:35    Blood culture 03-31 @ 22:50  --    Growth in aerobic bottle: Gram Positive Cocci in Pairs and Chains  --  --  --    Urine culture    -->  Blood culture 03-29 @ 15:35  --    Growth in aerobic and anaerobic bottles: Gram Positive Cocci in Pairs and  Chains  PCR  Blood Culture PCR  Blood Culture PCR    Urine culture    -->      Physical Examination:  PULM: Coarse bilaterally   CVS: S1, S2 heard    RADIOLOGY REVIEWED  CXR: Bilateral patchy opacities

## 2021-04-01 NOTE — PROGRESS NOTE ADULT - SUBJECTIVE AND OBJECTIVE BOX
TAMARA CHEW 60y [id number]    Patient is a 60y old  Male who presents with a chief complaint of hypoxia 2/2 COVID (01 Apr 2021 07:18)      Follow Up/CC:  ID following for COVID    Interval History/ROS: intubated, no fever    Allergies    No Known Allergies    Intolerances        ANTIMICROBIALS:  ampicillin/sulbactam  IVPB 3 every 6 hours  ampicillin/sulbactam  IVPB        MEDICATIONS  (STANDING):  ampicillin/sulbactam  IVPB 3 Gram(s) IV Intermittent every 6 hours  ampicillin/sulbactam  IVPB      chlorhexidine 0.12% Liquid 15 milliLiter(s) Oral Mucosa every 12 hours  chlorhexidine 2% Cloths 1 Application(s) Topical <User Schedule>  cholecalciferol 2000 Unit(s) Oral daily  dexMEDEtomidine Infusion 0.012 MICROgram(s)/kG/Hr (0.2 mL/Hr) IV Continuous <Continuous>  heparin  Infusion. 800 Unit(s)/Hr (8 mL/Hr) IV Continuous <Continuous>  influenza   Vaccine 0.5 milliLiter(s) IntraMuscular once  insulin lispro (ADMELOG) corrective regimen sliding scale   SubCutaneous every 6 hours  ketamine Infusion. 2.999 mG/kG/Hr (20 mL/Hr) IV Continuous <Continuous>  methadone    Tablet 20 milliGRAM(s) Oral every 8 hours  metoclopramide Injectable 10 milliGRAM(s) IV Push every 8 hours  midazolam Infusion 0.02 mG/kG/Hr (1.33 mL/Hr) IV Continuous <Continuous>  pantoprazole  Injectable 40 milliGRAM(s) IV Push every 12 hours  petrolatum Ophthalmic Ointment 1 Application(s) Both EYES two times a day  phenylephrine    Infusion 0.1 MICROgram(s)/kG/Min (2.5 mL/Hr) IV Continuous <Continuous>    MEDICATIONS  (PRN):        Vital Signs Last 24 Hrs  T(C): 36.8 (01 Apr 2021 12:00), Max: 37.3 (01 Apr 2021 04:15)  T(F): 98.2 (01 Apr 2021 12:00), Max: 99.1 (01 Apr 2021 04:15)  HR: 82 (01 Apr 2021 14:00) (81 - 93)  BP: --  BP(mean): --  RR: 34 (01 Apr 2021 14:00) (30 - 39)  SpO2: 92% (01 Apr 2021 14:00) (81% - 97%)    CBC Full  -  ( 01 Apr 2021 01:09 )  WBC Count : 6.79 K/uL  RBC Count : 3.88 M/uL  Hemoglobin : 8.4 g/dL  Hematocrit : 29.5 %  Platelet Count - Automated : 293 K/uL  Mean Cell Volume : 76.0 fl  Mean Cell Hemoglobin : 21.6 pg  Mean Cell Hemoglobin Concentration : 28.5 gm/dL  Auto Neutrophil # : 4.85 K/uL  Auto Lymphocyte # : 0.88 K/uL  Auto Monocyte # : 0.53 K/uL  Auto Eosinophil # : 0.42 K/uL  Auto Basophil # : 0.03 K/uL  Auto Neutrophil % : 71.4 %  Auto Lymphocyte % : 13.0 %  Auto Monocyte % : 7.8 %  Auto Eosinophil % : 6.2 %  Auto Basophil % : 0.4 %    04-01    142  |  108  |  9   ----------------------------<  114<H>  3.8   |  21<L>  |  0.43<L>    Ca    7.6<L>      01 Apr 2021 01:09  Phos  3.3     04-01  Mg     1.8     04-01    TPro  5.7<L>  /  Alb  1.7<L>  /  TBili  0.4  /  DBili  x   /  AST  38  /  ALT  71<H>  /  AlkPhos  211<H>  04-01    LIVER FUNCTIONS - ( 01 Apr 2021 01:09 )  Alb: 1.7 g/dL / Pro: 5.7 g/dL / ALK PHOS: 211 U/L / ALT: 71 U/L / AST: 38 U/L / GGT: x           Procalcitonin, Serum: 0.41 (03-30)  Procalcitonin, Serum: 0.45 (03-27)    C-Reactive Protein, Serum: 184 (03-30)  C-Reactive Protein, Serum: 128 (03-27)    Ferritin, Serum: 1122 (03-31)  Ferritin, Serum: 3119 (03-27)      D-Dimer Assay, Quantitative: 1300 (03-30)  D-Dimer Assay, Quantitative: 2725 (03-27)        MICROBIOLOGY:  .Blood Blood-Peripheral  03-31-21   Growth in aerobic bottle: Gram Positive Cocci in Pairs and Chains  --    Growth in aerobic bottle: Gram Positive Cocci in Pairs and Chains      .Blood Blood-Peripheral  03-29-21   Growth in aerobic and anaerobic bottles: Enterococcus raffinosus  See previous culture [id number]  --  Blood Culture PCR      .Blood Blood-Peripheral  03-27-21   Growth in anaerobic bottle: Bacteroides thetaiotamcron group  --    Growth in anaerobic bottle: Gram Negative Rods      .Sputum Sputum  03-25-21   Normal Respiratory Marilee present  --    Few polymorphonuclear leukocytes per low power field  Rare Squamous epithelial cells per low power field  Rare Gram positive cocci in pairs per oil power field  Rare Gram Variable Rods per oil power field      .Blood Blood-Peripheral  03-25-21   Growth in aerobic and anaerobic bottles: Escherichia coli  ***Blood Panel PCR results on this specimen are available  approximately 3 hours after the Gram stain result.***  Gram stain, PCR, and/or culture results may not always  correspond due to difference in methodologies.  ************************************************************  This PCR assay was performed by multiplex PCR. This  Assay tests for 66 bacterial and resistance gene targets.  Please refer to the [facility] test directory  at [email or web address] for details.  --  Blood Culture PCR  Escherichia coli      .Blood Blood-Peripheral  03-23-21   No Growth Final  --  --      .Sputum Sputum  03-22-21   Normal Respiratory Marilee absent  --    Rare polymorphonuclear leukocytes per low power field  Rare Squamous epithelial cells per low power field  No organisms seen per oil power field      .Blood Blood-Peripheral  03-21-21   No Growth Final  --  --      .Blood Blood-Venous  03-16-21   No Growth Final  --  --      .Blood Blood  03-13-21   No Growth Final  --  --      .Blood Blood-Peripheral  03-08-21   No Growth Final  --  --      RADIOLOGY    < from: Xray Kidney Ureter Bladder (03.31.21 @ 20:38) >  Distended stomach.    < end of copied text >

## 2021-04-01 NOTE — PROGRESS NOTE ADULT - PROBLEM SELECTOR PLAN 3
RLL medial segmental pulmonary embolism  -Heparin gtt per MICU   -LE duplex neg DVT   -TTE with no RV strain

## 2021-04-01 NOTE — PROGRESS NOTE ADULT - ASSESSMENT
59 yo male w/no known pmh (does not f/u with PCP) presents to [facility] for cc fevers, weakness, fatigue, shortness of breath for 5 days. Tested positive for covid 3 days ago at an urgent care. Today presented again to urgent care and noted to be hypoxic and sent to the ER. Patient denies dysuria, diarrhea, constipation. Currently denies shortness of breath on high flow. Intubated for worsening respiratory failure and transferred to the ICU for further management.     Neuro:  - Nimbex d/c on 3/29  - Sedated with Ketamine 1.5, Precedex 1.5, Versed 0.1 (6.7mg/hr)  - Methadone for fentanyl wean --> holding PO meds    RESPIRATORY:  - Pressure control: RR 30/PIP 33/ 45%/ PEEP 8  - AM ABG: pH, Arterial: 7.37  pH, Blood: x     /  pCO2: 44    /  pO2: 73    / HCO3: 25    / Base Excess: -.1   /  SaO2: 93   lact 1.0  - Continue to titrate FiO2 as tolerated. Maintain O2 sat >90  - Plan for possible trach this week. Family is aware.  - RLL PE diagnosed on admission, treated with heparin gtt    CARDIOVASCULAR:  - Levophed switched to Lm 3/29, 2/2 sinus tachycardia   --lm requirements increasing, 1.1-->1.4  - Maintain MAP >65  - TTE 3/16 EF 55-60%. Mild diastolic dysfunction     RENAL:  BUN/Cr at baseline 9/0.35  - s/p 20 IV lasix x 2 on 3/31 (last dose @ 8pm) --> 925 UOP in response  - goal net negative   - Continue to monitor renal indices, strict I/Os     GASTROINTESTINAL:  #SBO  - CT ABD/Pelv 3/28: SBO w/ transition point in distal ileum   - Gen surg consulted: **NPO w/ OGT to LCWS, f/u AM Abd XR 3/29. No surgical intervention @ this time  - last BM 3/30  -Diet: NPO, strict   --3/30 trickle feeds restarted --> increased to goal on 3/31 found to have abdominal distention STAT KUB markedly distented gastric bubble, made NPO, OGT to suction with 200cc output of TFs, improved distention abd soft , seen bedside by Surgery, will continue to follow. [ ] AM KUB, consider starting Reglan after EKG     #Melena - Resolved  -S/P  melena X 2 on 3/26. Serial cbcs stable GI consulted, ------> No acute intervention and H+H stable  -PPI BID    #Transaminitis, resolving   - LFTs downtrending, continuing to monitor    INFECTIOUS DISEASE: gram negative sepsis 2/2 e coli bacteremia   - 3/25 blood cultures now with gram (-) rods (E.coli) pan sensitive   - rpt Bld cx 3/29: enterococcus species, pending sensitivities  - FU rpt Bld cx x2 on 3/31: __  - ID following  - WBC 6, afebrile  - ABX:  Unasyn 3g q6h (3/29- )      HEME:  - H/H stable 8/27.2 -->    --ABLA with Hgb 7.0 s/p 1U PRBC 3/29 AM   -On therapeutic heparin gtt @ 800u/hr for RLL PE and hypercoagulable state, last PTT 74    -- f/u daily PTT  - GI evaluation-----> c/w PPI dosing increased to BID for UGIB ppx    ENDO: newly dx DM on this admission  - 3/9/21 Hg A1c 12.4%  - hypoglycemia in setting of NPO, prn D10 @ 20cc/hr  - holding ISS and NPH        59 yo male w/no known pmh (does not f/u with PCP) presents to [facility] for cc fevers, weakness, fatigue, shortness of breath for 5 days. Tested positive for covid 3 days ago at an urgent care. Today presented again to urgent care and noted to be hypoxic and sent to the ER. Patient denies dysuria, diarrhea, constipation. Currently denies shortness of breath on high flow. Intubated for worsening respiratory failure and transferred to the ICU for further management.     Neuro:  - Nimbex d/c on 3/29  - Sedated with Ketamine 1.5, Precedex 1.5, Versed 0.1 (6.7mg/hr)  - Methadone for fentanyl wean --> holding PO meds    RESPIRATORY:  - Pressure control: RR 30/PIP 33/ 45%/ PEEP 8  - AM ABG: pH, Arterial: 7.37  pH, Blood: x     /  pCO2: 44    /  pO2: 73    / HCO3: 25    / Base Excess: -.1   /  SaO2: 93   lact 1.0  - Continue to titrate FiO2 as tolerated. Maintain O2 sat >90  - Plan for possible trach this week. Family is aware.  - RLL PE diagnosed on admission, treated with heparin gtt    CARDIOVASCULAR:  - Levophed switched to Lm 3/29, 2/2 sinus tachycardia   --lm requirements increasing, 1.1-->1.4  - Maintain MAP >65  - TTE 3/16 EF 55-60%. Mild diastolic dysfunction     RENAL:  BUN/Cr at baseline 9/0.35  - s/p 20 IV lasix x 2 on 3/31 (last dose @ 8pm) --> 925 UOP in response  - goal net negative   - Continue to monitor renal indices, strict I/Os     GASTROINTESTINAL:  #SBO  - CT ABD/Pelv 3/28: SBO w/ transition point in distal ileum   - Gen surg consulted: **NPO w/ OGT to LCWS, f/u AM Abd XR 3/29. No surgical intervention @ this time  - last BM 3/30  -Diet: NPO, strict   --3/30 trickle feeds restarted --> increased to goal on 3/31 found to have abdominal distention STAT KUB markedly distented gastric bubble, made NPO, OGT to suction with 200cc output of TFs, improved distention abd soft , seen bedside by Surgery, will continue to follow. [ ] AM KUB  - Restarted Reglan 10 q8 w/ daily EKG    #Melena - Resolved  -S/P  melena X 2 on 3/26. Serial cbcs stable GI consulted, ------> No acute intervention and H+H stable  -PPI BID    #Transaminitis, resolving   - LFTs downtrending, continuing to monitor    INFECTIOUS DISEASE: gram negative sepsis 2/2 e coli bacteremia   - 3/25 blood cultures now with gram (-) rods (E.coli) pan sensitive   - rpt Bld cx 3/29: enterococcus species, pending sensitivities  - FU rpt Bld cx x2 on 3/31: __  - ID following  - WBC 6, afebrile  - ABX:  Unasyn 3g q6h (3/29- )      HEME:  - H/H stable 8/27.2 -->    --ABLA with Hgb 7.0 s/p 1U PRBC 3/29 AM   -On therapeutic heparin gtt @ 800u/hr for RLL PE and hypercoagulable state, last PTT 74    -- f/u daily PTT  - GI evaluation-----> c/w PPI dosing increased to BID for UGIB ppx    ENDO: newly dx DM on this admission  - 3/9/21 Hg A1c 12.4%  - hypoglycemia in setting of NPO, prn D10 @ 20cc/hr  - holding ISS and NPH

## 2021-04-01 NOTE — PROGRESS NOTE ADULT - ATTENDING COMMENTS
Pt seen and examined.  Chart reviewed.  Resident note confirmed.    Pt is a 60 year old male who presented to [facility] with COVID+ pneumonia, a RLL PE (on heparin gtt), Gram negative bacteremia (on Ancef) and a parapneumonic ileus. Overnight, he developed acute abdominal distention. an AXR revealed a large gastric bubble. NGT was returned to suction with resolution of sx.    Pt remains intubated and sedated.  He remains HD stable, on lopressor  He is in respiratory failure with Covid PNA/PE, on vent support.  Pt has an ileus, most likley reactive to pneumonia and aggravated by the use of narcotics  Suggest Relistor for opioid induced ileus.  Rectal exam performed by surgical team.    A/p   Parapneumonic ileus  Monitor and replace lytes  Minimize the use of narcotics  Suggest Relistor.   Will follow with you

## 2021-04-01 NOTE — PROGRESS NOTE ADULT - SUBJECTIVE AND OBJECTIVE BOX
HISTORY  60y Male with COVID-19(+) pneumonia requiring intubation.    Reason for admission to ICU:  Patient is in acute hypoxic respiratory distress requiring intubation and sedation. Imaging c/w ARDS. Admitted to MICU for further observation and management.    59 yo male w/no known pmh (does not f/u with PCP) presents to [facility] for cc fevers, weakness, fatigue, shortness of breath for 5 days. Tested positive for covid 3 days ago at an urgent care. Today presented again to urgent care and noted to be hypoxic and sent to the ER. Patient denies dysuria, diarrhea, constipation. Currently denies shortness of breath on high flow. Intubated for worsening respiratory failure and transferred to the ICU for further management.     24 HOUR EVENTS:  - Transiently  desaturated to 80, improved with FIO2 55, FIO2 was decreased 45% without further desaturation   - Abd distended, d/c'ed feeds, AXR - distended abd     SUBJECTIVE/ROS: Due to intubation with sedation, subjective information was not able to be obtained from the patient. History was obtained, to the extent possible, from review of the chart and collateral sources of information.      NEURO  Exam: sedated, no acute distress  Meds: dexMEDEtomidine Infusion 0.012 MICROgram(s)/kG/Hr IV Continuous <Continuous>  ketamine Infusion. 2.999 mG/kG/Hr IV Continuous <Continuous>  methadone    Tablet 20 milliGRAM(s) Oral every 8 hours  midazolam Infusion 0.02 mG/kG/Hr IV Continuous <Continuous>    Adequacy of sedation and pain control has been assessed and adjusted.      RESPIRATORY  RR: 30 (30 - 35)  SpO2: 92% (81% - 97%)  Mechanical Ventilation: Mode: AC/ CMV (Assist Control/ Continuous Mandatory Ventilation), RR (machine): 30, FiO2: 55, PEEP: 8  Predicted Body Weight:  Tidal Volume/PBW ratio:  ABG - ( 01 Apr 2021 04:56 )  pH: 7.37  /  pCO2: 44    /  pO2: 73    / HCO3: 25    / Base Excess: -.1   /  SaO2: 93      Lactate: x                P to F ratio:  Extubation readiness assessed.      CARDIOVASCULAR  HR: 85 (80 - 90)  ABP: 88/56 (69/47 - 157/83)  ABP(mean): 68 (56 - 113)  Cardiac Rhythm: sinus  Most recent QTc:   Meds: phenylephrine    Infusion (2.5 mL/Hr)        GI/NUTRITION  Diet:   Most recent bowel movement:  Meds/stress ulcer prophylaxis: cholecalciferol 2000 Unit(s) Oral daily  pantoprazole  Injectable 40 milliGRAM(s) IV Push every 12 hours        GENITOURINARY  I&O's Detail    03-31 @ 07:01  -  04-01 @ 07:00  --------------------------------------------------------  IN:    Dexmedetomidine: 575 mL    Heparin Infusion: 184 mL    Jevity: 290 mL    Ketamine: 395 mL    Midazolam: 184 mL    Phenylephrine: 621 mL  Total IN: 2249 mL    OUT:    Indwelling Catheter - Urethral (mL): 1895 mL  Total OUT: 1895 mL    Total NET: 354 mL          04-01    142  |  108  |  9   ----------------------------<  114<H>  3.8   |  21<L>  |  0.43<L>    Ca    7.6<L>      01 Apr 2021 01:09  Phos  3.3     04-01  Mg     1.8     04-01    TPro  5.7<L>  /  Alb  1.7<L>  /  TBili  0.4  /  DBili  x   /  AST  38  /  ALT  71<H>  /  AlkPhos  211<H>  04-01    [x] Coates catheter, indication: urine output monitoring in critically ill patient.  Meds: cholecalciferol 2000 Unit(s) Oral daily    Creatine Kinase, Serum: 37 (03-30-21 @ 23:50)        HEMATOLOGIC  Meds: heparin  Infusion. 800 Unit(s)/Hr IV Continuous <Continuous>    VTE Prophylaxis:                         8.4    6.79  )-----------( 293      ( 01 Apr 2021 01:09 )             29.5     PT/INR - ( 01 Apr 2021 01:09 )   PT: 17.4 sec;   INR: 1.48 ratio         PTT - ( 01 Apr 2021 01:09 )  PTT:71.8 sec  [id number] @ 05:22        INFECTIOUS DISEASES  T(C): 37.3, Max: 37.3 (04-01-21 @ 04:15)  WBC Count: 6.79 (04-01-21 @ 01:09)  WBC Count: 6.92 (03-30-21 @ 23:50)    Recent Cultures:  Specimen Source: .Blood Blood-Peripheral, 03-29 @ 15:35; Results   Growth in aerobic and anaerobic bottles: Enterococcus raffinosus  See previous culture [id number]; Gram Stain:   Growth in aerobic and anaerobic bottles: Gram Positive Cocci in Pairs and  Chains; Organism: Blood Culture PCR  Specimen Source: .Blood Blood-Peripheral, 03-27 @ 13:08; Results   Growth in anaerobic bottle: Bacteroides thetaiotamcron group; Gram Stain:   Growth in anaerobic bottle: Gram Negative Rods; Organism: --  Specimen Source: .Sputum Sputum, 03-25 @ 17:09; Results   Normal Respiratory Marilee present; Gram Stain:   Few polymorphonuclear leukocytes per low power field  Rare Squamous epithelial cells per low power field  Rare Gram positive cocci in pairs per oil power field  Rare Gram Variable Rods per oil power field; Organism: --  Specimen Source: .Blood Blood-Peripheral, 03-25 @ 16:31; Results   Growth in aerobic and anaerobic bottles: Escherichia coli  ***Blood Panel PCR results on this specimen are available  approximately 3 hours after the Gram stain result.***  Gram stain, PCR, and/or culture results may not always  correspond due to difference in methodologies.  ************************************************************  This PCR assay was performed by multiplex PCR. This  Assay tests for 66 bacterial and resistance gene targets.  Please refer to the [facility] test directory  at [email or web address] for details.; Gram Stain:   Growth in aerobic bottle: Gram Negative Rods  Growth in anaerobic bottle: Gram Negative Rods; Organism: Blood Culture PCR  Escherichia coli    Meds: ampicillin/sulbactam  IVPB 3 Gram(s) IV Intermittent every 6 hours  ampicillin/sulbactam  IVPB      influenza   Vaccine 0.5 milliLiter(s) IntraMuscular once    Procalcitonin, Serum: 0.41 ng/mL (03-30-21 @ 23:50)        ENDOCRINE  93  94    Meds: insulin lispro (ADMELOG) corrective regimen sliding scale   SubCutaneous every 6 hours        ACCESS DEVICES:  [ ] Peripheral IV  [x] Central Venous Line	[ ] R	[ ] L	[ ] IJ	[ ] Fem	[ ] SC	Placed:   [x] Arterial Line		[ ] R	[ ] L	[ ] Fem	[ ] Rad	[ ] Ax	Placed:   [ ] Shiley HD Access             [ ] R [ ] L [ ] IJ  [ ] Fem     Placed:  [x] Urinary Catheter, Date Placed:   Necessity of urinary, arterial, and venous catheters discussed.      CODE STATUS:     IMAGING:

## 2021-04-01 NOTE — PROGRESS NOTE ADULT - ATTENDING COMMENTS
Pt is a 59 yo M with no known PMHx presented on 3/8 to [facility] to fevers, weakness, fatigue, shortness of breath for 5 days. Tested positive for Covid-19 on 3/5. Pt was seen at urgent care and noted to be hypoxic and sent to the ER.  Admitting dx: acute hypoxemic resp failure 2 to COVID 19. Hospital course complicated by PE and superimposed bacterial PNA. RRT on 3/17 2 to worsening hypoxemia; pt Intubated and transferred to the ICU. Pt with abd distension overnight    Resp: Schedule trach   ID:  Unasyn for E. coli (E. coli septic shock) and now with Enterococcus sepsis; f/u as per ID   CVS:  Phenylephrine to maintain MAP >65  Heme: s/p 1 unit PRBC/ Adjust Heparin drip to PTT  FEN: Increase enteral feeds  Endo: Follow FS  GI: Surg f/u prn  Neuro: Off Nimbex/ Was started on Methadone and decrease sedation as tolerated . Pt is a 59 yo M with no known PMHx presented on 3/8 to [facility] 2 to fevers, weakness, fatigue, shortness of breath for 5 days. Tested positive for Covid-19 on 3/5. Pt was seen at urgent care and noted to be hypoxic and sent to the ER.  Admitting dx: acute hypoxemic resp failure 2 to COVID 19. Hospital course complicated by PE and superimposed bacterial PNA. RRT on 3/17 2 to worsening hypoxemia; pt Intubated and transferred to the ICU. Pt with abd distension overnight    Resp: Schedule trach   ID:  Unasyn for E. coli (E. coli septic shock) and now with Enterococcus sepsis; f/u as per ID   CVS:  Phenylephrine to maintain MAP >65  Heme: s/p 1 unit PRBC/ Adjust Heparin drip to PTT  FEN: NPO  Endo: Follow FS  GI: Surg f/u prn  Neuro: Off Nimbex/ Was started on Methadone and decrease sedation as tolerated .

## 2021-04-02 NOTE — PROGRESS NOTE ADULT - ASSESSMENT
Mr Olivares is a 60 year old man who presented with hypoxia after being diagnosed with COVID outpatient. He has completed a course of remdesevir, but is still febrile and hypoxic.   He does not have a clear superimposed bacterial infection, though bacterial pneumonia is possible.   CT chest on admission with PE and ground glass opacities.   Febrile and requiring high flow nasal cannula.     COVID-19 pneumonia, leukocytosis, resp failure, E. coli/enterococcal bacteremia  - Remdesevir completed  - unasyn 3 gm iv q6 to cover E.coli and GI tract better   - Not a candidate for tocilizumab in setting of possible infection   - Follow up cultures - repeat bcx positive  - Monitor for fevers  - Trend WBCs  - vent per MICU   - change lines if possible   - polymicrobial bacteremia - likely from GI source - consider repeat CT abd   - SBO per surgery  - check repeat bcx  - f/u sensitivities - d/w micro - sensitivities will be ready tomorrow     Prognosis guarded    Rj Rouse  Attending Physician   Division of Infectious Disease  Pager #[phone]  Available on Microsoft Teams also  After 5pm/weekend or no response, call #[phone]    D/w MICU NP (Hedy)    Please call the ID service [phone] with questions or concerns over the weekend.    I am away and will return 4/8. ID coverage available. Call ID office @ [phone] with questions.

## 2021-04-02 NOTE — PROGRESS NOTE ADULT - SUBJECTIVE AND OBJECTIVE BOX
Chief complaint  Patient is a 60y old  Male who presents with a chief complaint of hypoxia 2/2 COVID (02 Apr 2021 12:10)   Review of systems  Patient remains intubated, NPO, no hypoglycemic episodes.    Labs and Fingersticks  CAPILLARY BLOOD GLUCOSE      POCT Blood Glucose.: 129 mg/dL (02 Apr 2021 11:12)  POCT Blood Glucose.: 108 mg/dL (02 Apr 2021 05:25)  POCT Blood Glucose.: 92 mg/dL (01 Apr 2021 17:38)  POCT Blood Glucose.: 110 mg/dL (01 Apr 2021 14:05)      Medications  MEDICATIONS  (STANDING):  ampicillin/sulbactam  IVPB 3 Gram(s) IV Intermittent every 6 hours  ampicillin/sulbactam  IVPB      chlorhexidine 0.12% Liquid 15 milliLiter(s) Oral Mucosa every 12 hours  chlorhexidine 2% Cloths 1 Application(s) Topical <User Schedule>  cholecalciferol 2000 Unit(s) Oral daily  dexMEDEtomidine Infusion 0.012 MICROgram(s)/kG/Hr (0.2 mL/Hr) IV Continuous <Continuous>  heparin  Infusion. 800 Unit(s)/Hr (8 mL/Hr) IV Continuous <Continuous>  influenza   Vaccine 0.5 milliLiter(s) IntraMuscular once  insulin lispro (ADMELOG) corrective regimen sliding scale   SubCutaneous every 6 hours  ketamine Infusion. 2.999 mG/kG/Hr (20 mL/Hr) IV Continuous <Continuous>  methadone    Tablet 20 milliGRAM(s) Oral every 8 hours  metoclopramide Injectable 10 milliGRAM(s) IV Push every 8 hours  midazolam Infusion 0.02 mG/kG/Hr (1.33 mL/Hr) IV Continuous <Continuous>  pantoprazole  Injectable 40 milliGRAM(s) IV Push every 12 hours  petrolatum Ophthalmic Ointment 1 Application(s) Both EYES two times a day  phenylephrine    Infusion 0.1 MICROgram(s)/kG/Min (2.5 mL/Hr) IV Continuous <Continuous>      Physical Exam  General: Patient intubated  Vital Signs Last 12 Hrs  T(F): 96.6 (04-02-21 @ 11:00), Max: 97.7 (04-02-21 @ 07:00)  HR: 76 (04-02-21 @ 12:15) (76 - 89)  BP: --  BP(mean): --  RR: 0 (04-02-21 @ 12:15) (0 - 42)  SpO2: 95% (04-02-21 @ 12:15) (91% - 95%)         Chief complaint  Patient is a 60y old  Male who presents with a chief complaint of hypoxia 2/2 COVID (02 Apr 2021 12:10)   Review of systems  Patient remains intubated, NPO, no hypoglycemic episodes.    Labs and Fingersticks  CAPILLARY BLOOD GLUCOSE      POCT Blood Glucose.: 129 mg/dL (02 Apr 2021 11:12)  POCT Blood Glucose.: 108 mg/dL (02 Apr 2021 05:25)  POCT Blood Glucose.: 92 mg/dL (01 Apr 2021 17:38)  POCT Blood Glucose.: 110 mg/dL (01 Apr 2021 14:05)      Medications  MEDICATIONS  (STANDING):  ampicillin/sulbactam  IVPB 3 Gram(s) IV Intermittent every 6 hours  ampicillin/sulbactam  IVPB      chlorhexidine 0.12% Liquid 15 milliLiter(s) Oral Mucosa every 12 hours  chlorhexidine 2% Cloths 1 Application(s) Topical <User Schedule>  cholecalciferol 2000 Unit(s) Oral daily  dexMEDEtomidine Infusion 0.012 MICROgram(s)/kG/Hr (0.2 mL/Hr) IV Continuous <Continuous>  heparin  Infusion. 800 Unit(s)/Hr (8 mL/Hr) IV Continuous <Continuous>  influenza   Vaccine 0.5 milliLiter(s) IntraMuscular once  insulin lispro (ADMELOG) corrective regimen sliding scale   SubCutaneous every 6 hours  ketamine Infusion. 2.999 mG/kG/Hr (20 mL/Hr) IV Continuous <Continuous>  methadone    Tablet 20 milliGRAM(s) Oral every 8 hours  metoclopramide Injectable 10 milliGRAM(s) IV Push every 8 hours  midazolam Infusion 0.02 mG/kG/Hr (1.33 mL/Hr) IV Continuous <Continuous>  pantoprazole  Injectable 40 milliGRAM(s) IV Push every 12 hours  petrolatum Ophthalmic Ointment 1 Application(s) Both EYES two times a day  phenylephrine    Infusion 0.1 MICROgram(s)/kG/Min (2.5 mL/Hr) IV Continuous <Continuous>      Physical Exam  General: Patient intubated  Vital Signs Last 12 Hrs  T(F): 96.6 (04-02-21 @ 11:00), Max: 97.7 (04-02-21 @ 07:00)  HR: 76 (04-02-21 @ 12:15) (76 - 89)  BP: --  BP(mean): --  RR: 0 (04-02-21 @ 12:15) (0 - 42)  SpO2: 95% (04-02-21 @ 12:15) (91% - 95%)

## 2021-04-02 NOTE — PROGRESS NOTE ADULT - COMMENTS
intubated, cannot get ROS

## 2021-04-02 NOTE — PROGRESS NOTE ADULT - VASCULAR DETAILS
left radial A-line+
left radial a-line+
right radial A-line+
right radial a-line+
right radail A-line+
right radial A-line+

## 2021-04-02 NOTE — PROGRESS NOTE ADULT - ASSESSMENT
CHIEF COMPLAINT:  Patient is a 60y old  Male who presents with a chief complaint of hypoxia 2/2 COVID (01 Apr 2021 17:12)    HPI:  61 yo male w/no known pmh (does not f/u with PCP) presents to [facility] for cc fevers, weakness, fatigue, shortness of breath for 5 days. Tested positive for covid 3 days ago at an urgent care. Today presented again to urgent care and noted to be hypoxic and sent to the ER. Patient denies dysuria, diarrhea, constipation. Currently denies shortness of breath on high flow.     In the ER, patient was febrile to 101.5F, tachycardic to 120s, tachypneic to 40, hypoxic to 88% on 6L. Improved on high flow nasal cannula to 95%.  (08 Mar 2021 14:08)      Interval Events:      REVIEW OF SYSTEMS:          OBJECTIVE:  ICU Vital Signs Last 24 Hrs  T(C): 35.9 (02 Apr 2021 11:00), Max: 37.4 (02 Apr 2021 00:00)  T(F): 96.6 (02 Apr 2021 11:00), Max: 99.3 (02 Apr 2021 00:00)  HR: 77 (02 Apr 2021 12:00) (77 - 89)  BP: --  BP(mean): --  ABP: 118/65 (02 Apr 2021 12:00) (71/43 - 129/71)  ABP(mean): 85 (02 Apr 2021 12:00) (54 - 93)  RR: 0 (02 Apr 2021 12:00) (0 - 42)  SpO2: 95% (02 Apr 2021 12:00) (89% - 95%)    Mode: AC/ CMV (Assist Control/ Continuous Mandatory Ventilation), RR (machine): 30, FiO2: 45, PEEP: 8, ITime: 0.7, MAP: 19, PC: 25, PIP: 36    04-01 @ 07:01 - 04-02 @ 07:00  --------------------------------------------------------  IN: 2721 mL / OUT: 1705 mL / NET: 1016 mL    04-02 @ 07:01 - 04-02 @ 12:10  --------------------------------------------------------  IN: 329 mL / OUT: 900 mL / NET: -571 mL      CAPILLARY BLOOD GLUCOSE      POCT Blood Glucose.: 129 mg/dL (02 Apr 2021 11:12)          PHYSICAL EXAM:          HOSPITAL MEDICATIONS:  MEDICATIONS  (STANDING):  ampicillin/sulbactam  IVPB 3 Gram(s) IV Intermittent every 6 hours  ampicillin/sulbactam  IVPB      chlorhexidine 0.12% Liquid 15 milliLiter(s) Oral Mucosa every 12 hours  chlorhexidine 2% Cloths 1 Application(s) Topical <User Schedule>  cholecalciferol 2000 Unit(s) Oral daily  dexMEDEtomidine Infusion 0.012 MICROgram(s)/kG/Hr (0.2 mL/Hr) IV Continuous <Continuous>  heparin  Infusion. 800 Unit(s)/Hr (8 mL/Hr) IV Continuous <Continuous>  influenza   Vaccine 0.5 milliLiter(s) IntraMuscular once  insulin lispro (ADMELOG) corrective regimen sliding scale   SubCutaneous every 6 hours  ketamine Infusion. 2.999 mG/kG/Hr (20 mL/Hr) IV Continuous <Continuous>  methadone    Tablet 20 milliGRAM(s) Oral every 8 hours  metoclopramide Injectable 10 milliGRAM(s) IV Push every 8 hours  midazolam Infusion 0.02 mG/kG/Hr (1.33 mL/Hr) IV Continuous <Continuous>  pantoprazole  Injectable 40 milliGRAM(s) IV Push every 12 hours  petrolatum Ophthalmic Ointment 1 Application(s) Both EYES two times a day  phenylephrine    Infusion 0.1 MICROgram(s)/kG/Min (2.5 mL/Hr) IV Continuous <Continuous>    MEDICATIONS  (PRN):      LABS:                        8.1    7.74  )-----------( 356      ( 02 Apr 2021 00:07 )             28.0     Hgb Trend: 8.1<--, 8.4<--, 8.0<--, 8.2<--, 8.6<--  04-02    146<H>  |  110<H>  |  8   ----------------------------<  109<H>  4.4   |  19<L>  |  0.46<L>    Ca    7.8<L>      02 Apr 2021 00:06  Phos  2.8     04-02  Mg     2.0     04-02    TPro  6.0  /  Alb  2.1<L>  /  TBili  0.5  /  DBili  x   /  AST  33  /  ALT  54<H>  /  AlkPhos  213<H>  04-02    LIVER FUNCTIONS - ( 02 Apr 2021 00:06 )  Alb: 2.1 g/dL / Pro: 6.0 g/dL / ALK PHOS: 213 U/L / ALT: 54 U/L / AST: 33 U/L / GGT: x           Creatinine Trend: 0.46<--, 0.43<--, 0.35<--, 0.36<--, <0.30<--, 0.39<--  PT/INR - ( 02 Apr 2021 00:06 )   PT: 18.8 sec;   INR: 1.60 ratio         PTT - ( 02 Apr 2021 00:06 )  PTT:69.1 sec    Arterial Blood Gas:  04-02 @ 00:01  7.35/40/73/22/94/-3.1  ABG lactate: --  Arterial Blood Gas:  04-01 @ 04:56  7.37/44/73/25/93/-.1  ABG lactate: --  Arterial Blood Gas:  03-31 @ 23:05  7.32/52/78/26/93/-.1  ABG lactate: --        MICROBIOLOGY:     RADIOLOGY:  [ ] Reviewed and interpreted by me    EKG:       59 yo male w/no known pmh (does not f/u with PCP) presents to [facility] for cc fevers, weakness, fatigue, shortness of breath for 5 days. Tested positive for covid 3 days ago at an urgent care. Today presented again to urgent care and noted to be hypoxic and sent to the ER. Patient denies dysuria, diarrhea, constipation. Currently denies shortness of breath on high flow. Intubated for worsening respiratory failure and transferred to the ICU for further management.     Neuro:  - Nimbex d/c on 3/29  - Sedated with Ketamine, Precedex,  Versed   - Methadone for fentanyl wean --> holding PO meds    RESPIRATORY:  - Pressure control: RR 30/PIP 33/ 45%/ PEEP 8  - AM ABG: pH, Arterial: 7.35  pH, Blood: x     /  pCO2: 40    /  pO2: 73    / HCO3: 22    /   SaO2: 94     - Continue to titrate FiO2 as tolerated. Maintain O2 sat >90  - Plan for possible trach this week. Family is aware and have agreed to it. Spoke with Dr. Mills- pulmonary interventionalist who will do the trach.  - RLL PE diagnosed on admission, treated with heparin gtt    CARDIOVASCULAR:  - Levophed switched to Lm 3/29, 2/2 sinus tachycardia   - Maintain MAP >65  - TTE 3/16 EF 55-60%. Mild diastolic dysfunction     RENAL:  BUN/Cr at baseline 9/0.35  - - goal net negative with intermittent diuresing  - Continue to monitor renal indices, strict I/Os     GASTROINTESTINAL:  #SBO  - CT ABD/Pelv 3/28: SBO w/ transition point in distal ileum   - Gen surg consulted: **NPO w/ OGT to LCWS,  No surgical intervention @ this time. Will trial Tube feed trickle rate as discussed in am rounds  - last BM 3/30  -Reglan 10 q8 w/ daily EKG    #Melena - Resolved  -S/P  melena X 2 on 3/26. Serial cbcs stable GI on board, ------> No acute intervention and H+H stable  -PPI BID    #Transaminitis, resolving   - LFTs downtrending, continuing to monitor    INFECTIOUS DISEASE: gram negative sepsis 2/2 e coli bacteremia   - 3/25 blood cultures now with gram (-) rods (E.coli) pan sensitive   - rpt Bld cx 3/29: enterococcus species, pending sensitivities  - FU rpt Bld cx x2 on 3/31: Enteroccocus 1/2 sets__  - ID following  - WBC 6, afebrile  - ABX:  Unasyn 3g q6h (3/29- )      HEME:  - H/H stable     - Hgb 7.0 s/p 1U PRBC 3/29 AM   -On therapeutic heparin gtt  for RLL PE and hypercoagulable state,     -- f/u daily PTT  - GI evaluation-----> c/w PPI dosing increased to BID for UGIB ppx    ENDO: newly dx DM on this admission  - 3/9/21 Hg A1c 12.4%  - holding ISS and NPH

## 2021-04-02 NOTE — PROGRESS NOTE ADULT - GASTROINTESTINAL DETAILS
soft/nontender/no distention/no guarding/no rigidity
soft/nontender/no distention/no rebound tenderness/no guarding
soft/nontender/no distention/no rebound tenderness/no guarding/no rigidity
soft/nontender/no distention/no guarding/no rigidity
soft/nontender/no distention/no rebound tenderness/no guarding
soft/nontender/no distention/no rebound tenderness/no guarding
soft/nontender/no distention/no guarding/no rigidity
soft/nontender/no distention/no rebound tenderness/no guarding/no rigidity
soft/nontender/no distention/no rebound tenderness/no guarding
soft/nontender/no distention/no guarding/no rigidity
soft/nontender/no guarding/no rigidity
soft/nontender/no rebound tenderness/no guarding/no rigidity
soft/nontender/no guarding/no rigidity

## 2021-04-02 NOTE — PROGRESS NOTE ADULT - MOUTH
ETT+
OGT, ETT
ETT+
ETT+, OGT+
ETT+
moist
OGT+, ETT+
ETT+

## 2021-04-02 NOTE — PROGRESS NOTE ADULT - RS GEN PE MLT RESP DETAILS PC
airway patent/clear to auscultation bilaterally/no wheezes
airway patent/clear to auscultation bilaterally/no wheezes
clear to auscultation bilaterally/no wheezes
airway patent/clear to auscultation bilaterally/no wheezes
clear to auscultation bilaterally/no wheezes
airway patent/clear to auscultation bilaterally/no wheezes
airway patent/clear to auscultation bilaterally/no wheezes
clear to auscultation bilaterally/no wheezes
airway patent/clear to auscultation bilaterally
airway patent/no wheezes
respirations non-labored/clear to auscultation bilaterally/no wheezes
airway patent/clear to auscultation bilaterally/no wheezes
clear to auscultation bilaterally/no wheezes

## 2021-04-02 NOTE — PROGRESS NOTE ADULT - CARDIOVASCULAR DETAILS
positive S1/positive S2

## 2021-04-02 NOTE — PROGRESS NOTE ADULT - NEUROLOGICAL DETAILS
no spontaneous movement
alert and oriented x 3/responds to verbal commands

## 2021-04-02 NOTE — PROGRESS NOTE ADULT - CONSTITUTIONAL DETAILS
intubated
no distress
intubated
intubated, in ICU
on HFNC/no distress
intubated
no distress

## 2021-04-02 NOTE — PROGRESS NOTE ADULT - SUBJECTIVE AND OBJECTIVE BOX
TAMARA CHEW 60y [id number]    Patient is a 60y old  Male who presents with a chief complaint of hypoxia 2/2 COVID (02 Apr 2021 13:27)      Follow Up/CC:  ID following for COVID    Interval History/ROS: intubated, no fever     Allergies    No Known Allergies    Intolerances        ANTIMICROBIALS:  ampicillin/sulbactam  IVPB 3 every 6 hours  ampicillin/sulbactam  IVPB        MEDICATIONS  (STANDING):  ampicillin/sulbactam  IVPB 3 Gram(s) IV Intermittent every 6 hours  ampicillin/sulbactam  IVPB      chlorhexidine 0.12% Liquid 15 milliLiter(s) Oral Mucosa every 12 hours  chlorhexidine 2% Cloths 1 Application(s) Topical <User Schedule>  cholecalciferol 2000 Unit(s) Oral daily  dexMEDEtomidine Infusion 0.012 MICROgram(s)/kG/Hr (0.2 mL/Hr) IV Continuous <Continuous>  heparin  Infusion. 800 Unit(s)/Hr (8 mL/Hr) IV Continuous <Continuous>  influenza   Vaccine 0.5 milliLiter(s) IntraMuscular once  insulin lispro (ADMELOG) corrective regimen sliding scale   SubCutaneous every 6 hours  ketamine Infusion. 2.999 mG/kG/Hr (20 mL/Hr) IV Continuous <Continuous>  methadone    Tablet 20 milliGRAM(s) Oral every 8 hours  metoclopramide Injectable 10 milliGRAM(s) IV Push every 8 hours  midazolam Infusion 0.02 mG/kG/Hr (1.33 mL/Hr) IV Continuous <Continuous>  pantoprazole  Injectable 40 milliGRAM(s) IV Push every 12 hours  petrolatum Ophthalmic Ointment 1 Application(s) Both EYES two times a day  phenylephrine    Infusion 0.1 MICROgram(s)/kG/Min (2.5 mL/Hr) IV Continuous <Continuous>    MEDICATIONS  (PRN):        Vital Signs Last 24 Hrs  T(C): 36.3 (02 Apr 2021 14:45), Max: 37.4 (02 Apr 2021 00:00)  T(F): 97.3 (02 Apr 2021 14:45), Max: 99.3 (02 Apr 2021 00:00)  HR: 79 (02 Apr 2021 16:30) (76 - 89)  BP: --  BP(mean): --  RR: 36 (02 Apr 2021 16:30) (0 - 42)  SpO2: 93% (02 Apr 2021 16:30) (89% - 95%)    CBC Full  -  ( 02 Apr 2021 00:07 )  WBC Count : 7.74 K/uL  RBC Count : 3.71 M/uL  Hemoglobin : 8.1 g/dL  Hematocrit : 28.0 %  Platelet Count - Automated : 356 K/uL  Mean Cell Volume : 75.5 fl  Mean Cell Hemoglobin : 21.8 pg  Mean Cell Hemoglobin Concentration : 28.9 gm/dL  Auto Neutrophil # : x  Auto Lymphocyte # : x  Auto Monocyte # : x  Auto Eosinophil # : x  Auto Basophil # : x  Auto Neutrophil % : x  Auto Lymphocyte % : x  Auto Monocyte % : x  Auto Eosinophil % : x  Auto Basophil % : x    04-02    146<H>  |  110<H>  |  8   ----------------------------<  109<H>  4.4   |  19<L>  |  0.46<L>    Ca    7.8<L>      02 Apr 2021 00:06  Phos  2.8     04-02  Mg     2.0     04-02    TPro  6.0  /  Alb  2.1<L>  /  TBili  0.5  /  DBili  x   /  AST  33  /  ALT  54<H>  /  AlkPhos  213<H>  04-02    LIVER FUNCTIONS - ( 02 Apr 2021 00:06 )  Alb: 2.1 g/dL / Pro: 6.0 g/dL / ALK PHOS: 213 U/L / ALT: 54 U/L / AST: 33 U/L / GGT: x           Procalcitonin, Serum: 0.47 (04-02)  Procalcitonin, Serum: 0.41 (03-30)  Procalcitonin, Serum: 0.45 (03-27)    C-Reactive Protein, Serum: 320 (04-02)  C-Reactive Protein, Serum: 184 (03-30)  C-Reactive Protein, Serum: 128 (03-27)    Ferritin, Serum: 839 (04-02)  Ferritin, Serum: 1122 (03-31)  Ferritin, Serum: 3119 (03-27)      D-Dimer Assay, Quantitative: 977 (04-02)  D-Dimer Assay, Quantitative: 1300 (03-30)  D-Dimer Assay, Quantitative: 2725 (03-27)        MICROBIOLOGY:  .Blood Blood-Peripheral  03-31-21   No growth to date.  --    Growth in aerobic bottle: Gram Positive Cocci in Pairs and Chains      .Blood Blood-Peripheral  03-29-21   Growth in aerobic and anaerobic bottles: Enterococcus raffinosus  See previous culture [id number]  --  Blood Culture PCR  Enterococcus raffinosus      .Blood Blood-Peripheral  03-27-21   Growth in anaerobic bottle: Bacteroides thetaiotamcron group  --    Growth in anaerobic bottle: Gram Negative Rods      .Sputum Sputum  03-25-21   Normal Respiratory Marilee present  --    Few polymorphonuclear leukocytes per low power field  Rare Squamous epithelial cells per low power field  Rare Gram positive cocci in pairs per oil power field  Rare Gram Variable Rods per oil power field      .Blood Blood-Peripheral  03-25-21   Growth in aerobic and anaerobic bottles: Escherichia coli  ***Blood Panel PCR results on this specimen are available  approximately 3 hours after the Gram stain result.***  Gram stain, PCR, and/or culture results may not always  correspond due to difference in methodologies.  ************************************************************  This PCR assay was performed by multiplex PCR. This  Assay tests for 66 bacterial and resistance gene targets.  Please refer to the [facility] appening test directory  at [email or web address] for details.  --  Blood Culture PCR  Escherichia coli      .Blood Blood-Peripheral  03-23-21   No Growth Final  --  --      .Sputum Sputum  03-22-21   Normal Respiratory Marilee absent  --    Rare polymorphonuclear leukocytes per low power field  Rare Squamous epithelial cells per low power field  No organisms seen per oil power field      .Blood Blood-Peripheral  03-21-21   No Growth Final  --  --      .Blood Blood-Venous  03-16-21   No Growth Final  --  --      .Blood Blood  03-13-21   No Growth Final  --  --      .Blood Blood-Peripheral  03-08-21   No Growth Final  --  --        RADIOLOGY    < from: Xray Kidney Ureter Bladder (03.31.21 @ 20:38) >  Distended stomach.    < end of copied text >

## 2021-04-02 NOTE — PROGRESS NOTE ADULT - MS EXT PE MLT D E PC
no cyanosis
no cyanosis/no pedal edema
no cyanosis
no cyanosis
no cyanosis/no pedal edema
no cyanosis
no cyanosis/no pedal edema
no cyanosis
no cyanosis

## 2021-04-02 NOTE — PROGRESS NOTE ADULT - SUBJECTIVE AND OBJECTIVE BOX
Follow-up Pulm Progress Note    Intubated/sedated      Medications:  MEDICATIONS  (STANDING):  ampicillin/sulbactam  IVPB 3 Gram(s) IV Intermittent every 6 hours  ampicillin/sulbactam  IVPB      chlorhexidine 0.12% Liquid 15 milliLiter(s) Oral Mucosa every 12 hours  chlorhexidine 2% Cloths 1 Application(s) Topical <User Schedule>  cholecalciferol 2000 Unit(s) Oral daily  dexMEDEtomidine Infusion 0.012 MICROgram(s)/kG/Hr (0.2 mL/Hr) IV Continuous <Continuous>  heparin  Infusion. 800 Unit(s)/Hr (8 mL/Hr) IV Continuous <Continuous>  influenza   Vaccine 0.5 milliLiter(s) IntraMuscular once  insulin lispro (ADMELOG) corrective regimen sliding scale   SubCutaneous every 6 hours  ketamine Infusion. 2.999 mG/kG/Hr (20 mL/Hr) IV Continuous <Continuous>  methadone    Tablet 20 milliGRAM(s) Oral every 8 hours  metoclopramide Injectable 10 milliGRAM(s) IV Push every 8 hours  midazolam Infusion 0.02 mG/kG/Hr (1.33 mL/Hr) IV Continuous <Continuous>  pantoprazole  Injectable 40 milliGRAM(s) IV Push every 12 hours  petrolatum Ophthalmic Ointment 1 Application(s) Both EYES two times a day  phenylephrine    Infusion 0.1 MICROgram(s)/kG/Min (2.5 mL/Hr) IV Continuous <Continuous>    MEDICATIONS  (PRN):      Mode: AC/ CMV (Assist Control/ Continuous Mandatory Ventilation)  RR (machine): 30  FiO2: 45  PEEP: 8  ITime: 0.7  MAP: 19  PC: 25  PIP: 36      Vital Signs Last 24 Hrs  T(C): 35.9 (02 Apr 2021 11:00), Max: 37.4 (02 Apr 2021 00:00)  T(F): 96.6 (02 Apr 2021 11:00), Max: 99.3 (02 Apr 2021 00:00)  HR: 76 (02 Apr 2021 13:15) (76 - 89)  BP: --  BP(mean): --  RR: 0 (02 Apr 2021 13:15) (0 - 42)  SpO2: 94% (02 Apr 2021 13:15) (89% - 95%) on 45%    ABG - ( 02 Apr 2021 00:01 )  pH, Arterial: 7.35  pH, Blood: x     /  pCO2: 40    /  pO2: 73    / HCO3: 22    / Base Excess: -3.1  /  SaO2: 94                    04-01 @ 07:01  -  04-02 @ 07:00  --------------------------------------------------------  IN: 2721 mL / OUT: 1705 mL / NET: 1016 mL          LABS:                        8.1    7.74  )-----------( 356      ( 02 Apr 2021 00:07 )             28.0     04-02    146<H>  |  110<H>  |  8   ----------------------------<  109<H>  4.4   |  19<L>  |  0.46<L>    Ca    7.8<L>      02 Apr 2021 00:06  Phos  2.8     04-02  Mg     2.0     04-02    TPro  6.0  /  Alb  2.1<L>  /  TBili  0.5  /  DBili  x   /  AST  33  /  ALT  54<H>  /  AlkPhos  213<H>  04-02      CARDIAC MARKERS ( 02 Apr 2021 00:06 )  x     / x     / 38 U/L / x     / x          CAPILLARY BLOOD GLUCOSE      POCT Blood Glucose.: 129 mg/dL (02 Apr 2021 11:12)    PT/INR - ( 02 Apr 2021 00:06 )   PT: 18.8 sec;   INR: 1.60 ratio         PTT - ( 02 Apr 2021 00:06 )  PTT:69.1 sec    Procalcitonin, Serum: 0.47 ng/mL (04-02-21 @ 00:06)  Procalcitonin, Serum: 0.41 ng/mL (03-30-21 @ 23:50)                  CULTURES: (if applicable)  Culture Results:   No growth to date. (03-31 @ 22:50)  Culture Results:   Growth in aerobic bottle: Enterococcus faecium  Susceptibility to follow. (03-31 @ 22:50)  Culture Results:   Growth in aerobic and anaerobic bottles: Enterococcus raffinosus  See previous culture [id number] (03-29 @ 15:35)  Culture Results:   Growth in aerobic and anaerobic bottles: Enterococcus raffinosus  Susceptibility to follow.  ***Blood Panel PCR results on this specimen are available  approximately 3 hours after the Gram stain result.***  Gram stain, PCR, and/or culture results may not always  correspond due to difference in methodologies.  ************************************************************  This PCR assay was performed by multiplex PCR. This  Assay tests for 66 bacterial and resistance gene targets.  Please refer to the [facility] Digital test directory  at [email or web address] for details. (03-29 @ 15:35)  Culture Results:   Growth in anaerobic bottle: Bacteroides thetaiotamcron group (03-27 @ 13:08)  Culture Results:   No Growth Final (03-27 @ 13:08)    Most recent blood culture -- 03-31 @ 22:50   -- -- .Blood Blood-Peripheral 03-31 @ 22:50  Most recent blood culture -- 03-29 @ 15:35   Blood Culture PCR Blood Culture PCR .Blood Blood-Peripheral 03-29 @ 15:35    Blood culture 03-31 @ 22:50  --    Growth in aerobic bottle: Gram Positive Cocci in Pairs and Chains  --  --  --    Urine culture    -->  Blood culture 03-29 @ 15:35  --    Growth in aerobic and anaerobic bottles: Gram Positive Cocci in Pairs and  Chains  PCR  Blood Culture PCR  Blood Culture PCR    Urine culture    -->      Physical Examination:  PULM: Coarse bilaterally   CVS: S1, S2 heard    RADIOLOGY REVIEWED  CXR: Bilateral patchy opacities

## 2021-04-02 NOTE — PROGRESS NOTE ADULT - ASSESSMENT
Assessment  DMT2: 60y Male with newly diagnosed DM T2 with hyperglycemia admitted with COVID19, A1C is 12.4%, on insulin coverage only, blood sugars are stable and trending within acceptable range, no new hypoglycemic episodes, tube feeds discontinued due to ileus, remains intubated and NPO.  COVID19: On medications, monitored, stable.  Overweight: No strict exercise routines, neither on low calorie diet.      Anderson Mcmanus MD  Cell: [phone]  Office: [phone]       Assessment  DMT2: 60y Male with newly diagnosed DM T2 with hyperglycemia admitted with COVID19, A1C is 12.4%, on insulin coverage only, blood sugars are stable and trending within acceptable range, no new hypoglycemic  episodes, tube feeds discontinued due to ileus, remains intubated and NPO.  COVID19: On medications, monitored, stable.  Overweight: No strict exercise routines, neither on low calorie diet.      Anderson Mcmanus MD  Cell: [phone]  Office: [phone]

## 2021-04-02 NOTE — PROGRESS NOTE ADULT - NECK DETAILS
left IJ TLC+
right IJ TLC+
no JVD
right IJ TLC+
no JVD
left IJ TLC+
no JVD

## 2021-04-02 NOTE — PROGRESS NOTE ADULT - SUBJECTIVE AND OBJECTIVE BOX
CHIEF COMPLAINT:  Patient is a 60y old  Male who presents with a chief complaint of hypoxia 2/2 COVID (01 Apr 2021 17:12)    HPI:  61 yo male w/no known pmh (does not f/u with PCP) presents to [facility] for cc fevers, weakness, fatigue, shortness of breath for 5 days. Tested positive for covid 3 days ago at an urgent care. Today presented again to urgent care and noted to be hypoxic and sent to the ER. Patient denies dysuria, diarrhea, constipation. Currently denies shortness of breath on high flow.     In the ER, patient was febrile to 101.5F, tachycardic to 120s, tachypneic to 40, hypoxic to 88% on 6L. Improved on high flow nasal cannula to 95%.  (08 Mar 2021 14:08)      Interval Events:      REVIEW OF SYSTEMS:          OBJECTIVE:  ICU Vital Signs Last 24 Hrs  T(C): 35.9 (02 Apr 2021 11:00), Max: 37.4 (02 Apr 2021 00:00)  T(F): 96.6 (02 Apr 2021 11:00), Max: 99.3 (02 Apr 2021 00:00)  HR: 77 (02 Apr 2021 12:00) (77 - 89)  BP: --  BP(mean): --  ABP: 118/65 (02 Apr 2021 12:00) (71/43 - 129/71)  ABP(mean): 85 (02 Apr 2021 12:00) (54 - 93)  RR: 0 (02 Apr 2021 12:00) (0 - 42)  SpO2: 95% (02 Apr 2021 12:00) (89% - 95%)    Mode: AC/ CMV (Assist Control/ Continuous Mandatory Ventilation), RR (machine): 30, FiO2: 45, PEEP: 8, ITime: 0.7, MAP: 19, PC: 25, PIP: 36    04-01 @ 07:01 - 04-02 @ 07:00  --------------------------------------------------------  IN: 2721 mL / OUT: 1705 mL / NET: 1016 mL    04-02 @ 07:01 - 04-02 @ 12:10  --------------------------------------------------------  IN: 329 mL / OUT: 900 mL / NET: -571 mL      CAPILLARY BLOOD GLUCOSE      POCT Blood Glucose.: 129 mg/dL (02 Apr 2021 11:12)          PHYSICAL EXAM:          HOSPITAL MEDICATIONS:  MEDICATIONS  (STANDING):  ampicillin/sulbactam  IVPB 3 Gram(s) IV Intermittent every 6 hours  ampicillin/sulbactam  IVPB      chlorhexidine 0.12% Liquid 15 milliLiter(s) Oral Mucosa every 12 hours  chlorhexidine 2% Cloths 1 Application(s) Topical <User Schedule>  cholecalciferol 2000 Unit(s) Oral daily  dexMEDEtomidine Infusion 0.012 MICROgram(s)/kG/Hr (0.2 mL/Hr) IV Continuous <Continuous>  heparin  Infusion. 800 Unit(s)/Hr (8 mL/Hr) IV Continuous <Continuous>  influenza   Vaccine 0.5 milliLiter(s) IntraMuscular once  insulin lispro (ADMELOG) corrective regimen sliding scale   SubCutaneous every 6 hours  ketamine Infusion. 2.999 mG/kG/Hr (20 mL/Hr) IV Continuous <Continuous>  methadone    Tablet 20 milliGRAM(s) Oral every 8 hours  metoclopramide Injectable 10 milliGRAM(s) IV Push every 8 hours  midazolam Infusion 0.02 mG/kG/Hr (1.33 mL/Hr) IV Continuous <Continuous>  pantoprazole  Injectable 40 milliGRAM(s) IV Push every 12 hours  petrolatum Ophthalmic Ointment 1 Application(s) Both EYES two times a day  phenylephrine    Infusion 0.1 MICROgram(s)/kG/Min (2.5 mL/Hr) IV Continuous <Continuous>    MEDICATIONS  (PRN):      LABS:                        8.1    7.74  )-----------( 356      ( 02 Apr 2021 00:07 )             28.0     Hgb Trend: 8.1<--, 8.4<--, 8.0<--, 8.2<--, 8.6<--  04-02    146<H>  |  110<H>  |  8   ----------------------------<  109<H>  4.4   |  19<L>  |  0.46<L>    Ca    7.8<L>      02 Apr 2021 00:06  Phos  2.8     04-02  Mg     2.0     04-02    TPro  6.0  /  Alb  2.1<L>  /  TBili  0.5  /  DBili  x   /  AST  33  /  ALT  54<H>  /  AlkPhos  213<H>  04-02    LIVER FUNCTIONS - ( 02 Apr 2021 00:06 )  Alb: 2.1 g/dL / Pro: 6.0 g/dL / ALK PHOS: 213 U/L / ALT: 54 U/L / AST: 33 U/L / GGT: x           Creatinine Trend: 0.46<--, 0.43<--, 0.35<--, 0.36<--, <0.30<--, 0.39<--  PT/INR - ( 02 Apr 2021 00:06 )   PT: 18.8 sec;   INR: 1.60 ratio         PTT - ( 02 Apr 2021 00:06 )  PTT:69.1 sec    Arterial Blood Gas:  04-02 @ 00:01  7.35/40/73/22/94/-3.1  ABG lactate: --  Arterial Blood Gas:  04-01 @ 04:56  7.37/44/73/25/93/-.1  ABG lactate: --  Arterial Blood Gas:  03-31 @ 23:05  7.32/52/78/26/93/-.1  ABG lactate: --        MICROBIOLOGY:     RADIOLOGY:  [ ] Reviewed and interpreted by me    EKG:       CHIEF COMPLAINT:  Patient is a 60y old  Male who presents with a chief complaint of hypoxia 2/2 COVID (01 Apr 2021 17:12)    HPI:  Pt is a 59 yo M with no known PMHx presented on 3/8 to [facility] 2 to fevers, weakness, fatigue, shortness of breath for 5 days. Tested positive for Covid-19 on 3/5. Pt was seen at urgent care and noted to be hypoxic and sent to the ER.  Admitting dx: acute hypoxemic resp failure 2 to COVID 19. Hospital course complicated by PE and superimposed bacterial PNA. RRT on 3/17 2 to worsening hypoxemia; pt Intubated and transferred to the ICU. Pt with persistent abd distension gen. surg following.    Interval Events: No events overnight      REVIEW OF SYSTEMS: unable as pt is sedated.           OBJECTIVE:  ICU Vital Signs Last 24 Hrs  T(C): 35.9 (02 Apr 2021 11:00), Max: 37.4 (02 Apr 2021 00:00)  T(F): 96.6 (02 Apr 2021 11:00), Max: 99.3 (02 Apr 2021 00:00)  HR: 77 (02 Apr 2021 12:00) (77 - 89)  BP: --  BP(mean): --  ABP: 118/65 (02 Apr 2021 12:00) (71/43 - 129/71)  ABP(mean): 85 (02 Apr 2021 12:00) (54 - 93)  RR: 0 (02 Apr 2021 12:00) (0 - 42)  SpO2: 95% (02 Apr 2021 12:00) (89% - 95%)    Mode: AC/ CMV (Assist Control/ Continuous Mandatory Ventilation), RR (machine): 30, FiO2: 45, PEEP: 8, ITime: 0.7, MAP: 19, PC: 25, PIP: 36    04-01 @ 07:01 - 04-02 @ 07:00  --------------------------------------------------------  IN: 2721 mL / OUT: 1705 mL / NET: 1016 mL    04-02 @ 07:01 - 04-02 @ 12:10  --------------------------------------------------------  IN: 329 mL / OUT: 900 mL / NET: -571 mL      CAPILLARY BLOOD GLUCOSE      POCT Blood Glucose.: 129 mg/dL (02 Apr 2021 11:12)          PHYSICAL EXAM:    GENERAL: NAD,  HEENT:  Atraumatic, Normocephalic  EYES: PERRLA, conjunctiva and sclera clear  NECK: Supple, No JVD  CHEST/LUNG: diminished bilaterally; No wheezes, rales, or rhonchi  HEART: Regular rate and rhythm; No murmurs, rubs, or gallops  ABDOMEN: Soft, Nontender, Nondistended; Bowel sounds present  EXTREMITIES:  2+ Peripheral Pulses, No clubbing, cyanosis, or edema  PSYCH: unable as pt is sedated  NEUROLOGY: sedated  SKIN: No rashes or lesions      HOSPITAL MEDICATIONS:  MEDICATIONS  (STANDING):  ampicillin/sulbactam  IVPB 3 Gram(s) IV Intermittent every 6 hours  ampicillin/sulbactam  IVPB      chlorhexidine 0.12% Liquid 15 milliLiter(s) Oral Mucosa every 12 hours  chlorhexidine 2% Cloths 1 Application(s) Topical <User Schedule>  cholecalciferol 2000 Unit(s) Oral daily  dexMEDEtomidine Infusion 0.012 MICROgram(s)/kG/Hr (0.2 mL/Hr) IV Continuous <Continuous>  heparin  Infusion. 800 Unit(s)/Hr (8 mL/Hr) IV Continuous <Continuous>  influenza   Vaccine 0.5 milliLiter(s) IntraMuscular once  insulin lispro (ADMELOG) corrective regimen sliding scale   SubCutaneous every 6 hours  ketamine Infusion. 2.999 mG/kG/Hr (20 mL/Hr) IV Continuous <Continuous>  methadone    Tablet 20 milliGRAM(s) Oral every 8 hours  metoclopramide Injectable 10 milliGRAM(s) IV Push every 8 hours  midazolam Infusion 0.02 mG/kG/Hr (1.33 mL/Hr) IV Continuous <Continuous>  pantoprazole  Injectable 40 milliGRAM(s) IV Push every 12 hours  petrolatum Ophthalmic Ointment 1 Application(s) Both EYES two times a day  phenylephrine    Infusion 0.1 MICROgram(s)/kG/Min (2.5 mL/Hr) IV Continuous <Continuous>    MEDICATIONS  (PRN):      LABS:                        8.1    7.74  )-----------( 356      ( 02 Apr 2021 00:07 )             28.0     Hgb Trend: 8.1<--, 8.4<--, 8.0<--, 8.2<--, 8.6<--  04-02    146<H>  |  110<H>  |  8   ----------------------------<  109<H>  4.4   |  19<L>  |  0.46<L>    Ca    7.8<L>      02 Apr 2021 00:06  Phos  2.8     04-02  Mg     2.0     04-02    TPro  6.0  /  Alb  2.1<L>  /  TBili  0.5  /  DBili  x   /  AST  33  /  ALT  54<H>  /  AlkPhos  213<H>  04-02    LIVER FUNCTIONS - ( 02 Apr 2021 00:06 )  Alb: 2.1 g/dL / Pro: 6.0 g/dL / ALK PHOS: 213 U/L / ALT: 54 U/L / AST: 33 U/L / GGT: x           Creatinine Trend: 0.46<--, 0.43<--, 0.35<--, 0.36<--, <0.30<--, 0.39<--  PT/INR - ( 02 Apr 2021 00:06 )   PT: 18.8 sec;   INR: 1.60 ratio         PTT - ( 02 Apr 2021 00:06 )  PTT:69.1 sec    Arterial Blood Gas:  04-02 @ 00:01  7.35/40/73/22/94/-3.1  ABG lactate: --  Arterial Blood Gas:  04-01 @ 04:56  7.37/44/73/25/93/-.1  ABG lactate: --  Arterial Blood Gas:  03-31 @ 23:05  7.32/52/78/26/93/-.1  ABG lactate: --        MICROBIOLOGY:     RADIOLOGY:  [ ] Reviewed and interpreted by me    EKG:

## 2021-04-02 NOTE — PROGRESS NOTE ADULT - NOSE
no discharge

## 2021-04-02 NOTE — PROGRESS NOTE ADULT - ATTENDING COMMENTS
Pt is a 59 yo M with no known PMHx presented on 3/8 to [facility] 2 to fevers, weakness, fatigue, shortness of breath for 5 days. Tested positive for Covid-19 on 3/5. Pt was seen at urgent care and noted to be hypoxic and sent to the ER.  Admitting dx: acute hypoxemic resp failure 2 to COVID 19. Hospital course complicated by PE and superimposed bacterial PNA. RRT on 3/17 2 to worsening hypoxemia; pt Intubated and transferred to the ICU.     Resp/Social: Family is leaning towards trach but want ot speak to ENT service  ID:  Unasyn for E. coli (E. coli septic shock) and now with Enterococcus sepsis; f/u as per ID   CVS:  Phenylephrine to maintain MAP >65  Heme: s/p 1 unit PRBC/ Adjust Heparin drip to PTT  FEN: Restart trickle feeds  Endo: Follow FS  GI: Surg f/u prn  Neuro: Cont Methadone and decrease sedation as tolerated    CCT: 40 min not in conjunction with the NP

## 2021-04-03 NOTE — PROGRESS NOTE ADULT - SUBJECTIVE AND OBJECTIVE BOX
Follow-up Pulm Progress Note    Intubated / sedated   PEEP 8 / 50% FiO2 - sats low 90s    Medications:  MEDICATIONS  (STANDING):  chlorhexidine 0.12% Liquid 15 milliLiter(s) Oral Mucosa every 12 hours  chlorhexidine 2% Cloths 1 Application(s) Topical <User Schedule>  cholecalciferol 2000 Unit(s) Oral daily  dexMEDEtomidine Infusion 0.012 MICROgram(s)/kG/Hr (0.2 mL/Hr) IV Continuous <Continuous>  heparin  Infusion. 800 Unit(s)/Hr (8 mL/Hr) IV Continuous <Continuous>  influenza   Vaccine 0.5 milliLiter(s) IntraMuscular once  insulin lispro (ADMELOG) corrective regimen sliding scale   SubCutaneous every 6 hours  ketamine Infusion. 2.999 mG/kG/Hr (20 mL/Hr) IV Continuous <Continuous>  lactulose Syrup 15 Gram(s) Enteral Tube every 12 hours  linezolid  IVPB 600 milliGRAM(s) IV Intermittent every 12 hours  meropenem  IVPB      meropenem  IVPB 1000 milliGRAM(s) IV Intermittent once  meropenem  IVPB 1000 milliGRAM(s) IV Intermittent every 8 hours  methadone    Tablet 20 milliGRAM(s) Oral every 8 hours  metoclopramide Injectable 10 milliGRAM(s) IV Push every 8 hours  midazolam Infusion 0.02 mG/kG/Hr (1.33 mL/Hr) IV Continuous <Continuous>  norepinephrine Infusion 0.05 MICROgram(s)/kG/Min (3.13 mL/Hr) IV Continuous <Continuous>  pantoprazole  Injectable 40 milliGRAM(s) IV Push every 12 hours  petrolatum Ophthalmic Ointment 1 Application(s) Both EYES two times a day  phenylephrine    Infusion 0.1 MICROgram(s)/kG/Min (2.5 mL/Hr) IV Continuous <Continuous>  senna 2 Tablet(s) Oral at bedtime  vasopressin Infusion 0.04 Unit(s)/Min (2.4 mL/Hr) IV Continuous <Continuous>        Mode: AC/ CMV (Assist Control/ Continuous Mandatory Ventilation)  RR (machine): 30  FiO2: 60  PEEP: 8  ITime: 1  MAP: 17  PC: 25  PIP: 35      Vital Signs Last 24 Hrs  T(C): 36.5 (03 Apr 2021 08:00), Max: 38.3 (02 Apr 2021 23:00)  T(F): 97.7 (03 Apr 2021 08:00), Max: 100.9 (02 Apr 2021 23:00)  HR: 105 (03 Apr 2021 10:00) (73 - 111)  BP: --  BP(mean): --  RR: 30 (03 Apr 2021 10:00) (0 - 45)  SpO2: 91% (03 Apr 2021 10:00) (90% - 99%)    ABG - ( 03 Apr 2021 00:13 )  pH, Arterial: 7.30  pH, Blood: x     /  pCO2: 41    /  pO2: 71    / HCO3: 20    / Base Excess: -5.7  /  SaO2: 91                    04-02 @ 07:01  -  04-03 @ 07:00  --------------------------------------------------------  IN: 2582.7 mL / OUT: 2245 mL / NET: 337.7 mL          LABS:                        8.0    8.00  )-----------( 461      ( 03 Apr 2021 00:10 )             28.0     04-03    144  |  107  |  10  ----------------------------<  167<H>  4.4   |  18<L>  |  0.52    Ca    7.9<L>      03 Apr 2021 00:10  Phos  3.0     04-03  Mg     1.9     04-03    TPro  6.4  /  Alb  2.0<L>  /  TBili  0.7  /  DBili  x   /  AST  78<H>  /  ALT  60<H>  /  AlkPhos  248<H>  04-03      CARDIAC MARKERS ( 02 Apr 2021 00:06 )  x     / x     / 38 U/L / x     / x          CAPILLARY BLOOD GLUCOSE      POCT Blood Glucose.: 190 mg/dL (03 Apr 2021 05:37)    PT/INR - ( 03 Apr 2021 00:10 )   PT: 22.0 sec;   INR: 1.89 ratio         PTT - ( 03 Apr 2021 00:10 )  PTT:65.3 sec    Procalcitonin, Serum: 0.47 ng/mL (04-02-21 @ 00:06)                  CULTURES:     Culture - Blood (collected 04-01-21 @ 22:58)  Source: .Blood Blood-Peripheral  Gram Stain (04-02-21 @ 19:51):    Growth in anaerobic bottle: Gram Positive Cocci in Pairs and Chains  Preliminary Report (04-02-21 @ 19:51):    Growth in anaerobic bottle: Gram Positive Cocci in Pairs and Chains    Culture - Blood (collected 03-31-21 @ 22:50)  Source: .Blood Blood-Peripheral  Gram Stain (04-01-21 @ 14:02):    Growth in aerobic bottle: Gram Positive Cocci in Pairs and Chains  Final Report (04-03-21 @ 09:22):    Growth in aerobic bottle: Enterococcus faecium (vancomycin resistant)  Organism: Enterococcus faecium (vancomycin resistant) (04-03-21 @ 09:22)  Organism: Enterococcus faecium (vancomycin resistant) (04-03-21 @ 09:22)      -  Ampicillin: R >8 Predicts results to ampicillin/sulbactam, amoxacillin-clavulanate and  piperacillin-tazobactam.      -  Daptomycin: SDD 4 The breakpoint for SDD (sensitive dose dependent)is based on a dosage regimen of 8-12 mg/kg administered every 24 h in adults and is intended for serious infections due to E. faecium. Consultation with an infectious diseases specialist is recommended.      -  Gentamicin synergy: S      -  Linezolid: S 2      -  Vancomycin: R >16      Method Type: RADHIKA    Culture - Blood (collected 03-31-21 @ 22:50)  Source: .Blood Blood-Peripheral  Preliminary Report (04-01-21 @ 23:01):    No growth to date.    Culture - Blood (collected 03-29-21 @ 15:35)  Source: .Blood Blood-Peripheral  Gram Stain (03-30-21 @ 06:31):    Growth in anaerobic bottle: Gram Positive Cocci in Pairs and Chains    Growth in aerobic bottle: Gram Positive Cocci in Pairs and Chains  Preliminary Report (04-01-21 @ 12:24):    Growth in aerobic and anaerobic bottles: Enterococcus raffinosus    Susceptibility to follow.    ***Blood Panel PCR results on this specimen are available    approximately 3 hours after the Gram stain result.***    Gram stain, PCR, and/or culture results may not always    correspond due to difference in methodologies.    ************************************************************    This PCR assay was performed by multiplex PCR. This    Assay tests for 66 bacterial and resistance gene targets.    Please refer to the [facility] [employer] test directory    at [email or web address] for details.  Organism: Blood Culture PCR  Enterococcus raffinosus (04-02-21 @ 14:02)  Organism: Blood Culture PCR (03-30-21 @ 06:42)      -  Enterococcus species: Detec      Method Type: PCR    Culture - Blood (collected 03-29-21 @ 15:35)  Source: .Blood Blood-Peripheral  Gram Stain (03-30-21 @ 05:40):    Growth in aerobic and anaerobic bottles: Gram Positive Cocci in Pairs and    Chains  Preliminary Report (03-31-21 @ 11:00):    Growth in aerobic and anaerobic bottles: Enterococcus raffinosus    See previous culture [id number]    Culture - Blood (collected 03-27-21 @ 13:08)  Source: .Blood Blood-Peripheral  Final Report (04-01-21 @ 14:00):    No Growth Final    Culture - Blood (collected 03-27-21 @ 13:08)  Source: .Blood Blood-Peripheral  Gram Stain (03-30-21 @ 10:36):    Growth in anaerobic bottle: Gram Negative Rods  Final Report (03-31-21 @ 10:52):    Growth in anaerobic bottle: Bacteroides thetaiotamcron group    Culture - Blood (collected 03-25-21 @ 16:31)  Source: .Blood Blood-Peripheral  Gram Stain (03-26-21 @ 02:22):    Growth in aerobic bottle: Gram Negative Rods    Growth in anaerobic bottle: Gram Negative Rods  Final Report (03-27-21 @ 19:13):    Growth in aerobic and anaerobic bottles: Escherichia coli    See previous culture [id number]    Culture - Blood (collected 03-25-21 @ 16:31)  Source: .Blood Blood-Peripheral  Gram Stain (03-26-21 @ 02:57):    Growth in aerobic bottle: Gram Negative Rods    Growth in anaerobic bottle: Gram Negative Rods  Final Report (03-27-21 @ 15:04):    Growth in aerobic and anaerobic bottles: Escherichia coli    ***Blood Panel PCR results on this specimen are available    approximately 3 hours after the Gram stain result.***    Gram stain, PCR, and/or culture results may not always    correspond due to difference in methodologies.    ************************************************************    This PCR assay was performed by multiplex PCR. This    Assay tests for 66 bacterial and resistance gene targets.    Please refer to the [facility] [employer] test directory    at [email or web address] for details.  Organism: Blood Culture PCR  Escherichia coli (03-27-21 @ 15:04)  Organism: Escherichia coli (03-27-21 @ 15:04)      -  Amikacin: S <=16      -  Ampicillin: S <=8 These ampicillin results predict results for amoxicillin      -  Ampicillin/Sulbactam: S <=4/2 Enterobacter, Citrobacter, and Serratia may develop resistance during prolonged therapy (3-4 days)      -  Aztreonam: S <=4      -  Cefazolin: S <=2 Enterobacter, Citrobacter, and Serratia may develop resistance during prolonged therapy (3-4 days)      -  Cefepime: S <=2      -  Cefoxitin: S <=8      -  Ceftriaxone: S <=1 Enterobacter, Citrobacter, and Serratia may develop resistance during prolonged therapy      -  Ciprofloxacin: S <=0.25      -  Ertapenem: S <=0.5      -  Gentamicin: S <=2      -  Imipenem: S <=1      -  Levofloxacin: S <=0.5      -  Meropenem: S <=1      -  Piperacillin/Tazobactam: S <=8      -  Tobramycin: S <=2      -  Trimethoprim/Sulfamethoxazole: S <=0.5/9.5      Method Type: RADHIKA  Organism: Blood Culture PCR (03-27-21 @ 15:04)      -  Escherichia coli: Detec      Method Type: PCR                          Physical Examination:  PULM: Coarse bilaterally   CVS: S1, S2 heard    RADIOLOGY REVIEWED  CXR 3/28: Bilateral patchy opacities

## 2021-04-03 NOTE — PROGRESS NOTE ADULT - ASSESSMENT
61 yo male w/no known pmh (does not f/u with PCP) presents to [facility] for cc fevers, weakness, fatigue, shortness of breath for 5 days. Tested positive for covid 3 days ago at an urgent care. Today presented again to urgent care and noted to be hypoxic and sent to the ER. Patient denies dysuria, diarrhea, constipation. Currently denies shortness of breath on high flow. Intubated for worsening respiratory failure and transferred to the ICU for further management.     Neuro:  - Nimbex d/c on 3/29  - Sedated with Ketamine, Precedex,  Versed   - Methadone for fentanyl wean --> holding PO meds    RESPIRATORY:  - Pressure control: RR 30/PIP 33/ 45%/ PEEP 8    - Continue to titrate FiO2 as tolerated. Maintain O2 sat >90  - Plan for possible trach this week. Family is aware and have agreed to it. Spoke with Dr. Mills- pulmonary interventionalist who will do the trach.  - RLL PE diagnosed on admission, treated with heparin gtt    CARDIOVASCULAR:  - Levophed switched to Lm 3/29, 2/2 sinus tachycardia   - Maintain MAP >65  - TTE 3/16 EF 55-60%. Mild diastolic dysfunction     RENAL:  BUN/Cr at baseline 9/0.35  - - goal net negative with intermittent diuresing  - Continue to monitor renal indices, strict I/Os     GASTROINTESTINAL:  #SBO  - CT ABD/Pelv 3/28: SBO w/ transition point in distal ileum   - Gen surg consulted: **NPO w/ OGT to LCWS,  No surgical intervention @ this time. Will trial Tube feed trickle rate as discussed in am rounds  - last BM 3/30  -Reglan 10 q8 w/ daily EKG    #Melena - Resolved  -S/P  melena X 2 on 3/26. Serial cbcs stable GI on board, ------> No acute intervention and H+H stable  -PPI BID    #Transaminitis, resolving   - LFTs downtrending, continuing to monitor    INFECTIOUS DISEASE: gram negative sepsis 2/2 e coli bacteremia   - 3/25 blood cultures now with gram (-) rods (E.coli) pan sensitive   - rpt Bld cx 3/29: enterococcus species, pending sensitivities  - FU rpt Bld cx x2 on 3/31: Enteroccocus 1/2 sets__  - ID following  - WBC 6, afebrile  - ABX:  Unasyn 3g q6h (3/29- )      HEME:  - H/H stable     - Hgb 7.0 s/p 1U PRBC 3/29 AM   -On therapeutic heparin gtt  for RLL PE and hypercoagulable state,     -- f/u daily PTT  - GI evaluation-----> c/w PPI dosing increased to BID for UGIB ppx    ENDO: newly dx DM on this admission  - 3/9/21 Hg A1c 12.4%  - holding ISS and NPH 61 yo male w/no known pmh (does not f/u with PCP) presents to [facility] for cc fevers, weakness, fatigue, shortness of breath for 5 days. Tested positive for covid 3 days ago at an urgent care. Today presented again to urgent care and noted to be hypoxic and sent to the ER. Patient denies dysuria, diarrhea, constipation. Currently denies shortness of breath on high flow. Intubated for worsening respiratory failure and transferred to the ICU for further management.     Neuro:  - Nimbex d/c on 3/29  - Sedated with Ketamine, Precedex,  Versed--> wean ketamine  - Methadone for fentanyl wean --> holding PO meds    RESPIRATORY:  - Pressure contro   - Continue to titrate FiO2 as tolerated. Maintain O2 sat >90  - Plan for possible trach this week. Family is aware and have agreed to it. Spoke with Dr. Mills- pulmonary interventionalist who will do the trach. On hold for now given active infection.   - RLL PE diagnosed on admission, treated with heparin gtt    CARDIOVASCULAR:  - Levophed switched to Lm 3/29, 2/2 sinus tachycardia   - Maintain MAP >65  - TTE 3/16 EF 55-60%. Mild diastolic dysfunction     RENAL:  BUN/Cr at baseline 9/0.35  - - goal net negative with intermittent diuresing  - Continue to monitor renal indices, strict I/Os     GASTROINTESTINAL:  #SBO  - CT ABD/Pelv 3/28: SBO w/ transition point in distal ileum   - Gen surg consulted: **NPO w/ OGT to LCWS,  No surgical intervention @ this time. Will trial Tube feed trickle rate as discussed in am rounds  - last BM 3/30  -Reglan 10 q8 w/ daily EKG    #Melena - Resolved  -S/P  melena X 2 on 3/26. Serial cbcs stable GI on board, ------> No acute intervention and H+H stable  -PPI BID    #Transaminitis, resolving   - LFTs downtrending, continuing to monitor    INFECTIOUS DISEASE: gram negative sepsis 2/2 e coli bacteremia   - 3/25 blood cultures now with gram (-) rods (E.coli) pan sensitive   - rpt Bld cx 3/29: enterococcus species, pending sensitivities  - FU rpt Bld cx x2 on 3/31: Enteroccocus 1/2 sets__  - ID following  - WBC 6, afebrile  - ABX:  Unasyn 3g q6h (3/29-4/3 )  - BCx growing VRE and will start broad coverage with linezolid and meropenem and will f/u repeat culture to be sent tmrw    HEME:  - H/H stable     - Hgb 7.0 s/p 1U PRBC 3/29 AM   -On therapeutic heparin gtt  for RLL PE and hypercoagulable state,     -- f/u daily PTT  - GI evaluation-----> c/w PPI dosing increased to BID for UGIB ppx    ENDO: newly dx DM on this admission  - 3/9/21 Hg A1c 12.4%  - holding ISS and NPH

## 2021-04-03 NOTE — PROGRESS NOTE ADULT - PROBLEM SELECTOR PLAN 4
E.Coli, Enterococcus, Bacteroides bacteremia   -Likely GI source  -Abx per ID  -Now with ileus  -BC 4/1 +GPC, f/u BC 4/3

## 2021-04-03 NOTE — PROGRESS NOTE ADULT - SUBJECTIVE AND OBJECTIVE BOX
INTERVAL HPI/OVERNIGHT EVENTS:    O/N:     SUBJECTIVE: Patient seen and examined at bedside.     OBJECTIVE:    VITAL SIGNS:  ICU Vital Signs Last 24 Hrs  T(C): 35.9 (03 Apr 2021 05:00), Max: 38.3 (02 Apr 2021 23:00)  T(F): 96.6 (03 Apr 2021 05:00), Max: 100.9 (02 Apr 2021 23:00)  HR: 108 (03 Apr 2021 07:00) (73 - 111)  BP: --  BP(mean): --  ABP: 92/51 (03 Apr 2021 07:00) (77/46 - 172/78)  ABP(mean): 67 (03 Apr 2021 07:00) (57 - 112)  RR: 30 (03 Apr 2021 07:00) (0 - 45)  SpO2: 96% (03 Apr 2021 07:00) (90% - 99%)    Mode: AC/ CMV (Assist Control/ Continuous Mandatory Ventilation), RR (machine): 30, FiO2: 60, PEEP: 8, ITime: 1, MAP: 17, PC: 25, PIP: 34    04-02 @ 07:01  -  04-03 @ 07:00  --------------------------------------------------------  IN: 2582.7 mL / OUT: 2245 mL / NET: 337.7 mL      CAPILLARY BLOOD GLUCOSE      POCT Blood Glucose.: 190 mg/dL (03 Apr 2021 05:37)      PHYSICAL EXAM:  GENERAL: NAD,  HEENT:  Atraumatic, Normocephalic  EYES: PERRLA, conjunctiva and sclera clear  NECK: Supple, No JVD  CHEST/LUNG: diminished bilaterally; No wheezes, rales, or rhonchi  HEART: Regular rate and rhythm; No murmurs, rubs, or gallops  ABDOMEN: Soft, Nontender, Nondistended; Bowel sounds present  EXTREMITIES:  2+ Peripheral Pulses, No clubbing, cyanosis, or edema  PSYCH: unable as pt is sedated  NEUROLOGY: sedated  SKIN: No rashes or lesions    MEDICATIONS:  MEDICATIONS  (STANDING):  ampicillin/sulbactam  IVPB 3 Gram(s) IV Intermittent every 6 hours  ampicillin/sulbactam  IVPB      chlorhexidine 0.12% Liquid 15 milliLiter(s) Oral Mucosa every 12 hours  chlorhexidine 2% Cloths 1 Application(s) Topical <User Schedule>  cholecalciferol 2000 Unit(s) Oral daily  dexMEDEtomidine Infusion 0.012 MICROgram(s)/kG/Hr (0.2 mL/Hr) IV Continuous <Continuous>  heparin  Infusion. 800 Unit(s)/Hr (8 mL/Hr) IV Continuous <Continuous>  influenza   Vaccine 0.5 milliLiter(s) IntraMuscular once  insulin lispro (ADMELOG) corrective regimen sliding scale   SubCutaneous every 6 hours  ketamine Infusion. 2.999 mG/kG/Hr (20 mL/Hr) IV Continuous <Continuous>  lactulose Syrup 15 Gram(s) Enteral Tube every 12 hours  methadone    Tablet 20 milliGRAM(s) Oral every 8 hours  metoclopramide Injectable 10 milliGRAM(s) IV Push every 8 hours  midazolam Infusion 0.02 mG/kG/Hr (1.33 mL/Hr) IV Continuous <Continuous>  norepinephrine Infusion 0.05 MICROgram(s)/kG/Min (3.13 mL/Hr) IV Continuous <Continuous>  pantoprazole  Injectable 40 milliGRAM(s) IV Push every 12 hours  petrolatum Ophthalmic Ointment 1 Application(s) Both EYES two times a day  phenylephrine    Infusion 0.1 MICROgram(s)/kG/Min (2.5 mL/Hr) IV Continuous <Continuous>  senna 2 Tablet(s) Oral at bedtime  vasopressin Infusion 0.04 Unit(s)/Min (2.4 mL/Hr) IV Continuous <Continuous>    MEDICATIONS  (PRN):      ALLERGIES:  Allergies    No Known Allergies    Intolerances        LABS:                        8.0    8.00  )-----------( 461      ( 03 Apr 2021 00:10 )             28.0     04-03    144  |  107  |  10  ----------------------------<  167<H>  4.4   |  18<L>  |  0.52    Ca    7.9<L>      03 Apr 2021 00:10  Phos  3.0     04-03  Mg     1.9     04-03    TPro  6.4  /  Alb  2.0<L>  /  TBili  0.7  /  DBili  x   /  AST  78<H>  /  ALT  60<H>  /  AlkPhos  248<H>  04-03    PT/INR - ( 03 Apr 2021 00:10 )   PT: 22.0 sec;   INR: 1.89 ratio         PTT - ( 03 Apr 2021 00:10 )  PTT:65.3 sec      RADIOLOGY & ADDITIONAL TESTS: Reviewed. INTERVAL HPI/OVERNIGHT EVENTS:    O/N: No acute events overnight.     SUBJECTIVE: Patient seen and examined at bedside. Unable to assess ROS.    OBJECTIVE:    VITAL SIGNS:  ICU Vital Signs Last 24 Hrs  T(C): 35.9 (03 Apr 2021 05:00), Max: 38.3 (02 Apr 2021 23:00)  T(F): 96.6 (03 Apr 2021 05:00), Max: 100.9 (02 Apr 2021 23:00)  HR: 108 (03 Apr 2021 07:00) (73 - 111)  BP: --  BP(mean): --  ABP: 92/51 (03 Apr 2021 07:00) (77/46 - 172/78)  ABP(mean): 67 (03 Apr 2021 07:00) (57 - 112)  RR: 30 (03 Apr 2021 07:00) (0 - 45)  SpO2: 96% (03 Apr 2021 07:00) (90% - 99%)    Mode: AC/ CMV (Assist Control/ Continuous Mandatory Ventilation), RR (machine): 30, FiO2: 60, PEEP: 8, ITime: 1, MAP: 17, PC: 25, PIP: 34    04-02 @ 07:01  -  04-03 @ 07:00  --------------------------------------------------------  IN: 2582.7 mL / OUT: 2245 mL / NET: 337.7 mL      CAPILLARY BLOOD GLUCOSE      POCT Blood Glucose.: 190 mg/dL (03 Apr 2021 05:37)      PHYSICAL EXAM:  GENERAL: NAD,  HEENT:  Atraumatic, Normocephalic  EYES: PERRLA, conjunctiva and sclera clear  NECK: Supple, No JVD  CHEST/LUNG: diminished bilaterally; No wheezes, rales, or rhonchi  HEART: Regular rate and rhythm; No murmurs, rubs, or gallops  ABDOMEN: Soft, Nontender, Nondistended; Bowel sounds present  EXTREMITIES:  2+ Peripheral Pulses, No clubbing, cyanosis, or edema  PSYCH: unable as pt is sedated  NEUROLOGY: sedated  SKIN: No rashes or lesions    MEDICATIONS:  MEDICATIONS  (STANDING):  ampicillin/sulbactam  IVPB 3 Gram(s) IV Intermittent every 6 hours  ampicillin/sulbactam  IVPB      chlorhexidine 0.12% Liquid 15 milliLiter(s) Oral Mucosa every 12 hours  chlorhexidine 2% Cloths 1 Application(s) Topical <User Schedule>  cholecalciferol 2000 Unit(s) Oral daily  dexMEDEtomidine Infusion 0.012 MICROgram(s)/kG/Hr (0.2 mL/Hr) IV Continuous <Continuous>  heparin  Infusion. 800 Unit(s)/Hr (8 mL/Hr) IV Continuous <Continuous>  influenza   Vaccine 0.5 milliLiter(s) IntraMuscular once  insulin lispro (ADMELOG) corrective regimen sliding scale   SubCutaneous every 6 hours  ketamine Infusion. 2.999 mG/kG/Hr (20 mL/Hr) IV Continuous <Continuous>  lactulose Syrup 15 Gram(s) Enteral Tube every 12 hours  methadone    Tablet 20 milliGRAM(s) Oral every 8 hours  metoclopramide Injectable 10 milliGRAM(s) IV Push every 8 hours  midazolam Infusion 0.02 mG/kG/Hr (1.33 mL/Hr) IV Continuous <Continuous>  norepinephrine Infusion 0.05 MICROgram(s)/kG/Min (3.13 mL/Hr) IV Continuous <Continuous>  pantoprazole  Injectable 40 milliGRAM(s) IV Push every 12 hours  petrolatum Ophthalmic Ointment 1 Application(s) Both EYES two times a day  phenylephrine    Infusion 0.1 MICROgram(s)/kG/Min (2.5 mL/Hr) IV Continuous <Continuous>  senna 2 Tablet(s) Oral at bedtime  vasopressin Infusion 0.04 Unit(s)/Min (2.4 mL/Hr) IV Continuous <Continuous>    MEDICATIONS  (PRN):      ALLERGIES:  Allergies    No Known Allergies    Intolerances        LABS:                        8.0    8.00  )-----------( 461      ( 03 Apr 2021 00:10 )             28.0     04-03    144  |  107  |  10  ----------------------------<  167<H>  4.4   |  18<L>  |  0.52    Ca    7.9<L>      03 Apr 2021 00:10  Phos  3.0     04-03  Mg     1.9     04-03    TPro  6.4  /  Alb  2.0<L>  /  TBili  0.7  /  DBili  x   /  AST  78<H>  /  ALT  60<H>  /  AlkPhos  248<H>  04-03    PT/INR - ( 03 Apr 2021 00:10 )   PT: 22.0 sec;   INR: 1.89 ratio         PTT - ( 03 Apr 2021 00:10 )  PTT:65.3 sec      RADIOLOGY & ADDITIONAL TESTS: Reviewed.

## 2021-04-03 NOTE — PROGRESS NOTE ADULT - ASSESSMENT
Assessment  DMT2: 60y Male with newly diagnosed DM T2 with hyperglycemia admitted with COVID19, A1C is 12.4%, on insulin coverage only, blood sugars are trending up, no new hypoglycemic  episodes, tube feeds discontinued due to ileus, remains intubated and NPO.  COVID19: On medications, monitored, stable.  Overweight: No strict exercise routines, neither on low calorie diet.      Anderson Mcmanus MD  Cell: [phone]  Office: [phone]

## 2021-04-03 NOTE — PROGRESS NOTE ADULT - ATTENDING COMMENTS
Pt is a 59 yo M with no known PMHx presented on 3/8 to [facility] 2 to fevers, weakness, fatigue, shortness of breath for 5 days. Tested positive for Covid-19 on 3/5. Pt was seen at urgent care and noted to be hypoxic and sent to the ER.  Admitting dx: acute hypoxemic resp failure 2 to COVID 19. Hospital course complicated by PE and superimposed bacterial PNA. RRT on 3/17 2 to worsening hypoxemia; pt Intubated and transferred to the ICU.     Resp/Social: Family is leaning towards trach but want to discuss it further. Will defer after bacteremia is cleared.   ID:  Patient with VRE bacteremia and worsening shock state. Antibiotics switched to Linezolid and Meropenem. May need line changes and TTE/TTE if bacteremia persistent.   CVS:  Now on multiple pressors. Likely vasoplegic 2/2 CRE bacteremia.   Heme: s/p 1 unit PRBC/ Adjust Heparin drip to PTT  FEN: Restart trickle feeds  Endo: Follow FS  GI: Surg f/u prn  Neuro: Cont Methadone and decrease sedation as tolerated

## 2021-04-03 NOTE — CHART NOTE - NSCHARTNOTEFT_GEN_A_CORE
: Gene MCCRACKEN    INDICATION: Shock state, respiratory failure    PROCEDURE:  [X] LIMITED ECHO  [X] LIMITED CHEST  [X] LIMITED RETROPERITONEAL  [ ] LIMITED ABDOMINAL  [ ] LIMITED DVT  [ ] NEEDLE GUIDANCE VASCULAR  [ ] NEEDLE GUIDANCE THORACENTESIS  [ ] NEEDLE GUIDANCE PARACENTESIS  [ ] NEEDLE GUIDANCE PERICARDIOCENTESIS  [ ] OTHER    FINDINGS: B line pattern b/l. Small subpleural consolidations.   Normal systolic function. No obvious valvular vegetation noted on screening study.   No gross ascites.     INTERPRETATION: COVID ARDS. Normal systolic function. Shock state likely vasoplegic.     Images uploaded to [employer]    Attending Addendum:     I was present during the entire procedure and agree with the above findings and interpretation. TIme spent on the procedure was separate from the critical care time spent caring for the patient.     Raudel Mills MD

## 2021-04-03 NOTE — PROGRESS NOTE ADULT - SUBJECTIVE AND OBJECTIVE BOX
Chief complaint  Patient is a 60y old  Male who presents with a chief complaint of hypoxia 2/2 COVID (03 Apr 2021 11:09)   Review of systems  Patient in bed, appears comfortable.    Labs and Fingersticks  CAPILLARY BLOOD GLUCOSE      POCT Blood Glucose.: 201 mg/dL (03 Apr 2021 17:36)  POCT Blood Glucose.: 236 mg/dL (03 Apr 2021 12:38)  POCT Blood Glucose.: 190 mg/dL (03 Apr 2021 05:37)      Anion Gap, Serum: 19 *H* (04-03 @ 00:10)  Anion Gap, Serum: 17 (04-02 @ 00:06)      Calcium, Total Serum: 7.9 *L* (04-03 @ 00:10)  Calcium, Total Serum: 7.8 *L* (04-02 @ 00:06)  Albumin, Serum: 2.0 *L* (04-03 @ 00:10)  Albumin, Serum: 2.1 *L* (04-02 @ 00:06)    Alanine Aminotransferase (ALT/SGPT): 60 *H* (04-03 @ 00:10)  Alanine Aminotransferase (ALT/SGPT): 54 *H* (04-02 @ 00:06)  Alkaline Phosphatase, Serum: 248 *H* (04-03 @ 00:10)  Alkaline Phosphatase, Serum: 213 *H* (04-02 @ 00:06)  Aspartate Aminotransferase (AST/SGOT): 78 *H* (04-03 @ 00:10)  Aspartate Aminotransferase (AST/SGOT): 33 (04-02 @ 00:06)        04-03    144  |  107  |  10  ----------------------------<  167<H>  4.4   |  18<L>  |  0.52    Ca    7.9<L>      03 Apr 2021 00:10  Phos  3.0     04-03  Mg     1.9     04-03    TPro  6.4  /  Alb  2.0<L>  /  TBili  0.7  /  DBili  x   /  AST  78<H>  /  ALT  60<H>  /  AlkPhos  248<H>  04-03                        8.0    8.00  )-----------( 461      ( 03 Apr 2021 00:10 )             28.0     Medications  MEDICATIONS  (STANDING):  chlorhexidine 0.12% Liquid 15 milliLiter(s) Oral Mucosa every 12 hours  chlorhexidine 2% Cloths 1 Application(s) Topical <User Schedule>  cholecalciferol 2000 Unit(s) Oral daily  dexMEDEtomidine Infusion 0.012 MICROgram(s)/kG/Hr (0.2 mL/Hr) IV Continuous <Continuous>  heparin  Infusion. 800 Unit(s)/Hr (8 mL/Hr) IV Continuous <Continuous>  influenza   Vaccine 0.5 milliLiter(s) IntraMuscular once  insulin lispro (ADMELOG) corrective regimen sliding scale   SubCutaneous every 6 hours  ketamine Infusion. 2.999 mG/kG/Hr (20 mL/Hr) IV Continuous <Continuous>  lactulose Syrup 15 Gram(s) Enteral Tube every 12 hours  linezolid  IVPB 600 milliGRAM(s) IV Intermittent every 12 hours  meropenem  IVPB      meropenem  IVPB 1000 milliGRAM(s) IV Intermittent every 8 hours  methadone    Tablet 20 milliGRAM(s) Oral every 8 hours  metoclopramide Injectable 10 milliGRAM(s) IV Push every 8 hours  midazolam Infusion 0.02 mG/kG/Hr (1.33 mL/Hr) IV Continuous <Continuous>  norepinephrine Infusion 0.05 MICROgram(s)/kG/Min (3.13 mL/Hr) IV Continuous <Continuous>  pantoprazole  Injectable 40 milliGRAM(s) IV Push every 12 hours  petrolatum Ophthalmic Ointment 1 Application(s) Both EYES two times a day  phenylephrine    Infusion 0.1 MICROgram(s)/kG/Min (2.5 mL/Hr) IV Continuous <Continuous>  senna 2 Tablet(s) Oral at bedtime  vasopressin Infusion 0.04 Unit(s)/Min (2.4 mL/Hr) IV Continuous <Continuous>      Physical Exam  General: Patient comfortable in bed  Vital Signs Last 12 Hrs  T(F): 99.1 (04-03-21 @ 20:00), Max: 99.5 (04-03-21 @ 16:00)  HR: 123 (04-03-21 @ 20:45) (103 - 126)  BP: --  BP(mean): --  RR: 30 (04-03-21 @ 20:45) (30 - 31)  SpO2: 91% (04-03-21 @ 20:45) (90% - 95%)  Neck: No palpable thyroid nodules.  CVS: S1S2, No murmurs  Respiratory: No wheezing, no crepitations  GI: Abdomen soft, bowel sounds positive  Musculoskeletal:  edema lower extremities.     Diagnostics    Free Thyroxine, Serum: AM Sched. Collection: 29-Mar-2021 06:00 (03-28 @ 16:46)  Thyroid Stimulating Hormone, Serum: AM Sched. Collection: 29-Mar-2021 06:00 (03-28 @ 16:46)

## 2021-04-04 NOTE — PROGRESS NOTE ADULT - ASSESSMENT
Assessment  DMT2: 60y Male with newly diagnosed DM T2 with hyperglycemia admitted with COVID19, A1C is 12.4%, on insulin coverage only, blood sugars, improving no new hypoglycemic episodes, remains intubated and NPO.  COVID19: On medications, monitored, stable.  Overweight: No strict exercise routines, neither on low calorie diet.      Anderson Mcmanus MD  Cell: [phone]  Office: [phone]

## 2021-04-04 NOTE — PROGRESS NOTE ADULT - ASSESSMENT
59 yo male w/no known pmh (does not f/u with PCP) presents to [facility] for cc fevers, weakness, fatigue, shortness of breath for 5 days. Tested positive for covid 3 days ago at an urgent care. Today presented again to urgent care and noted to be hypoxic and sent to the ER. Patient denies dysuria, diarrhea, constipation. Currently denies shortness of breath on high flow. Intubated for worsening respiratory failure and transferred to the ICU for further management.     Neuro:  - Nimbex d/c on 3/29  - Sedated with Ketamine, Precedex,  Versed--> wean ketamine  - Methadone for fentanyl wean --> holding PO meds    RESPIRATORY:  - Pressure contro   - Continue to titrate FiO2 as tolerated. Maintain O2 sat >90  - Plan for possible trach this week. Family is aware and have agreed to it. Spoke with Dr. Mills- pulmonary interventionalist who will do the trach. On hold for now given active infection.   - RLL PE diagnosed on admission, treated with heparin gtt    CARDIOVASCULAR:  - Levophed switched to Lm 3/29, 2/2 sinus tachycardia   - Maintain MAP >65  - TTE 3/16 EF 55-60%. Mild diastolic dysfunction     RENAL:  BUN/Cr at baseline 9/0.35  - - goal net negative with intermittent diuresing  - Continue to monitor renal indices, strict I/Os     GASTROINTESTINAL:  #SBO  - CT ABD/Pelv 3/28: SBO w/ transition point in distal ileum   - Gen surg consulted: **NPO w/ OGT to LCWS,  No surgical intervention @ this time. Will trial Tube feed trickle rate as discussed in am rounds  - last BM 3/30  -Reglan 10 q8 w/ daily EKG    #Melena - Resolved  -S/P  melena X 2 on 3/26. Serial cbcs stable GI on board, ------> No acute intervention and H+H stable  -PPI BID    #Transaminitis, resolving   - LFTs downtrending, continuing to monitor    INFECTIOUS DISEASE: gram negative sepsis 2/2 e coli bacteremia   - 3/25 blood cultures now with gram (-) rods (E.coli) pan sensitive   - rpt Bld cx 3/29: enterococcus species, pending sensitivities  - FU rpt Bld cx x2 on 3/31: Enteroccocus 1/2 sets__  - ID following  - WBC 6, afebrile  - ABX:  Unasyn 3g q6h (3/29-4/3 )  - BCx growing VRE and will start broad coverage with linezolid and meropenem and will f/u repeat culture to be sent tmrw    HEME:  - H/H stable     - Hgb 7.0 s/p 1U PRBC 3/29 AM   -On therapeutic heparin gtt  for RLL PE and hypercoagulable state,     -- f/u daily PTT  - GI evaluation-----> c/w PPI dosing increased to BID for UGIB ppx    ENDO: newly dx DM on this admission  - 3/9/21 Hg A1c 12.4%  - holding ISS and NPH 59 yo male w/no known pmh (does not f/u with PCP) presents to [facility] for cc fevers, weakness, fatigue, shortness of breath for 5 days. Tested positive for covid 3 days ago at an urgent care. Today presented again to urgent care and noted to be hypoxic and sent to the ER. Patient denies dysuria, diarrhea, constipation. Currently denies shortness of breath on high flow. Intubated for worsening respiratory failure and transferred to the ICU for further management.     Neuro:  - Nimbex at 4  - Sedated with Ketamine, Precedex,  Versed--> wean ketamine  - Methadone for fentanyl wean --> holding PO meds    RESPIRATORY:  - Pressure control  - Continue to titrate FiO2 as tolerated. Maintain O2 sat >90  - Plan for possible trach this week. Family is aware and have agreed to it. Spoke with Dr. Mills- pulmonary interventionalist who will do the trach. On hold for now given active infection.   - RLL PE diagnosed on admission; heparin gttr stopped due to acute drop in H/H     CARDIOVASCULAR:  - Levo and vaso for pressor requirements  - Maintain MAP >65  - TTE 3/16 EF 55-60%. Mild diastolic dysfunction     RENAL:  BUN/Cr at baseline 9/0.35  - - goal net negative with intermittent diuresing  - Continue to monitor renal indices, strict I/Os     GASTROINTESTINAL:  #SBO  - CT ABD/Pelv 3/28: SBO w/ transition point in distal ileum   - Gen surg consulted: **NPO w/ OGT to LCWS,  No surgical intervention @ this time. Will trial Tube feed trickle rate as discussed in am rounds  - last BM 3/30  -Reglan 10 q8 w/ daily EKG    #Melena - Resolved  -S/P  melena X 2 on 3/26. Serial cbcs stable GI on board, ------> No acute intervention and H+H stable  -PPI BID    #Transaminitis, resolving   - LFTs downtrending, continuing to monitor    INFECTIOUS DISEASE: gram negative sepsis 2/2 e coli bacteremia   - 3/25 blood cultures now with gram (-) rods (E.coli) pan sensitive   - rpt Bld cx 3/29: enterococcus species, pending sensitivities  - FU rpt Bld cx x2 on 3/31: Enteroccocus 1/2 sets__  - ID following  - WBC 6, afebrile  - ABX:  Unasyn 3g q6h (3/29-4/3 )  - BCx growing VRE and will start broad coverage with linezolid and meropenem and will f/u repeat culture to be sent tmrw    HEME:  - H/H stable     - Hgb 7.0 s/p 1U PRBC 3/29 AM   -On therapeutic heparin gtt  for RLL PE and hypercoagulable state,     -- f/u daily PTT  - GI evaluation-----> c/w PPI dosing increased to BID for UGIB ppx    ENDO: newly dx DM on this admission  - 3/9/21 Hg A1c 12.4%  - holding ISS and NPH 59 yo male w/no known pmh (does not f/u with PCP) presents to [facility] for cc fevers, weakness, fatigue, shortness of breath for 5 days. Tested positive for covid 3 days ago at an urgent care. Today presented again to urgent care and noted to be hypoxic and sent to the ER. Patient denies dysuria, diarrhea, constipation. Currently denies shortness of breath on high flow. Intubated for worsening respiratory failure and transferred to the ICU for further management.     Neuro:  - Nimbex at 4  - Sedated with Ketamine, Precedex,  Versed--> wean ketamine  - Methadone for fentanyl wean --> holding PO meds    RESPIRATORY:  - Pressure control  - Continue to titrate FiO2 as tolerated. Maintain O2 sat >90  - Plan for possible trach this week. Family is aware and have agreed to it. Spoke with Dr. Mills- pulmonary interventionalist who will do the trach. On hold for now given active infection.   - RLL PE diagnosed on admission; heparin gttr stopped due to acute drop in H/H     CARDIOVASCULAR:  - Levo and vaso for pressor requirements  - Maintain MAP >65  - TTE 3/16 EF 55-60%. Mild diastolic dysfunction     RENAL:  - KARINA; Creat increase to 1.29 from 0.52  - Goal net negative with intermittent diuresing  - Continue to monitor renal indices, strict I/Os     GASTROINTESTINAL:  #SBO  - CT ABD/Pelv 3/28: SBO w/ transition point in distal ileum   - Gen surg consulted: **NPO w/ OGT to LCWS,  No surgical intervention @ this time. Will trial Tube feed trickle rate as discussed in am rounds  - last BM 3/30  - Reglan 10 q8 w/ daily EKG  - CT Chest A/P ordered (4/4): explore source of potential bleed   - NPO with TF     #Melena - Resolved  -S/P  melena X 2 on 3/26. No melena since 3/26   -PPI BID    #Transaminitis   - LFTs uptrending continuing to monitor    INFECTIOUS DISEASE: gram negative sepsis 2/2 e coli bacteremia   - 3/25 blood cultures now with gram (-) rods (E.coli) pan sensitive   - rpt Bld cx 3/29: enterococcus species, pending sensitivities  - FU rpt Bld cx x2 on 3/31: Enteroccocus 1/2 sets  - ID following  - ABX:  Unasyn 3g q6h (3/29-4/3 )  - BCx growing VRE; Started linezolid and meropenem and f/u repeat cultures sent (4/3)     HEME:  - H/H drop to 6.9/24.7   - Heparin gtt dced due to drop in H/H; previously for RLL PE and hypercoagulable state,   - f/u daily PTT  - GI evaluation-----> c/w PPI dosing increased to BID for UGIB ppx    ENDO: newly dx DM on this admission  - 3/9/21 Hg A1c 12.4%  - Moderate ISS

## 2021-04-04 NOTE — PROGRESS NOTE ADULT - SUBJECTIVE AND OBJECTIVE BOX
Patient is a 60y old  Male who presents with a chief complaint of hypoxia 2/2 COVID (03 Apr 2021 21:40)      24 hour events: ***    REVIEW OF SYSTEMS:  Constitutional: [ ] fevers [ ] chills [ ] weight loss [ ] weight gain  HEENT: [ ] dry eyes [ ] eye irritation [ ] postnasal drip [ ] nasal congestion  CV: [ ] chest pain [ ] orthopnea [ ] palpitations [ ] murmur  Resp: [ ] cough [ ] shortness of breath [ ] dyspnea [ ] wheezing [ ] sputum [ ] hemoptysis  GI: [ ] nausea [ ] vomiting [ ] diarrhea [ ] constipation [ ] abd pain [ ] dysphagia   : [ ] dysuria [ ] nocturia [ ] hematuria [ ] increased urinary frequency  Musculoskeletal: [ ] back pain [ ] myalgias [ ] arthralgias [ ] fracture  Skin: [ ] rash [ ] itch  Neurological: [ ] headache [ ] dizziness [ ] syncope [ ] weakness [ ] numbness  Psychiatric: [ ] anxiety [ ] depression  Endocrine: [ ] diabetes [ ] thyroid problem  Hematologic/Lymphatic: [ ] anemia [ ] bleeding problem  Allergic/Immunologic: [ ] itchy eyes [ ] nasal discharge [ ] hives [ ] angioedema  [ ] All other systems negative  [ ] Unable to assess ROS because ________    OBJECTIVE:  ICU Vital Signs Last 24 Hrs  T(C): 36.9 (04 Apr 2021 07:00), Max: 37.6 (04 Apr 2021 04:00)  T(F): 98.4 (04 Apr 2021 07:00), Max: 99.6 (04 Apr 2021 04:00)  HR: 92 (04 Apr 2021 07:45) (89 - 126)  BP: --  BP(mean): --  ABP: 116/68 (04 Apr 2021 07:45) (74/50 - 175/91)  ABP(mean): 88 (04 Apr 2021 07:45) (60 - 126)  RR: 38 (04 Apr 2021 07:45) (17 - 38)  SpO2: 100% (04 Apr 2021 07:45) (90% - 100%)    Mode: AC/ CMV (Assist Control/ Continuous Mandatory Ventilation), RR (machine): 38, TV (machine): 380, FiO2: 80, PEEP: 5, ITime: 1, MAP: 18, PC: 25, PIP: 39    04-03 @ 07:01  -  04-04 @ 07:00  --------------------------------------------------------  IN: 2909 mL / OUT: 490 mL / NET: 2419 mL      CAPILLARY BLOOD GLUCOSE      POCT Blood Glucose.: 201 mg/dL (03 Apr 2021 17:36)      PHYSICAL EXAM:  GENERAL: NAD, well-developed  HEAD:  Atraumatic, Normocephalic  EYES: EOMI, PERRLA, conjunctiva and sclera clear  NECK: Supple, No JVD, Thyroid not palpable, non tender, Trachea midline  CHEST/LUNG: ( )ETT in place, ( )Tracheostomy in place, ( )no chest deformity,  ( )  Normal expansion/effort/palpation,  ( )Normal percussion/auscultation,  Clear to auscultation bilaterally; No wheeze  HEART: Regular rate and rhythm; No murmurs, rubs, or gallops, ( ) No JVD, ( )Normal Pulses, ( )Edema   ABDOMEN: Soft, Nontender, Nondistended; Bowel sounds presen, ( ) No Masses, (  ) No organomegaly,  (  ) Non-tender normal BS   : Coates in Place, Voiding freely  Musculoskeletal/EXTREMITIES:(  ) Normal strength, movement, and tone, (  ) No focal atropy, (  ) Normal ROM, (  ) Normal digits and nails,  2+ Peripheral Pulses, No clubbing, cyanosis, or edema  PSYCH: AAOx3, (  ) Normal mood/affect/judgment/insight, (  ) Intact memory,   NEUROLOGY: non-focal, exam  SKIN: No rashes or lesions      LINES:    HOSPITAL MEDICATIONS:  MEDICATIONS  (STANDING):  buMETAnide Infusion 2 mG/Hr (10 mL/Hr) IV Continuous <Continuous>  chlorhexidine 0.12% Liquid 15 milliLiter(s) Oral Mucosa every 12 hours  chlorhexidine 2% Cloths 1 Application(s) Topical <User Schedule>  cholecalciferol 2000 Unit(s) Oral daily  cisatracurium Infusion 3 MICROgram(s)/kG/Min (12 mL/Hr) IV Continuous <Continuous>  dexMEDEtomidine Infusion 0.012 MICROgram(s)/kG/Hr (0.2 mL/Hr) IV Continuous <Continuous>  heparin  Infusion. 800 Unit(s)/Hr (8 mL/Hr) IV Continuous <Continuous>  influenza   Vaccine 0.5 milliLiter(s) IntraMuscular once  insulin lispro (ADMELOG) corrective regimen sliding scale   SubCutaneous every 6 hours  ketamine Infusion. 2.999 mG/kG/Hr (20 mL/Hr) IV Continuous <Continuous>  lactulose Syrup 15 Gram(s) Enteral Tube every 12 hours  linezolid  IVPB 600 milliGRAM(s) IV Intermittent every 12 hours  meropenem  IVPB 1000 milliGRAM(s) IV Intermittent every 8 hours  meropenem  IVPB      methadone    Tablet 20 milliGRAM(s) Oral every 8 hours  metoclopramide Injectable 10 milliGRAM(s) IV Push every 8 hours  midazolam Infusion 0.02 mG/kG/Hr (1.33 mL/Hr) IV Continuous <Continuous>  norepinephrine Infusion 0.05 MICROgram(s)/kG/Min (3.13 mL/Hr) IV Continuous <Continuous>  pantoprazole  Injectable 40 milliGRAM(s) IV Push every 12 hours  petrolatum Ophthalmic Ointment 1 Application(s) Both EYES two times a day  phenylephrine    Infusion 0.1 MICROgram(s)/kG/Min (2.5 mL/Hr) IV Continuous <Continuous>  senna 2 Tablet(s) Oral at bedtime  vasopressin Infusion 0.04 Unit(s)/Min (2.4 mL/Hr) IV Continuous <Continuous>    MEDICATIONS  (PRN):      LABS:                        7.7    10.14 )-----------( 515      ( 04 Apr 2021 00:14 )             28.1     Hgb Trend: 7.7<--, 8.0<--, 8.1<--, 8.4<--, 8.0<--  04-04    147<H>  |  113<H>  |  14  ----------------------------<  226<H>  5.6<H>   |  23  |  1.29    Ca    8.0<L>      04 Apr 2021 00:15  Phos  5.4     04-04  Mg     2.0     04-04    TPro  6.4  /  Alb  2.0<L>  /  TBili  0.6  /  DBili  x   /  AST  169<H>  /  ALT  80<H>  /  AlkPhos  331<H>  04-04    Creatinine Trend: 1.29<--, 0.52<--, 0.46<--, 0.43<--, 0.35<--, 0.36<--  PT/INR - ( 03 Apr 2021 00:10 )   PT: 22.0 sec;   INR: 1.89 ratio         PTT - ( 03 Apr 2021 00:10 )  PTT:65.3 sec    Arterial Blood Gas:  04-04 @ 04:42  7.26/60/128/26/99/-.9  ABG lactate: --  Arterial Blood Gas:  04-04 @ 02:41  7.06/98/208/26/99/-4.6  ABG lactate: --  Arterial Blood Gas:  04-04 @ 00:09  7.12/80/196/25/99/-4.6  ABG lactate: --  Arterial Blood Gas:  04-03 @ 00:13  7.30/41/71/20/91/-5.7  ABG lactate: --  Arterial Blood Gas:  04-02 @ 19:32  7.35/38/77/21/94/-3.9  ABG lactate: --    Venous Blood Gas:  04-03 @ 23:55  7.10/87/44/26/69  VBG Lactate: 2.1     Patient is a 60y old  Male who presents with a chief complaint of hypoxia 2/2 COVID (03 Apr 2021 21:40)      24 hour events: pH and pCO2 overnight at 7/98; changes to pRVC and paralyzed with nimbex with improvement to 7.32/50. Bumex started and johnson put back in. KARINA with creat at 1.29 (previously 0.52). H/H drop to 6.9/24.7; to be given 1 unit pRBCs.     REVIEW OF SYSTEMS:  Constitutional: [ ] fevers [ ] chills [ ] weight loss [ ] weight gain  HEENT: [ ] dry eyes [ ] eye irritation [ ] postnasal drip [ ] nasal congestion  CV: [ ] chest pain [ ] orthopnea [ ] palpitations [ ] murmur  Resp: [ ] cough [ ] shortness of breath [ ] dyspnea [ ] wheezing [ ] sputum [ ] hemoptysis  GI: [ ] nausea [ ] vomiting [ ] diarrhea [ ] constipation [ ] abd pain [ ] dysphagia   : [ ] dysuria [ ] nocturia [ ] hematuria [ ] increased urinary frequency  Musculoskeletal: [ ] back pain [ ] myalgias [ ] arthralgias [ ] fracture  Skin: [ ] rash [ ] itch  Neurological: [ ] headache [ ] dizziness [ ] syncope [ ] weakness [ ] numbness  Psychiatric: [ ] anxiety [ ] depression  Endocrine: [ ] diabetes [ ] thyroid problem  Hematologic/Lymphatic: [ ] anemia [ ] bleeding problem  Allergic/Immunologic: [ ] itchy eyes [ ] nasal discharge [ ] hives [ ] angioedema  [ ] All other systems negative  [ ] Unable to assess ROS because ________    OBJECTIVE:  ICU Vital Signs Last 24 Hrs  T(C): 36.9 (04 Apr 2021 07:00), Max: 37.6 (04 Apr 2021 04:00)  T(F): 98.4 (04 Apr 2021 07:00), Max: 99.6 (04 Apr 2021 04:00)  HR: 92 (04 Apr 2021 07:45) (89 - 126)  BP: --  BP(mean): --  ABP: 116/68 (04 Apr 2021 07:45) (74/50 - 175/91)  ABP(mean): 88 (04 Apr 2021 07:45) (60 - 126)  RR: 38 (04 Apr 2021 07:45) (17 - 38)  SpO2: 100% (04 Apr 2021 07:45) (90% - 100%)    Mode: AC/ CMV (Assist Control/ Continuous Mandatory Ventilation), RR (machine): 38, TV (machine): 380, FiO2: 80, PEEP: 5, ITime: 1, MAP: 18, PC: 25, PIP: 39    04-03 @ 07:01  -  04-04 @ 07:00  --------------------------------------------------------  IN: 2909 mL / OUT: 490 mL / NET: 2419 mL      CAPILLARY BLOOD GLUCOSE      POCT Blood Glucose.: 201 mg/dL (03 Apr 2021 17:36)      PHYSICAL EXAM:  GENERAL: NAD, well-developed  HEAD:  Atraumatic, Normocephalic  EYES: EOMI, PERRLA, conjunctiva and sclera clear  NECK: Supple, No JVD, Thyroid not palpable, non tender, Trachea midline  CHEST/LUNG: ( )ETT in place, ( )Tracheostomy in place, ( )no chest deformity,  ( )  Normal expansion/effort/palpation,  ( )Normal percussion/auscultation,  Clear to auscultation bilaterally; No wheeze  HEART: Regular rate and rhythm; No murmurs, rubs, or gallops, ( ) No JVD, ( )Normal Pulses, ( )Edema   ABDOMEN: Soft, Nontender, Nondistended; Bowel sounds presen, ( ) No Masses, (  ) No organomegaly,  (  ) Non-tender normal BS   : Johnson in Place, Voiding freely  Musculoskeletal/EXTREMITIES:(  ) Normal strength, movement, and tone, (  ) No focal atropy, (  ) Normal ROM, (  ) Normal digits and nails,  2+ Peripheral Pulses, No clubbing, cyanosis, or edema  PSYCH: AAOx3, (  ) Normal mood/affect/judgment/insight, (  ) Intact memory,   NEUROLOGY: non-focal, exam  SKIN: No rashes or lesions      LINES:    HOSPITAL MEDICATIONS:  MEDICATIONS  (STANDING):  buMETAnide Infusion 2 mG/Hr (10 mL/Hr) IV Continuous <Continuous>  chlorhexidine 0.12% Liquid 15 milliLiter(s) Oral Mucosa every 12 hours  chlorhexidine 2% Cloths 1 Application(s) Topical <User Schedule>  cholecalciferol 2000 Unit(s) Oral daily  cisatracurium Infusion 3 MICROgram(s)/kG/Min (12 mL/Hr) IV Continuous <Continuous>  dexMEDEtomidine Infusion 0.012 MICROgram(s)/kG/Hr (0.2 mL/Hr) IV Continuous <Continuous>  heparin  Infusion. 800 Unit(s)/Hr (8 mL/Hr) IV Continuous <Continuous>  influenza   Vaccine 0.5 milliLiter(s) IntraMuscular once  insulin lispro (ADMELOG) corrective regimen sliding scale   SubCutaneous every 6 hours  ketamine Infusion. 2.999 mG/kG/Hr (20 mL/Hr) IV Continuous <Continuous>  lactulose Syrup 15 Gram(s) Enteral Tube every 12 hours  linezolid  IVPB 600 milliGRAM(s) IV Intermittent every 12 hours  meropenem  IVPB 1000 milliGRAM(s) IV Intermittent every 8 hours  meropenem  IVPB      methadone    Tablet 20 milliGRAM(s) Oral every 8 hours  metoclopramide Injectable 10 milliGRAM(s) IV Push every 8 hours  midazolam Infusion 0.02 mG/kG/Hr (1.33 mL/Hr) IV Continuous <Continuous>  norepinephrine Infusion 0.05 MICROgram(s)/kG/Min (3.13 mL/Hr) IV Continuous <Continuous>  pantoprazole  Injectable 40 milliGRAM(s) IV Push every 12 hours  petrolatum Ophthalmic Ointment 1 Application(s) Both EYES two times a day  phenylephrine    Infusion 0.1 MICROgram(s)/kG/Min (2.5 mL/Hr) IV Continuous <Continuous>  senna 2 Tablet(s) Oral at bedtime  vasopressin Infusion 0.04 Unit(s)/Min (2.4 mL/Hr) IV Continuous <Continuous>    MEDICATIONS  (PRN):      LABS:                        7.7    10.14 )-----------( 515      ( 04 Apr 2021 00:14 )             28.1     Hgb Trend: 7.7<--, 8.0<--, 8.1<--, 8.4<--, 8.0<--  04-04    147<H>  |  113<H>  |  14  ----------------------------<  226<H>  5.6<H>   |  23  |  1.29    Ca    8.0<L>      04 Apr 2021 00:15  Phos  5.4     04-04  Mg     2.0     04-04    TPro  6.4  /  Alb  2.0<L>  /  TBili  0.6  /  DBili  x   /  AST  169<H>  /  ALT  80<H>  /  AlkPhos  331<H>  04-04    Creatinine Trend: 1.29<--, 0.52<--, 0.46<--, 0.43<--, 0.35<--, 0.36<--  PT/INR - ( 03 Apr 2021 00:10 )   PT: 22.0 sec;   INR: 1.89 ratio         PTT - ( 03 Apr 2021 00:10 )  PTT:65.3 sec    Arterial Blood Gas:  04-04 @ 04:42  7.26/60/128/26/99/-.9  ABG lactate: --  Arterial Blood Gas:  04-04 @ 02:41  7.06/98/208/26/99/-4.6  ABG lactate: --  Arterial Blood Gas:  04-04 @ 00:09  7.12/80/196/25/99/-4.6  ABG lactate: --  Arterial Blood Gas:  04-03 @ 00:13  7.30/41/71/20/91/-5.7  ABG lactate: --  Arterial Blood Gas:  04-02 @ 19:32  7.35/38/77/21/94/-3.9  ABG lactate: --    Venous Blood Gas:  04-03 @ 23:55  7.10/87/44/26/69  VBG Lactate: 2.1     Patient is a 60y old  Male who presents with a chief complaint of hypoxia 2/2 COVID (03 Apr 2021 21:40)      24 hour events: pH and pCO2 overnight at 7/98; changes to pRVC and paralyzed with nimbex with improvement to 7.32/50. Bumex started and johnson put back in. KARINA with creat at 1.29 (previously 0.52). H/H drop to 6.9/24.7; to be given 1 unit pRBCs. CT chest A/p ordered for source of potential bleed     REVIEW OF SYSTEMS:  Constitutional: [ ] fevers [ ] chills [ ] weight loss [ ] weight gain  HEENT: [ ] dry eyes [ ] eye irritation [ ] postnasal drip [ ] nasal congestion  CV: [ ] chest pain [ ] orthopnea [ ] palpitations [ ] murmur  Resp: [ ] cough [ ] shortness of breath [ ] dyspnea [ ] wheezing [ ] sputum [ ] hemoptysis  GI: [ ] nausea [ ] vomiting [ ] diarrhea [ ] constipation [ ] abd pain [ ] dysphagia   : [ ] dysuria [ ] nocturia [ ] hematuria [ ] increased urinary frequency  Musculoskeletal: [ ] back pain [ ] myalgias [ ] arthralgias [ ] fracture  Skin: [ ] rash [ ] itch  Neurological: [ ] headache [ ] dizziness [ ] syncope [ ] weakness [ ] numbness  Psychiatric: [ ] anxiety [ ] depression  Endocrine: [ ] diabetes [ ] thyroid problem  Hematologic/Lymphatic: [ ] anemia [ ] bleeding problem  Allergic/Immunologic: [ ] itchy eyes [ ] nasal discharge [ ] hives [ ] angioedema  [ ] All other systems negative  [ ] Unable to assess ROS because ________    OBJECTIVE:  ICU Vital Signs Last 24 Hrs  T(C): 36.9 (04 Apr 2021 07:00), Max: 37.6 (04 Apr 2021 04:00)  T(F): 98.4 (04 Apr 2021 07:00), Max: 99.6 (04 Apr 2021 04:00)  HR: 92 (04 Apr 2021 07:45) (89 - 126)  BP: --  BP(mean): --  ABP: 116/68 (04 Apr 2021 07:45) (74/50 - 175/91)  ABP(mean): 88 (04 Apr 2021 07:45) (60 - 126)  RR: 38 (04 Apr 2021 07:45) (17 - 38)  SpO2: 100% (04 Apr 2021 07:45) (90% - 100%)    Mode: AC/ CMV (Assist Control/ Continuous Mandatory Ventilation), RR (machine): 38, TV (machine): 380, FiO2: 80, PEEP: 5, ITime: 1, MAP: 18, PC: 25, PIP: 39    04-03 @ 07:01  -  04-04 @ 07:00  --------------------------------------------------------  IN: 2909 mL / OUT: 490 mL / NET: 2419 mL      CAPILLARY BLOOD GLUCOSE      POCT Blood Glucose.: 201 mg/dL (03 Apr 2021 17:36)      PHYSICAL EXAM:  GENERAL: NAD, well-developed  HEAD:  Atraumatic, Normocephalic  EYES: EOMI, PERRLA, conjunctiva and sclera clear  NECK: Supple, No JVD, Thyroid not palpable, non tender, Trachea midline  CHEST/LUNG: ( )ETT in place, ( )Tracheostomy in place, ( )no chest deformity,  ( )  Normal expansion/effort/palpation,  ( )Normal percussion/auscultation,  Clear to auscultation bilaterally; No wheeze  HEART: Regular rate and rhythm; No murmurs, rubs, or gallops, ( ) No JVD, ( )Normal Pulses, ( )Edema   ABDOMEN: Soft, Nontender, Nondistended; Bowel sounds presen, ( ) No Masses, (  ) No organomegaly,  (  ) Non-tender normal BS   : Johnson in Place, Voiding freely  Musculoskeletal/EXTREMITIES:(  ) Normal strength, movement, and tone, (  ) No focal atropy, (  ) Normal ROM, (  ) Normal digits and nails,  2+ Peripheral Pulses, No clubbing, cyanosis, or edema  PSYCH: AAOx3, (  ) Normal mood/affect/judgment/insight, (  ) Intact memory,   NEUROLOGY: non-focal, exam  SKIN: No rashes or lesions      LINES:    HOSPITAL MEDICATIONS:  MEDICATIONS  (STANDING):  buMETAnide Infusion 2 mG/Hr (10 mL/Hr) IV Continuous <Continuous>  chlorhexidine 0.12% Liquid 15 milliLiter(s) Oral Mucosa every 12 hours  chlorhexidine 2% Cloths 1 Application(s) Topical <User Schedule>  cholecalciferol 2000 Unit(s) Oral daily  cisatracurium Infusion 3 MICROgram(s)/kG/Min (12 mL/Hr) IV Continuous <Continuous>  dexMEDEtomidine Infusion 0.012 MICROgram(s)/kG/Hr (0.2 mL/Hr) IV Continuous <Continuous>  heparin  Infusion. 800 Unit(s)/Hr (8 mL/Hr) IV Continuous <Continuous>  influenza   Vaccine 0.5 milliLiter(s) IntraMuscular once  insulin lispro (ADMELOG) corrective regimen sliding scale   SubCutaneous every 6 hours  ketamine Infusion. 2.999 mG/kG/Hr (20 mL/Hr) IV Continuous <Continuous>  lactulose Syrup 15 Gram(s) Enteral Tube every 12 hours  linezolid  IVPB 600 milliGRAM(s) IV Intermittent every 12 hours  meropenem  IVPB 1000 milliGRAM(s) IV Intermittent every 8 hours  meropenem  IVPB      methadone    Tablet 20 milliGRAM(s) Oral every 8 hours  metoclopramide Injectable 10 milliGRAM(s) IV Push every 8 hours  midazolam Infusion 0.02 mG/kG/Hr (1.33 mL/Hr) IV Continuous <Continuous>  norepinephrine Infusion 0.05 MICROgram(s)/kG/Min (3.13 mL/Hr) IV Continuous <Continuous>  pantoprazole  Injectable 40 milliGRAM(s) IV Push every 12 hours  petrolatum Ophthalmic Ointment 1 Application(s) Both EYES two times a day  phenylephrine    Infusion 0.1 MICROgram(s)/kG/Min (2.5 mL/Hr) IV Continuous <Continuous>  senna 2 Tablet(s) Oral at bedtime  vasopressin Infusion 0.04 Unit(s)/Min (2.4 mL/Hr) IV Continuous <Continuous>    MEDICATIONS  (PRN):      LABS:                        7.7    10.14 )-----------( 515      ( 04 Apr 2021 00:14 )             28.1     Hgb Trend: 7.7<--, 8.0<--, 8.1<--, 8.4<--, 8.0<--  04-04    147<H>  |  113<H>  |  14  ----------------------------<  226<H>  5.6<H>   |  23  |  1.29    Ca    8.0<L>      04 Apr 2021 00:15  Phos  5.4     04-04  Mg     2.0     04-04    TPro  6.4  /  Alb  2.0<L>  /  TBili  0.6  /  DBili  x   /  AST  169<H>  /  ALT  80<H>  /  AlkPhos  331<H>  04-04    Creatinine Trend: 1.29<--, 0.52<--, 0.46<--, 0.43<--, 0.35<--, 0.36<--  PT/INR - ( 03 Apr 2021 00:10 )   PT: 22.0 sec;   INR: 1.89 ratio         PTT - ( 03 Apr 2021 00:10 )  PTT:65.3 sec    Arterial Blood Gas:  04-04 @ 04:42  7.26/60/128/26/99/-.9  ABG lactate: --  Arterial Blood Gas:  04-04 @ 02:41  7.06/98/208/26/99/-4.6  ABG lactate: --  Arterial Blood Gas:  04-04 @ 00:09  7.12/80/196/25/99/-4.6  ABG lactate: --  Arterial Blood Gas:  04-03 @ 00:13  7.30/41/71/20/91/-5.7  ABG lactate: --  Arterial Blood Gas:  04-02 @ 19:32  7.35/38/77/21/94/-3.9  ABG lactate: --    Venous Blood Gas:  04-03 @ 23:55  7.10/87/44/26/69  VBG Lactate: 2.1     Patient is a 60y old  Male who presents with a chief complaint of hypoxia 2/2 COVID (03 Apr 2021 21:40)      24 hour events: pH and pCO2 overnight at 7/98; changes to pRVC and paralyzed with nimbex with improvement to 7.32/50. Bumex started and johnson put back in. KARINA with creat at 1.29 (previously 0.52). H/H drop to 6.9/24.7; to be given 1 unit pRBCs. CT chest A/p ordered for source of potential bleed     REVIEW OF SYSTEMS:  Constitutional: [ ] fevers [ ] chills [ ] weight loss [ ] weight gain  HEENT: [ ] dry eyes [ ] eye irritation [ ] postnasal drip [ ] nasal congestion  CV: [ ] chest pain [ ] orthopnea [ ] palpitations [ ] murmur  Resp: [ ] cough [ ] shortness of breath [ ] dyspnea [ ] wheezing [ ] sputum [ ] hemoptysis  GI: [ ] nausea [ ] vomiting [ ] diarrhea [ ] constipation [ ] abd pain [ ] dysphagia   : [ ] dysuria [ ] nocturia [ ] hematuria [ ] increased urinary frequency  Musculoskeletal: [ ] back pain [ ] myalgias [ ] arthralgias [ ] fracture  Skin: [ ] rash [ ] itch  Neurological: [ ] headache [ ] dizziness [ ] syncope [ ] weakness [ ] numbness  Psychiatric: [ ] anxiety [ ] depression  Endocrine: [ ] diabetes [ ] thyroid problem  Hematologic/Lymphatic: [ ] anemia [ ] bleeding problem  Allergic/Immunologic: [ ] itchy eyes [ ] nasal discharge [ ] hives [ ] angioedema  [ ] All other systems negative  [ ] Unable to assess ROS because ________    OBJECTIVE:  ICU Vital Signs Last 24 Hrs  T(C): 36.9 (04 Apr 2021 07:00), Max: 37.6 (04 Apr 2021 04:00)  T(F): 98.4 (04 Apr 2021 07:00), Max: 99.6 (04 Apr 2021 04:00)  HR: 92 (04 Apr 2021 07:45) (89 - 126)  ABP: 116/68 (04 Apr 2021 07:45) (74/50 - 175/91)  ABP(mean): 88 (04 Apr 2021 07:45) (60 - 126)  RR: 38 (04 Apr 2021 07:45) (17 - 38)  SpO2: 100% (04 Apr 2021 07:45) (90% - 100%)    Mode: AC/ CMV (Assist Control/ Continuous Mandatory Ventilation), RR (machine): 38, TV (machine): 380, FiO2: 80, PEEP: 5, ITime: 1, MAP: 18, PC: 25, PIP: 39    04-03 @ 07:01  -  04-04 @ 07:00  --------------------------------------------------------  IN: 2909 mL / OUT: 490 mL / NET: 2419 mL      CAPILLARY BLOOD GLUCOSE      POCT Blood Glucose.: 201 mg/dL (03 Apr 2021 17:36)      PHYSICAL EXAM:  GENERAL: NAD, well-developed  HEAD:  Atraumatic, Normocephalic  EYES: EOMI, PERRLA, conjunctiva and sclera clear  NECK: Supple, No JVD, Thyroid not palpable, non tender, Trachea midline  CHEST/LUNG: ( )ETT in place, ( )Tracheostomy in place, ( )no chest deformity,  ( )  Normal expansion/effort/palpation,  ( )Normal percussion/auscultation,  Clear to auscultation bilaterally; No wheeze  HEART: Regular rate and rhythm; No murmurs, rubs, or gallops, ( ) No JVD, ( )Normal Pulses, ( )Edema   ABDOMEN: Soft, Nontender, Nondistended; Bowel sounds presen, ( ) No Masses, (  ) No organomegaly,  (  ) Non-tender normal BS   : Johnson in Place, Voiding freely  Musculoskeletal/EXTREMITIES:(  ) Normal strength, movement, and tone, (  ) No focal atropy, (  ) Normal ROM, (  ) Normal digits and nails,  2+ Peripheral Pulses, No clubbing, cyanosis, or edema  PSYCH: AAOx3, (  ) Normal mood/affect/judgment/insight, (  ) Intact memory,   NEUROLOGY: non-focal, exam  SKIN: No rashes or lesions      LINES:    HOSPITAL MEDICATIONS:  MEDICATIONS  (STANDING):  buMETAnide Infusion 2 mG/Hr (10 mL/Hr) IV Continuous <Continuous>  chlorhexidine 0.12% Liquid 15 milliLiter(s) Oral Mucosa every 12 hours  chlorhexidine 2% Cloths 1 Application(s) Topical <User Schedule>  cholecalciferol 2000 Unit(s) Oral daily  cisatracurium Infusion 3 MICROgram(s)/kG/Min (12 mL/Hr) IV Continuous <Continuous>  dexMEDEtomidine Infusion 0.012 MICROgram(s)/kG/Hr (0.2 mL/Hr) IV Continuous <Continuous>  heparin  Infusion. 800 Unit(s)/Hr (8 mL/Hr) IV Continuous <Continuous>  influenza   Vaccine 0.5 milliLiter(s) IntraMuscular once  insulin lispro (ADMELOG) corrective regimen sliding scale   SubCutaneous every 6 hours  ketamine Infusion. 2.999 mG/kG/Hr (20 mL/Hr) IV Continuous <Continuous>  lactulose Syrup 15 Gram(s) Enteral Tube every 12 hours  linezolid  IVPB 600 milliGRAM(s) IV Intermittent every 12 hours  meropenem  IVPB 1000 milliGRAM(s) IV Intermittent every 8 hours  meropenem  IVPB      methadone    Tablet 20 milliGRAM(s) Oral every 8 hours  metoclopramide Injectable 10 milliGRAM(s) IV Push every 8 hours  midazolam Infusion 0.02 mG/kG/Hr (1.33 mL/Hr) IV Continuous <Continuous>  norepinephrine Infusion 0.05 MICROgram(s)/kG/Min (3.13 mL/Hr) IV Continuous <Continuous>  pantoprazole  Injectable 40 milliGRAM(s) IV Push every 12 hours  petrolatum Ophthalmic Ointment 1 Application(s) Both EYES two times a day  phenylephrine    Infusion 0.1 MICROgram(s)/kG/Min (2.5 mL/Hr) IV Continuous <Continuous>  senna 2 Tablet(s) Oral at bedtime  vasopressin Infusion 0.04 Unit(s)/Min (2.4 mL/Hr) IV Continuous <Continuous>    MEDICATIONS  (PRN):      LABS:                        7.7    10.14 )-----------( 515      ( 04 Apr 2021 00:14 )             28.1     Hgb Trend: 7.7<--, 8.0<--, 8.1<--, 8.4<--, 8.0<--  04-04    147<H>  |  113<H>  |  14  ----------------------------<  226<H>  5.6<H>   |  23  |  1.29    Ca    8.0<L>      04 Apr 2021 00:15  Phos  5.4     04-04  Mg     2.0     04-04    TPro  6.4  /  Alb  2.0<L>  /  TBili  0.6  /  DBili  x   /  AST  169<H>  /  ALT  80<H>  /  AlkPhos  331<H>  04-04    Creatinine Trend: 1.29<--, 0.52<--, 0.46<--, 0.43<--, 0.35<--, 0.36<--  PT/INR - ( 03 Apr 2021 00:10 )   PT: 22.0 sec;   INR: 1.89 ratio         PTT - ( 03 Apr 2021 00:10 )  PTT:65.3 sec    Arterial Blood Gas:  04-04 @ 04:42  7.26/60/128/26/99/-.9  ABG lactate: --  Arterial Blood Gas:  04-04 @ 02:41  7.06/98/208/26/99/-4.6  ABG lactate: --  Arterial Blood Gas:  04-04 @ 00:09  7.12/80/196/25/99/-4.6  ABG lactate: --  Arterial Blood Gas:  04-03 @ 00:13  7.30/41/71/20/91/-5.7  ABG lactate: --  Arterial Blood Gas:  04-02 @ 19:32  7.35/38/77/21/94/-3.9  ABG lactate: --    Venous Blood Gas:  04-03 @ 23:55  7.10/87/44/26/69  VBG Lactate: 2.1

## 2021-04-04 NOTE — CHART NOTE - NSCHARTNOTEFT_GEN_A_CORE
: Gene Mustafa    INDICATION: Shock State    PROCEDURE:  [X] LIMITED ECHO  [ ] LIMITED CHEST  [ ] LIMITED RETROPERITONEAL  [ ] LIMITED ABDOMINAL  [ ] LIMITED DVT  [ ] NEEDLE GUIDANCE VASCULAR  [ ] NEEDLE GUIDANCE THORACENTESIS  [ ] NEEDLE GUIDANCE PARACENTESIS  [ ] NEEDLE GUIDANCE PERICARDIOCENTESIS  [ ] OTHER    FINDINGS: Normal LV function. RV size increased = LV size.    INTERPRETATION: Shock state, likely vasoplegic in nature.       Images uploaded to [facility]    Time spent on the procedure was separate from the critical care time spent for patient care.

## 2021-04-04 NOTE — PROGRESS NOTE ADULT - SUBJECTIVE AND OBJECTIVE BOX
Chief complaint  Patient is a 60y old  Male who presents with a chief complaint of hypoxia 2/2 COVID (04 Apr 2021 08:42)   Review of systems  Patient in bed, appears comfortable.    Labs and Fingersticks  CAPILLARY BLOOD GLUCOSE      POCT Blood Glucose.: 214 mg/dL (04 Apr 2021 16:47)  POCT Blood Glucose.: 195 mg/dL (04 Apr 2021 12:10)      Anion Gap, Serum: 13 (04-04 @ 14:17)  Anion Gap, Serum: 11 (04-04 @ 00:15)  Anion Gap, Serum: 19 *H* (04-03 @ 00:10)      Calcium, Total Serum: 7.1 *L* (04-04 @ 14:17)  Calcium, Total Serum: 8.0 *L* (04-04 @ 00:15)  Calcium, Total Serum: 7.9 *L* (04-03 @ 00:10)  Albumin, Serum: 1.6 *L* (04-04 @ 14:17)  Albumin, Serum: 2.0 *L* (04-04 @ 00:15)  Albumin, Serum: 2.0 *L* (04-03 @ 00:10)    Alanine Aminotransferase (ALT/SGPT): 78 *H* (04-04 @ 14:17)  Alanine Aminotransferase (ALT/SGPT): 80 *H* (04-04 @ 00:15)  Alanine Aminotransferase (ALT/SGPT): 60 *H* (04-03 @ 00:10)  Alkaline Phosphatase, Serum: 339 *H* (04-04 @ 14:17)  Alkaline Phosphatase, Serum: 331 *H* (04-04 @ 00:15)  Alkaline Phosphatase, Serum: 248 *H* (04-03 @ 00:10)  Aspartate Aminotransferase (AST/SGOT): 153 *H* (04-04 @ 14:17)  Aspartate Aminotransferase (AST/SGOT): 169 *H* (04-04 @ 00:15)  Aspartate Aminotransferase (AST/SGOT): 78 *H* (04-03 @ 00:10)        04-04    146<H>  |  114<H>  |  20  ----------------------------<  244<H>  4.7   |  19<L>  |  1.45<H>    Ca    7.1<L>      04 Apr 2021 14:17  Phos  3.4     04-04  Mg     1.8     04-04    TPro  5.7<L>  /  Alb  1.6<L>  /  TBili  0.9  /  DBili  x   /  AST  153<H>  /  ALT  78<H>  /  AlkPhos  339<H>  04-04                        8.0    8.03  )-----------( 407      ( 04 Apr 2021 14:17 )             28.0     Medications  MEDICATIONS  (STANDING):  buMETAnide Infusion 2 mG/Hr (10 mL/Hr) IV Continuous <Continuous>  chlorhexidine 0.12% Liquid 15 milliLiter(s) Oral Mucosa every 12 hours  chlorhexidine 2% Cloths 1 Application(s) Topical <User Schedule>  cholecalciferol 2000 Unit(s) Oral daily  cisatracurium Infusion 2.999 MICROgram(s)/kG/Min (12 mL/Hr) IV Continuous <Continuous>  dexMEDEtomidine Infusion 0.012 MICROgram(s)/kG/Hr (0.2 mL/Hr) IV Continuous <Continuous>  influenza   Vaccine 0.5 milliLiter(s) IntraMuscular once  insulin lispro (ADMELOG) corrective regimen sliding scale   SubCutaneous every 6 hours  ketamine Infusion. 2.999 mG/kG/Hr (20 mL/Hr) IV Continuous <Continuous>  lactulose Syrup 15 Gram(s) Enteral Tube every 12 hours  linezolid  IVPB 600 milliGRAM(s) IV Intermittent every 12 hours  meropenem  IVPB      meropenem  IVPB 1000 milliGRAM(s) IV Intermittent every 8 hours  methadone    Tablet 20 milliGRAM(s) Oral every 8 hours  metoclopramide Injectable 10 milliGRAM(s) IV Push every 8 hours  midazolam Infusion 0.02 mG/kG/Hr (1.33 mL/Hr) IV Continuous <Continuous>  norepinephrine Infusion 0.05 MICROgram(s)/kG/Min (3.13 mL/Hr) IV Continuous <Continuous>  pantoprazole  Injectable 40 milliGRAM(s) IV Push every 12 hours  petrolatum Ophthalmic Ointment 1 Application(s) Both EYES two times a day  phenylephrine    Infusion 0.1 MICROgram(s)/kG/Min (2.5 mL/Hr) IV Continuous <Continuous>  senna 2 Tablet(s) Oral at bedtime  vasopressin Infusion 0.04 Unit(s)/Min (2.4 mL/Hr) IV Continuous <Continuous>      Physical Exam  General: Patient comfortable in bed  Vital Signs Last 12 Hrs  T(F): 97.7 (04-04-21 @ 12:00), Max: 98.4 (04-04-21 @ 07:00)  HR: 87 (04-04-21 @ 15:08) (85 - 94)  BP: --  BP(mean): --  RR: 38 (04-04-21 @ 15:00) (38 - 38)  SpO2: 100% (04-04-21 @ 15:08) (100% - 100%)  Neck: No palpable thyroid nodules.  CVS: S1S2, No murmurs  Respiratory: No wheezing, no crepitations  GI: Abdomen soft, bowel sounds positive  Musculoskeletal:  edema lower extremities.     Diagnostics    Free Thyroxine, Serum: AM Sched. Collection: 29-Mar-2021 06:00 (03-28 @ 16:46)  Thyroid Stimulating Hormone, Serum: AM Sched. Collection: 29-Mar-2021 06:00 (03-28 @ 16:46)

## 2021-04-04 NOTE — PROGRESS NOTE ADULT - ATTENDING COMMENTS
Critically ill patient with frequent bedside visits. Patient seen and examined on rounds and plan of care discussed with team.     Pt is a 59 yo M with no known PMHx presented on 3/8 to [facility] 2 to fevers, weakness, fatigue, shortness of breath for 5 days. Tested positive for Covid-19 on 3/5. Pt was seen at urgent care and noted to be hypoxic and sent to the ER.  Admitting dx: acute hypoxemic resp failure 2 to COVID 19. Hospital course complicated by PE and superimposed bacterial PNA. RRT on 3/17 2 to worsening hypoxemia; pt Intubated and transferred to the ICU.     Resp/Social: Family is leaning towards trach but want to discuss it further. Will defer after bacteremia is cleared and hemodynamics are stabilized. Overnight went into respiratory acidosis, likely due to plugging and being on pressure control, switched to PRVC by overnight team with resolution of respiratory acidosis.   ID:  Patient with VRE bacteremia and worsening shock state. Antibiotics switched to Linezolid and Meropenem. May need line changes and TTE/TTE if bacteremia persistent.   CVS:  Now on multiple pressors. Likely vasoplegic 2/2 CRE bacteremia. Pressors requirements stable.   Heme: Hb dropping again, will hold AC for now, obtain CT CHEST/ABDOMEN/PELVIS to assess for possible bleeding. Transfuse pRBC and recheck Hb.   FEN: Restart trickle feeds  Endo: Follow FS  GI: Will wait for CT Abdomen.   Renal: UOP reduced, will check CT Abdomen. Diuresis.

## 2021-04-05 NOTE — PROGRESS NOTE ADULT - SUBJECTIVE AND OBJECTIVE BOX
Follow-up Pulm Progress Note    Intubated/sedated  Worsening acidosis today      Medications:  MEDICATIONS  (STANDING):  chlorhexidine 0.12% Liquid 15 milliLiter(s) Oral Mucosa every 12 hours  chlorhexidine 2% Cloths 1 Application(s) Topical <User Schedule>  cholecalciferol 2000 Unit(s) Oral daily  cisatracurium Infusion 2.999 MICROgram(s)/kG/Min (12 mL/Hr) IV Continuous <Continuous>  CRRT Treatment    <Continuous>  dexMEDEtomidine Infusion 0.012 MICROgram(s)/kG/Hr (0.2 mL/Hr) IV Continuous <Continuous>  influenza   Vaccine 0.5 milliLiter(s) IntraMuscular once  insulin lispro (ADMELOG) corrective regimen sliding scale   SubCutaneous every 6 hours  ketamine Infusion. 2.999 mG/kG/Hr (20 mL/Hr) IV Continuous <Continuous>  lactulose Syrup 15 Gram(s) Enteral Tube every 12 hours  linezolid  IVPB 600 milliGRAM(s) IV Intermittent every 12 hours  meropenem  IVPB 1000 milliGRAM(s) IV Intermittent every 12 hours  methadone    Tablet 20 milliGRAM(s) Oral every 8 hours  midazolam Infusion 0.02 mG/kG/Hr (1.33 mL/Hr) IV Continuous <Continuous>  naloxegol 12.5 milliGRAM(s) Oral daily  norepinephrine Infusion 0.05 MICROgram(s)/kG/Min (3.13 mL/Hr) IV Continuous <Continuous>  pantoprazole  Injectable 40 milliGRAM(s) IV Push every 12 hours  petrolatum Ophthalmic Ointment 1 Application(s) Both EYES two times a day  phenylephrine    Infusion 0.1 MICROgram(s)/kG/Min (2.5 mL/Hr) IV Continuous <Continuous>  PrismaSATE Dialysate BK 0 / 3.5 5000 milliLiter(s) (2400 mL/Hr) CRRT <Continuous>  PrismaSOL Filtration BGK 0 / 2.5 5000 milliLiter(s) (1800 mL/Hr) CRRT <Continuous>  PrismaSOL Filtration BGK 0 / 2.5 5000 milliLiter(s) (200 mL/Hr) CRRT <Continuous>  senna 2 Tablet(s) Oral at bedtime  sodium bicarbonate  Infusion 0.169 mEq/kG/Hr (75 mL/Hr) IV Continuous <Continuous>  sodium bicarbonate  Injectable 50 milliEquivalent(s) IV Push once  vasopressin Infusion 0.04 Unit(s)/Min (2.4 mL/Hr) IV Continuous <Continuous>      Mode: AC/ CMV (Assist Control/ Continuous Mandatory Ventilation)  RR (machine): 38  TV (machine): 380  FiO2: 50  PEEP: 5  ITime: 1  MAP: 9  PIP: 44      Vital Signs Last 24 Hrs  T(C): 37 (05 Apr 2021 04:00), Max: 37 (05 Apr 2021 04:00)  T(F): 98.6 (05 Apr 2021 04:00), Max: 98.6 (05 Apr 2021 04:00)  HR: 96 (05 Apr 2021 11:45) (82 - 100)  BP: --  BP(mean): --  RR: 38 (05 Apr 2021 11:45) (0 - 38)  SpO2: 86% (05 Apr 2021 11:45) (68% - 100%)    ABG - ( 05 Apr 2021 10:45 )  pH, Arterial: 6.89  pH, Blood: x     /  pCO2: 41    /  pO2: 81    / HCO3: 8     / Base Excess: -23.8 /  SaO2: 86                VBG pH 6.84 04-05 @ 12:08    VBG pCO2 66 04-05 @ 12:08    VBG O2 sat 63 04-05 @ 12:08    VBG lactate 19.0 04-05 @ 12:08  VBG pH 7.10 04-03 @ 23:55    VBG pCO2 87 04-03 @ 23:55    VBG O2 sat 69 04-03 @ 23:55    VBG lactate 2.1 04-03 @ 23:55      04-04 @ 07:01  -  04-05 @ 07:00  --------------------------------------------------------  IN: 3602.4 mL / OUT: 555 mL / NET: 3047.4 mL          LABS:                        8.6    14.07 )-----------( 496      ( 05 Apr 2021 06:27 )             31.7     04-05    143  |  108  |  26<H>  ----------------------------<  84  5.5<H>   |  <10<LL>  |  1.89<H>    Ca    7.7<L>      05 Apr 2021 06:27  Phos  6.4     04-05  Mg     2.0     04-05    TPro  6.0  /  Alb  1.4<L>  /  TBili  1.1  /  DBili  x   /  AST  160<H>  /  ALT  86<H>  /  AlkPhos  420<H>  04-05      CARDIAC MARKERS ( 05 Apr 2021 00:28 )  x     / x     / 143 U/L / x     / x          CAPILLARY BLOOD GLUCOSE      POCT Blood Glucose.: 111 mg/dL (05 Apr 2021 05:25)    PT/INR - ( 05 Apr 2021 00:28 )   PT: 26.0 sec;   INR: 2.25 ratio         PTT - ( 05 Apr 2021 06:27 )  PTT:186.5 sec    Procalcitonin, Serum: 5.57 ng/mL (04-05-21 @ 00:28)        CULTURES:     Culture - Blood (collected 04-03-21 @ 08:51)  Source: .Blood Blood-Peripheral  Gram Stain (04-04-21 @ 21:19):    Growth in anaerobic bottle: Gram Positive Cocci in Clusters  Preliminary Report (04-04-21 @ 21:20):    Growth in anaerobic bottle: Gram Positive Cocci in Clusters    ***Blood Panel PCR results on this specimen are available    approximately 3 hours after the Gram stain result.***    Gram stain, PCR, and/or culture results may not always    correspond due todifference in methodologies.    ************************************************************    This PCR assay was performed by multiplex PCR. This    Assay tests for 66 bacterial and resistance gene targets.    Please refer to the [facility] testdirectory    at [email or web address] for details.  Organism: Blood Culture PCR (04-04-21 @ 23:11)  Organism: Blood Culture PCR (04-04-21 @ 23:11)      -  Staphylococcus epidermidis, Methicillin resistant: Detec      Method Type: PCR    Culture - Blood (collected 04-01-21 @ 22:58)  Source: .Blood Blood-Peripheral  Gram Stain (04-02-21 @ 19:51):    Growth in anaerobic bottle: Gram Positive Cocci in Pairs and Chains  Final Report (04-03-21 @ 18:01):    Growth in anaerobic bottle: Enterococcus faecium (vancomycin resistant)    See previous culture [id number]    Culture - Blood (collected 03-31-21 @ 22:50)  Source: .Blood Blood-Peripheral  Gram Stain (04-01-21 @ 14:02):    Growth in aerobic bottle: Gram Positive Cocci in Pairs and Chains  Final Report (04-03-21 @ 09:22):    Growth in aerobic bottle: Enterococcus faecium (vancomycin resistant)  Organism: Enterococcus faecium (vancomycin resistant) (04-03-21 @ 09:22)  Organism: Enterococcus faecium (vancomycin resistant) (04-03-21 @ 09:22)      -  Ampicillin: R >8 Predicts results to ampicillin/sulbactam, amoxacillin-clavulanate and  piperacillin-tazobactam.      -  Daptomycin: SDD 4 The breakpoint for SDD (sensitive dose dependent)is based on a dosage regimen of 8-12 mg/kg administered every 24 h in adults and is intended for serious infections due to E. faecium. Consultation with an infectious diseases specialist is recommended.      -  Gentamicin synergy: S      -  Linezolid: S 2      -  Vancomycin: R >16      Method Type: RADHIKA    Culture - Blood (collected 03-31-21 @ 22:50)  Source: .Blood Blood-Peripheral  Preliminary Report (04-01-21 @ 23:01):    No growth to date.    Culture - Blood (collected 03-29-21 @ 15:35)  Source: .Blood Blood-Peripheral  Gram Stain (03-30-21 @ 06:31):    Growth in anaerobic bottle: Gram Positive Cocci in Pairs and Chains    Growth in aerobic bottle: Gram Positive Cocci in Pairs and Chains  Final Report (04-04-21 @ 07:48):    Growth in aerobic and anaerobic bottles: Enterococcus raffinosus    ***Blood Panel PCR results on this specimen are available    approximately 3 hours after the Gram stain result.***    Gram stain, PCR, and/or culture results may not always    correspond due to difference in methodologies.    ************************************************************    This PCR assay was performed by multiplex PCR. This    Assay tests for 66 bacterial and resistance gene targets.    Please refer to the [facility] Labstest directory    at [email or web address] for details.  Organism: Blood Culture PCR  Enterococcus raffinosus (04-04-21 @ 07:48)  Organism: Enterococcus raffinosus (04-04-21 @ 07:48)      -  Ampicillin: R >8 Predicts results to ampicillin/sulbactam, amoxacillin-clavulanate and  piperacillin-tazobactam.      -  Gentamicin synergy: S      -  Vancomycin: S 1      Method Type: RADHIKA  Organism: Blood Culture PCR (04-04-21 @ 07:48)      -  Enterococcus species: Detec      Method Type: PCR    Culture - Blood (collected 03-29-21 @ 15:35)  Source: .Blood Blood-Peripheral  Gram Stain (03-30-21 @ 05:40):    Growth in aerobic and anaerobic bottles: Gram Positive Cocci in Pairs and    Chains  Final Report (04-04-21 @ 07:49):    Growth in aerobic and anaerobic bottles: Enterococcus raffinosus    See previous culture [id number]    Culture - Blood (collected 03-27-21 @ 13:08)  Source: .Blood Blood-Peripheral  Final Report (04-01-21 @ 14:00):    No Growth Final    Culture - Blood (collected 03-27-21 @ 13:08)  Source: .Blood Blood-Peripheral  Gram Stain (03-30-21 @ 10:36):    Growth in anaerobic bottle: Gram Negative Rods  Final Report (03-31-21 @ 10:52):    Growth in anaerobic bottle: Bacteroides thetaiotamcron group          Physical Examination:  PULM: Coarse bilaterally   CVS: S1, S2 heard      RADIOLOGY REVIEWED  CT chest: < from: CT Chest No Cont (04.04.21 @ 20:17) >  FINDINGS:    Evaluation of the solid visceral organs and vasculature is limited without the administration of intravenous contrast.    CHEST:  LUNGS AND LARGE AIRWAYS: ET tube terminates below the thoracic inlet and above the dariel. Patent central airways. Emphysema. Redemonstrated bronchiectasis, diffuse bilateral groundglass opacities and septal thickening. New since 03/12/2021, there are several areas of dependent more consolidativeopacity.  PLEURA: Small to moderate bilateral pleural effusions, new.  VESSELS: Atherosclerotic change of a normal caliber thoracic aorta. Main pulmonary artery is normal in caliber.  HEART: Heart size is normal. No pericardial effusion.  MEDIASTINUMAND NIKOLAS: Several prominent mediastinal nodes, as on 03/12/2021, likely reactive. Enteric tube terminates in the stomach.  CHEST WALL AND LOWER NECK: A right-sided central line terminates in the SVC.    ABDOMEN AND PELVIS:  LIVER: Within normal limits.  BILE DUCTS: Normal caliber.  GALLBLADDER: Tiny layering sludge versus vicarious excretion of contrast from prior studies.  SPLEEN: Within normal limits.  PANCREAS: Within normal limits.  ADRENALS: Mild nodular thickening bilaterally, unchanged.  KIDNEYS/URETERS: Within normal limits.    BLADDER: Collapsed around a Ocates catheter.  REPRODUCTIVE ORGANS: Normal size prostate.    BOWEL: Enteric tube terminates in the stomach. Interval resolution of the small bowel obstruction since 03/28/2021. There is a mildly prominent fluid-filled loop of bowel in the anterior abdomen with gradual transition to underdistended bowel, but no significant upstream dilatation to suggest obstruction. Colonic diverticulosis, concentrated in the cecum, without acute diverticulitis. No bowel obstruction. Appendix is normal.  PERITONEUM: Trace ascites, largely perihepatic.  VESSELS: Atherosclerotic change.  RETROPERITONEUM/LYMPH NODES: No lymphadenopathy.  ABDOMINAL WALL: Anasarca  BONES: Degenerative changes.    IMPRESSION:  Limited noncontrast study.    Redemonstrated bronchiectasis, diffuse bilateral groundglass opacities and septal thickening. Since 03/12/2021, there are several areas of dependent more consolidative opacity.    New small to moderate bilateral pleural effusions.    Trace abdominopelvic ascites. Anasarca.    < end of copied text >

## 2021-04-05 NOTE — CONSULT NOTE ADULT - ATTENDING COMMENTS
Patient seen and examined, agree with above. Patient H/H stable despite some bloody BMs. He also has sudden elevation in liver enzymes, mostly hepatocellular, ?ischemic hepatopathy. Would check LDH, viral hepatitis panel. Ischemic colitis also is in differential. If bleeding continues or WBC continues to go up, consider CT A/P vs endoscopic evaluation. Continue board spectrum antibiotics.
Severe multiorgan system failure, lactic acidosis and ARF (placed on CRRT)  1.  Lactic acidosis--severe.  Keep pH >7 with HCO3 pushes if needed.  High clearance CRRT  2.  ARF--CVVHDF tailored to increase Ca, decrease K  3.  Hypotension--get ionized Ca>1.1 to improve pressor response  4.  Respiratory failure, acute hypoxemic--unable to remove volume, poor lung compliance    Bleak prognosis discussed with MICU team, attending and family    CVVHDF  Catheter placed.  Orders and goals reviewed with primary RN  Initiated without problem
pt seen and examined; agree with above. I saw patient at bedside with NP Rosaura, he is on bipap 12/6/100 satting 98 and comfortable not using any accessory muscles. ABG ordered, is proning and comfortable. imaging reviewed, starting zosyn, on decadron, s/p remdesivir, can stay on floors for now on Bipap/High flow. please reconsult if status changes.
60M with COVID PNA, hypoxia, leukocytosis  -possible bacterial PNA given leukocytosis/worsening PCT  -cont zosyn 3.375 gm iv q8  -s/p remdesevir  -cont dexamethasone  -check bcx and sputum cx if possible   -add vancomycin if fever or worsening WBC     Rj Rouse  Attending Physician   Division of Infectious Disease  Pager #[phone]  Available on [employer] Teams also  After 5pm/weekend or no response, call #[phone]

## 2021-04-05 NOTE — PROGRESS NOTE ADULT - NUTRITIONAL ASSESSMENT
This patient has been assessed with a concern for Malnutrition and has been determined to have a diagnosis/diagnoses of Mild protein-calorie malnutrition.      
This patient has been assessed with a concern for Malnutrition and has been determined to have a diagnosis/diagnoses of Mild protein-calorie malnutrition.      
This patient has been assessed with a concern for Malnutrition and has been determined to have a diagnosis/diagnoses of Moderate protein-calorie malnutrition.    This patient is being managed with:   Diet NPO-  Entered: Mar 31 2021 11:01PM    
This patient has been assessed with a concern for Malnutrition and has been determined to have a diagnosis/diagnoses of Mild protein-calorie malnutrition.    This patient is being managed with:   Diet NPO with Tube Feed-  Tube Feeding Modality: Nasogastric  Vital AF (VITALAFRTH)  Total Volume for 24 Hours (mL): 720  Continuous  Starting Tube Feed Rate {mL per Hour}: 10     Every 4 hours  Until Goal Tube Feed Rate (mL per Hour): 30  Tube Feed Duration (in Hours): 24  Tube Feed Start Time: 17:00  Supplement Feeding Modality:  Nasogastric  Probiotic Yogurt/Smoothie Cans or Servings Per Day:  2       Frequency:  Daily  Entered: Mar 21 2021  4:47PM    
This patient has been assessed with a concern for Malnutrition and has been determined to have a diagnosis/diagnoses of Moderate protein-calorie malnutrition.    This patient is being managed with:   Diet NPO with Tube Feed-  Tube Feeding Modality: Nasogastric  Nepro with Carb Steady (NEPRORTH)  Total Volume for 24 Hours (mL): 1080  Continuous  Starting Tube Feed Rate {mL per Hour}: 10     Every 4 hours  Until Goal Tube Feed Rate (mL per Hour): 45  Tube Feed Duration (in Hours): 24  Tube Feed Start Time: 17:00  Supplement Feeding Modality:  Nasogastric  Probiotic Yogurt/Smoothie Cans or Servings Per Day:  2       Frequency:  Daily  Entered: Mar 25 2021  9:00AM    
This patient has been assessed with a concern for Malnutrition and has been determined to have a diagnosis/diagnoses of Moderate protein-calorie malnutrition.    This patient is being managed with:   Diet NPO with Tube Feed-  Tube Feeding Modality: Nasogastric  Nepro with Carb Steady (NEPRORTH)  Total Volume for 24 Hours (mL): 1080  Continuous  Starting Tube Feed Rate {mL per Hour}: 10     Every 4 hours  Until Goal Tube Feed Rate (mL per Hour): 45  Tube Feed Duration (in Hours): 24  Tube Feed Start Time: 17:00  Supplement Feeding Modality:  Nasogastric  Probiotic Yogurt/Smoothie Cans or Servings Per Day:  2       Frequency:  Daily  Entered: Mar 25 2021  9:00AM    
This patient has been assessed with a concern for Malnutrition and has been determined to have a diagnosis/diagnoses of Moderate protein-calorie malnutrition.    This patient is being managed with:   Diet NPO with Tube Feed-  Tube Feeding Modality: Nasogastric  Vital AF (VITALAFRTH)  Total Volume for 24 Hours (mL): 240  Continuous  Starting Tube Feed Rate {mL per Hour}: 10  Increase Tube Feed Rate by (mL): 10     Every 4 hours  Until Goal Tube Feed Rate (mL per Hour): 10  Tube Feed Duration (in Hours): 24  Tube Feed Start Time: 10:30  Entered: Apr 2 2021  4:01PM    
This patient has been assessed with a concern for Malnutrition and has been determined to have a diagnosis/diagnoses of Moderate protein-calorie malnutrition.    This patient is being managed with:   Diet NPO-  Entered: Mar 28 2021  5:43PM    
This patient has been assessed with a concern for Malnutrition and has been determined to have a diagnosis/diagnoses of Mild protein-calorie malnutrition.    This patient is being managed with:   Diet NPO with Tube Feed-  Tube Feeding Modality: Orogastric  Vital AF (VITALAFRTH)  Total Volume for 24 Hours (mL): 960  Continuous  Starting Tube Feed Rate {mL per Hour}: 10  Increase Tube Feed Rate by (mL): 10     Every 4 hours  Until Goal Tube Feed Rate (mL per Hour): 40  Tube Feed Duration (in Hours): 24  Tube Feed Start Time: 10:30  Entered: Mar 18 2021  4:29PM    
This patient has been assessed with a concern for Malnutrition and has been determined to have a diagnosis/diagnoses of Moderate protein-calorie malnutrition.    This patient is being managed with:   Diet NPO with Tube Feed-  Tube Feeding Modality: Nasogastric  Nepro with Carb Steady (NEPRORTH)  Total Volume for 24 Hours (mL): 1080  Continuous  Starting Tube Feed Rate {mL per Hour}: 10     Every 4 hours  Until Goal Tube Feed Rate (mL per Hour): 45  Tube Feed Duration (in Hours): 24  Tube Feed Start Time: 17:00  Supplement Feeding Modality:  Nasogastric  Probiotic Yogurt/Smoothie Cans or Servings Per Day:  2       Frequency:  Daily  Entered: Mar 25 2021  9:00AM    
This patient has been assessed with a concern for Malnutrition and has been determined to have a diagnosis/diagnoses of Moderate protein-calorie malnutrition.    This patient is being managed with:   Diet NPO with Tube Feed-  Tube Feeding Modality: Nasogastric  Vital AF (VITALAFRTH)  Total Volume for 24 Hours (mL): 240  Continuous  Starting Tube Feed Rate {mL per Hour}: 10  Increase Tube Feed Rate by (mL): 10     Every 4 hours  Until Goal Tube Feed Rate (mL per Hour): 10  Tube Feed Duration (in Hours): 24  Tube Feed Start Time: 10:30  Entered: Apr 2 2021  4:01PM    
This patient has been assessed with a concern for Malnutrition and has been determined to have a diagnosis/diagnoses of Moderate protein-calorie malnutrition.    This patient is being managed with:   Diet NPO-  Entered: Mar 31 2021 11:01PM    
This patient has been assessed with a concern for Malnutrition and has been determined to have a diagnosis/diagnoses of Mild protein-calorie malnutrition.      
This patient has been assessed with a concern for Malnutrition and has been determined to have a diagnosis/diagnoses of Mild protein-calorie malnutrition.    This patient is being managed with:   Diet NPO with Tube Feed-  Tube Feeding Modality: Nasogastric  Vital AF (VITALAFRTH)  Total Volume for 24 Hours (mL): 720  Continuous  Starting Tube Feed Rate {mL per Hour}: 10     Every 4 hours  Until Goal Tube Feed Rate (mL per Hour): 30  Tube Feed Duration (in Hours): 24  Tube Feed Start Time: 17:00  Supplement Feeding Modality:  Nasogastric  Probiotic Yogurt/Smoothie Cans or Servings Per Day:  2       Frequency:  Daily  Entered: Mar 21 2021  4:47PM    
This patient has been assessed with a concern for Malnutrition and has been determined to have a diagnosis/diagnoses of Mild protein-calorie malnutrition.    This patient is being managed with:   Diet NPO with Tube Feed-  Tube Feeding Modality: Nasogastric  Vital AF (VITALAFRTH)  Total Volume for 24 Hours (mL): 720  Continuous  Starting Tube Feed Rate {mL per Hour}: 10     Every 4 hours  Until Goal Tube Feed Rate (mL per Hour): 30  Tube Feed Duration (in Hours): 24  Tube Feed Start Time: 17:00  Supplement Feeding Modality:  Nasogastric  Probiotic Yogurt/Smoothie Cans or Servings Per Day:  2       Frequency:  Daily  Entered: Mar 21 2021  4:47PM    
This patient has been assessed with a concern for Malnutrition and has been determined to have a diagnosis/diagnoses of Moderate protein-calorie malnutrition.      
This patient has been assessed with a concern for Malnutrition and has been determined to have a diagnosis/diagnoses of Moderate protein-calorie malnutrition.    This patient is being managed with:   Diet NPO with Tube Feed-  Tube Feeding Modality: Nasogastric  Vital AF (VITALAFRTH)  Total Volume for 24 Hours (mL): 240  Continuous  Starting Tube Feed Rate {mL per Hour}: 10  Increase Tube Feed Rate by (mL): 10     Every 4 hours  Until Goal Tube Feed Rate (mL per Hour): 10  Tube Feed Duration (in Hours): 24  Tube Feed Start Time: 10:30  Entered: Apr 2 2021  4:01PM    
This patient has been assessed with a concern for Malnutrition and has been determined to have a diagnosis/diagnoses of Moderate protein-calorie malnutrition.    This patient is being managed with:   Diet NPO with Tube Feed-  Tube Feeding Modality: Nasogastric  Vital AF (VITALAFRTH)  Total Volume for 24 Hours (mL): 240  Continuous  Starting Tube Feed Rate {mL per Hour}: 10  Until Goal Tube Feed Rate (mL per Hour): 10  Tube Feed Duration (in Hours): 24  Tube Feed Start Time: 10:30  Entered: Mar 30 2021 10:28AM    
This patient has been assessed with a concern for Malnutrition and has been determined to have a diagnosis/diagnoses of Moderate protein-calorie malnutrition.    This patient is being managed with:   Diet NPO-  Entered: Mar 28 2021  5:43PM    
This patient has been assessed with a concern for Malnutrition and has been determined to have a diagnosis/diagnoses of Moderate protein-calorie malnutrition.    This patient is being managed with:   Diet NPO with Tube Feed-  Tube Feeding Modality: Nasogastric  Nepro with Carb Steady (NEPRORTH)  Total Volume for 24 Hours (mL): 1080  Continuous  Starting Tube Feed Rate {mL per Hour}: 10     Every 4 hours  Until Goal Tube Feed Rate (mL per Hour): 45  Tube Feed Duration (in Hours): 24  Tube Feed Start Time: 17:00  Supplement Feeding Modality:  Nasogastric  Probiotic Yogurt/Smoothie Cans or Servings Per Day:  2       Frequency:  Daily  Entered: Mar 25 2021  9:00AM

## 2021-04-05 NOTE — PROGRESS NOTE ADULT - SUBJECTIVE AND OBJECTIVE BOX
Follow Up:  Bacteremia    Interval History:    REVIEW OF SYSTEMS  [  ] ROS unobtainable because:    [  ] All other systems negative except as noted below    Constitutional:  [ ] fever [ ] chills  [ ] weight loss  [ ] weakness  Skin:  [ ] rash [ ] phlebitis	  Eyes: [ ] icterus [ ] pain  [ ] discharge	  ENMT: [ ] sore throat  [ ] thrush [ ] ulcers [ ] exudates  Respiratory: [ ] dyspnea [ ] hemoptysis [ ] cough [ ] sputum	  Cardiovascular:  [ ] chest pain [ ] palpitations [ ] edema	  Gastrointestinal:  [ ] nausea [ ] vomiting [ ] diarrhea [ ] constipation [ ] pain	  Genitourinary:  [ ] dysuria [ ] frequency [ ] hematuria [ ] discharge [ ] flank pain  [ ] incontinence  Musculoskeletal:  [ ] myalgias [ ] arthralgias [ ] arthritis  [ ] back pain  Neurological:  [ ] headache [ ] seizures  [ ] confusion/altered mental status    Allergies  No Known Allergies        ANTIMICROBIALS:  DAPTOmycin IVPB 550 once  DAPTOmycin IVPB    meropenem  IVPB 1000 every 8 hours      OTHER MEDS:  MEDICATIONS  (STANDING):  cisatracurium Infusion 2.999 <Continuous>  dexMEDEtomidine Infusion 0.012 <Continuous>  heparin   Injectable 5500 once  heparin   Injectable 5500 every 6 hours PRN  heparin   Injectable 2500 every 6 hours PRN  heparin  Infusion. 600 <Continuous>  influenza   Vaccine 0.5 once  insulin lispro (ADMELOG) corrective regimen sliding scale  every 6 hours  ketamine Infusion. 2.999 <Continuous>  lactulose Syrup 15 every 12 hours  methadone    Tablet 20 every 8 hours  midazolam Infusion 0.02 <Continuous>  naloxegol 12.5 daily  norepinephrine Infusion 0.05 <Continuous>  pantoprazole  Injectable 40 every 12 hours  phenylephrine    Infusion 0.1 <Continuous>  senna 2 at bedtime  vasopressin Infusion 0.04 <Continuous>      Vital Signs Last 24 Hrs  T(C): 37 (05 Apr 2021 04:00), Max: 37 (05 Apr 2021 04:00)  T(F): 98.6 (05 Apr 2021 04:00), Max: 98.6 (05 Apr 2021 04:00)  HR: 82 (05 Apr 2021 16:45) (77 - 101)  BP: --  BP(mean): --  RR: 38 (05 Apr 2021 16:45) (0 - 38)  SpO2: 85% (05 Apr 2021 16:45) (68% - 100%)    PHYSICAL EXAMINATION:  General: Alert and Awake, NAD  HEENT: PERRL, EOMI  Neck: Supple  Cardiac: RRR, No M/R/G  Resp: CTAB, No Wh/Rh/Ra  Abdomen: NBS, NT/ND, No HSM, No rigidity or guarding  MSK: No LE edema. No Calf tenderness  : No johnson  Skin: No rashes or lesions. Skin is warm and dry to the touch.   Neuro: Alert and Awake. CN 2-12 Grossly intact. Moves all four extremities spontaneously.  Psych: Calm, Pleasant, Cooperative                          7.4    13.35 )-----------( 351      ( 05 Apr 2021 14:40 )             27.8       04-05    145  |  106  |  24<H>  ----------------------------<  198<H>  5.0   |  <10<LL>  |  1.78<H>    Ca    7.0<L>      05 Apr 2021 14:40  Phos  6.4     04-05  Mg     2.0     04-05    TPro  5.2<L>  /  Alb  1.4<L>  /  TBili  1.3<H>  /  DBili  x   /  AST  3287<H>  /  ALT  637<H>  /  AlkPhos  403<H>  04-05          MICROBIOLOGY:  v  .Sputum Sputum  04-04-21   Normal Respiratory Mariele present  --    No Squamous epithelial cells per low power field  Few polymorphonuclear leukocytes per low power field  No organisms seen per oil power field      .Blood Blood-Peripheral  04-03-21   Growth in anaerobic bottle: Staphylococcus epidermidis  Coag Negative Staphylococcus  Single set isolate, possible contaminant. Contact  Microbiology if susceptibility testing clinically  indicated.  ***Blood Panel PCR results on this specimen are available  approximately 3 hours after the Gram stain result.***  Gram stain, PCR, and/or culture results may not always  correspond due to difference in methodologies.  ************************************************************  This PCR assay was performed by multiplex PCR. This  Assay tests for 66 bacterial and resistance gene targets.  Please refer to the [facility] test directory  at [email or web address] for details.  --  Blood Culture PCR      .Blood Blood-Peripheral  04-01-21   Growth in anaerobic bottle: Enterococcus faecium (vancomycin resistant)  See previous culture [id number]  --    Growth in anaerobic bottle: Gram Positive Cocci in Pairs and Chains      .Blood Blood-Peripheral  03-31-21   No growth to date.  --  Enterococcus faecium (vancomycin resistant)      .Blood Blood-Peripheral  03-29-21   Growth in aerobic and anaerobic bottles: Enterococcus raffinosus  See previous culture [id number]  --  Blood Culture PCR  Enterococcus raffinosus      .Blood Blood-Peripheral  03-27-21   Growth in anaerobic bottle: Bacteroides thetaiotamcron group  --    Growth in anaerobic bottle: Gram Negative Rods      .Sputum Sputum  03-25-21   Normal Respiratory Marilee present  --    Few polymorphonuclear leukocytes per low power field  Rare Squamous epithelial cells per low power field  Rare Gram positive cocci in pairs per oil power field  Rare Gram Variable Rods per oil power field      .Blood Blood-Peripheral  03-25-21   Growth in aerobic and anaerobic bottles: Escherichia coli  ***Blood Panel PCR results on this specimen are available  approximately 3 hours after the Gram stain result.***  Gram stain, PCR, and/or culture results may not always  correspond due to difference in methodologies.  ************************************************************  This PCR assay was performed by multiplex PCR. This  Assay tests for 66 bacterial and resistance gene targets.  Please refer to the [facility] test directory  at [email or web address] for details.  --  Blood Culture PCR  Escherichia coli      .Blood Blood-Peripheral  03-23-21   No Growth Final  --  --      .Sputum Sputum  03-22-21   Normal Respiratory Marilee absent  --    Rare polymorphonuclear leukocytes per low power field  Rare Squamous epithelial cells per low power field  No organisms seen per oil power field      .Blood Blood-Peripheral  03-21-21   No Growth Final  --  --      .Blood Blood-Venous  03-16-21   No Growth Final  --  --      .Blood Blood  03-13-21   No Growth Final  --  --      .Blood Blood-Peripheral  03-08-21   No Growth Final  --  --                RADIOLOGY:    <The imaging below has been reviewed and visualized by me independently. Findings as detailed in report below> Follow Up:  Bacteremia    Interval History: initiated on CRRT today. remains intubated and vasopressors.     REVIEW OF SYSTEMS  [ x ] ROS unobtainable because:  intubated and sedated  [  ] All other systems negative except as noted below    Constitutional:  [ ] fever [ ] chills  [ ] weight loss  [ ] weakness  Skin:  [ ] rash [ ] phlebitis	  Eyes: [ ] icterus [ ] pain  [ ] discharge	  ENMT: [ ] sore throat  [ ] thrush [ ] ulcers [ ] exudates  Respiratory: [ ] dyspnea [ ] hemoptysis [ ] cough [ ] sputum	  Cardiovascular:  [ ] chest pain [ ] palpitations [ ] edema	  Gastrointestinal:  [ ] nausea [ ] vomiting [ ] diarrhea [ ] constipation [ ] pain	  Genitourinary:  [ ] dysuria [ ] frequency [ ] hematuria [ ] discharge [ ] flank pain  [ ] incontinence  Musculoskeletal:  [ ] myalgias [ ] arthralgias [ ] arthritis  [ ] back pain  Neurological:  [ ] headache [ ] seizures  [ ] confusion/altered mental status    Allergies  No Known Allergies        ANTIMICROBIALS:  DAPTOmycin IVPB 550 once  DAPTOmycin IVPB    meropenem  IVPB 1000 every 8 hours      OTHER MEDS:  MEDICATIONS  (STANDING):  cisatracurium Infusion 2.999 <Continuous>  dexMEDEtomidine Infusion 0.012 <Continuous>  heparin   Injectable 5500 once  heparin   Injectable 5500 every 6 hours PRN  heparin   Injectable 2500 every 6 hours PRN  heparin  Infusion. 600 <Continuous>  influenza   Vaccine 0.5 once  insulin lispro (ADMELOG) corrective regimen sliding scale  every 6 hours  ketamine Infusion. 2.999 <Continuous>  lactulose Syrup 15 every 12 hours  methadone    Tablet 20 every 8 hours  midazolam Infusion 0.02 <Continuous>  naloxegol 12.5 daily  norepinephrine Infusion 0.05 <Continuous>  pantoprazole  Injectable 40 every 12 hours  phenylephrine    Infusion 0.1 <Continuous>  senna 2 at bedtime  vasopressin Infusion 0.04 <Continuous>      Vital Signs Last 24 Hrs  T(C): 37 (05 Apr 2021 04:00), Max: 37 (05 Apr 2021 04:00)  T(F): 98.6 (05 Apr 2021 04:00), Max: 98.6 (05 Apr 2021 04:00)  HR: 82 (05 Apr 2021 16:45) (77 - 101)  BP: --  BP(mean): --  RR: 38 (05 Apr 2021 16:45) (0 - 38)  SpO2: 85% (05 Apr 2021 16:45) (68% - 100%)    PHYSICAL EXAMINATION:  General: Intubated and Sedated  HEENT: +ETT  Neck: Supple  Cardiac: RRR, No M/R/G  Resp: CTAB, No Wh/Rh/Ra  Abdomen: NBS, NT/ND, No HSM, No rigidity or guarding  MSK: No LE edema. No Calf tenderness  : [place]  Skin: No rashes or lesions. Skin is warm and dry to the touch.   Vascular: Dialysis catheter (No surrounding erythema, drainage or tenderness to palpation)  Neuro: Intubated and Sedated  Psych: Unable to assess - intubated and sedated                          7.4    13.35 )-----------( 351      ( 05 Apr 2021 14:40 )             27.8       04-05    145  |  106  |  24<H>  ----------------------------<  198<H>  5.0   |  <10<LL>  |  1.78<H>    Ca    7.0<L>      05 Apr 2021 14:40  Phos  6.4     04-05  Mg     2.0     04-05    TPro  5.2<L>  /  Alb  1.4<L>  /  TBili  1.3<H>  /  DBili  x   /  AST  3287<H>  /  ALT  637<H>  /  AlkPhos  403<H>  04-05          MICROBIOLOGY:  v  .Sputum Sputum  04-04-21   Normal Respiratory Marilee present  --    No Squamous epithelial cells per low power field  Few polymorphonuclear leukocytes per low power field  No organisms seen per oil power field      .Blood Blood-Peripheral  04-03-21   Growth in anaerobic bottle: Staphylococcus epidermidis  Coag Negative Staphylococcus  Single set isolate, possible contaminant. Contact  Microbiology if susceptibility testing clinically  indicated.  ***Blood Panel PCR results on this specimen are available  approximately 3 hours after the Gram stain result.***  Gram stain, PCR, and/or culture results may not always  correspond due to difference in methodologies.  ************************************************************  This PCR assay was performed by multiplex PCR. This  Assay tests for 66 bacterial and resistance gene targets.  Please refer to the [employer] test directory  at [email or web address] for details.  --  Blood Culture PCR      .Blood Blood-Peripheral  04-01-21   Growth in anaerobic bottle: Enterococcus faecium (vancomycin resistant)  See previous culture [id number]  --    Growth in anaerobic bottle: Gram Positive Cocci in Pairs and Chains      .Blood Blood-Peripheral  03-31-21   No growth to date.  --  Enterococcus faecium (vancomycin resistant)      .Blood Blood-Peripheral  03-29-21   Growth in aerobic and anaerobic bottles: Enterococcus raffinosus  See previous culture [id number]  --  Blood Culture PCR  Enterococcus raffinosus      .Blood Blood-Peripheral  03-27-21   Growth in anaerobic bottle: Bacteroides thetaiotamcron group  --    Growth in anaerobic bottle: Gram Negative Rods      .Sputum Sputum  03-25-21   Normal Respiratory Marilee present  --    Few polymorphonuclear leukocytes per low power field  Rare Squamous epithelial cells per low power field  Rare Gram positive cocci in pairs per oil power field  Rare Gram Variable Rods per oil power field      .Blood Blood-Peripheral  03-25-21   Growth in aerobic and anaerobic bottles: Escherichia coli  ***Blood Panel PCR results on this specimen are available  approximately 3 hours after the Gram stain result.***  Gram stain, PCR, and/or culture results may not always  correspond due to difference in methodologies.  ************************************************************  This PCR assay was performed by multiplex PCR. This  Assay tests for 66 bacterial and resistance gene targets.  Please refer to the [facility] test directory  at [email or web address] for details.  --  Blood Culture PCR  Escherichia coli      .Blood Blood-Peripheral  03-23-21   No Growth Final  --  --      .Sputum Sputum  03-22-21   Normal Respiratory Marilee absent  --    Rare polymorphonuclear leukocytes per low power field  Rare Squamous epithelial cells per low power field  No organisms seen per oil power field      .Blood Blood-Peripheral  03-21-21   No Growth Final  --  --      .Blood Blood-Venous  03-16-21   No Growth Final  --  --      .Blood Blood  03-13-21   No Growth Final  --  --      .Blood Blood-Peripheral  03-08-21   No Growth Final  --  --                RADIOLOGY:    <The imaging below has been reviewed and visualized by me independently. Findings as detailed in report below>    EXAM:  CT ABDOMEN AND PELVIS                        EXAM:  CT CHEST                        PROCEDURE DATE:  04/04/2021    Limited noncontrast study.  Redemonstrated bronchiectasis, diffuse bilateral groundglass opacities and septal thickening. Since 03/12/2021, there are several areas of dependent more consolidative opacity.  New small to moderate bilateral pleural effusions.  Trace abdominopelvic ascites. Anasarca.

## 2021-04-05 NOTE — PROCEDURE NOTE - NSPROCDETAILS_GEN_ALL_CORE
guidewire recovered
location identified, draped/prepped, sterile technique used, needle inserted/introduced/positive blood return obtained via catheter/connected to a pressurized flush line/sutured in place/hemostasis with direct pressure, dressing applied/Seldinger technique/all materials/supplies accounted for at end of procedure
guidewire recovered/lumen(s) aspirated and flushed/sterile dressing applied/sterile technique, catheter placed/ultrasound guidance with use of sterile gel and probe cove
location identified, draped/prepped, sterile technique used, needle inserted/introduced/positive blood return obtained via catheter/connected to a pressurized flush line/sutured in place/hemostasis with direct pressure, dressing applied/Seldinger technique

## 2021-04-05 NOTE — PROCEDURE NOTE - NSPROCNAME_GEN_A_CORE
Tracheal Intubation
Arterial Puncture/Cannulation
Central Line Insertion
Arterial Puncture/Cannulation
Arterial Puncture/Cannulation
Central Line Insertion

## 2021-04-05 NOTE — PROGRESS NOTE ADULT - ASSESSMENT
Mr Olivares is a 60 year old man who presented with hypoxia after being diagnosed with COVID outpatient. He has completed a course of remdesevir, but is still febrile and hypoxic.   He does not have a clear superimposed bacterial infection, though bacterial pneumonia is possible.   CT chest on admission with PE and ground glass opacities.     Developed polymicrobial bacteremia  Likely secondary to GI translocation in context of SBO and COVID19    #VRE Bacteremia, E coli Bacteremia, Bacteroides Bacteremia, Leukocytosis   --Recommend stopping Linezolid  --Start Daptomycin 8 mg/kg IV Q24H (while on CRRT) (for bacteriocidal activity)  --Adjust Meropenem to 1g IV Q12H while on CRRT  --If no growth of ESBL organisms by tomorrow would switch Meropenem to Zosyn    #COVID-19 pneumonia, Acute Hypoxic Respiratory Failure  - Remdesevir completed  --Monitor off of corticosteroids given bacteremia     Prognosis guarded    I will continue to follow. Please feel free to contact me with any further questions.    Red Pichardo M.D.  [facility] Division of Infectious Disease  8AM-5PM: Pager Number [phone]  After Hours (or if no response): Please contact the Infectious Diseases Office at [phone]     The above assessment and plan were discussed with MICU Resident

## 2021-04-05 NOTE — CONSULT NOTE ADULT - CONSULT REQUESTED DATE/TIME
12-Mar-2021 11:00
15-Mar-2021 08:00
05-Apr-2021 09:41
26-Mar-2021
28-Mar-2021 18:03
13-Mar-2021 18:32
09-Mar-2021 17:50

## 2021-04-05 NOTE — CHART NOTE - NSCHARTNOTEFT_GEN_A_CORE
: Emanuel Olivares MD, Shen Cruz MD    INDICATION: Hypoxemic respiratory failure, septic shock    PROCEDURE:  [x ] LIMITED ECHO  [x ] LIMITED CHEST  [ ] LIMITED RETROPERITONEAL  [ ] LIMITED ABDOMINAL  [ ] LIMITED DVT  [ ] NEEDLE GUIDANCE VASCULAR  [ ] NEEDLE GUIDANCE THORACENTESIS  [ ] NEEDLE GUIDANCE PARACENTESIS  [ ] NEEDLE GUIDANCE PERICARDIOCENTESIS  [ ] OTHER    FINDINGS: Diffuse anterior b-lines. No translobar consolidations. Grossly preserved LV function. No pericardial effusion.     INTERPRETATION: Likely septic shock. COVID-19 pulmonary disease. : Emanuel Olivares MD, Shen Cruz MD    INDICATION: Hypoxemic respiratory failure, septic shock    PROCEDURE:  [x ] LIMITED ECHO  [x ] LIMITED CHEST  [ ] LIMITED RETROPERITONEAL  [ ] LIMITED ABDOMINAL  [ ] LIMITED DVT  [ ] NEEDLE GUIDANCE VASCULAR  [ ] NEEDLE GUIDANCE THORACENTESIS  [ ] NEEDLE GUIDANCE PARACENTESIS  [ ] NEEDLE GUIDANCE PERICARDIOCENTESIS  [ ] OTHER    FINDINGS: Diffuse anterior b-lines. No translobar consolidations. Grossly preserved LV function. No pericardial effusion.     INTERPRETATION: COVID-19 pulmonary disease. Normal LV function.

## 2021-04-05 NOTE — PROGRESS NOTE ADULT - CRITICAL CARE SERVICES PROVIDED
Critical care services provided

## 2021-04-05 NOTE — PROCEDURE NOTE - NSCVLATTEMPTSITEVASC_A_CORE
right/internal jugular
left/femoral vein
right/internal jugular
left/internal jugular
left/internal jugular

## 2021-04-05 NOTE — PROGRESS NOTE ADULT - ASSESSMENT
Assessment  DMT2: 60y Male with newly diagnosed DM T2 with hyperglycemia admitted with COVID19, A1C is 12.4%, on insulin coverage only, blood sugars are fluctuating within overall acceptable range with intermittent elevations, no hypoglycemic episodes, NPO on tube feeds.  COVID19: On medications, monitored, stable.  Overweight: No strict exercise routines, neither on low calorie diet.      Anderson Mcmanus MD  Cell: [phone]  Office: [phone]       Assessment  DMT2: 60y Male with newly diagnosed DM T2 with hyperglycemia admitted with COVID19, A1C is 12.4%, on insulin coverage only, blood sugars are fluctuating within overall acceptable range with intermittent elevations,  no hypoglycemic episodes, NPO on tube feeds.  COVID19: On medications, monitored, stable.  Overweight: No strict exercise routines, neither on low calorie diet.      Anderson Mcmanus MD  Cell: [phone]  Office: [phone]

## 2021-04-05 NOTE — PROCEDURE NOTE - NSPOSTCAREGUIDE_GEN_A_CORE
Verbal/written post procedure instructions were given to patient/caregiver
Care for catheter as per unit/ICU protocols
Verbal/written post procedure instructions were given to patient/caregiver

## 2021-04-05 NOTE — PROGRESS NOTE ADULT - ATTENDING SUPERVISION STATEMENT
ACP
Resident
ACP
Resident
ACP
Resident
Resident/Fellow
Resident
ACP

## 2021-04-05 NOTE — PROGRESS NOTE ADULT - ATTENDING COMMENTS
1. Acute hypoxemic respiratory failure due ARDS from Covid pneumonia now with fibrotic lungs. Plateau pressure ~35 on PRVC . Pt still requiring High FIO2. H/O RLL PE. Heparin stopped to place HD catheter. CT chest yesterday shows ground glass infiltrates diffusely , bilaterally.    2. Pt with severe sepsis with septic shock now requiring 3 pressors. VRE bacteremia. Continue Linezolid. E coli bacteremia 3/25. Blood cx cleared.  Meropenem started for gram negative and anaerobic coverage since pt is worsening.    3. Metabolic acidosis, lactic acidosis, acute renal failure from ATN all due to septic shock. Start Bicarbonate drip. To start CVVHD once HD catheter placed.  4. Elevated LFTS from Shock Liver. Continue to monitor.  5. GOC.  Family called to bedside since pt is actively dying. They are aware that despite starting CVVHD pt may not survive.

## 2021-04-05 NOTE — PROGRESS NOTE ADULT - PROBLEM SELECTOR PLAN 1
2nd to COVID PNA - s/p intubation 3/17 for worsening hypoxic respiratory failure   -Keep pH >7.20, sats >90% as able   -Overall prognosis poor

## 2021-04-05 NOTE — CONSULT NOTE ADULT - REASON FOR ADMISSION
hypoxia 2/2 COVID

## 2021-04-05 NOTE — PROGRESS NOTE ADULT - ASSESSMENT
59 y/o M with no significant PMH. Presents with c/o fevers, weakness, fatigue, SOB for 5 days. Tested positive for COVID 3 days prior to admission at an urgent care. Day of admission presented again to urgent care and noted to be hypoxic and sent to the ER and placed on HFNC. CXR with b/l LL opacities. Course c/b RLL medial segmental pulmonary embolism and worsening hypoxic respiratory failure - s/p intubation 3/17. Hospital course c/b SBO, polymicrobial bacteremia. Now with ileus

## 2021-04-05 NOTE — PROGRESS NOTE ADULT - PROVIDER SPECIALTY LIST ADULT
Hospitalist
Hospitalist
MICU
MICU
Critical Care
Critical Care
Endocrinology
MICU
Pulmonology
Pulmonology
Critical Care
Endocrinology
Hospitalist
Hospitalist
MICU
Surgery
Endocrinology
Infectious Disease
Endocrinology
Infectious Disease
MICU
Endocrinology
Pulmonology
Pulmonology
Endocrinology
Pulmonology
Pulmonology
Endocrinology
Pulmonology
Infectious Disease
Pulmonology
Hospitalist
Infectious Disease
Hospitalist
Pulmonology
Infectious Disease

## 2021-04-05 NOTE — PROGRESS NOTE ADULT - REASON FOR ADMISSION
hypoxia 2/2 COVID

## 2021-04-05 NOTE — PROCEDURE NOTE - PRACTITIONER PERFORMING THE TIME OUT
Dr. Georgi Chiang
Dr. Georgi Chiang
GUILLERMINA TOBIAS
Anya TERRY
Lupillo Mustafa
GUILLERMINA TOBIAS

## 2021-04-05 NOTE — PROGRESS NOTE ADULT - NSICDXPILOT_GEN_ALL_CORE
[place]

## 2021-04-05 NOTE — PROGRESS NOTE ADULT - PROBLEM SELECTOR PROBLEM 2
Acute respiratory failure with hypoxia
COVID-19
Hypoxia
Hypoxia
Acute respiratory failure with hypoxia
COVID-19
Pneumonia due to COVID-19 virus
COVID-19
Pneumonia due to COVID-19 virus
COVID-19
Hypoxia
Pneumonia due to COVID-19 virus
Hypoxia
Hypoxia
Metabolic acidosis
COVID-19
COVID-19
Hypoxia
Hypoxia
Pneumonia due to COVID-19 virus
Pneumonia due to COVID-19 virus
Hypoxia

## 2021-04-05 NOTE — PROCEDURE NOTE - NSPOSTPRCRAD_GEN_A_CORE
central line located in the superior vena cava/no pneumothorax/post-procedure radiography performed
Guidewire ascertained in Left Femoral Vein, catheter filtered over guidewire, guidewire removed in entirety. Line placed to pressure transducer to ascertain placement as well, venous tracing obtained/central line located in the
central line located in the superior vena cava
left subclavina/central line located in the/no pneumothorax/post-procedure radiography performed

## 2021-04-05 NOTE — PROCEDURE NOTE - NSINDICATIONS_GEN_A_CORE
arterial puncture to obtain ABG's/critical patient/monitoring purposes
arterial puncture to obtain ABG's/blood sampling/cannulation purposes/critical patient/monitoring purposes
hemodynamic monitoring
critical illness/dialysis/CRRT
arterial puncture to obtain ABG's/monitoring purposes
venous access
emergency venous access
critical illness/venous access

## 2021-04-05 NOTE — CONSULT NOTE ADULT - SUBJECTIVE AND OBJECTIVE BOX
[facility] DIVISION OF KIDNEY DISEASES AND HYPERTENSION   -- INITIAL CONSULT NOTE --  Nini Rosado, Nephrology Fellow     NS Pager: [phone] / [place] Pager: [phone]  After 5pm or on weekend, please page the on-call fellow via [email or web address].  --------------------------------------------------------------------------------    HPI: 60M PMHx recent COVID19 (3/5/21) who present to ED for fever, shortness of breath - admitted for acute hypoxic respiratory failure 2/2 COVID Pneumonia requiring NRB, completed remdesivir/decadron (3/8-3/17).  Hospital course c/b newly dx DM2 (A1c 12), RLL medial segmental PE (started lovenox) and RRT on 3/17 for worsening hypoxia (saO2 70s) s/p intubation on 3/17.  Hospital course further complicated by SBO with ileus and E coli bacteremia.    Nephrology consulted for KARINA/acidosis.  Upon review of [facility], no prior sCr noted.  On admission serum Cr 0.8, then sCr worsened from 0.5 to 1.29 on 4/4/21 then 1.8 on 4/5.  Other labs on 4/5/21 noted for K 5.5,  pH 7.0/pCO2 48, serum bicarb <10 with lactic acid 14.2, urinalysis trace protein, blood/rbc, Brian 68.  Currently patient is intubated/sedated FIO2 50%, oliguric on bumex drip on multiple vasopressors.       PAST HISTORY  --------------------------------------------------------------------------------  PAST MEDICAL & SURGICAL HISTORY:  No pertinent past medical history    No significant past surgical history    FAMILY HISTORY:  No pertinent family history in first degree relatives    PAST SOCIAL HISTORY:  ALLERGIES & MEDICATIONS  --------------------------------------------------------------------------------  Allergies  No Known Allergies    Intolerances    Standing Inpatient Medications  buMETAnide Infusion 2 mG/Hr IV Continuous <Continuous>  chlorhexidine 0.12% Liquid 15 milliLiter(s) Oral Mucosa every 12 hours  chlorhexidine 2% Cloths 1 Application(s) Topical <User Schedule>  cholecalciferol 2000 Unit(s) Oral daily  cisatracurium Infusion 2.999 MICROgram(s)/kG/Min IV Continuous <Continuous>  dexMEDEtomidine Infusion 0.012 MICROgram(s)/kG/Hr IV Continuous <Continuous>  heparin  Infusion. 1000 Unit(s)/Hr IV Continuous <Continuous>  influenza   Vaccine 0.5 milliLiter(s) IntraMuscular once  insulin lispro (ADMELOG) corrective regimen sliding scale   SubCutaneous every 6 hours  ketamine Infusion. 2.999 mG/kG/Hr IV Continuous <Continuous>  lactulose Syrup 15 Gram(s) Enteral Tube every 12 hours  linezolid  IVPB 600 milliGRAM(s) IV Intermittent every 12 hours  meropenem  IVPB      meropenem  IVPB 1000 milliGRAM(s) IV Intermittent every 8 hours  methadone    Tablet 20 milliGRAM(s) Oral every 8 hours  metoclopramide Injectable 10 milliGRAM(s) IV Push every 8 hours  midazolam Infusion 0.02 mG/kG/Hr IV Continuous <Continuous>  norepinephrine Infusion 0.05 MICROgram(s)/kG/Min IV Continuous <Continuous>  pantoprazole  Injectable 40 milliGRAM(s) IV Push every 12 hours  petrolatum Ophthalmic Ointment 1 Application(s) Both EYES two times a day  phenylephrine    Infusion 0.1 MICROgram(s)/kG/Min IV Continuous <Continuous>  senna 2 Tablet(s) Oral at bedtime  sodium bicarbonate  Infusion 0.169 mEq/kG/Hr IV Continuous <Continuous>  sodium zirconium cyclosilicate 10 Gram(s) Oral once  vasopressin Infusion 0.04 Unit(s)/Min IV Continuous <Continuous>    PRN Inpatient Medications    REVIEW OF SYSTEMS    VITALS/PHYSICAL EXAM  --------------------------------------------------------------------------------  T(C): 37 (04-05-21 @ 04:00), Max: 37 (04-05-21 @ 04:00)  HR: 92 (04-05-21 @ 09:23) (82 - 100)  BP: --  RR: 38 (04-05-21 @ 07:15) (0 - 38)  SpO2: 98% (04-05-21 @ 09:23) (85% - 100%)  Wt(kg): --    04-04-21 @ 07:01  -  04-05-21 @ 07:00  --------------------------------------------------------  IN: 3602.4 mL / OUT: 555 mL / NET: 3047.4 mL    Physical Exam:      LABS/STUDIES  --------------------------------------------------------------------------------  ABG - ( 05 Apr 2021 06:22 )  pH, Arterial: 7.00  pH, Blood: x     /  pCO2: 48    /  pO2: 78    / HCO3: 11    / Base Excess: -18.9 /  SaO2: 87                  8.6    14.07 >-----------<  496      [04-05-21 @ 06:27]              31.7     143  |  108  |  26  ----------------------------<  84      [04-05-21 @ 06:27]  5.5   |  <10  |  1.89        Ca     7.7     [04-05-21 @ 06:27]      Mg     2.0     [04-05-21 @ 06:27]      Phos  6.4     [04-05-21 @ 06:27]    TPro  6.0  /  Alb  1.4  /  TBili  1.1  /  DBili  x   /  AST  160  /  ALT  86  /  AlkPhos  420  [04-05-21 @ 00:28]    PT/INR: PT 26.0 , INR 2.25       [04-05-21 @ 00:28]  PTT: 186.5      [04-05-21 @ 06:27]          [04-05-21 @ 00:28]        [04-05-21 @ 00:28]    Creatinine Trend:  SCr 1.89 [04-05 @ 06:27]  SCr 1.76 [04-05 @ 00:28]  SCr 1.45 [04-04 @ 14:17]  SCr 1.29 [04-04 @ 00:15]  SCr 0.52 [04-03 @ 00:10]    Urinalysis - [03-25-21 @ 12:59]      Color Colorless / Appearance Slightly Turbid / SG 1.010 / pH 6.0      Gluc 100 mg/dL / Ketone Negative  / Bili Negative / Urobili Negative       Blood Large / Protein Trace / Leuk Est Negative / Nitrite Negative       / WBC 3 / Hyaline 2 / Gran  / Sq Epi  / Non Sq Epi 0 / Bacteria Negative    Iron 19, TIBC 256, %sat 7      [03-14-21 @ 00:02]  Ferritin 839      [04-02-21 @ 04:09]  TSH 1.01      [03-29-21 @ 08:26]  Lipid: chol --, , HDL --, LDL --      [04-04-21 @ 02:44]    HBsAg Nonreact      [03-27-21 @ 23:42]  HCV 0.12, Nonreact      [03-27-21 @ 23:42]     [facility] DIVISION OF KIDNEY DISEASES AND HYPERTENSION   -- INITIAL CONSULT NOTE --  Nini Rosado, Nephrology Fellow     NS Pager: [phone] / [place] Pager: [phone]  After 5pm or on weekend, please page the on-call fellow via [email or web address].  --------------------------------------------------------------------------------    HPI: 60M PMHx recent COVID19 (3/5/21) who present to ED for fever, shortness of breath - admitted for acute hypoxic respiratory failure 2/2 COVID Pneumonia requiring NRB, completed remdesivir/decadron (3/8-3/17).  Hospital course c/b newly dx DM2 (A1c 12), RLL medial segmental PE (started lovenox) and RRT on 3/17 for worsening hypoxia (saO2 70s) s/p intubation on 3/17.  Hospital course further complicated by SBO with ileus and E coli bacteremia.    Nephrology consulted for KARINA/acidosis.  Upon review of [facility], no prior sCr noted.  On admission serum Cr 0.8, then sCr worsened from 0.5 to 1.29 on 4/4/21 then 1.8 on 4/5.  Other labs on 4/5/21 noted for K 5.5,  pH 7.0/pCO2 48, serum bicarb <10 with lactic acid 14.2, urinalysis trace protein, blood/rbc, Brian 68.  Currently patient is intubated/sedated FIO2 50%, oliguric on bumex drip on multiple vasopressors.       PAST HISTORY  --------------------------------------------------------------------------------  PAST MEDICAL & SURGICAL HISTORY:  No pertinent past medical history    No significant past surgical history    FAMILY HISTORY:  No pertinent family history in first degree relatives    PAST SOCIAL HISTORY:  ALLERGIES & MEDICATIONS  --------------------------------------------------------------------------------  Allergies  No Known Allergies    Intolerances    Standing Inpatient Medications  buMETAnide Infusion 2 mG/Hr IV Continuous <Continuous>  chlorhexidine 0.12% Liquid 15 milliLiter(s) Oral Mucosa every 12 hours  chlorhexidine 2% Cloths 1 Application(s) Topical <User Schedule>  cholecalciferol 2000 Unit(s) Oral daily  cisatracurium Infusion 2.999 MICROgram(s)/kG/Min IV Continuous <Continuous>  dexMEDEtomidine Infusion 0.012 MICROgram(s)/kG/Hr IV Continuous <Continuous>  heparin  Infusion. 1000 Unit(s)/Hr IV Continuous <Continuous>  influenza   Vaccine 0.5 milliLiter(s) IntraMuscular once  insulin lispro (ADMELOG) corrective regimen sliding scale   SubCutaneous every 6 hours  ketamine Infusion. 2.999 mG/kG/Hr IV Continuous <Continuous>  lactulose Syrup 15 Gram(s) Enteral Tube every 12 hours  linezolid  IVPB 600 milliGRAM(s) IV Intermittent every 12 hours  meropenem  IVPB      meropenem  IVPB 1000 milliGRAM(s) IV Intermittent every 8 hours  methadone    Tablet 20 milliGRAM(s) Oral every 8 hours  metoclopramide Injectable 10 milliGRAM(s) IV Push every 8 hours  midazolam Infusion 0.02 mG/kG/Hr IV Continuous <Continuous>  norepinephrine Infusion 0.05 MICROgram(s)/kG/Min IV Continuous <Continuous>  pantoprazole  Injectable 40 milliGRAM(s) IV Push every 12 hours  petrolatum Ophthalmic Ointment 1 Application(s) Both EYES two times a day  phenylephrine    Infusion 0.1 MICROgram(s)/kG/Min IV Continuous <Continuous>  senna 2 Tablet(s) Oral at bedtime  sodium bicarbonate  Infusion 0.169 mEq/kG/Hr IV Continuous <Continuous>  sodium zirconium cyclosilicate 10 Gram(s) Oral once  vasopressin Infusion 0.04 Unit(s)/Min IV Continuous <Continuous>    PRN Inpatient Medications    REVIEW OF SYSTEMS  unable to obtain d/t intubated/sedated    VITALS/PHYSICAL EXAM  --------------------------------------------------------------------------------  T(C): 37 (04-05-21 @ 04:00), Max: 37 (04-05-21 @ 04:00)  HR: 92 (04-05-21 @ 09:23) (82 - 100)  BP: --  RR: 38 (04-05-21 @ 07:15) (0 - 38)  SpO2: 98% (04-05-21 @ 09:23) (85% - 100%)  Wt(kg): --    04-04-21 @ 07:01  -  04-05-21 @ 07:00  --------------------------------------------------------  IN: 3602.4 mL / OUT: 555 mL / NET: 3047.4 mL    Physical Exam:  	Gen: intubated  	HEENT: intubated, +ETT  	Pulm: mechanical breath sounds  	CV: non tachycardic   	GI: soft  	: johnson catheter in place  	MSK: + b/l upper/lower edema in lower extremity              Neuro: sedated  	Psych: sedated    LABS/STUDIES  --------------------------------------------------------------------------------  ABG - ( 05 Apr 2021 06:22 )  pH, Arterial: 7.00  pH, Blood: x     /  pCO2: 48    /  pO2: 78    / HCO3: 11    / Base Excess: -18.9 /  SaO2: 87                  8.6    14.07 >-----------<  496      [04-05-21 @ 06:27]              31.7     143  |  108  |  26  ----------------------------<  84      [04-05-21 @ 06:27]  5.5   |  <10  |  1.89        Ca     7.7     [04-05-21 @ 06:27]      Mg     2.0     [04-05-21 @ 06:27]      Phos  6.4     [04-05-21 @ 06:27]    TPro  6.0  /  Alb  1.4  /  TBili  1.1  /  DBili  x   /  AST  160  /  ALT  86  /  AlkPhos  420  [04-05-21 @ 00:28]    PT/INR: PT 26.0 , INR 2.25       [04-05-21 @ 00:28]  PTT: 186.5      [04-05-21 @ 06:27]          [04-05-21 @ 00:28]        [04-05-21 @ 00:28]    Creatinine Trend:  SCr 1.89 [04-05 @ 06:27]  SCr 1.76 [04-05 @ 00:28]  SCr 1.45 [04-04 @ 14:17]  SCr 1.29 [04-04 @ 00:15]  SCr 0.52 [04-03 @ 00:10]    Urinalysis - [03-25-21 @ 12:59]      Color Colorless / Appearance Slightly Turbid / SG 1.010 / pH 6.0      Gluc 100 mg/dL / Ketone Negative  / Bili Negative / Urobili Negative       Blood Large / Protein Trace / Leuk Est Negative / Nitrite Negative       / WBC 3 / Hyaline 2 / Gran  / Sq Epi  / Non Sq Epi 0 / Bacteria Negative    Iron 19, TIBC 256, %sat 7      [03-14-21 @ 00:02]  Ferritin 839      [04-02-21 @ 04:09]  TSH 1.01      [03-29-21 @ 08:26]  Lipid: chol --, , HDL --, LDL --      [04-04-21 @ 02:44]    HBsAg Nonreact      [03-27-21 @ 23:42]  HCV 0.12, Nonreact      [03-27-21 @ 23:42]

## 2021-04-05 NOTE — PROGRESS NOTE ADULT - PROBLEM SELECTOR PROBLEM 5
Bacteremia
Prophylactic measure

## 2021-04-05 NOTE — CHART NOTE - NSCHARTNOTEFT_GEN_A_CORE
Dialysis Consent (verbal)  Thoroughly reviewed risks and benefits of RRT/HD with patient's wife (Fandra) via telephone conversation who agrees to dialytic therapy if needed. All questions answered.

## 2021-04-05 NOTE — PROCEDURE NOTE - ADDITIONAL PROCEDURE DETAILS
60 yr old male with hypoxic resp failure secondary to COVID-19 viral pneumonia, RLL/RML PE now requiring CRRT. Lt fem dialysis catheter placed for CRRT

## 2021-04-05 NOTE — PROCEDURE NOTE - NSINFORMCONSENT_GEN_A_CORE
This was an emergent procedure.
Benefits, risks, and possible complications of procedure explained to patient/caregiver who verbalized understanding and gave verbal consent.
This was an emergent procedure.
This was an emergent procedure.
no

## 2021-04-05 NOTE — PROGRESS NOTE ADULT - SUBJECTIVE AND OBJECTIVE BOX
Chief complaint  Patient is a 60y old  Male who presents with a chief complaint of hypoxia 2/2 COVID (05 Apr 2021 12:23)   Review of systems  No hypoglycemic episodes.    Labs and Fingersticks  CAPILLARY BLOOD GLUCOSE      POCT Blood Glucose.: 111 mg/dL (05 Apr 2021 05:25)  POCT Blood Glucose.: 214 mg/dL (04 Apr 2021 16:47)      Medications  MEDICATIONS  (STANDING):  chlorhexidine 0.12% Liquid 15 milliLiter(s) Oral Mucosa every 12 hours  chlorhexidine 2% Cloths 1 Application(s) Topical <User Schedule>  cholecalciferol 2000 Unit(s) Oral daily  cisatracurium Infusion 2.999 MICROgram(s)/kG/Min (12 mL/Hr) IV Continuous <Continuous>  CRRT Treatment    <Continuous>  dexMEDEtomidine Infusion 0.012 MICROgram(s)/kG/Hr (0.2 mL/Hr) IV Continuous <Continuous>  heparin   Injectable 5500 Unit(s) IV Push once  heparin  Infusion. 600 Unit(s)/Hr (6 mL/Hr) IV Continuous <Continuous>  influenza   Vaccine 0.5 milliLiter(s) IntraMuscular once  insulin lispro (ADMELOG) corrective regimen sliding scale   SubCutaneous every 6 hours  ketamine Infusion. 2.999 mG/kG/Hr (20 mL/Hr) IV Continuous <Continuous>  lactulose Syrup 15 Gram(s) Enteral Tube every 12 hours  meropenem  IVPB 1000 milliGRAM(s) IV Intermittent every 8 hours  methadone    Tablet 20 milliGRAM(s) Oral every 8 hours  midazolam Infusion 0.02 mG/kG/Hr (1.33 mL/Hr) IV Continuous <Continuous>  naloxegol 12.5 milliGRAM(s) Oral daily  norepinephrine Infusion 0.05 MICROgram(s)/kG/Min (3.13 mL/Hr) IV Continuous <Continuous>  pantoprazole  Injectable 40 milliGRAM(s) IV Push every 12 hours  petrolatum Ophthalmic Ointment 1 Application(s) Both EYES two times a day  phenylephrine    Infusion 0.1 MICROgram(s)/kG/Min (2.5 mL/Hr) IV Continuous <Continuous>  PrismaSATE Dialysate BK 0 / 3.5 5000 milliLiter(s) (2400 mL/Hr) CRRT <Continuous>  PrismaSOL Filtration BGK 0 / 2.5 5000 milliLiter(s) (1800 mL/Hr) CRRT <Continuous>  PrismaSOL Filtration BGK 0 / 2.5 5000 milliLiter(s) (200 mL/Hr) CRRT <Continuous>  senna 2 Tablet(s) Oral at bedtime  sodium bicarbonate  Infusion 0.337 mEq/kG/Hr (150 mL/Hr) IV Continuous <Continuous>  vasopressin Infusion 0.04 Unit(s)/Min (2.4 mL/Hr) IV Continuous <Continuous>      Physical Exam  Vital Signs Last 12 Hrs  T(F): --  HR: 83 (04-05-21 @ 15:39) (77 - 101)  BP: --  BP(mean): --  RR: 38 (04-05-21 @ 15:30) (38 - 38)  SpO2: 98% (04-05-21 @ 15:39) (68% - 100%)       Chief complaint  Patient is a 60y old  Male who presents with a chief complaint of hypoxia 2/2 COVID (05 Apr 2021 12:23)   Review of systems  No hypoglycemic episodes.    Labs and Fingersticks  CAPILLARY BLOOD GLUCOSE      POCT Blood Glucose.: 111 mg/dL (05 Apr 2021 05:25)  POCT Blood Glucose.: 214 mg/dL (04 Apr 2021 16:47)      Medications  MEDICATIONS  (STANDING):  chlorhexidine 0.12% Liquid 15 milliLiter(s) Oral Mucosa every 12 hours  chlorhexidine 2% Cloths 1 Application(s) Topical <User Schedule>  cholecalciferol 2000 Unit(s) Oral daily  cisatracurium Infusion 2.999 MICROgram(s)/kG/Min (12 mL/Hr) IV Continuous <Continuous>  CRRT Treatment    <Continuous>  dexMEDEtomidine Infusion 0.012 MICROgram(s)/kG/Hr (0.2 mL/Hr) IV Continuous <Continuous>  heparin   Injectable 5500 Unit(s) IV Push once  heparin  Infusion. 600 Unit(s)/Hr (6 mL/Hr) IV Continuous <Continuous>  influenza   Vaccine 0.5 milliLiter(s) IntraMuscular once  insulin lispro (ADMELOG) corrective regimen sliding scale   SubCutaneous every 6 hours  ketamine Infusion. 2.999 mG/kG/Hr (20 mL/Hr) IV Continuous <Continuous>  lactulose Syrup 15 Gram(s) Enteral Tube every 12 hours  meropenem  IVPB 1000 milliGRAM(s) IV Intermittent every 8 hours  methadone    Tablet 20 milliGRAM(s) Oral every 8 hours  midazolam Infusion 0.02 mG/kG/Hr (1.33 mL/Hr) IV Continuous <Continuous>  naloxegol 12.5 milliGRAM(s) Oral daily  norepinephrine Infusion 0.05 MICROgram(s)/kG/Min (3.13 mL/Hr) IV Continuous <Continuous>  pantoprazole  Injectable 40 milliGRAM(s) IV Push every 12 hours  petrolatum Ophthalmic Ointment 1 Application(s) Both EYES two times a day  phenylephrine    Infusion 0.1 MICROgram(s)/kG/Min (2.5 mL/Hr) IV Continuous <Continuous>  PrismaSATE Dialysate BK 0 / 3.5 5000 milliLiter(s) (2400 mL/Hr) CRRT <Continuous>  PrismaSOL Filtration BGK 0 / 2.5 5000 milliLiter(s) (1800 mL/Hr) CRRT <Continuous>  PrismaSOL Filtration BGK 0 / 2.5 5000 milliLiter(s) (200 mL/Hr) CRRT <Continuous>  senna 2 Tablet(s) Oral at bedtime  sodium bicarbonate  Infusion 0.337 mEq/kG/Hr (150 mL/Hr) IV Continuous <Continuous>  vasopressin Infusion 0.04 Unit(s)/Min (2.4 mL/Hr) IV Continuous <Continuous>      Physical Exam  Vital Signs Last 12 Hrs  T(F): --  HR: 83 (04-05-21 @ 15:39) (77 - 101)  BP: --  BP(mean): --  RR: 38 (04-05-21 @ 15:30) (38 - 38)  SpO2: 98% (04-05-21 @ 15:39) (68% - 100%)

## 2021-04-05 NOTE — PROCEDURE NOTE - PROCEDURE DATE TIME, MLM
17-Mar-2021 05:08
27-Mar-2021 22:39
28-Mar-2021 00:14
04-Apr-2021 00:10
17-Mar-2021 07:42
17-Mar-2021 08:50
04-Apr-2021 23:52
05-Apr-2021 12:00
29-Mar-2021 04:29

## 2021-04-05 NOTE — PROGRESS NOTE ADULT - PROBLEM SELECTOR PROBLEM 1
Acute respiratory failure with hypoxia
Acute respiratory failure with hypoxia
COVID-19
Diabetes mellitus
Pneumonia due to COVID-19 virus
Acute respiratory failure with hypoxia
Diabetes mellitus
Diabetes mellitus
Acute respiratory failure with hypoxia
Diabetes mellitus
Acute respiratory failure with hypoxia
COVID-19
Diabetes mellitus
Acute respiratory failure with hypoxia
Diabetes mellitus
Acute respiratory failure with hypoxia
COVID-19
COVID-19
Diabetes mellitus
COVID-19
Diabetes mellitus
COVID-19
Acute respiratory failure with hypoxia
COVID-19
Pneumonia due to COVID-19 virus
COVID-19

## 2021-04-05 NOTE — PROGRESS NOTE ADULT - ASSESSMENT
59 yo male w/no known pmh (does not f/u with PCP) presents to [facility] for cc fevers, weakness, fatigue, shortness of breath for 5 days. Tested positive for covid 3 days ago at an urgent care. Today presented again to urgent care and noted to be hypoxic and sent to the ER. Patient denies dysuria, diarrhea, constipation. Currently denies shortness of breath on high flow. Intubated for worsening respiratory failure and transferred to the ICU for further management.     Neuro:  - Nimbex at 4  - Sedated with Ketamine, Precedex,  Versed--> wean ketamine  - Methadone for fentanyl wean --> holding PO meds    RESPIRATORY:  - Pressure control  - Continue to titrate FiO2 as tolerated. Maintain O2 sat >90  - Plan for possible trach this week. Family is aware and have agreed to it. Spoke with Dr. Mills- pulmonary interventionalist who will do the trach. On hold for now given active infection.   - RLL PE diagnosed on admission; heparin gttr stopped due to acute drop in H/H     CARDIOVASCULAR:  - Levo and vaso for pressor requirements  - Maintain MAP >65  - TTE 3/16 EF 55-60%. Mild diastolic dysfunction     RENAL:  - KARINA; Creat increase to 1.29 from 0.52  - Goal net negative with intermittent diuresing  - Continue to monitor renal indices, strict I/Os     GASTROINTESTINAL:  #SBO  - CT ABD/Pelv 3/28: SBO w/ transition point in distal ileum   - Gen surg consulted: **NPO w/ OGT to LCWS,  No surgical intervention @ this time. Will trial Tube feed trickle rate as discussed in am rounds  - last BM 3/30  - Reglan 10 q8 w/ daily EKG  - CT Chest A/P ordered (4/4): explore source of potential bleed   - NPO with TF     #Melena - Resolved  -S/P  melena X 2 on 3/26. No melena since 3/26   -PPI BID    #Transaminitis   - LFTs uptrending continuing to monitor    INFECTIOUS DISEASE: gram negative sepsis 2/2 e coli bacteremia   - 3/25 blood cultures now with gram (-) rods (E.coli) pan sensitive   - rpt Bld cx 3/29: enterococcus species, pending sensitivities  - FU rpt Bld cx x2 on 3/31: Enteroccocus 1/2 sets  - ID following  - ABX:  Unasyn 3g q6h (3/29-4/3 )  - BCx growing VRE; Started linezolid and meropenem and f/u repeat cultures sent (4/3)     HEME:  - H/H drop to 6.9/24.7   - Heparin gtt dced due to drop in H/H; previously for RLL PE and hypercoagulable state,   - f/u daily PTT  - GI evaluation-----> c/w PPI dosing increased to BID for UGIB ppx    ENDO: newly dx DM on this admission  - 3/9/21 Hg A1c 12.4%  - Moderate ISS  61 y/o M with no significant PMH and a recent diagnosis of COVID on 03/05. Patient presented to [facility] from Urgent Care on 03/08 with complaints of a fever, weakness, SOB, and was found to have worsening hypoxia requiring HFNC. Patient completed RDV (3/8- 3/12) and Decadron (3/8 - 3/17). Course c/b newly diagnosed T2DM with a HbA1c 12.4 and a right lower lobe medial segmental pulmonary embolism on CTA on 3/12 currently on full dose Lovenox.  RRT was called 3/17 due to increased work of breathing with an SpO2 70/80's while on HFNC and NRB. Patient intubated 3/17 and transferred to COVID ICU for further assessment and management.   MICU course further complicated by illeus and intermittent hypoxia with lung noncompliance issues.     Neuro:  - Nimbex at 4  - Sedated with Ketamine, Precedex,  Versed--> wean ketamine  - Methadone for fentanyl wean --> holding PO meds    RESPIRATORY:  - Pressure control  - Continue to titrate FiO2 as tolerated. Maintain O2 sat >90  - Plan for possible trach this week. Family is aware and have agreed to it. Spoke with Dr. Mills- pulmonary interventionalist who will do the trach. On hold for now given active infection.   - RLL PE diagnosed on admission; heparin gttr stopped due to acute drop in H/H     CARDIOVASCULAR:  - Levo and vaso for pressor requirements  - Maintain MAP >65  - TTE 3/16 EF 55-60%. Mild diastolic dysfunction     RENAL:  - KARINA; Creat increase to 1.29 from 0.52  - Goal net negative with intermittent diuresing  - Continue to monitor renal indices, strict I/Os     GASTROINTESTINAL:  #SBO  - CT ABD/Pelv 3/28: SBO w/ transition point in distal ileum   - Gen surg consulted: **NPO w/ OGT to LCWS,  No surgical intervention @ this time. Will trial Tube feed trickle rate as discussed in am rounds  - last BM 3/30  - Reglan 10 q8 w/ daily EKG  - CT Chest A/P ordered (4/4): explore source of potential bleed   - NPO with TF     #Melena - Resolved  -S/P  melena X 2 on 3/26. No melena since 3/26   -PPI BID    #Transaminitis   - LFTs uptrending continuing to monitor    INFECTIOUS DISEASE:  gram negative sepsis 2/2 e coli bacteremia  - 3/25 blood cultures now with gram (-) rods (E.coli) pan sensitive   - rpt Bld cx 3/29: enterococcus species, pending sensitivities  - ABX:  Unasyn 3g q6h (3/29-4/3), Meropenem (4/3-)  VRE bacteremia (1/2 sets) on 3/31  - Linezolid (4/3-)  - ID following  - F/u repeat cultures sent (4/3)   - Repeat CT a/p?     HEME:  - H/H drop to 6.9/24.7   - Heparin gtt dced due to drop in H/H; previously for RLL PE and hypercoagulable state,   - f/u daily PTT  - GI evaluation-----> c/w PPI dosing increased to BID for UGIB ppx    ENDO: newly dx DM on this admission  - 3/9/21 Hg A1c 12.4%  - Moderate ISS  59 y/o M with no significant PMH and a recent diagnosis of COVID on 03/05. Patient presented to [facility] from Urgent Care on 03/08 with complaints of a fever, weakness, SOB, and was found to have worsening hypoxia requiring HFNC. Patient completed RDV (3/8- 3/12) and Decadron (3/8 - 3/17). Course c/b newly diagnosed T2DM with a HbA1c 12.4 and a right lower lobe medial segmental pulmonary embolism on CTA on 3/12 currently on full dose Lovenox.  RRT was called 3/17 due to increased work of breathing with an SpO2 70/80's while on HFNC and NRB. Patient intubated 3/17 and transferred to COVID ICU for further assessment and management.   MICU course further complicated by illeus and intermittent hypoxia with lung noncompliance issues. Worsening metabolic acidosis, requiring CRRT, with poor prognosis.    Neuro:  - Sedated, paralyzed for ventilator dyssynchrony: Ketamine, Precedex, Versed, Nimbex at 4  - Methadone 20mg q8h for fentanyl wean    RESPIRATORY:  Acute hypoxic respiratory failure, d/t COVID Pneumonia  - PRVC, RR 38//FiO2 50%/PEEP 5  - Maintain high RR for worsening metabolic acidosis  - RLL PE diagnosed on admission  - Restart heparin gtt given no active bleed and stable H/H     CARDIOVASCULAR:  - Levo and vaso for pressor requirements  - Maintain MAP >65  - TTE 3/16 EF 55-60%. Mild diastolic dysfunction     RENAL:  Metabolic acidosis  - 7.0/48/78/11/87% on FiO2 50%, lactate 14.2  - L femoral vein shiley placed, CRRT started 4/5  KARINA, worsening with oliguria  - Creat increase to 1.89  - d/c bumex  - Continue to monitor renal indices, strict I/Os     GASTROINTESTINAL:  SBO  - CT ABD/Pelv 3/28: SBO w/ transition point in distal ileum. Per Surgery, no intervention.  - NPO with trickle feed  - last BM 3/30  - d/c Reglan 10 18h as QTc 711 on 4/5  Melena - Resolved  -No melena since 3/26   -PPI BID  Transaminitis   - LFTs uptrending continuing to monitor    INFECTIOUS DISEASE:  Gram negative sepsis 2/2 E. Coli bacteremia  - 3/25 bcx now with E.coli pan sensitive. Repeat bcx 3/29: Enterococcus species  - ABX: Unasyn 3g q6h (3/29-4/3), Meropenem 1g q8h now that on CRRT (4/3-)  VRE bacteremia (1/2 sets) on 3/31  - d/c Linezolid (4/3-), start daptomycin per ID recs  - ID following  - F/u repeat cultures sent (4/3)     HEME:  - Heparin gtt restarted for RLL PE and hypercoagulable state,   - f/u daily PTT    ENDO:  Newly dx DM on this admission  - 3/9/21 Hg A1c 12.4%  - Moderate ISS     GOC:  - Family contacted, visited for worsening prognosis 59 y/o M with no significant PMH and a recent diagnosis of COVID on 03/05. Patient presented to [facility] from Urgent Care on 03/08 with complaints of a fever, weakness, SOB, and was found to have worsening hypoxia requiring HFNC. Patient completed RDV (3/8- 3/12) and Decadron (3/8 - 3/17). Course c/b newly diagnosed T2DM with a HbA1c 12.4 and a right lower lobe medial segmental pulmonary embolism on CTA on 3/12 currently on full dose Lovenox.  RRT was called 3/17 due to increased work of breathing with an SpO2 70/80's while on HFNC and NRB. Patient intubated 3/17 and transferred to COVID ICU for further assessment and management.   MICU course further complicated by illeus and intermittent hypoxia with lung noncompliance issues. Worsening metabolic acidosis, requiring CRRT, with poor prognosis.    Neuro:  - Sedated, paralyzed for ventilator dyssynchrony: Ketamine, Precedex, Versed, Nimbex at 4  - Methadone 20mg q8h for fentanyl wean    RESPIRATORY:  Acute hypoxic respiratory failure, d/t COVID Pneumonia  - PRVC, RR 38//FiO2 50%/PEEP 5  - Maintain high RR for worsening metabolic acidosis  - RLL PE diagnosed on admission  - Restart heparin gtt given no active bleed and stable H/H     CARDIOVASCULAR:  - Levo and vaso for pressor requirements  - Maintain MAP >65  - TTE 3/16 EF 55-60%. Mild diastolic dysfunction     RENAL:  Metabolic acidosis  - 7.0/48/78/11/87% on FiO2 50%, lactate 14.2  - L femoral vein shiley placed, CRRT started 4/5  KARINA, worsening with oliguria  - Creat increase to 1.89  - d/c bumex  - Continue to monitor renal indices, strict I/Os     GASTROINTESTINAL:  SBO  - CT ABD/Pelv 3/28: SBO w/ transition point in distal ileum. Per Surgery, no intervention.  - NPO with trickle feed  - last BM 3/30  - d/c Reglan 10 18h as QTc 711 on 4/5  Melena - Resolved  -No melena since 3/26   -PPI BID  Transaminitis   - LFTs uptrending continuing to monitor    INFECTIOUS DISEASE:  Gram negative sepsis 2/2 E. Coli bacteremia  - 3/25 bcx now with E.coli pan sensitive. Repeat bcx 3/29: Enterococcus species  - ABX: Unasyn 3g q6h (3/29-4/3), Meropenem 1g q8h now that on CRRT (4/3-)  VRE bacteremia (1/2 sets) on 3/31  - d/c Linezolid (4/3-), start daptomycin per ID recs  - ID following  - F/u repeat cultures sent (4/3)     HEME:  - Heparin gtt restarted for RLL PE and hypercoagulable state  - f/u daily PTT  - check TEG    ENDO:  Newly dx DM on this admission  - 3/9/21 Hg A1c 12.4%  - Moderate ISS     GOC:  - Family contacted, visited for worsening prognosis

## 2021-04-05 NOTE — PROGRESS NOTE ADULT - PROBLEM SELECTOR PLAN 5
- full AC with lovenox   - diet DASH
- lovenox qd  - diet dash
- lovenox qd  - diet dash
E.Coli, Enterococcus, Bacteroides bacteremia   -Likely GI source  -Abx per ID  -Now with ileus  -Attempt trickle feeds per MICU   -BC 4/3 still positive, f/u repeat BC
- full AC with lovenox   - diet DASH
- lovenox qd  - diet dash

## 2021-04-05 NOTE — PROGRESS NOTE ADULT - PROBLEM SELECTOR PLAN 4
RLL medial segmental pulmonary embolism  -Heparin gtt on hold per MICU in setting of anemia   -LE duplex neg DVT   -TTE with no RV strain

## 2021-04-05 NOTE — PROGRESS NOTE ADULT - SUBJECTIVE AND OBJECTIVE BOX
Masoud Vanessa, PGY1  Pager [phone]    INTERVAL HPI/OVERNIGHT EVENTS:    SUBJECTIVE: Patient seen and examined at bedside.       OBJECTIVE:    VITAL SIGNS:  ICU Vital Signs Last 24 Hrs  T(C): 36.7 (04 Apr 2021 20:00), Max: 36.9 (04 Apr 2021 07:00)  T(F): 98.1 (04 Apr 2021 20:00), Max: 98.4 (04 Apr 2021 07:00)  HR: 93 (05 Apr 2021 03:05) (82 - 100)  BP: --  BP(mean): --  ABP: 103/59 (05 Apr 2021 01:15) (76/46 - 155/86)  ABP(mean): 74 (05 Apr 2021 01:15) (56 - 111)  RR: 38 (05 Apr 2021 01:15) (0 - 38)  SpO2: 98% (05 Apr 2021 03:05) (85% - 100%)    Mode: AC/ CMV (Assist Control/ Continuous Mandatory Ventilation), RR (machine): 38, TV (machine): 380, FiO2: 50, PEEP: 5, ITime: 1, MAP: 8, PIP: 40    04-03 @ 07:01  -  04-04 @ 07:00  --------------------------------------------------------  IN: 2909 mL / OUT: 490 mL / NET: 2419 mL    04-04 @ 07:01  -  04-05 @ 06:16  --------------------------------------------------------  IN: 2824.4 mL / OUT: 515 mL / NET: 2309.4 mL      CAPILLARY BLOOD GLUCOSE      POCT Blood Glucose.: 111 mg/dL (05 Apr 2021 05:25)      PHYSICAL EXAM:  General: sedated  HEENT: NCAT, PERRL, clear conjunctiva, sclera anicteric  Neck: supple, no JVD  Respiratory: CTAB  Cardiovascular: RRR, S1S2, no m/r/g  Abdomen: soft, nontender, nondistended, normal bowel sounds  Extremities: no edema, no cyanosis  Skin: warm, perfused  Neurological: nonfocal    MEDICATIONS:  MEDICATIONS  (STANDING):  buMETAnide Infusion 2 mG/Hr (10 mL/Hr) IV Continuous <Continuous>  chlorhexidine 0.12% Liquid 15 milliLiter(s) Oral Mucosa every 12 hours  chlorhexidine 2% Cloths 1 Application(s) Topical <User Schedule>  cholecalciferol 2000 Unit(s) Oral daily  cisatracurium Infusion 2.999 MICROgram(s)/kG/Min (12 mL/Hr) IV Continuous <Continuous>  dexMEDEtomidine Infusion 0.012 MICROgram(s)/kG/Hr (0.2 mL/Hr) IV Continuous <Continuous>  heparin  Infusion. 1000 Unit(s)/Hr (10 mL/Hr) IV Continuous <Continuous>  influenza   Vaccine 0.5 milliLiter(s) IntraMuscular once  insulin lispro (ADMELOG) corrective regimen sliding scale   SubCutaneous every 6 hours  ketamine Infusion. 2.999 mG/kG/Hr (20 mL/Hr) IV Continuous <Continuous>  lactulose Syrup 15 Gram(s) Enteral Tube every 12 hours  linezolid  IVPB 600 milliGRAM(s) IV Intermittent every 12 hours  meropenem  IVPB      meropenem  IVPB 1000 milliGRAM(s) IV Intermittent every 8 hours  methadone    Tablet 20 milliGRAM(s) Oral every 8 hours  metoclopramide Injectable 10 milliGRAM(s) IV Push every 8 hours  midazolam Infusion 0.02 mG/kG/Hr (1.33 mL/Hr) IV Continuous <Continuous>  norepinephrine Infusion 0.05 MICROgram(s)/kG/Min (3.13 mL/Hr) IV Continuous <Continuous>  pantoprazole  Injectable 40 milliGRAM(s) IV Push every 12 hours  petrolatum Ophthalmic Ointment 1 Application(s) Both EYES two times a day  phenylephrine    Infusion 0.1 MICROgram(s)/kG/Min (2.5 mL/Hr) IV Continuous <Continuous>  senna 2 Tablet(s) Oral at bedtime  vasopressin Infusion 0.04 Unit(s)/Min (2.4 mL/Hr) IV Continuous <Continuous>    MEDICATIONS  (PRN):      ALLERGIES:  Allergies    No Known Allergies    Intolerances        LABS:                        8.5    10.25 )-----------( 441      ( 05 Apr 2021 00:28 )             30.0     04-05    145  |  112<H>  |  25<H>  ----------------------------<  161<H>  5.4<H>   |  13<L>  |  1.76<H>    Ca    7.4<L>      05 Apr 2021 00:28  Phos  4.6     04-05  Mg     2.0     04-05    TPro  6.0  /  Alb  1.4<L>  /  TBili  1.1  /  DBili  x   /  AST  160<H>  /  ALT  86<H>  /  AlkPhos  420<H>  04-05    PT/INR - ( 05 Apr 2021 00:28 )   PT: 26.0 sec;   INR: 2.25 ratio         PTT - ( 05 Apr 2021 00:28 )  PTT:38.2 sec      RADIOLOGY & ADDITIONAL TESTS: Reviewed. Masoud Zhang Vanessa, PGY1  Pager [phone]    INTERVAL HPI/OVERNIGHT EVENTS:  - CT c/a/p done overnight  - pH 7.0, HCO3 11 this AM. Given 2 pushes of bicarbonate amp.    SUBJECTIVE:  Patient sedated. Patient seen and examined at bedside.     OBJECTIVE:  VITAL SIGNS:  ICU Vital Signs Last 24 Hrs  T(C): 36.7 (04 Apr 2021 20:00), Max: 36.9 (04 Apr 2021 07:00)  T(F): 98.1 (04 Apr 2021 20:00), Max: 98.4 (04 Apr 2021 07:00)  HR: 93 (05 Apr 2021 03:05) (82 - 100)  BP: --  BP(mean): --  ABP: 103/59 (05 Apr 2021 01:15) (76/46 - 155/86)  ABP(mean): 74 (05 Apr 2021 01:15) (56 - 111)  RR: 38 (05 Apr 2021 01:15) (0 - 38)  SpO2: 98% (05 Apr 2021 03:05) (85% - 100%)    Mode: AC/ CMV (Assist Control/ Continuous Mandatory Ventilation), RR (machine): 38, TV (machine): 380, FiO2: 50, PEEP: 5, ITime: 1, MAP: 8, PIP: 40    04-03 @ 07:01  -  04-04 @ 07:00  --------------------------------------------------------  IN: 2909 mL / OUT: 490 mL / NET: 2419 mL    04-04 @ 07:01  -  04-05 @ 06:16  --------------------------------------------------------  IN: 2824.4 mL / OUT: 515 mL / NET: 2309.4 mL      CAPILLARY BLOOD GLUCOSE      POCT Blood Glucose.: 111 mg/dL (05 Apr 2021 05:25)      PHYSICAL EXAM:  General: sedated, intubated  HEENT: NCAT, Pupils equal and unresponsive to light, clear conjunctiva, sclera anicteric  Neck: supple  Respiratory: coarse breath sounds, crackles CTAB  Cardiovascular: RRR, S1S2, no m/r/g  Abdomen: soft, nontender, nondistended, normal bowel sounds  Extremities: no edema, no cyanosis  Skin: warm, perfused  Neurological: nonfocal    MEDICATIONS:  MEDICATIONS  (STANDING):  buMETAnide Infusion 2 mG/Hr (10 mL/Hr) IV Continuous <Continuous>  chlorhexidine 0.12% Liquid 15 milliLiter(s) Oral Mucosa every 12 hours  chlorhexidine 2% Cloths 1 Application(s) Topical <User Schedule>  cholecalciferol 2000 Unit(s) Oral daily  cisatracurium Infusion 2.999 MICROgram(s)/kG/Min (12 mL/Hr) IV Continuous <Continuous>  dexMEDEtomidine Infusion 0.012 MICROgram(s)/kG/Hr (0.2 mL/Hr) IV Continuous <Continuous>  heparin  Infusion. 1000 Unit(s)/Hr (10 mL/Hr) IV Continuous <Continuous>  influenza   Vaccine 0.5 milliLiter(s) IntraMuscular once  insulin lispro (ADMELOG) corrective regimen sliding scale   SubCutaneous every 6 hours  ketamine Infusion. 2.999 mG/kG/Hr (20 mL/Hr) IV Continuous <Continuous>  lactulose Syrup 15 Gram(s) Enteral Tube every 12 hours  linezolid  IVPB 600 milliGRAM(s) IV Intermittent every 12 hours  meropenem  IVPB      meropenem  IVPB 1000 milliGRAM(s) IV Intermittent every 8 hours  methadone    Tablet 20 milliGRAM(s) Oral every 8 hours  metoclopramide Injectable 10 milliGRAM(s) IV Push every 8 hours  midazolam Infusion 0.02 mG/kG/Hr (1.33 mL/Hr) IV Continuous <Continuous>  norepinephrine Infusion 0.05 MICROgram(s)/kG/Min (3.13 mL/Hr) IV Continuous <Continuous>  pantoprazole  Injectable 40 milliGRAM(s) IV Push every 12 hours  petrolatum Ophthalmic Ointment 1 Application(s) Both EYES two times a day  phenylephrine    Infusion 0.1 MICROgram(s)/kG/Min (2.5 mL/Hr) IV Continuous <Continuous>  senna 2 Tablet(s) Oral at bedtime  vasopressin Infusion 0.04 Unit(s)/Min (2.4 mL/Hr) IV Continuous <Continuous>    MEDICATIONS  (PRN):      ALLERGIES:  Allergies    No Known Allergies    Intolerances        LABS:                        8.5    10.25 )-----------( 441      ( 05 Apr 2021 00:28 )             30.0     04-05    145  |  112<H>  |  25<H>  ----------------------------<  161<H>  5.4<H>   |  13<L>  |  1.76<H>    Ca    7.4<L>      05 Apr 2021 00:28  Phos  4.6     04-05  Mg     2.0     04-05    TPro  6.0  /  Alb  1.4<L>  /  TBili  1.1  /  DBili  x   /  AST  160<H>  /  ALT  86<H>  /  AlkPhos  420<H>  04-05    PT/INR - ( 05 Apr 2021 00:28 )   PT: 26.0 sec;   INR: 2.25 ratio         PTT - ( 05 Apr 2021 00:28 )  PTT:38.2 sec      RADIOLOGY & ADDITIONAL TESTS: Reviewed. Masoud Zhang Vanessa, PGY1  Pager [phone]    INTERVAL HPI/OVERNIGHT EVENTS:  - CT c/a/p done overnight  - pH 7.0, HCO3 11 this AM. Given 2 pushes of bicarbonate amp.    SUBJECTIVE:  Patient sedated. Patient seen and examined at bedside.     OBJECTIVE:  VITAL SIGNS:  ICU Vital Signs Last 24 Hrs  T(C): 36.7 (04 Apr 2021 20:00), Max: 36.9 (04 Apr 2021 07:00)  T(F): 98.1 (04 Apr 2021 20:00), Max: 98.4 (04 Apr 2021 07:00)  HR: 93 (05 Apr 2021 03:05) (82 - 100)  BP: --  BP(mean): --  ABP: 103/59 (05 Apr 2021 01:15) (76/46 - 155/86)  ABP(mean): 74 (05 Apr 2021 01:15) (56 - 111)  RR: 38 (05 Apr 2021 01:15) (0 - 38)  SpO2: 98% (05 Apr 2021 03:05) (85% - 100%)    Mode: AC/ CMV (Assist Control/ Continuous Mandatory Ventilation), RR (machine): 38, TV (machine): 380, FiO2: 50, PEEP: 5, ITime: 1, MAP: 8, PIP: 40    04-03 @ 07:01  -  04-04 @ 07:00  --------------------------------------------------------  IN: 2909 mL / OUT: 490 mL / NET: 2419 mL    04-04 @ 07:01  -  04-05 @ 06:16  --------------------------------------------------------  IN: 2824.4 mL / OUT: 515 mL / NET: 2309.4 mL      CAPILLARY BLOOD GLUCOSE      POCT Blood Glucose.: 111 mg/dL (05 Apr 2021 05:25)      PHYSICAL EXAM:  General: sedated, intubated  HEENT: NCAT, Pupils equal, unresponsive to light, clear conjunctiva, sclera anicteric  Neck: supple  Respiratory: coarse breath sounds, crackles  Cardiovascular: RRR, S1S2, no m/r/g  Abdomen: soft, distended  Extremities: BLE edema, no cyanosis  Skin: warm, perfused  Neurological: sedated    MEDICATIONS:  MEDICATIONS  (STANDING):  buMETAnide Infusion 2 mG/Hr (10 mL/Hr) IV Continuous <Continuous>  chlorhexidine 0.12% Liquid 15 milliLiter(s) Oral Mucosa every 12 hours  chlorhexidine 2% Cloths 1 Application(s) Topical <User Schedule>  cholecalciferol 2000 Unit(s) Oral daily  cisatracurium Infusion 2.999 MICROgram(s)/kG/Min (12 mL/Hr) IV Continuous <Continuous>  dexMEDEtomidine Infusion 0.012 MICROgram(s)/kG/Hr (0.2 mL/Hr) IV Continuous <Continuous>  heparin  Infusion. 1000 Unit(s)/Hr (10 mL/Hr) IV Continuous <Continuous>  influenza   Vaccine 0.5 milliLiter(s) IntraMuscular once  insulin lispro (ADMELOG) corrective regimen sliding scale   SubCutaneous every 6 hours  ketamine Infusion. 2.999 mG/kG/Hr (20 mL/Hr) IV Continuous <Continuous>  lactulose Syrup 15 Gram(s) Enteral Tube every 12 hours  linezolid  IVPB 600 milliGRAM(s) IV Intermittent every 12 hours  meropenem  IVPB      meropenem  IVPB 1000 milliGRAM(s) IV Intermittent every 8 hours  methadone    Tablet 20 milliGRAM(s) Oral every 8 hours  metoclopramide Injectable 10 milliGRAM(s) IV Push every 8 hours  midazolam Infusion 0.02 mG/kG/Hr (1.33 mL/Hr) IV Continuous <Continuous>  norepinephrine Infusion 0.05 MICROgram(s)/kG/Min (3.13 mL/Hr) IV Continuous <Continuous>  pantoprazole  Injectable 40 milliGRAM(s) IV Push every 12 hours  petrolatum Ophthalmic Ointment 1 Application(s) Both EYES two times a day  phenylephrine    Infusion 0.1 MICROgram(s)/kG/Min (2.5 mL/Hr) IV Continuous <Continuous>  senna 2 Tablet(s) Oral at bedtime  vasopressin Infusion 0.04 Unit(s)/Min (2.4 mL/Hr) IV Continuous <Continuous>    MEDICATIONS  (PRN):      ALLERGIES:  Allergies    No Known Allergies    Intolerances        LABS:                        8.5    10.25 )-----------( 441      ( 05 Apr 2021 00:28 )             30.0     04-05    145  |  112<H>  |  25<H>  ----------------------------<  161<H>  5.4<H>   |  13<L>  |  1.76<H>    Ca    7.4<L>      05 Apr 2021 00:28  Phos  4.6     04-05  Mg     2.0     04-05    TPro  6.0  /  Alb  1.4<L>  /  TBili  1.1  /  DBili  x   /  AST  160<H>  /  ALT  86<H>  /  AlkPhos  420<H>  04-05    PT/INR - ( 05 Apr 2021 00:28 )   PT: 26.0 sec;   INR: 2.25 ratio         PTT - ( 05 Apr 2021 00:28 )  PTT:38.2 sec      RADIOLOGY & ADDITIONAL TESTS: Reviewed.

## 2021-04-05 NOTE — DISCHARGE NOTE FOR THE EXPIRED PATIENT - HOSPITAL COURSE
59 y/o M with no significant PMH and a recent diagnosis of COVID on 03/05. Patient presented to [facility] from Urgent Care on 03/08 with complaints of a fever, weakness, SOB, and was found to have worsening hypoxia requiring HFNC. Patient completed RDV (3/8- 3/12) and Decadron (3/8 - 3/17). Course c/b newly diagnosed T2DM with a HbA1c 12.4 and a right lower lobe medial segmental pulmonary embolism on CTA on 3/12 currently on full dose Lovenox.  RRT was called 3/17 due to increased work of breathing with an SpO2 70/80's while on HFNC and NRB. Patient intubated 3/17 and transferred to COVID ICU for further assessment and management.   MICU course further complicated by ileus and intermittent hypoxia with lung noncompliance issues. Worsening metabolic acidosis, started on CRRT and sodium bicarbonate gtt.  Patient developed cardiac arrest with asystole.  Asystole after 10 minutes of chest compressions and epinephrine pushes.

## 2021-04-05 NOTE — CONSULT NOTE ADULT - ASSESSMENT
HPI: 60M PMHx recent COVID19 (3/5/21) - admitted for acute hypoxic respiratory failure 2/2 COVID Pneumonia (s/p remdesivir/decadron (3/8-3/17).  Hospital course c/b RLL medial segmental PE (started lovenox), worsening hypoxia s/p intubation on 3/17, SBO with ileus and E coli enterococcus bacteremia, KARINA, lactic acidosis.  Nephrology consulted for KARINA, acidosis.        # KARINA  Pt with oliguric KARINA likely 2/2 ATN in the setting of septic shock (E coli enterococcus), COVID19 PNA.  On admission sCr 0.8, worsened 0.5 to 1.29 on 4/4, worsening.  UA noted for trace protein, blood/rbc.  Currently intubated/sedated, multiple vasopressors with worsening acidosis/kidney function    Recommendations:  - pt will need dialysis CRRT/CVVHDF given refractory acidosis/fluid overload.  Will consent family  - BP optimization/antibiotic/blood transfusion per ICU team. Renally dose antibiotic  - monitor BMP, strict I/O, avoid nephrotoxic agents (NSAIDs, PPI, contrast), renally dose medications per GFR.    # Acidosis  likely 2/2 metabolic acidosis in the setting KARINA, lactic acidosis, hypoperfusion.  Last serum bicarbonate <10, pH 7.0/pCO2 48  - continue D5 w/ sodium bicarb drip until start CRRT/CVVHDF  - workup lactic acidosis per ICU team, monitor blood gas, serum bicarb    # Hyperkalemia  likely 2/2 acidosis, KARINA.  Last serum K 5.5  - continue bicarbonate based IVF for now, will plan start CRRT/CVVHDF.  Monitor serum potassium       HPI: 60M PMHx recent COVID19 (3/5/21) - admitted for acute hypoxic respiratory failure 2/2 COVID Pneumonia (s/p remdesivir/decadron (3/8-3/17).  Hospital course c/b RLL medial segmental PE (started lovenox), worsening hypoxia s/p intubation on 3/17, SBO with ileus and E coli enterococcus bacteremia, KARINA, lactic acidosis.  Nephrology consulted for KARINA, acidosis.        # KARINA  Pt with oliguric KARINA likely 2/2 ATN in the setting of septic shock (E coli enterococcus), COVID19 PNA.  On admission sCr 0.8, worsened 0.5 to 1.29 on 4/4, worsening.  UA noted for trace protein, blood/rbc.  Currently intubated/sedated, multiple vasopressors with worsening acidosis/kidney function    Recommendations:  - start CRRT/CVVHDF given refractory acidosis/fluid overload.  Family consented.  - BP optimization/antibiotic/blood transfusion per ICU team. Renally dose antibiotic  - monitor BMP, strict I/O, avoid nephrotoxic agents (NSAIDs, PPI, contrast), renally dose medications per GFR.    # Acidosis  likely 2/2 metabolic acidosis in the setting KARINA, lactic acidosis, hypoperfusion.  Last serum bicarbonate <10, pH 7.0/pCO2 48  - continue D5 w/ sodium bicarb drip, start CRRT/CVVHDF as outline above  - workup lactic acidosis per ICU team, monitor blood gas, serum bicarb    # Hyperkalemia  likely 2/2 acidosis, KARINA.  Last serum K 5.5  - continue bicarbonate based IVF for now, will start CRRT/CVVHDF.  Monitor serum potassium    Plan discussed with ICU  Nini Rosado  Nephrology Fellow  Pager NS: [phone]   (After 5 pm or on weekends please page the on-call fellow via [email or web address])

## 2021-04-07 LAB
-  CEFTAZIDIME: SIGNIFICANT CHANGE UP
-  LEVOFLOXACIN: SIGNIFICANT CHANGE UP
-  TRIMETHOPRIM/SULFAMETHOXAZOLE: SIGNIFICANT CHANGE UP
CULTURE RESULTS: SIGNIFICANT CHANGE UP
METHOD TYPE: SIGNIFICANT CHANGE UP
ORGANISM # SPEC MICROSCOPIC CNT: SIGNIFICANT CHANGE UP
ORGANISM # SPEC MICROSCOPIC CNT: SIGNIFICANT CHANGE UP
SPECIMEN SOURCE: SIGNIFICANT CHANGE UP

## 2023-01-25 NOTE — PROGRESS NOTE ADULT - PROBLEM SELECTOR PLAN 4
E.Coli, Enterococcus, Bacteroides bacteremia   -Likely GI source  -Abx per ID  -Now with ileus 2 seconds or less

## 2023-07-08 NOTE — RAPID RESPONSE TEAM SUMMARY - NSTRANSFERTYPERRT_GEN_ALL_CORE
MICU
Other
[facility] Beckett  Cardiovascular Disease  [address] [address] [facility] [address] [facility] [address], [address] [place] [address]  Phone: [phone]  Fax: [phone]  Follow Up Time:

## 2024-03-07 NOTE — PROGRESS NOTE ADULT - PROBLEM SELECTOR PLAN 3
- likely has underlying diabetes  - hyperglycemia will worsen with decadron  - A1C = 12.4   - Lantus and Admelog is started   - monitor blood glucose   - Endo consult with Dr Mcmanus is appreciated    - cont slidding scale   - Nutrition and DM educator consults [phone]

## 2025-06-13 NOTE — ED ADULT TRIAGE NOTE - PRO INTERPRETER NEED 2
Patient states he thinks his message was misunderstood. He wants to know if he has a pre-existing condition that would prevent him from having the surgery in an outpatient facility instead of a hospital. States okay to send response on [facility]. States he is agreeable to stress test but wants to know if clear from cardiac standpoint for outpatient facility    Routed to Dr. Pulido to advise    English